# Patient Record
Sex: MALE | Race: OTHER | NOT HISPANIC OR LATINO | URBAN - METROPOLITAN AREA
[De-identification: names, ages, dates, MRNs, and addresses within clinical notes are randomized per-mention and may not be internally consistent; named-entity substitution may affect disease eponyms.]

---

## 2019-07-19 ENCOUNTER — INPATIENT (INPATIENT)
Facility: HOSPITAL | Age: 44
LOS: 0 days | Discharge: LEFT BEFORE DISCHARGE | DRG: 603 | End: 2019-07-19
Attending: UROLOGY | Admitting: UROLOGY
Payer: SELF-PAY

## 2019-07-19 ENCOUNTER — TRANSCRIPTION ENCOUNTER (OUTPATIENT)
Age: 44
End: 2019-07-19

## 2019-07-19 VITALS
HEART RATE: 62 BPM | SYSTOLIC BLOOD PRESSURE: 101 MMHG | OXYGEN SATURATION: 100 % | RESPIRATION RATE: 18 BRPM | TEMPERATURE: 97 F | DIASTOLIC BLOOD PRESSURE: 74 MMHG

## 2019-07-19 VITALS
DIASTOLIC BLOOD PRESSURE: 75 MMHG | TEMPERATURE: 98 F | WEIGHT: 139.99 LBS | RESPIRATION RATE: 20 BRPM | HEART RATE: 80 BPM | OXYGEN SATURATION: 96 % | HEIGHT: 65 IN | SYSTOLIC BLOOD PRESSURE: 119 MMHG

## 2019-07-19 DIAGNOSIS — L03.314 CELLULITIS OF GROIN: ICD-10-CM

## 2019-07-19 LAB
ALBUMIN SERPL ELPH-MCNC: 3.5 G/DL — SIGNIFICANT CHANGE UP (ref 3.5–5)
ALP SERPL-CCNC: 64 U/L — SIGNIFICANT CHANGE UP (ref 40–120)
ALT FLD-CCNC: 22 U/L DA — SIGNIFICANT CHANGE UP (ref 10–60)
ANION GAP SERPL CALC-SCNC: 6 MMOL/L — SIGNIFICANT CHANGE UP (ref 5–17)
APPEARANCE UR: CLEAR — SIGNIFICANT CHANGE UP
AST SERPL-CCNC: 18 U/L — SIGNIFICANT CHANGE UP (ref 10–40)
BASOPHILS # BLD AUTO: 0.04 K/UL — SIGNIFICANT CHANGE UP (ref 0–0.2)
BASOPHILS NFR BLD AUTO: 0.3 % — SIGNIFICANT CHANGE UP (ref 0–2)
BILIRUB SERPL-MCNC: 0.3 MG/DL — SIGNIFICANT CHANGE UP (ref 0.2–1.2)
BILIRUB UR-MCNC: NEGATIVE — SIGNIFICANT CHANGE UP
BUN SERPL-MCNC: 18 MG/DL — SIGNIFICANT CHANGE UP (ref 7–18)
C TRACH RRNA SPEC QL NAA+PROBE: SIGNIFICANT CHANGE UP
CALCIUM SERPL-MCNC: 7.9 MG/DL — LOW (ref 8.4–10.5)
CHLORIDE SERPL-SCNC: 111 MMOL/L — HIGH (ref 96–108)
CO2 SERPL-SCNC: 25 MMOL/L — SIGNIFICANT CHANGE UP (ref 22–31)
COLOR SPEC: YELLOW — SIGNIFICANT CHANGE UP
CREAT SERPL-MCNC: 0.99 MG/DL — SIGNIFICANT CHANGE UP (ref 0.5–1.3)
DIFF PNL FLD: NEGATIVE — SIGNIFICANT CHANGE UP
EOSINOPHIL # BLD AUTO: 0.6 K/UL — HIGH (ref 0–0.5)
EOSINOPHIL NFR BLD AUTO: 4.8 % — SIGNIFICANT CHANGE UP (ref 0–6)
GLUCOSE SERPL-MCNC: 82 MG/DL — SIGNIFICANT CHANGE UP (ref 70–99)
GLUCOSE UR QL: NEGATIVE — SIGNIFICANT CHANGE UP
HCT VFR BLD CALC: 44 % — SIGNIFICANT CHANGE UP (ref 39–50)
HGB BLD-MCNC: 14.6 G/DL — SIGNIFICANT CHANGE UP (ref 13–17)
HIV 1+2 AB+HIV1 P24 AG SERPL QL IA: SIGNIFICANT CHANGE UP
IMM GRANULOCYTES NFR BLD AUTO: 0.4 % — SIGNIFICANT CHANGE UP (ref 0–1.5)
KETONES UR-MCNC: NEGATIVE — SIGNIFICANT CHANGE UP
LEUKOCYTE ESTERASE UR-ACNC: NEGATIVE — SIGNIFICANT CHANGE UP
LYMPHOCYTES # BLD AUTO: 1.09 K/UL — SIGNIFICANT CHANGE UP (ref 1–3.3)
LYMPHOCYTES # BLD AUTO: 8.8 % — LOW (ref 13–44)
MCHC RBC-ENTMCNC: 31 PG — SIGNIFICANT CHANGE UP (ref 27–34)
MCHC RBC-ENTMCNC: 33.2 GM/DL — SIGNIFICANT CHANGE UP (ref 32–36)
MCV RBC AUTO: 93.4 FL — SIGNIFICANT CHANGE UP (ref 80–100)
MONOCYTES # BLD AUTO: 0.79 K/UL — SIGNIFICANT CHANGE UP (ref 0–0.9)
MONOCYTES NFR BLD AUTO: 6.4 % — SIGNIFICANT CHANGE UP (ref 2–14)
N GONORRHOEA RRNA SPEC QL NAA+PROBE: SIGNIFICANT CHANGE UP
NEUTROPHILS # BLD AUTO: 9.81 K/UL — HIGH (ref 1.8–7.4)
NEUTROPHILS NFR BLD AUTO: 79.3 % — HIGH (ref 43–77)
NITRITE UR-MCNC: NEGATIVE — SIGNIFICANT CHANGE UP
NRBC # BLD: 0 /100 WBCS — SIGNIFICANT CHANGE UP (ref 0–0)
PH UR: 6 — SIGNIFICANT CHANGE UP (ref 5–8)
PLATELET # BLD AUTO: 214 K/UL — SIGNIFICANT CHANGE UP (ref 150–400)
POTASSIUM SERPL-MCNC: 4.3 MMOL/L — SIGNIFICANT CHANGE UP (ref 3.5–5.3)
POTASSIUM SERPL-SCNC: 4.3 MMOL/L — SIGNIFICANT CHANGE UP (ref 3.5–5.3)
PROT SERPL-MCNC: 6.5 G/DL — SIGNIFICANT CHANGE UP (ref 6–8.3)
PROT UR-MCNC: NEGATIVE — SIGNIFICANT CHANGE UP
RBC # BLD: 4.71 M/UL — SIGNIFICANT CHANGE UP (ref 4.2–5.8)
RBC # FLD: 12.7 % — SIGNIFICANT CHANGE UP (ref 10.3–14.5)
SODIUM SERPL-SCNC: 142 MMOL/L — SIGNIFICANT CHANGE UP (ref 135–145)
SP GR SPEC: 1.02 — SIGNIFICANT CHANGE UP (ref 1.01–1.02)
SPECIMEN SOURCE: SIGNIFICANT CHANGE UP
T PALLIDUM AB TITR SER: NEGATIVE — SIGNIFICANT CHANGE UP
UROBILINOGEN FLD QL: NEGATIVE — SIGNIFICANT CHANGE UP
WBC # BLD: 12.38 K/UL — HIGH (ref 3.8–10.5)
WBC # FLD AUTO: 12.38 K/UL — HIGH (ref 3.8–10.5)

## 2019-07-19 PROCEDURE — 36415 COLL VENOUS BLD VENIPUNCTURE: CPT

## 2019-07-19 PROCEDURE — 87491 CHLMYD TRACH DNA AMP PROBE: CPT

## 2019-07-19 PROCEDURE — 76870 US EXAM SCROTUM: CPT | Mod: 26

## 2019-07-19 PROCEDURE — 76870 US EXAM SCROTUM: CPT

## 2019-07-19 PROCEDURE — 86780 TREPONEMA PALLIDUM: CPT

## 2019-07-19 PROCEDURE — 87591 N.GONORRHOEAE DNA AMP PROB: CPT

## 2019-07-19 PROCEDURE — 80053 COMPREHEN METABOLIC PANEL: CPT

## 2019-07-19 PROCEDURE — 81003 URINALYSIS AUTO W/O SCOPE: CPT

## 2019-07-19 PROCEDURE — 99285 EMERGENCY DEPT VISIT HI MDM: CPT | Mod: 25

## 2019-07-19 PROCEDURE — 99285 EMERGENCY DEPT VISIT HI MDM: CPT

## 2019-07-19 PROCEDURE — 85027 COMPLETE CBC AUTOMATED: CPT

## 2019-07-19 PROCEDURE — 87389 HIV-1 AG W/HIV-1&-2 AB AG IA: CPT

## 2019-07-19 RX ORDER — BUDESONIDE AND FORMOTEROL FUMARATE DIHYDRATE 160; 4.5 UG/1; UG/1
2 AEROSOL RESPIRATORY (INHALATION)
Refills: 0 | Status: DISCONTINUED | OUTPATIENT
Start: 2019-07-19 | End: 2019-07-19

## 2019-07-19 RX ORDER — SODIUM CHLORIDE 9 MG/ML
1000 INJECTION INTRAMUSCULAR; INTRAVENOUS; SUBCUTANEOUS
Refills: 0 | Status: DISCONTINUED | OUTPATIENT
Start: 2019-07-19 | End: 2019-07-19

## 2019-07-19 RX ORDER — TIOTROPIUM BROMIDE 18 UG/1
1 CAPSULE ORAL; RESPIRATORY (INHALATION) DAILY
Refills: 0 | Status: DISCONTINUED | OUTPATIENT
Start: 2019-07-19 | End: 2019-07-19

## 2019-07-19 RX ORDER — VANCOMYCIN HCL 1 G
1500 VIAL (EA) INTRAVENOUS ONCE
Refills: 0 | Status: COMPLETED | OUTPATIENT
Start: 2019-07-19 | End: 2019-07-19

## 2019-07-19 RX ORDER — ALBUTEROL 90 UG/1
2 AEROSOL, METERED ORAL EVERY 6 HOURS
Refills: 0 | Status: DISCONTINUED | OUTPATIENT
Start: 2019-07-19 | End: 2019-07-19

## 2019-07-19 RX ORDER — HEPARIN SODIUM 5000 [USP'U]/ML
5000 INJECTION INTRAVENOUS; SUBCUTANEOUS EVERY 8 HOURS
Refills: 0 | Status: DISCONTINUED | OUTPATIENT
Start: 2019-07-19 | End: 2019-07-19

## 2019-07-19 RX ORDER — FLUTICASONE PROPIONATE 50 MCG
1 SPRAY, SUSPENSION NASAL
Refills: 0 | Status: DISCONTINUED | OUTPATIENT
Start: 2019-07-19 | End: 2019-07-19

## 2019-07-19 RX ORDER — LORATADINE 10 MG/1
10 TABLET ORAL DAILY
Refills: 0 | Status: DISCONTINUED | OUTPATIENT
Start: 2019-07-19 | End: 2019-07-19

## 2019-07-19 RX ORDER — PIPERACILLIN AND TAZOBACTAM 4; .5 G/20ML; G/20ML
3.38 INJECTION, POWDER, LYOPHILIZED, FOR SOLUTION INTRAVENOUS ONCE
Refills: 0 | Status: COMPLETED | OUTPATIENT
Start: 2019-07-19 | End: 2019-07-19

## 2019-07-19 RX ORDER — DIPHENHYDRAMINE HCL 50 MG
25 CAPSULE ORAL ONCE
Refills: 0 | Status: DISCONTINUED | OUTPATIENT
Start: 2019-07-19 | End: 2019-07-19

## 2019-07-19 RX ORDER — MONTELUKAST 4 MG/1
10 TABLET, CHEWABLE ORAL DAILY
Refills: 0 | Status: DISCONTINUED | OUTPATIENT
Start: 2019-07-19 | End: 2019-07-19

## 2019-07-19 RX ADMIN — Medication 300 MILLIGRAM(S): at 10:21

## 2019-07-19 RX ADMIN — PIPERACILLIN AND TAZOBACTAM 200 GRAM(S): 4; .5 INJECTION, POWDER, LYOPHILIZED, FOR SOLUTION INTRAVENOUS at 12:10

## 2019-07-19 RX ADMIN — PIPERACILLIN AND TAZOBACTAM 3.38 GRAM(S): 4; .5 INJECTION, POWDER, LYOPHILIZED, FOR SOLUTION INTRAVENOUS at 12:40

## 2019-07-19 RX ADMIN — Medication 1500 MILLIGRAM(S): at 12:00

## 2019-07-19 NOTE — H&P ADULT - NSHPLABSRESULTS_GEN_ALL_CORE
14.6   12.38 )-----------( 214      ( 19 Jul 2019 09:28 )             44.0   07-19    142  |  111<H>  |  18  ----------------------------<  82  4.3   |  25  |  0.99    Ca    7.9<L>      19 Jul 2019 09:28    TPro  6.5  /  Alb  3.5  /  TBili  0.3  /  DBili  x   /  AST  18  /  ALT  22  /  AlkPhos  64  07-19  < from: US Testicles (07.19.19 @ 09:57) >    RIGHT:  Right testicle measures 4.1 x 1.9 x 3.3cm. No testicular cyst or mass   seen.  Right epididymis is within normal limits.  Normal Doppler flow to the right testis - no right testicular torsion   noted.  Mild right hydrocele is noted.  No right-sided varicocele.  There is right scrotal wall thickening (1.0cm) which may be due to   inflammatory process.    LEFT:   The left testis measures 3.7 x 1.9 x 3.6cm. No left testicular cyst or   mass.  The left epididymis is within normal limits.  There is normal Doppler flow to the left testis - no left testicular   torsion is noted.  Left-sided hydrocele is present.  No left-sided varicocele.  There is left scrotal wall thickening (9mm) -which may be due to   inflammatory process.    Miscellaneous:  The penis was scanned. There is generalized penile edema of uncertain   etiology - ?secondary to inflammatory process.    IMPRESSION:   1. No bilateral testicular cyst or mass.   2. No bilateral testicular torsion.  3. Mild bilateral hydroceles.  4. Scrotal wall thickening - right more than left - may be due to   inflammatory process.  5. Generalized penile edema of uncertain etiology - may be inflammatory.  6. Recommend urological consult to further assess. Short interval   followup sonogram (within three months) is recommended to reassess.   This report to ER via PACs system.    < end of copied text >

## 2019-07-19 NOTE — H&P ADULT - HISTORY OF PRESENT ILLNESS
44 y/o M with a significant PMHx of asthma presents to the ED with complaints of rash to penis and groin. Patient states he was in [place] 10 days ago, where he was exposed to poison ivy. Patient states swelling to penis and testicle have increased over the past day with associated redness. He reports applying an unknown ointment yesterday morning and the redness and swelling became worse. Denies difficulty urinating, history of STDs or syphilis, fever, chills or any other acute complaints.

## 2019-07-19 NOTE — ED ADULT NURSE REASSESSMENT NOTE - NS ED NURSE REASSESS COMMENT FT1
patient wanted to leave AMA, risks and benefits explained by DR Patel, still insist going, patient says I don't have any infection, iv lock removed, walked out of ed with out waiting for signing AMA form.

## 2019-07-19 NOTE — ED ADULT TRIAGE NOTE - CHIEF COMPLAINT QUOTE
Redness, swelling to penis and rash to both legs s/p exposure to Poison Ivy x 10 days. Redness, swelling to penis and rash to both legs s/p exposure to Poison Ivy x 10 days. P/s unable to urinate.

## 2019-07-19 NOTE — H&P ADULT - PROBLEM SELECTOR PLAN 1
1) admit to surgery Dr. Hui  2) IV antibiotics, zosyn  3) ID consult, Dr. Tyson  4) monitor scrotal and penile skin changes  5) benadryl  7) case and plan discussed with Dr. Hui and agrees

## 2019-07-19 NOTE — ED PROVIDER NOTE - CARE PLAN
Principal Discharge DX:	Cellulitis of groin  Secondary Diagnosis:	Rash  Secondary Diagnosis:	Penile swelling

## 2019-07-19 NOTE — ED PROVIDER NOTE - PROGRESS NOTE DETAILS
Pt adamant to leave, states he "has to collect paycheck", left prior to signing AMA. Risks of leaving stated to pt including worsening of infection/death.    Patient desires to leave emergency department against medical advice, prior to completion of my desired evaluation and treatment plan.  Patient's mental status is normal, patient is fully alert and oriented x person, place and time.  Patient demonstrates clear reasoning capabilities and capacity to make this decision.  Patient fully understands the explained risks involved with this decision including worsening of medical condition, missed and or delayed diagnosis, permanent disability and death.  All alternative options given to patient and still desires discharge against medical advice from ED and will follow up as an outpatient.  Patient given the option to return to ED at any time to have further evaluation and treatment.

## 2019-07-19 NOTE — H&P ADULT - NSHPPHYSICALEXAM_GEN_ALL_CORE
Gen: A&Ox2, NAD  Skin: no rashes, no jaundice  Abdomen: soft, NT, ND, no suprapubic tenderness  : normal external genitalia, penile erythema and edema with skin thickening, tenderness, no drainage, no crepitus, no abscess collection, scrotum with mild skin erythema, no palpable masses or areas of induration, no crepitus, no abscess collection

## 2019-07-19 NOTE — ED PROVIDER NOTE - OBJECTIVE STATEMENT
44 y/o M with a significant PMHx of asthma presents to the ED with complaints of redness/swelling to penis. Patient states he came into contact with poison ivy 10 days ago that started on his legs. Patient reports noticing another bump last night on R forearm. Patient then notes swelling and redness to penis that became very irritated last night. Patient reports being in [place] recently, where he believed he came into contact with poison ivy. Patient notes using lotion with no relief. Denies being sexually active. 44 y/o M with a significant PMHx of asthma presents to the ED with complaints of rash to penis and groin. Patient states he was in [place] 10 days ago, where he was exposed to poison ivy. Patient states swelling to penis and testicle have increased over the past day with associated redness. Denies difficulty urinating, history of STDs or syphilis, fever, chills or any other acute complaints.

## 2019-07-19 NOTE — ED ADULT NURSE NOTE - CHIEF COMPLAINT QUOTE
Redness, swelling to penis and rash to both legs s/p exposure to Poison Ivy x 10 days. P/s unable to urinate.

## 2019-07-19 NOTE — ED ADULT NURSE NOTE - OBJECTIVE STATEMENT
p[resented to ed c/o penile swelling and redness, patient said he got exposed to poison ivy 10 days ago

## 2019-07-19 NOTE — ED PROVIDER NOTE - CLINICAL SUMMARY MEDICAL DECISION MAKING FREE TEXT BOX
44 y/o M presents to the ED with rash and swelling to groin and history of ? poison ivy. Will obtain labs, US, urology consult and give 1 dose of antibiotics. 44 y/o M presents to the ED with rash and swelling to groin and history of ? poison ivy. Will obtain labs, US, urology consult and give 1 dose of antibiotics. no evidence of Kaylee's presently.

## 2019-10-21 ENCOUNTER — EMERGENCY (EMERGENCY)
Facility: HOSPITAL | Age: 44
LOS: 1 days | Discharge: HOME | End: 2019-10-21
Attending: EMERGENCY MEDICINE | Admitting: EMERGENCY MEDICINE
Payer: MEDICAID

## 2019-10-21 VITALS
RESPIRATION RATE: 18 BRPM | SYSTOLIC BLOOD PRESSURE: 141 MMHG | HEART RATE: 100 BPM | OXYGEN SATURATION: 99 % | DIASTOLIC BLOOD PRESSURE: 96 MMHG

## 2019-10-21 VITALS
TEMPERATURE: 98 F | HEART RATE: 59 BPM | DIASTOLIC BLOOD PRESSURE: 96 MMHG | OXYGEN SATURATION: 98 % | RESPIRATION RATE: 18 BRPM | SYSTOLIC BLOOD PRESSURE: 146 MMHG

## 2019-10-21 LAB
ALBUMIN SERPL ELPH-MCNC: 4.6 G/DL — SIGNIFICANT CHANGE UP (ref 3.3–5)
ALP SERPL-CCNC: 77 U/L — SIGNIFICANT CHANGE UP (ref 40–120)
ALT FLD-CCNC: 19 U/L — SIGNIFICANT CHANGE UP (ref 4–41)
AMPHET UR-MCNC: NEGATIVE — SIGNIFICANT CHANGE UP
ANION GAP SERPL CALC-SCNC: 15 MMO/L — HIGH (ref 7–14)
APAP SERPL-MCNC: < 15 UG/ML — LOW (ref 15–25)
APPEARANCE UR: CLEAR — SIGNIFICANT CHANGE UP
AST SERPL-CCNC: 28 U/L — SIGNIFICANT CHANGE UP (ref 4–40)
BACTERIA # UR AUTO: NEGATIVE — SIGNIFICANT CHANGE UP
BARBITURATES UR SCN-MCNC: NEGATIVE — SIGNIFICANT CHANGE UP
BASOPHILS # BLD AUTO: 0.07 K/UL — SIGNIFICANT CHANGE UP (ref 0–0.2)
BASOPHILS NFR BLD AUTO: 0.7 % — SIGNIFICANT CHANGE UP (ref 0–2)
BENZODIAZ UR-MCNC: NEGATIVE — SIGNIFICANT CHANGE UP
BILIRUB SERPL-MCNC: < 0.2 MG/DL — LOW (ref 0.2–1.2)
BILIRUB UR-MCNC: NEGATIVE — SIGNIFICANT CHANGE UP
BLOOD UR QL VISUAL: NEGATIVE — SIGNIFICANT CHANGE UP
BUN SERPL-MCNC: 25 MG/DL — HIGH (ref 7–23)
CALCIUM SERPL-MCNC: 9.4 MG/DL — SIGNIFICANT CHANGE UP (ref 8.4–10.5)
CANNABINOIDS UR-MCNC: POSITIVE — SIGNIFICANT CHANGE UP
CHLORIDE SERPL-SCNC: 102 MMOL/L — SIGNIFICANT CHANGE UP (ref 98–107)
CO2 SERPL-SCNC: 24 MMOL/L — SIGNIFICANT CHANGE UP (ref 22–31)
COCAINE METAB.OTHER UR-MCNC: NEGATIVE — SIGNIFICANT CHANGE UP
COLOR SPEC: YELLOW — SIGNIFICANT CHANGE UP
CREAT SERPL-MCNC: 1.05 MG/DL — SIGNIFICANT CHANGE UP (ref 0.5–1.3)
EOSINOPHIL # BLD AUTO: 0.11 K/UL — SIGNIFICANT CHANGE UP (ref 0–0.5)
EOSINOPHIL NFR BLD AUTO: 1.1 % — SIGNIFICANT CHANGE UP (ref 0–6)
ETHANOL BLD-MCNC: < 10 MG/DL — SIGNIFICANT CHANGE UP
GLUCOSE SERPL-MCNC: 85 MG/DL — SIGNIFICANT CHANGE UP (ref 70–99)
GLUCOSE UR-MCNC: NEGATIVE — SIGNIFICANT CHANGE UP
HCT VFR BLD CALC: 49.3 % — SIGNIFICANT CHANGE UP (ref 39–50)
HGB BLD-MCNC: 16.1 G/DL — SIGNIFICANT CHANGE UP (ref 13–17)
HYALINE CASTS # UR AUTO: NEGATIVE — SIGNIFICANT CHANGE UP
IMM GRANULOCYTES NFR BLD AUTO: 0.3 % — SIGNIFICANT CHANGE UP (ref 0–1.5)
KETONES UR-MCNC: NEGATIVE — SIGNIFICANT CHANGE UP
LEUKOCYTE ESTERASE UR-ACNC: NEGATIVE — SIGNIFICANT CHANGE UP
LYMPHOCYTES # BLD AUTO: 1.37 K/UL — SIGNIFICANT CHANGE UP (ref 1–3.3)
LYMPHOCYTES # BLD AUTO: 13.5 % — SIGNIFICANT CHANGE UP (ref 13–44)
MCHC RBC-ENTMCNC: 30.5 PG — SIGNIFICANT CHANGE UP (ref 27–34)
MCHC RBC-ENTMCNC: 32.7 % — SIGNIFICANT CHANGE UP (ref 32–36)
MCV RBC AUTO: 93.4 FL — SIGNIFICANT CHANGE UP (ref 80–100)
METHADONE UR-MCNC: NEGATIVE — SIGNIFICANT CHANGE UP
MONOCYTES # BLD AUTO: 1.12 K/UL — HIGH (ref 0–0.9)
MONOCYTES NFR BLD AUTO: 11 % — SIGNIFICANT CHANGE UP (ref 2–14)
NEUTROPHILS # BLD AUTO: 7.46 K/UL — HIGH (ref 1.8–7.4)
NEUTROPHILS NFR BLD AUTO: 73.4 % — SIGNIFICANT CHANGE UP (ref 43–77)
NITRITE UR-MCNC: NEGATIVE — SIGNIFICANT CHANGE UP
NRBC # FLD: 0 K/UL — SIGNIFICANT CHANGE UP (ref 0–0)
OPIATES UR-MCNC: NEGATIVE — SIGNIFICANT CHANGE UP
OXYCODONE UR-MCNC: NEGATIVE — SIGNIFICANT CHANGE UP
PCP UR-MCNC: NEGATIVE — SIGNIFICANT CHANGE UP
PH UR: 6 — SIGNIFICANT CHANGE UP (ref 5–8)
PLATELET # BLD AUTO: 267 K/UL — SIGNIFICANT CHANGE UP (ref 150–400)
PMV BLD: 10.7 FL — SIGNIFICANT CHANGE UP (ref 7–13)
POTASSIUM SERPL-MCNC: 5.1 MMOL/L — SIGNIFICANT CHANGE UP (ref 3.5–5.3)
POTASSIUM SERPL-SCNC: 5.1 MMOL/L — SIGNIFICANT CHANGE UP (ref 3.5–5.3)
PROT SERPL-MCNC: 7.5 G/DL — SIGNIFICANT CHANGE UP (ref 6–8.3)
PROT UR-MCNC: 20 — SIGNIFICANT CHANGE UP
RBC # BLD: 5.28 M/UL — SIGNIFICANT CHANGE UP (ref 4.2–5.8)
RBC # FLD: 13.1 % — SIGNIFICANT CHANGE UP (ref 10.3–14.5)
RBC CASTS # UR COMP ASSIST: SIGNIFICANT CHANGE UP (ref 0–?)
SALICYLATES SERPL-MCNC: < 5 MG/DL — LOW (ref 15–30)
SODIUM SERPL-SCNC: 141 MMOL/L — SIGNIFICANT CHANGE UP (ref 135–145)
SP GR SPEC: 1.03 — SIGNIFICANT CHANGE UP (ref 1–1.04)
SQUAMOUS # UR AUTO: SIGNIFICANT CHANGE UP
UROBILINOGEN FLD QL: NORMAL — SIGNIFICANT CHANGE UP
VALPROATE SERPL-MCNC: 15.2 UG/ML — LOW (ref 50–100)
WBC # BLD: 10.16 K/UL — SIGNIFICANT CHANGE UP (ref 3.8–10.5)
WBC # FLD AUTO: 10.16 K/UL — SIGNIFICANT CHANGE UP (ref 3.8–10.5)
WBC UR QL: SIGNIFICANT CHANGE UP (ref 0–?)

## 2019-10-21 PROCEDURE — 99283 EMERGENCY DEPT VISIT LOW MDM: CPT

## 2019-10-21 NOTE — ED PROVIDER NOTE - PROGRESS NOTE DETAILS
Jasmin NP- pt calm,  cooprative no behavioural distress, denies SI/ HI, AH, VH. Med clearance done pt will be discharge and follow up outpatient. Jasmin NP- collateral info from Mallroy Brown who is follow up SW at [facility], states pt was recently hospitalized and has a follow up appointment with them. Char: pt signed out to me by DR Hernández pending labs and discussion with family. Family corroborates story and have no concerns. PT has no SI/HI or hallucinations. pt also has outpatient f/u will d/c home.

## 2019-10-21 NOTE — ED PROVIDER NOTE - NSFOLLOWUPCLINICS_GEN_ALL_ED_FT
[facility]  Behavioral Health  [address] [place] [address]  Phone: [phone]  Fax:   Follow Up Time:
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
## 2019-10-21 NOTE — ED PROVIDER NOTE - PATIENT PORTAL LINK FT
You can access the FollowMyHealth Patient Portal offered by [facility] by registering at the following website: [email or web address]. By joining [facility]’s FollowMyHealth portal, you will also be able to view your health information using other applications (apps) compatible with our system.

## 2019-10-21 NOTE — ED BEHAVIORAL HEALTH NOTE - BEHAVIORAL HEALTH NOTE
Worker spoke with patient’s mother Yanely Titus ([phone]) for collateral information. All information is as per Ms. Titus:  Ms. Titus states that she was not aware of the patient presenting to the emergency room today. She states that the patient was recently admitted to [facility] from 10/9-10/18 because of his Bipolar diagnosis. She states that the patient has been doing well since his discharge from [facility] and he told her yesterday that he had an appointment today for his outpatient at [facility] and a job interview. She states that the patient has been compliant with his medications. She states that the patient has a history of using cocaine but is not sure if he is using now. She states that the patient does not have a good relationship with his father and she is not sure if he was with the father today because she has not spoken to him for 18 years. She states she is not aware of the patient having any SI/HI thoughts and today he ate breakfast and seemed as if he was doing well. She states that the patient was supposed to have an outpatient apt at 1pm. Case discussed with SINDY Castellanos. Worker spoke with patient’s mother Yanely Titus ([phone]) for collateral information. All information is as per Ms. Titus:  Ms. Titus states that she was not aware of the patient presenting to the emergency room today. She states that the patient was recently admitted to [facility] from 10/9-10/18 because of his Bipolar diagnosis. She states that the patient has been doing well since his discharge from [facility] and he told her yesterday that he had an appointment today for his outpatient at [facility] and a job interview. She states that the patient has been compliant with his medications. She states that the patient has a history of using cocaine but is not sure if he is using now. She states that the patient does not have a good relationship with his father and she is not sure if he was with the father today because she has not spoken to him for 18 years. She states she is not aware of the patient having any SI/HI thoughts and today he ate breakfast and seemed as if he was doing well. She states that the patient was supposed to have an outpatient apt at 1pm. Case discussed with SINDY Castellanos. Worker provided metro card for patient upon discharge.

## 2019-10-21 NOTE — ED PROVIDER NOTE - NS_ ATTENDINGSCRIBEDETAILS _ED_A_ED_FT
Pt signed out pt SINDY Castellanos to follow up psych recommendations. Initial evaluation and plan by me.
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
## 2019-10-21 NOTE — ED PROVIDER NOTE - CONSTITUTIONAL, MLM
normal... Very well dressed and groomed. Wearing a suit and tie. Well appearing, well nourished, awake, alert, oriented to person, place, time/situation and in no apparent distress.
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
## 2019-11-01 ENCOUNTER — OUTPATIENT (OUTPATIENT)
Dept: OUTPATIENT SERVICES | Facility: HOSPITAL | Age: 44
LOS: 1 days | End: 2019-11-01
Payer: MEDICAID

## 2019-11-01 PROCEDURE — G9001: CPT

## 2019-11-11 DIAGNOSIS — Z71.89 OTHER SPECIFIED COUNSELING: ICD-10-CM

## 2020-06-18 NOTE — ED ADULT NURSE NOTE - CAS DISCH ACCOMP BY
Implemented All Universal Safety Interventions:  [place] to call system. Call bell, personal items and telephone within reach. Instruct patient to call for assistance. Room bathroom lighting operational. Non-slip footwear when patient is off stretcher. Physically safe environment: no spills, clutter or unnecessary equipment. Stretcher in lowest position, wheels locked, appropriate side rails in place. Self

## 2021-02-21 ENCOUNTER — INPATIENT (INPATIENT)
Facility: HOSPITAL | Age: 46
LOS: 2 days | Discharge: HOME | DRG: 896 | End: 2021-02-24
Attending: GENERAL PRACTICE | Admitting: GENERAL PRACTICE
Payer: MEDICAID

## 2021-02-21 VITALS
HEIGHT: 65 IN | SYSTOLIC BLOOD PRESSURE: 170 MMHG | OXYGEN SATURATION: 100 % | WEIGHT: 149.91 LBS | DIASTOLIC BLOOD PRESSURE: 110 MMHG | RESPIRATION RATE: 30 BRPM | HEART RATE: 119 BPM

## 2021-02-21 DIAGNOSIS — J96.01 ACUTE RESPIRATORY FAILURE WITH HYPOXIA: ICD-10-CM

## 2021-02-21 LAB
ANION GAP SERPL CALC-SCNC: 5 MMOL/L — SIGNIFICANT CHANGE UP (ref 5–17)
BASE EXCESS BLDA CALC-SCNC: -4.8 MMOL/L — LOW (ref -2–2)
BASOPHILS # BLD AUTO: 0.07 K/UL — SIGNIFICANT CHANGE UP (ref 0–0.2)
BASOPHILS NFR BLD AUTO: 0.6 % — SIGNIFICANT CHANGE UP (ref 0–2)
BLOOD GAS COMMENTS ARTERIAL: SIGNIFICANT CHANGE UP
BUN SERPL-MCNC: 24 MG/DL — HIGH (ref 7–18)
CALCIUM SERPL-MCNC: 8.6 MG/DL — SIGNIFICANT CHANGE UP (ref 8.4–10.5)
CHLORIDE SERPL-SCNC: 104 MMOL/L — SIGNIFICANT CHANGE UP (ref 96–108)
CO2 SERPL-SCNC: 30 MMOL/L — SIGNIFICANT CHANGE UP (ref 22–31)
CREAT SERPL-MCNC: 1.14 MG/DL — SIGNIFICANT CHANGE UP (ref 0.5–1.3)
EOSINOPHIL # BLD AUTO: 1.48 K/UL — HIGH (ref 0–0.5)
EOSINOPHIL NFR BLD AUTO: 12.2 % — HIGH (ref 0–6)
GLUCOSE SERPL-MCNC: 170 MG/DL — HIGH (ref 70–99)
HCO3 BLDA-SCNC: 23 MMOL/L — SIGNIFICANT CHANGE UP (ref 23–27)
HCT VFR BLD CALC: 48.3 % — SIGNIFICANT CHANGE UP (ref 39–50)
HGB BLD-MCNC: 14.8 G/DL — SIGNIFICANT CHANGE UP (ref 13–17)
HOROWITZ INDEX BLDA+IHG-RTO: 100 — SIGNIFICANT CHANGE UP
IMM GRANULOCYTES NFR BLD AUTO: 0.2 % — SIGNIFICANT CHANGE UP (ref 0–1.5)
LYMPHOCYTES # BLD AUTO: 1.83 K/UL — SIGNIFICANT CHANGE UP (ref 1–3.3)
LYMPHOCYTES # BLD AUTO: 15 % — SIGNIFICANT CHANGE UP (ref 13–44)
MAGNESIUM SERPL-MCNC: 2.2 MG/DL — SIGNIFICANT CHANGE UP (ref 1.6–2.6)
MCHC RBC-ENTMCNC: 27.9 PG — SIGNIFICANT CHANGE UP (ref 27–34)
MCHC RBC-ENTMCNC: 30.6 GM/DL — LOW (ref 32–36)
MCV RBC AUTO: 91 FL — SIGNIFICANT CHANGE UP (ref 80–100)
MONOCYTES # BLD AUTO: 0.92 K/UL — HIGH (ref 0–0.9)
MONOCYTES NFR BLD AUTO: 7.6 % — SIGNIFICANT CHANGE UP (ref 2–14)
NEUTROPHILS # BLD AUTO: 7.83 K/UL — HIGH (ref 1.8–7.4)
NEUTROPHILS NFR BLD AUTO: 64.4 % — SIGNIFICANT CHANGE UP (ref 43–77)
NRBC # BLD: 0 /100 WBCS — SIGNIFICANT CHANGE UP (ref 0–0)
PCO2 BLDA: 54 MMHG — HIGH (ref 32–46)
PCP SPEC-MCNC: SIGNIFICANT CHANGE UP
PH BLDA: 7.24 — LOW (ref 7.35–7.45)
PLATELET # BLD AUTO: 375 K/UL — SIGNIFICANT CHANGE UP (ref 150–400)
PO2 BLDA: 361 MMHG — HIGH (ref 74–108)
POTASSIUM SERPL-MCNC: 4 MMOL/L — SIGNIFICANT CHANGE UP (ref 3.5–5.3)
POTASSIUM SERPL-SCNC: 4 MMOL/L — SIGNIFICANT CHANGE UP (ref 3.5–5.3)
RAPID RVP RESULT: SIGNIFICANT CHANGE UP
RBC # BLD: 5.31 M/UL — SIGNIFICANT CHANGE UP (ref 4.2–5.8)
RBC # FLD: 13.1 % — SIGNIFICANT CHANGE UP (ref 10.3–14.5)
SAO2 % BLDA: 99 % — HIGH (ref 92–96)
SARS-COV-2 IGG SERPL QL IA: POSITIVE
SARS-COV-2 IGM SERPL IA-ACNC: 34.6 INDEX — HIGH
SARS-COV-2 RNA SPEC QL NAA+PROBE: SIGNIFICANT CHANGE UP
SODIUM SERPL-SCNC: 139 MMOL/L — SIGNIFICANT CHANGE UP (ref 135–145)
WBC # BLD: 12.16 K/UL — HIGH (ref 3.8–10.5)
WBC # FLD AUTO: 12.16 K/UL — HIGH (ref 3.8–10.5)

## 2021-02-21 PROCEDURE — 71045 X-RAY EXAM CHEST 1 VIEW: CPT | Mod: 26

## 2021-02-21 PROCEDURE — 99291 CRITICAL CARE FIRST HOUR: CPT

## 2021-02-21 RX ORDER — BUDESONIDE AND FORMOTEROL FUMARATE DIHYDRATE 160; 4.5 UG/1; UG/1
2 AEROSOL RESPIRATORY (INHALATION)
Refills: 0 | Status: DISCONTINUED | OUTPATIENT
Start: 2021-02-21 | End: 2021-02-24

## 2021-02-21 RX ORDER — IPRATROPIUM/ALBUTEROL SULFATE 18-103MCG
3 AEROSOL WITH ADAPTER (GRAM) INHALATION EVERY 6 HOURS
Refills: 0 | Status: DISCONTINUED | OUTPATIENT
Start: 2021-02-21 | End: 2021-02-21

## 2021-02-21 RX ORDER — MAGNESIUM SULFATE 500 MG/ML
2 VIAL (ML) INJECTION ONCE
Refills: 0 | Status: COMPLETED | OUTPATIENT
Start: 2021-02-21 | End: 2021-02-21

## 2021-02-21 RX ORDER — ALBUTEROL 90 UG/1
2.5 AEROSOL, METERED ORAL
Refills: 0 | Status: DISCONTINUED | OUTPATIENT
Start: 2021-02-21 | End: 2021-02-21

## 2021-02-21 RX ORDER — ENOXAPARIN SODIUM 100 MG/ML
40 INJECTION SUBCUTANEOUS DAILY
Refills: 0 | Status: DISCONTINUED | OUTPATIENT
Start: 2021-02-21 | End: 2021-02-24

## 2021-02-21 RX ORDER — TIOTROPIUM BROMIDE 18 UG/1
1 CAPSULE ORAL; RESPIRATORY (INHALATION) DAILY
Refills: 0 | Status: DISCONTINUED | OUTPATIENT
Start: 2021-02-21 | End: 2021-02-24

## 2021-02-21 RX ORDER — PANTOPRAZOLE SODIUM 20 MG/1
40 TABLET, DELAYED RELEASE ORAL DAILY
Refills: 0 | Status: DISCONTINUED | OUTPATIENT
Start: 2021-02-21 | End: 2021-02-23

## 2021-02-21 RX ORDER — IPRATROPIUM/ALBUTEROL SULFATE 18-103MCG
3 AEROSOL WITH ADAPTER (GRAM) INHALATION ONCE
Refills: 0 | Status: COMPLETED | OUTPATIENT
Start: 2021-02-21 | End: 2021-02-21

## 2021-02-21 RX ORDER — LORATADINE 10 MG/1
10 TABLET ORAL DAILY
Refills: 0 | Status: DISCONTINUED | OUTPATIENT
Start: 2021-02-21 | End: 2021-02-24

## 2021-02-21 RX ORDER — MONTELUKAST 4 MG/1
10 TABLET, CHEWABLE ORAL DAILY
Refills: 0 | Status: DISCONTINUED | OUTPATIENT
Start: 2021-02-21 | End: 2021-02-24

## 2021-02-21 RX ORDER — ALBUTEROL 90 UG/1
1 AEROSOL, METERED ORAL EVERY 4 HOURS
Refills: 0 | Status: COMPLETED | OUTPATIENT
Start: 2021-02-21 | End: 2022-01-20

## 2021-02-21 RX ORDER — ALBUTEROL 90 UG/1
1 AEROSOL, METERED ORAL EVERY 4 HOURS
Refills: 0 | Status: DISCONTINUED | OUTPATIENT
Start: 2021-02-21 | End: 2021-02-24

## 2021-02-21 RX ADMIN — Medication 3 MILLILITER(S): at 10:50

## 2021-02-21 RX ADMIN — PANTOPRAZOLE SODIUM 40 MILLIGRAM(S): 20 TABLET, DELAYED RELEASE ORAL at 14:21

## 2021-02-21 RX ADMIN — Medication 125 MILLIGRAM(S): at 11:03

## 2021-02-21 RX ADMIN — ALBUTEROL 2.5 MILLIGRAM(S): 90 AEROSOL, METERED ORAL at 10:40

## 2021-02-21 RX ADMIN — ALBUTEROL 1 PUFF(S): 90 AEROSOL, METERED ORAL at 23:36

## 2021-02-21 RX ADMIN — Medication 3 MILLILITER(S): at 11:05

## 2021-02-21 RX ADMIN — Medication 1 MILLIGRAM(S): at 21:58

## 2021-02-21 RX ADMIN — Medication 40 MILLIGRAM(S): at 17:24

## 2021-02-21 RX ADMIN — Medication 200 GRAM(S): at 10:45

## 2021-02-21 RX ADMIN — Medication 0.25 MILLIGRAM(S): at 11:35

## 2021-02-21 RX ADMIN — ENOXAPARIN SODIUM 40 MILLIGRAM(S): 100 INJECTION SUBCUTANEOUS at 14:21

## 2021-02-21 RX ADMIN — Medication 40 MILLIGRAM(S): at 23:34

## 2021-02-21 NOTE — H&P ADULT - NSHPSOCIALHISTORY_GEN_ALL_CORE
former smoker and former alcohol intake   Denies any present alcohol abuse, denies smoking presently, Reports passive smoking, mother smokes   Lives at home with his mother, who smokes

## 2021-02-21 NOTE — H&P ADULT - HISTORY OF PRESENT ILLNESS
44 yo male with PMH of Asthma, never intubated or on BIPAP for asthma in past, presented with SOB and mild wheezing since yesterday. Pt reports following up with his pulmonologist and was prescribed steroids but reports that he was waiting for steroids to be delivered and did not take any dose yet.  No fever, cough, cp, ap, n/v/d. Last admitted for asthma more than 1 year ago. Denies any sick contact.  44 yo male with PMH of Asthma, never intubated or on BIPAP for asthma in past, presented with SOB and mild wheezing since yesterday. Pt reports following up with his pulmonologist and was prescribed steroids but reports that he was waiting for steroids to be delivered and did not take any dose yet.  No fever, cough, cp, ap, n/v/d. Last admitted for asthma more than 1 year ago. Denies any sick contact.  Pt reports feeling better after medication in ED and started on Bipap, and noted to be tachycardic on Tele monitor at bedside.

## 2021-02-21 NOTE — ED PROVIDER NOTE - CLINICAL SUMMARY MEDICAL DECISION MAKING FREE TEXT BOX
Patient with severe asthma exacerbation requiring bipap. Needs ICU admission for airway monitoring. Patient with severe asthma exacerbation requiring bipap. Needs ICU admission for airway monitoring.    Critical care time: 60 minutes. Additional history taking, interpretation of results, consultation with other physicians.

## 2021-02-21 NOTE — ED ADULT NURSE NOTE - OBJECTIVE STATEMENT
pr is here for difficulty breathing. pt stated that difficulty breathing since in the morning, denied chest pain headache, c/o restless and anxiety, diaphoretic, breathing with accessory muscle.

## 2021-02-21 NOTE — H&P ADULT - NSHPPHYSICALEXAM_GEN_ALL_CORE
ICU Vital Signs Last 24 Hrs  T(C): --  T(F): --  HR: 120 (21 Feb 2021 11:44) (118 - 120)  BP: 141/93 (21 Feb 2021 11:44) (141/93 - 170/110)  BP(mean): --  ABP: --  ABP(mean): --  RR: 28 (21 Feb 2021 11:44) (28 - 30)  SpO2: 100% (21 Feb 2021 11:44) (97% - 100%)      PHYSICAL EXAM:  GENERAL: male in bed, on bipap, in mild respiratory distress   HEENT: Normocephalic;  conjunctivae and sclerae clear; moist mucous membranes;   NECK : supple  CHEST/LUNG: bilateral breath sounds decreased, bilateral wheeze, no crackles   HEART: tachycardic   ABDOMEN: Soft, Nontender, Nondistended; Bowel sounds present  EXTREMITIES: no cyanosis; no edema; no calf tenderness  SKIN: warm and dry; no rash  NERVOUS SYSTEM:  Awake and alert; Oriented  to place, person and time ; no new deficits

## 2021-02-21 NOTE — ED PROVIDER NOTE - OBJECTIVE STATEMENT
Patient report he woke up with morning with severe shortness of breath. Had mild wheezing yesterday, saw his pulmonologist who prescribed steroids but patient did not start it yet. No fever, cough, cp, ap, n/v/d. Last admitted for asthma more than 1 year ago. Unsure if he has been in the ICU but has never been intubated.

## 2021-02-21 NOTE — ED PROVIDER NOTE - CARE PLAN
Principal Discharge DX:	Acute respiratory failure with hypoxia  Secondary Diagnosis:	Persistent asthma with acute exacerbation, unspecified asthma severity

## 2021-02-21 NOTE — H&P ADULT - ATTENDING COMMENTS
44 yo male with PMH of Asthma, never intubated or on BIPAP for asthma in past, presented with SOB and mild wheezing since yesterday. Pt reports following up with his pulmonologist and was prescribed steroids but reports that he was waiting for steroids to be delivered and didnot take any dose yet.  No fever, cough, cp, ap, n/v/d. Last admitted for asthma more than 1 year ago. Denies any sick contact.       Assessment:   1. Acute Hypoxic respiratory failure due to severe Asthma exacerbation   2. Leukocytosis   3. Tachycardia       Plan  -admit to icu   -continue BiPaP and will oral intubate as needed  -solumedrol   -albuterol  -continue antibx x 2 more day  -continue singulair   -hemodynamic support  -ivf  -hemodynamic monitoring   -dvt/GI prophy

## 2021-02-21 NOTE — H&P ADULT - NSICDXFAMILYHX_GEN_ALL_CORE_FT
FAMILY HISTORY:  Family history of asthma  No pertinent family history in first degree relatives

## 2021-02-21 NOTE — H&P ADULT - ASSESSMENT
ASSESSMENT AND PLAN:  46 yo male with PMH of Asthma, never intubated or on BIPAP for asthma in past, presented with SOB and mild wheezing since yesterday. Pt reports following up with his pulmonologist and was prescribed steroids but reports that he was waiting for steroids to be delivered and didnot take any dose yet.  No fever, cough, cp, ap, n/v/d. Last admitted for asthma more than 1 year ago. Denies any sick contact.       Assessment:   1. Acute Hypoxic respiratory failure due to severe Asthma exacerbation   2. Leukocytosis       =================== Neuro============================  Alert and oriented x 3, no active issues     ================= Cardiovascular==========================  No active issues     ================- Pulm=================================  Acute Hypoxic respiratory failure due to severe Asthma exacerbation.   -p/w SOB, wheeze,   -CXR: pending   - ED course:  Solumed, Duo Neb  -c/w Duo neb, symbicort   -C/w [id number]   - Levofloxacin for prophylactic PNA coverage   - follow daily Peak flows   - Presently on BIPAP, will try to taper to NC or HF       ==================ID===================================  Prophylactic coverage for PNA     ================= Nephro================================  No active issues   =================GI====================================  NPO for now as he is on Bipap, will try to taper to HF/NC and start diet     No active issues   ================ Heme==================================  Leukocytosis :   most likely reactive   monitor WBC count daily   =================Endocrine===============================  No active issues   Follow A1c, tSH   ================= Skin/Catheters============================  No rashes. Peripheral IV lines.     - =================Prophylaxis =============================  Lovenox for DVT proph  Protonix for  GI proph    ==================GOC==================================  full code   ==================Disposition===============================  Pt accepted to ICU for further care   ASSESSMENT AND PLAN:  44 yo male with PMH of Asthma, never intubated or on BIPAP for asthma in past, presented with SOB and mild wheezing since yesterday. Pt reports following up with his pulmonologist and was prescribed steroids but reports that he was waiting for steroids to be delivered and didnot take any dose yet.  No fever, cough, cp, ap, n/v/d. Last admitted for asthma more than 1 year ago. Denies any sick contact.       Assessment:   1. Acute Hypoxic respiratory failure due to severe Asthma exacerbation   2. Leukocytosis   3. Tachycardia       =================== Neuro============================  Alert and oriented x 3, no active issues     ================= Cardiovascular==========================  Tachycardia: Sinus tachy   most likely due to Duo neb and hypoxia   Tele monitoring     ================- Pulm=================================  Acute Hypoxic respiratory failure due to severe Asthma exacerbation.   -p/w SOB, wheeze,   -CXR: pending   - ED course:  Solumed, Duo Neb  -c/w Duo neb, symbicort   -C/w [id number]   - Levofloxacin for prophylactic PNA coverage   - follow daily Peak flows   - Presently on BIPAP, will try to taper to NC or HF       ==================ID===================================  Prophylactic coverage for PNA     ================= Nephro================================  No active issues   =================GI====================================  NPO for now as he is on Bipap, will try to taper to HF/NC and start diet     No active issues   ================ Heme==================================  Leukocytosis :   most likely reactive   monitor WBC count daily   =================Endocrine===============================  No active issues   Follow A1c, tSH   ================= Skin/Catheters============================  No rashes. Peripheral IV lines.     - =================Prophylaxis =============================  Lovenox for DVT proph  Protonix for  GI proph    ==================GOC==================================  full code   ==================Disposition===============================  Pt accepted to ICU for further care

## 2021-02-21 NOTE — ED PROVIDER NOTE - PROGRESS NOTE DETAILS
Shortly after my initial evaluation, patient became very distressed, tried to stand up, took off aerosol mask saying he couldn't breath. Multiple nurses and PCAs came into room with me, held mask on patient. Respiratory called, bipap started. Patient rapidly improved on bipap. Now sitting comfortably, still mildly tachypneic but able to speak in full sentences. I consulted the ICU, who accepted the patient.

## 2021-02-22 DIAGNOSIS — J45.901 UNSPECIFIED ASTHMA WITH (ACUTE) EXACERBATION: ICD-10-CM

## 2021-02-22 DIAGNOSIS — F11.23 OPIOID DEPENDENCE WITH WITHDRAWAL: ICD-10-CM

## 2021-02-22 DIAGNOSIS — F19.11 OTHER PSYCHOACTIVE SUBSTANCE ABUSE, IN REMISSION: ICD-10-CM

## 2021-02-22 LAB
24R-OH-CALCIDIOL SERPL-MCNC: 24.4 NG/ML — LOW (ref 30–80)
A1C WITH ESTIMATED AVERAGE GLUCOSE RESULT: 5.9 % — HIGH (ref 4–5.6)
ALBUMIN SERPL ELPH-MCNC: 3.8 G/DL — SIGNIFICANT CHANGE UP (ref 3.5–5)
ALP SERPL-CCNC: 72 U/L — SIGNIFICANT CHANGE UP (ref 40–120)
ALT FLD-CCNC: 28 U/L DA — SIGNIFICANT CHANGE UP (ref 10–60)
ANION GAP SERPL CALC-SCNC: 13 MMOL/L — SIGNIFICANT CHANGE UP (ref 5–17)
AST SERPL-CCNC: 20 U/L — SIGNIFICANT CHANGE UP (ref 10–40)
BASOPHILS # BLD AUTO: 0.01 K/UL — SIGNIFICANT CHANGE UP (ref 0–0.2)
BASOPHILS NFR BLD AUTO: 0.1 % — SIGNIFICANT CHANGE UP (ref 0–2)
BILIRUB SERPL-MCNC: 0.3 MG/DL — SIGNIFICANT CHANGE UP (ref 0.2–1.2)
BUN SERPL-MCNC: 27 MG/DL — HIGH (ref 7–18)
CALCIUM SERPL-MCNC: 9 MG/DL — SIGNIFICANT CHANGE UP (ref 8.4–10.5)
CHLORIDE SERPL-SCNC: 106 MMOL/L — SIGNIFICANT CHANGE UP (ref 96–108)
CHOLEST SERPL-MCNC: 175 MG/DL — SIGNIFICANT CHANGE UP
CO2 SERPL-SCNC: 21 MMOL/L — LOW (ref 22–31)
CREAT SERPL-MCNC: 1.06 MG/DL — SIGNIFICANT CHANGE UP (ref 0.5–1.3)
EOSINOPHIL # BLD AUTO: 0 K/UL — SIGNIFICANT CHANGE UP (ref 0–0.5)
EOSINOPHIL NFR BLD AUTO: 0 % — SIGNIFICANT CHANGE UP (ref 0–6)
ESTIMATED AVERAGE GLUCOSE: 123 MG/DL — HIGH (ref 68–114)
FOLATE SERPL-MCNC: 19 NG/ML — SIGNIFICANT CHANGE UP
GLUCOSE SERPL-MCNC: 124 MG/DL — HIGH (ref 70–99)
HCT VFR BLD CALC: 47.1 % — SIGNIFICANT CHANGE UP (ref 39–50)
HDLC SERPL-MCNC: 78 MG/DL — SIGNIFICANT CHANGE UP
HGB BLD-MCNC: 15 G/DL — SIGNIFICANT CHANGE UP (ref 13–17)
IMM GRANULOCYTES NFR BLD AUTO: 0.4 % — SIGNIFICANT CHANGE UP (ref 0–1.5)
LIPID PNL WITH DIRECT LDL SERPL: 87 MG/DL — SIGNIFICANT CHANGE UP
LYMPHOCYTES # BLD AUTO: 0.91 K/UL — LOW (ref 1–3.3)
LYMPHOCYTES # BLD AUTO: 6.6 % — LOW (ref 13–44)
MAGNESIUM SERPL-MCNC: 2.4 MG/DL — SIGNIFICANT CHANGE UP (ref 1.6–2.6)
MCHC RBC-ENTMCNC: 27.9 PG — SIGNIFICANT CHANGE UP (ref 27–34)
MCHC RBC-ENTMCNC: 31.8 GM/DL — LOW (ref 32–36)
MCV RBC AUTO: 87.7 FL — SIGNIFICANT CHANGE UP (ref 80–100)
MONOCYTES # BLD AUTO: 0.23 K/UL — SIGNIFICANT CHANGE UP (ref 0–0.9)
MONOCYTES NFR BLD AUTO: 1.7 % — LOW (ref 2–14)
NEUTROPHILS # BLD AUTO: 12.62 K/UL — HIGH (ref 1.8–7.4)
NEUTROPHILS NFR BLD AUTO: 91.2 % — HIGH (ref 43–77)
NON HDL CHOLESTEROL: 97 MG/DL — SIGNIFICANT CHANGE UP
NRBC # BLD: 0 /100 WBCS — SIGNIFICANT CHANGE UP (ref 0–0)
PHOSPHATE SERPL-MCNC: 1.8 MG/DL — LOW (ref 2.5–4.5)
PLATELET # BLD AUTO: 389 K/UL — SIGNIFICANT CHANGE UP (ref 150–400)
POTASSIUM SERPL-MCNC: 4.3 MMOL/L — SIGNIFICANT CHANGE UP (ref 3.5–5.3)
POTASSIUM SERPL-SCNC: 4.3 MMOL/L — SIGNIFICANT CHANGE UP (ref 3.5–5.3)
PROT SERPL-MCNC: 8 G/DL — SIGNIFICANT CHANGE UP (ref 6–8.3)
RBC # BLD: 5.37 M/UL — SIGNIFICANT CHANGE UP (ref 4.2–5.8)
RBC # FLD: 13.1 % — SIGNIFICANT CHANGE UP (ref 10.3–14.5)
SODIUM SERPL-SCNC: 140 MMOL/L — SIGNIFICANT CHANGE UP (ref 135–145)
TRIGL SERPL-MCNC: 48 MG/DL — SIGNIFICANT CHANGE UP
TSH SERPL-MCNC: 0.22 UU/ML — LOW (ref 0.34–4.82)
VIT B12 SERPL-MCNC: 1067 PG/ML — SIGNIFICANT CHANGE UP (ref 232–1245)
WBC # BLD: 13.83 K/UL — HIGH (ref 3.8–10.5)
WBC # FLD AUTO: 13.83 K/UL — HIGH (ref 3.8–10.5)

## 2021-02-22 PROCEDURE — 99222 1ST HOSP IP/OBS MODERATE 55: CPT

## 2021-02-22 RX ORDER — METHADONE HYDROCHLORIDE 40 MG/1
40 TABLET ORAL ONCE
Refills: 0 | Status: DISCONTINUED | OUTPATIENT
Start: 2021-02-22 | End: 2021-02-22

## 2021-02-22 RX ORDER — ONDANSETRON 8 MG/1
4 TABLET, FILM COATED ORAL EVERY 6 HOURS
Refills: 0 | Status: DISCONTINUED | OUTPATIENT
Start: 2021-02-22 | End: 2021-02-24

## 2021-02-22 RX ORDER — ONDANSETRON 8 MG/1
4 TABLET, FILM COATED ORAL ONCE
Refills: 0 | Status: COMPLETED | OUTPATIENT
Start: 2021-02-22 | End: 2021-02-22

## 2021-02-22 RX ORDER — MORPHINE SULFATE 50 MG/1
2 CAPSULE, EXTENDED RELEASE ORAL ONCE
Refills: 0 | Status: DISCONTINUED | OUTPATIENT
Start: 2021-02-22 | End: 2021-02-22

## 2021-02-22 RX ORDER — CLONAZEPAM 1 MG
0.5 TABLET ORAL EVERY 8 HOURS
Refills: 0 | Status: DISCONTINUED | OUTPATIENT
Start: 2021-02-22 | End: 2021-02-24

## 2021-02-22 RX ORDER — ACETAMINOPHEN 500 MG
650 TABLET ORAL EVERY 6 HOURS
Refills: 0 | Status: DISCONTINUED | OUTPATIENT
Start: 2021-02-22 | End: 2021-02-24

## 2021-02-22 RX ORDER — CYCLOBENZAPRINE HYDROCHLORIDE 10 MG/1
5 TABLET, FILM COATED ORAL THREE TIMES A DAY
Refills: 0 | Status: DISCONTINUED | OUTPATIENT
Start: 2021-02-22 | End: 2021-02-24

## 2021-02-22 RX ADMIN — ALBUTEROL 1 PUFF(S): 90 AEROSOL, METERED ORAL at 05:24

## 2021-02-22 RX ADMIN — Medication 40 MILLIGRAM(S): at 11:14

## 2021-02-22 RX ADMIN — LORATADINE 10 MILLIGRAM(S): 10 TABLET ORAL at 11:23

## 2021-02-22 RX ADMIN — ONDANSETRON 4 MILLIGRAM(S): 8 TABLET, FILM COATED ORAL at 11:29

## 2021-02-22 RX ADMIN — Medication 1 PATCH: at 21:24

## 2021-02-22 RX ADMIN — MONTELUKAST 10 MILLIGRAM(S): 4 TABLET, CHEWABLE ORAL at 11:12

## 2021-02-22 RX ADMIN — Medication 40 MILLIGRAM(S): at 05:24

## 2021-02-22 RX ADMIN — BUDESONIDE AND FORMOTEROL FUMARATE DIHYDRATE 2 PUFF(S): 160; 4.5 AEROSOL RESPIRATORY (INHALATION) at 20:41

## 2021-02-22 RX ADMIN — Medication 650 MILLIGRAM(S): at 18:58

## 2021-02-22 RX ADMIN — ONDANSETRON 4 MILLIGRAM(S): 8 TABLET, FILM COATED ORAL at 10:09

## 2021-02-22 RX ADMIN — TIOTROPIUM BROMIDE 1 CAPSULE(S): 18 CAPSULE ORAL; RESPIRATORY (INHALATION) at 11:19

## 2021-02-22 RX ADMIN — PANTOPRAZOLE SODIUM 40 MILLIGRAM(S): 20 TABLET, DELAYED RELEASE ORAL at 11:14

## 2021-02-22 RX ADMIN — ALBUTEROL 1 PUFF(S): 90 AEROSOL, METERED ORAL at 14:25

## 2021-02-22 RX ADMIN — Medication 0.5 MILLIGRAM(S): at 20:40

## 2021-02-22 RX ADMIN — ALBUTEROL 1 PUFF(S): 90 AEROSOL, METERED ORAL at 20:41

## 2021-02-22 RX ADMIN — ENOXAPARIN SODIUM 40 MILLIGRAM(S): 100 INJECTION SUBCUTANEOUS at 11:20

## 2021-02-22 RX ADMIN — Medication 1 PATCH: at 17:57

## 2021-02-22 RX ADMIN — CYCLOBENZAPRINE HYDROCHLORIDE 5 MILLIGRAM(S): 10 TABLET, FILM COATED ORAL at 20:41

## 2021-02-22 RX ADMIN — ALBUTEROL 1 PUFF(S): 90 AEROSOL, METERED ORAL at 17:55

## 2021-02-22 RX ADMIN — Medication 40 MILLIGRAM(S): at 17:58

## 2021-02-22 RX ADMIN — METHADONE HYDROCHLORIDE 40 MILLIGRAM(S): 40 TABLET ORAL at 11:12

## 2021-02-22 RX ADMIN — BUDESONIDE AND FORMOTEROL FUMARATE DIHYDRATE 2 PUFF(S): 160; 4.5 AEROSOL RESPIRATORY (INHALATION) at 11:23

## 2021-02-22 RX ADMIN — ALBUTEROL 1 PUFF(S): 90 AEROSOL, METERED ORAL at 11:18

## 2021-02-22 RX ADMIN — Medication 85 MILLIMOLE(S): at 09:28

## 2021-02-22 RX ADMIN — MORPHINE SULFATE 2 MILLIGRAM(S): 50 CAPSULE, EXTENDED RELEASE ORAL at 12:18

## 2021-02-22 NOTE — PHYSICAL THERAPY INITIAL EVALUATION ADULT - DIAGNOSIS, PT EVAL
No impairments or functional deficits detected, PT services not required at this time Pt. presents without gross impairment and independent with functional mobility. Skilled, therapeutic PT intervention not indicated at this time.

## 2021-02-22 NOTE — PHYSICAL THERAPY INITIAL EVALUATION ADULT - REFERRAL TO ANOTHER SERVICE NEEDED, PT EVAL
Asthma management, Pt reports he is unemployed/respiratory therapy/social work Asthma management; SW for substance misuse/respiratory therapy

## 2021-02-22 NOTE — PHYSICAL THERAPY INITIAL EVALUATION ADULT - PATIENT PROFILE REVIEW, REHAB EVAL
Consult received, chart reviewed. Patient received lying in chair, NAD, + Bipap, +SCDs (not connected to pump), +cardiac monitoring. Pt A&Ox4, RASS (0), CAM-ICU (-). Patient agreed to EVALUATION from Physical Therapist./yes yes

## 2021-02-22 NOTE — PHYSICAL THERAPY INITIAL EVALUATION ADULT - GENERAL OBSERVATIONS, REHAB EVAL
Pt appeared anxious while reclined in chair, divided attention in therapy session with zoom meeting on cell phone. Pt motivated to move during session. Consult received, chart reviewed. Patient received lying in chair, NAD, + Bipap, +SCDs (not connected to pump), +cardiac monitoring. RASS (0), CAM-ICU (-). Patient agreed to EVALUATION from Physical Therapist. Pt appeared anxious while reclined in chair, divided attention in therapy session with zoom meeting on cell phone (which he did not want to disconnect from).

## 2021-02-22 NOTE — PHYSICAL THERAPY INITIAL EVALUATION ADULT - CRITERIA FOR SKILLED THERAPEUTIC INTERVENTIONS
No skilled PT needs/anticipated discharge recommendation Pt. presents without gross impairment and independent with functional mobility. Skilled, therapeutic PT intervention not indicated at this time.

## 2021-02-22 NOTE — CONSULT NOTE ADULT - PROBLEM SELECTOR RECOMMENDATION 9
- pt with acute opioid withdrawal - heroin.  Last use yesterday - 1 bag. 2 days ago - 7-9 bags.   - Use COWS clinical scale - 6 .  Pt is in mild withdrawal - + for anxiety, tachycardia, joint pain.   - Methadone 40mg po q daily - given once.  Can give additional dose tomorrow  - Supportive mgt  - flexeril 5mg p q 8 hours   - recommend clonidine patch - 0.1mg daily - as pt was nauseous.   - zofran iv prn  - monitor vitals   - severe anxious - can give short acting benzo - klonopin.

## 2021-02-22 NOTE — PROGRESS NOTE ADULT - ATTENDING COMMENTS
44 yo male with PMH of Asthma, never intubated or on BIPAP for asthma in past, presented with SOB and mild wheezing since yesterday. Pt reports following up with his pulmonologist and was prescribed steroids but reports that he was waiting for steroids to be delivered and did not take any dose yet.  No fever, cough, cp, ap, n/v/d. Last admitted for asthma more than 1 year ago. Denies any sick contact.  Pt reports feeling better after medication in ED and started on Bipap, and noted to be tachycardic on Tele monitor at bedside. patient was started on asthma inhalers and solumedrol 40 mg IV q 6 ,  in the morning  2/22, he was agitated , on BIPAP , he vomited twice , BIPAP was removed and patient was put on NRB then NC , chest is clear on auscultation, patient was given zofran and a dose of morphine 2 mg and methadone  .Utox showed opoids and THC,  pain management bahman was consulted and recommended additional dose of methadone to be given  tomorrow,  Supportive mgt, flexeril 5mg p q 8 hours , clonidine patch - 0.1mg daily - as pt was nauseous,  zofran iv prn and for  severe anxiety - can give short acting benzo - klonopin      Plan   -advise to stop using marijuana and opiates  -social svc for out pat treatment   -continue solumedrol   -clonidine patch since pat is vomiting   -pat is showing signs of opiate withdrawl  -dvt/gi prophy

## 2021-02-22 NOTE — PHYSICAL THERAPY INITIAL EVALUATION ADULT - ADDITIONAL COMMENTS
Pt reports asthma medications have helped in symptom management, Pt has tried other treatment methods.

## 2021-02-22 NOTE — PHYSICAL THERAPY INITIAL EVALUATION ADULT - PERTINENT HX OF CURRENT PROBLEM, REHAB EVAL
Pt reports reason for admission was SOB and chest pain. Pt has history of asthma. Pt reports asthma medications have helped in symptom management, Pt has tried other treatment methods. SOB and chest pain. Has history of asthma.

## 2021-02-22 NOTE — PHYSICAL THERAPY INITIAL EVALUATION ADULT - MANUAL MUSCLE TESTING RESULTS, REHAB EVAL
BUE 5/5 except left wrist ext & flex 4-/5/no strength deficits were identified BUE 5/5 except left wrist ext & flex 4-/5 possibly due to IV./no strength deficits were identified

## 2021-02-22 NOTE — CONSULT NOTE ADULT - SUBJECTIVE AND OBJECTIVE BOX
Source of information: , Chart review, patient  Patient language: English  : n/a    CC: Patient is a 45y old  Male who presents with a chief complaint of SOB (22 Feb 2021 08:45)      HPI:  46 yo male with PMH of Asthma, never intubated or on BIPAP for asthma in past, presented with SOB and mild wheezing since yesterday. Pt reports following up with his pulmonologist and was prescribed steroids but reports that he was waiting for steroids to be delivered and did not take any dose yet.  No fever, cough, cp, ap, n/v/d. Last admitted for asthma more than 1 year ago. Denies any sick contact.  Pt reports feeling better after medication in ED and started on Bipap, and noted to be tachycardic on Tele monitor at bedside.  (21 Feb 2021 12:09)      Patient stated goal for pain control: to be able to take deep breaths, utilize incentive spirometer, get out of bed to chair and ambulate with tolerable pain control.     PAIN SCORE:       SCALE USED: (1-10 VNRS)    PAST MEDICAL & SURGICAL HISTORY:  Asthma    Ex-smoker  (cigarettes / marijuana)    No significant past surgical history        FAMILY HISTORY:  No pertinent family history in first degree relatives    Family history of asthma          SOCIAL HISTORY:  [ ] Denies Smoking, Alcohol, or Drug Use    Allergies    dust (Eye Irritation)  No Known Drug Allergies    Intolerances        MEDICATIONS:    MEDICATIONS  (STANDING):  ALBUTerol    90 MICROgram(s) HFA Inhaler 1 Puff(s) Inhalation every 4 hours  budesonide 160 MICROgram(s)/formoterol 4.5 MICROgram(s) Inhaler 2 Puff(s) Inhalation two times a day  cyclobenzaprine 5 milliGRAM(s) Oral three times a day  enoxaparin Injectable 40 milliGRAM(s) SubCutaneous daily  loratadine 10 milliGRAM(s) Oral daily  methylPREDNISolone sodium succinate Injectable 40 milliGRAM(s) IV Push every 6 hours  montelukast 10 milliGRAM(s) Oral daily  pantoprazole  Injectable 40 milliGRAM(s) IV Push daily  tiotropium 18 MICROgram(s) Capsule 1 Capsule(s) Inhalation daily    MEDICATIONS  (PRN):  ondansetron Injectable 4 milliGRAM(s) IV Push every 6 hours PRN Nausea and/or Vomiting      Vital Signs Last 24 Hrs  T(C): 36.7 (22 Feb 2021 12:00), Max: 36.8 (21 Feb 2021 20:00)  T(F): 98 (22 Feb 2021 12:00), Max: 98.2 (21 Feb 2021 20:00)  HR: 118 (22 Feb 2021 13:59) (98 - 124)  BP: 122/85 (22 Feb 2021 12:00) (110/68 - 155/93)  BP(mean): 95 (22 Feb 2021 12:00) (79 - 107)  RR: 31 (22 Feb 2021 12:00) (16 - 39)  SpO2: 96% (22 Feb 2021 13:59) (94% - 100%)    LABS:                          15.0   13.83 )-----------( 389      ( 22 Feb 2021 06:33 )             47.1     02-22    140  |  106  |  27<H>  ----------------------------<  124<H>  4.3   |  21<L>  |  1.06    Ca    9.0      22 Feb 2021 06:33  Phos  1.8     02-22  Mg     2.4     02-22    TPro  8.0  /  Alb  3.8  /  TBili  0.3  /  DBili  x   /  AST  20  /  ALT  28  /  AlkPhos  72  02-22      LIVER FUNCTIONS - ( 22 Feb 2021 06:33 )  Alb: 3.8 g/dL / Pro: 8.0 g/dL / ALK PHOS: 72 U/L / ALT: 28 U/L DA / AST: 20 U/L / GGT: x             CAPILLARY BLOOD GLUCOSE          REVIEW OF SYSTEMS:  CONSTITUTIONAL: No fever or fatigue  RESPIRATORY: No cough, wheezing, chills or hemoptysis; No shortness of breath  CARDIOVASCULAR: No chest pain, palpitations, dizziness, or leg swelling  GASTROINTESTINAL: No abdominal or epigastric pain. No nausea, vomiting; No diarrhea or constipation.   GENITOURINARY: No dysuria, frequency, hematuria, retention or incontinence  MUSCULOSKELETAL: No joint pain or swelling; No muscle, back, or extremity pain, no upper or lower motor strength weakness, no saddle anesthesia, bowel/bladder incontinence, no falls   NEURO: No weakness, no numbness   PSYCHIATRIC: No depression, anxiety, mood swings, or difficulty sleeping    PHYSICAL EXAM:  GENERAL:  Alert & Oriented X3, NAD, Good concentration  CHEST/LUNG: Clear to auscultation bilaterally; No rales, rhonchi, wheezing, or rubs  HEART: Regular rate and rhythm; No murmurs, rubs, or gallops  ABDOMEN: Soft, Nontender, Nondistended; Bowel sounds present  : no incontinence, no flank pain  EXTREMITIES:  2+ Peripheral Pulses, No cyanosis, or edema  MUSCULOSKELETAL: Motor Strength 5/5 B/L upper and lower extremities; moves all extremities equally against gravity; ROM intact; negative SRL  NEUROLOGICAL: awake and alert and oriented   SKIN: No rashes or lesions    Radiology:    Drug Screen:  enoxaparin Injectable 40 milliGRAM(s) SubCutaneous daily          ORT Score   Family Hx of substance abuse	Female	Male  Alcohol 	                                              1              3  Illegal drugs	                                      2              3  Rx drugs                                               4	      4    Personal Hx of substance abuse		  Alcohol 	                                               3	      3  Illegal drugs                                  	       4	      4  Rx drugs                                                5	      5  Age between 16- 45 years	               1             1  hx preadolescent sexual abuse	       3	      0  Psychological disease		  ADD, OCD, bipolar, schizophrenia	2	      2  Depression                                    	1	      1  Score totals 		  		  a score of 3 or lower indicates low risk for opioid abuse		  a score of 4-7 indicates moderate risk for opioid abuse		  a score of 8 or higher indicates high risk for opioid abuse	    Risk factors associated with adverse outcomes related to opioid treatment  [ ]  Concurrent benzodiazepine use  [ ]  History/ Active substance use or alcohol use disorder  [ ] Psychiatric co-morbidity  [ ] Sleep apnea  [ ] COPD  [ ] BMI> 35  [ ] Liver dysfunction  [ ] Renal dysfunction  [ ] CHF  [ ] Smoker  [ ]  Age > 60 years      [ ]  [place]  Reviewed and Copied to Chart. See below.           Source of information: , Chart review, patient  Patient language: English  : n/a    CC: Patient is a 45y old  Male who presents with a chief complaint of SOB (22 Feb 2021 08:45)      HPI:  46 yo male with PMH of Asthma, never intubated or on BIPAP for asthma in past, presented with SOB and mild wheezing since yesterday. Pt reports following up with his pulmonologist and was prescribed steroids but reports that he was waiting for steroids to be delivered and did not take any dose yet.  No fever, cough, cp, ap, n/v/d. Last admitted for asthma more than 1 year ago. Denies any sick contact.  Pt reports feeling better after medication in ED and started on Bipap, and noted to be tachycardic on Tele monitor at bedside.  (21 Feb 2021 12:09)    45 year old male with PMH of asthma.  Pt has never been intubated or on cpap.  Pt admitted for sob and wheezing.  Pt was complaining of chest tightness.  Pt was on bipap and now on nasal cannula.  Pt is agitated and restless.  Pt admitted to heroin use.  Pt has been on heroin for several years.  Pt did go to [facility] in [id number] for rehab and was clean for one year.   Pt used heroin 7-9 bags on 2/21.  Pt used one bag yesterday and came in for an asthma attack.  + agitation + restlessness.  No diarrhea.  No n/v. No rhinorhea, no tearing.  + muscle aches. Urine drug screen - + opioids and thc    PAIN SCORE:  0/10  USED: (1-10 VNRS)    PAST MEDICAL & SURGICAL HISTORY:  Asthma    Ex-smoker  (cigarettes / marijuana)    No significant past surgical history        FAMILY HISTORY:  No pertinent family history in first degree relatives    Family history of asthma          SOCIAL HISTORY:  + heroin use for 5 years    dust (Eye Irritation)  No Known Drug Allergies    Intolerances        MEDICATIONS:    MEDICATIONS  (STANDING):  ALBUTerol    90 MICROgram(s) HFA Inhaler 1 Puff(s) Inhalation every 4 hours  budesonide 160 MICROgram(s)/formoterol 4.5 MICROgram(s) Inhaler 2 Puff(s) Inhalation two times a day  cyclobenzaprine 5 milliGRAM(s) Oral three times a day  enoxaparin Injectable 40 milliGRAM(s) SubCutaneous daily  loratadine 10 milliGRAM(s) Oral daily  methylPREDNISolone sodium succinate Injectable 40 milliGRAM(s) IV Push every 6 hours  montelukast 10 milliGRAM(s) Oral daily  pantoprazole  Injectable 40 milliGRAM(s) IV Push daily  tiotropium 18 MICROgram(s) Capsule 1 Capsule(s) Inhalation daily    MEDICATIONS  (PRN):  ondansetron Injectable 4 milliGRAM(s) IV Push every 6 hours PRN Nausea and/or Vomiting      Vital Signs Last 24 Hrs  T(C): 36.7 (22 Feb 2021 12:00), Max: 36.8 (21 Feb 2021 20:00)  T(F): 98 (22 Feb 2021 12:00), Max: 98.2 (21 Feb 2021 20:00)  HR: 118 (22 Feb 2021 13:59) (98 - 124)  BP: 122/85 (22 Feb 2021 12:00) (110/68 - 155/93)  BP(mean): 95 (22 Feb 2021 12:00) (79 - 107)  RR: 31 (22 Feb 2021 12:00) (16 - 39)  SpO2: 96% (22 Feb 2021 13:59) (94% - 100%)    LABS:                          15.0   13.83 )-----------( 389      ( 22 Feb 2021 06:33 )             47.1     02-22    140  |  106  |  27<H>  ----------------------------<  124<H>  4.3   |  21<L>  |  1.06    Ca    9.0      22 Feb 2021 06:33  Phos  1.8     02-22  Mg     2.4     02-22    TPro  8.0  /  Alb  3.8  /  TBili  0.3  /  DBili  x   /  AST  20  /  ALT  28  /  AlkPhos  72  02-22      LIVER FUNCTIONS - ( 22 Feb 2021 06:33 )  Alb: 3.8 g/dL / Pro: 8.0 g/dL / ALK PHOS: 72 U/L / ALT: 28 U/L DA / AST: 20 U/L / GGT: x             CAPILLARY BLOOD GLUCOSE          REVIEW OF SYSTEMS:  CONSTITUTIONAL: No fever or fatigue  RESPIRATORY: No cough, wheezing, chills or hemoptysis; No shortness of breath  CARDIOVASCULAR: No chest pain, palpitations, dizziness, or leg swelling  GASTROINTESTINAL: No abdominal or epigastric pain. No nausea, vomiting; No diarrhea or constipation.   GENITOURINARY: No dysuria, frequency, hematuria, retention or incontinence  MUSCULOSKELETAL: + back pain + muscle aches  NEURO:  no weakness, no numbness   PSYCHIATRIC: No depression, anxiety, mood swings, or difficulty sleeping    PHYSICAL EXAM:  GENERAL:  Alert & Oriented X3, NAD, anxious   CHEST/LUNG: decreased breath sounds   HEART: Regular rate and rhythm; No murmurs, rubs, or gallops  ABDOMEN: Soft, Nontender, Nondistended; Bowel sounds present  : no incontinence, no flank pain  EXTREMITIES:  2+ Peripheral Pulses, No cyanosis, or edema  MUSCULOSKELETAL: + restlessness   NEUROLOGICAL: awake and alert and oriented   SKIN: No rashes or lesions    Radiology:  < from: Xray Chest 1 View- PORTABLE-Urgent (Xray Chest 1 View- PORTABLE-Urgent .) (02.21.21 @ 12:32) >    EXAM:  XR CHEST PORTABLE URGENT 1V                            PROCEDURE DATE:  02/21/2021          INTERPRETATION:  Clinical Information: Dyspnea    Technique: AP chest image.    Comparison: 2015    Findings/  Impression: The heart is unremarkable.The lungs are clear. Bones are unremarkable for age.    < end of copied text >      Drug Screen:  enoxaparin Injectable 40 milliGRAM(s) SubCutaneous daily          ORT Score   Family Hx of substance abuse	Female	Male  Alcohol 	                                              1              3  Illegal drugs	                                      2              3  Rx drugs                                               4	      4    Personal Hx of substance abuse		  Alcohol 	                                               3	      3  Illegal drugs                                  	       4	      4  Rx drugs                                                5	      5  Age between 16- 45 years	               1             1  hx preadolescent sexual abuse	       3	      0  Psychological disease		  ADD, OCD, bipolar, schizophrenia	2	      2  Depression                                    	1	      1  Score totals 		  		  a score of 3 or lower indicates low risk for opioid abuse		  a score of 4-7 indicates moderate risk for opioid abuse		  a score of 8 or higher indicates high risk for opioid abuse	    Risk factors associated with adverse outcomes related to opioid treatment  [ ]  Concurrent benzodiazepine use  [x ]  History/ Active substance use or alcohol use disorder  [ ] Psychiatric co-morbidity  [ ] Sleep apnea  [ ] COPD  [ ] BMI> 35  [ ] Liver dysfunction  [ ] Renal dysfunction  [ ] CHF  [ ] Smoker  [ ]  Age > 60 years      [ ]  [place]  Reviewed and Copied to Chart. See below.

## 2021-02-22 NOTE — CONSULT NOTE ADULT - ASSESSMENT
Patient Search   Multi-Patient Search   Reports   Drug Listing   Designation   My [facility]RI[facility] Numbers  Data Detail Level: Printer-Friendly View Extended View  Confidential Drug Utilization Report  Search Terms: pierce mix, 1975Search Date: 02/22/2021 15:00:37 PM  The Drug Utilization Report below displays all of the controlled substance prescriptions, if any, that your patient has filled in the last twelve months. The information displayed on this report is compiled from pharmacy submissions to the Department, and accurately reflects the information as submitted by the pharmacies.    Clinical Opiate Withdrawal Scale (COWS)  Flow-sheet for measuring symptoms for opiate withdrawals over a period of time.  For each item, write in the number that best describes the patient’s signs or symptom. Rate on  just the apparent relationship to opiate withdrawal. For example, if heart rate is increased  because the patient was jogging just prior to assessment, the increase pulse rate would not add to  the score.  Patient’s Name:___________________________ Date: ______________  Enter scores at time zero, 30min after first dose, 2 h after first dose, etc.  Times: ______ ______ ______ ______  Resting Pulse Rate: (record beats per minute)  Measured after patient is sitting or lying for one minute  0 pulse rate 80 or below  1 pulse rate   2 pulse rate 101-120  4 pulse rate greater than 120  Sweating: over past ½ hour not accounted for by room  temperature or patient activity.  0 no report of chills or flushing  1 subjective report of chills or flushing  2 flushed or observable moistness on face  3 beads of sweat on brow or face  4 sweat streaming off face  Restlessness Observation during assessment  0 able to sit still  1 reports difficulty sitting still, but is able to do so  3 frequent shifting or extraneous movements of legs/arms  5 Unable to sit still for more than a few seconds  Pupil size  0 pupils pinned or normal size for room light  1 pupils possibly larger than normal for room light  2 pupils moderately dilated  5 pupils so dilated that only the rim of the iris is visible  Bone or Joint aches If patient was having pain  previously, only the additional component attributed  to opiates withdrawal is scored  0 not present  1 mild diffuse discomfort  2 patient reports severe diffuse aching of joints/ muscles  4 patient is rubbing joints or muscles and is unable to sit  still because of discomfort  Runny nose or tearing Not accounted for by cold  symptoms or allergies  0 not present  1 nasal stuffiness or unusually moist eyes  2 nose running or tearing  4 nose constantly running or tears streaming down cheeks   COWS / Flow-sheet format for measuring symptoms over a period of time  GI Upset: over last ½ hour  0 no GI symptoms  1 stomach cramps  2 nausea or loose stool  3 vomiting or diarrhea  5 Multiple episodes of diarrhea or vomiting  Tremor observation of outstretched hands  0 No tremor  1 tremor can be felt, but not observed  2 slight tremor observable  4 gross tremor or muscle twitching  Yawning Observation during assessment  0 no yawning  1 yawning once or twice during assessment  2 yawning three or more times during assessment  4 yawning several times/minute  Anxiety or Irritability  0 none  1 patient reports increasing irritability or anxiousness  2 patient obviously irritable anxious  4 patient so irritable or anxious that participation in the  assessment is difficult  Gooseflesh skin  0 skin is smooth  3 piloerrection of skin can be felt or hairs standing up on  arms  5 prominent piloerrection  Total scores  with observer’s initials  Score:  5-12 = mild;  13-24 = moderate;  25-36 = moderately severe;  more than 36 = severe withdrawal

## 2021-02-22 NOTE — CHART NOTE - NSCHARTNOTEFT_GEN_A_CORE
46 yo male with PMH of Asthma, never intubated or on BIPAP for asthma in past, presented with SOB and mild wheezing since yesterday. Pt reports following up with his pulmonologist and was prescribed steroids but reports that he was waiting for steroids to be delivered and did not take any dose yet.  No fever, cough, cp, ap, n/v/d. Last admitted for asthma more than 1 year ago. Denies any sick contact.  Pt reports feeling better after medication in ED and started on Bipap, and noted to be tachycardic on Tele monitor at bedside. patient was started on asthma inhalers and solumedrol 40 mg IV q 6 ,  in the morning  2/22, he was agitated , on BIPAP , he vomited twice , BIPAP was removed and patient was put on NRB then NC , chest is clear on auscultation, patient was given zofran and a dose of morphine 2 mg and methadone  .Utox showed opoids and THC,  pain management bahman was consulted and recommended additional dose of methadone to be given  tomorrow,  Supportive mgt, flexeril 5mg p q 8 hours , clonidine patch - 0.1mg daily - as pt was nauseous,  zofran iv prn and for  severe anxiety - can give short acting benzo - klonopin.    Things to follow :  -Taper steroids and monitor respiratory status.   - f/u pain management recommendations for possible opoid withdrawal    signed out to Dr Chisholm 46 yo male with PMH of Asthma, never intubated or on BIPAP for asthma in past, presented with SOB and mild wheezing since yesterday. Pt reports following up with his pulmonologist and was prescribed steroids but reports that he was waiting for steroids to be delivered and did not take any dose yet.  No fever, cough, cp, ap, n/v/d. Last admitted for asthma more than 1 year ago. Denies any sick contact.  Pt reports feeling better after medication in ED and started on Bipap, and noted to be tachycardic on Tele monitor at bedside. patient was started on asthma inhalers and solumedrol 40 mg IV q 6 ,  in the morning  2/22, he was agitated , on BIPAP , he vomited twice , BIPAP was removed and patient was put on NRB then NC , chest is clear on auscultation, patient was given zofran and a dose of morphine 2 mg and methadone  .Utox showed opoids and THC,  pain management bahman was consulted and recommended additional dose of methadone to be given  tomorrow,  Supportive mgt, flexeril 5mg p q 8 hours , clonidine patch - 0.1mg daily - as pt was nauseous,  zofran iv prn and for  severe anxiety - can give short acting benzo - klonopin.    Things to follow :  -Taper steroids and monitor respiratory status.   - f/u pain management recommendations for possible opoid withdrawal    signed out to Aletha Polanco NP   Mother Ravi Yanely was updated about the current status and about the downgrade. 46 yo male with PMH of Asthma, never intubated or on BIPAP for asthma in past, presented with SOB and mild wheezing since yesterday. Pt reports following up with his pulmonologist and was prescribed steroids but reports that he was waiting for steroids to be delivered and did not take any dose yet.  No fever, cough, cp, ap, n/v/d. Last admitted for asthma more than 1 year ago. Denies any sick contact.  Pt reports feeling better after medication in ED and started on Bipap, and noted to be tachycardic on Tele monitor at bedside. patient was started on asthma inhalers and solumedrol 40 mg IV q 6 ,  in the morning  2/22, he was agitated , on BIPAP , he vomited twice , BIPAP was removed and patient was put on NRB then NC , chest is clear on auscultation, patient was given zofran and a dose of morphine 2 mg and methadone  .Utox showed opoids and THC,  pain management bahman was consulted and recommended additional dose of methadone to be given  tomorrow,  Supportive mgt, flexeril 5mg p q 8 hours , clonidine patch - 0.1mg daily - as pt was nauseous,  zofran iv prn and for  severe anxiety - can give short acting benzo - klonopin.    Things to follow :  -Taper steroids and monitor respiratory status.   - f/u pain management recommendations for possible opoid withdrawal  -TSH is low , f/u T3, T4    signed out to Aletha Polanco NP   Mother Yanely Rodrigues was updated about the current status and about the downgrade.

## 2021-02-22 NOTE — PHYSICAL THERAPY INITIAL EVALUATION ADULT - GAIT DISTANCE, PT EVAL
25 feet pt denies any exertion with this activity, no s/s of fatigue. Pt defered further amb as he wanted to lay down in bed./25 feet

## 2021-02-22 NOTE — PROGRESS NOTE ADULT - ASSESSMENT
ASSESSMENT AND PLAN:  46 yo male with PMH of Asthma, never intubated or on BIPAP for asthma in past, presented with SOB and mild wheezing since yesterday. Pt reports following up with his pulmonologist and was prescribed steroids but reports that he was waiting for steroids to be delivered and didnot take any dose yet.  No fever, cough, cp, ap, n/v/d. Last admitted for asthma more than 1 year ago. Denies any sick contact.       Assessment:   1. Acute Hypoxic respiratory failure due to severe Asthma exacerbation   2. Leukocytosis   3. Tachycardia       =================== Neuro============================  Alert and oriented x 3, no active issues     ================= Cardiovascular==========================  Tachycardia: Sinus tachy   most likely due to Duo neb and hypoxia   Tele monitoring     ================- Pulm=================================  Acute Hypoxic respiratory failure due to severe Asthma exacerbation.   -p/w SOB, wheeze,   -CXR: pending   - ED course:  Solumed, Duo Neb  -c/w Duo neb, symbicort   -C/w [id number]   - Levofloxacin for prophylactic PNA coverage   - follow daily Peak flows   - Presently on BIPAP, will try to taper to NC or HF       ==================ID===================================  Prophylactic coverage for PNA     ================= Nephro================================  No active issues   =================GI====================================  NPO for now as he is on Bipap, will try to taper to HF/NC and start diet     No active issues   ================ Heme==================================  Leukocytosis :   most likely reactive   monitor WBC count daily   =================Endocrine===============================  No active issues   Follow A1c, tSH   ================= Skin/Catheters============================  No rashes. Peripheral IV lines.     - =================Prophylaxis =============================  Lovenox for DVT proph  Protonix for  GI proph    ==================GOC==================================  full code   ==================Disposition===============================  Pt accepted to ICU for further care   ASSESSMENT AND PLAN:  44 yo male with PMH of Asthma, never intubated or on BIPAP for asthma in past, presented with SOB and mild wheezing since yesterday. Pt reports following up with his pulmonologist and was prescribed steroids but reports that he was waiting for steroids to be delivered and didnot take any dose yet.  No fever, cough, cp, ap, n/v/d. Last admitted for asthma more than 1 year ago. Denies any sick contact.       Assessment:   1. Acute Hypoxic respiratory failure due to severe Asthma exacerbation   2. Leukocytosis   3. Tachycardia       =================== Neuro============================  Alert and oriented x 3, agitated , utox pos for opiates, given methadone and morphine.     ================= Cardiovascular==========================  Tachycardia: Sinus tachy   most likely due to Duo neb and hypoxia   Tele monitoring   resolved    ================- Pulm=================================  Acute Hypoxic respiratory failure due to severe Asthma exacerbation.   -p/w SOB, wheeze,   -CXR: pending   - ED course:  Solumed, Duo Neb  -c/w Duo neb, symbicort   -C/w [id number]   - Levofloxacin dced  fllow daily Peak flows   - Presently on BIPAP, will try to taper to NC or HF       ==================ID===================================  Prophylactic coverage for PNA     ================= Nephro================================  No active issues   =================GI====================================  NPO for now as he is on Bipap, will try to taper to HF/NC and start diet     No active issues   ================ Heme==================================  Leukocytosis :   most likely reactive   monitor WBC count daily   =================Endocrine===============================  No active issues   Follow A1c, tSH   ================= Skin/Catheters============================  No rashes. Peripheral IV lines.     - =================Prophylaxis =============================  Lovenox for DVT proph  Protonix for  GI proph    ==================GOC==================================  full code   ==================Disposition===============================  Pt accepted to ICU for further care   ASSESSMENT AND PLAN:  46 yo male with PMH of Asthma, never intubated or on BIPAP for asthma in past, presented with SOB and mild wheezing since yesterday. Pt reports following up with his pulmonologist and was prescribed steroids but reports that he was waiting for steroids to be delivered and didnot take any dose yet.  No fever, cough, cp, ap, n/v/d. Last admitted for asthma more than 1 year ago. Denies any sick contact.       Assessment:   1. Acute Hypoxic respiratory failure due to severe Asthma exacerbation   2. Leukocytosis   3. Tachycardia       =================== Neuro============================  Alert and oriented x 3, agitated , utox pos for opiates, given methadone and morphine.     ================= Cardiovascular==========================  Tachycardia: Sinus tachy   most likely due to Duo neb and hypoxia   Tele monitoring   resolved    ================- Pulm=================================  Acute Hypoxic respiratory failure due to severe Asthma exacerbation.   -p/w SOB, wheeze,   -CXR: pending   - ED course:  Solumed, Duo Neb  -c/w Duo neb, symbicort   -C/w [id number]   - Levofloxacin dced  -follow daily Peak flows   -  NC      ==================ID===================================  levoflox dced     ================= Nephro================================  No active issues   =================GI====================================  strted on diet     No active issues   ================ Heme==================================  Leukocytosis :   most likely reactive   monitor WBC count daily   =================Endocrine===============================  No active issues   tsh low   f/u t3 , t4     ================= Skin/Catheters============================  No rashes. Peripheral IV lines.     - =================Prophylaxis =============================  Lovenox for DVT proph  Protonix for  GI proph    ==================GOC==================================  full code   ==================Disposition===============================  Pt accepted to ICU for further care

## 2021-02-22 NOTE — PROGRESS NOTE ADULT - SUBJECTIVE AND OBJECTIVE BOX
Patient is a 45y old  Male who presents with a chief complaint of SOB (21 Feb 2021 12:09)      INTERVAL HPI/OVERNIGHT EVENTS:  ICU Vital Signs Last 24 Hrs  T(C): 36.7 (22 Feb 2021 06:07), Max: 36.8 (21 Feb 2021 20:00)  T(F): 98.1 (22 Feb 2021 06:07), Max: 98.2 (21 Feb 2021 20:00)  HR: 110 (22 Feb 2021 08:00) (98 - 124)  BP: 121/103 (22 Feb 2021 08:00) (110/68 - 170/110)  BP(mean): 107 (22 Feb 2021 08:00) (80 - 107)  ABP: --  ABP(mean): --  RR: 30 (22 Feb 2021 08:00) (16 - 39)  SpO2: 99% (22 Feb 2021 08:00) (95% - 100%)    I&O's Summary    21 Feb 2021 07:01  -  22 Feb 2021 07:00  --------------------------------------------------------  IN: 420 mL / OUT: 750 mL / NET: -330 mL          LABS:                        15.0   13.83 )-----------( 389      ( 22 Feb 2021 06:33 )             47.1     02-22    140  |  106  |  27<H>  ----------------------------<  124<H>  4.3   |  21<L>  |  1.06    Ca    9.0      22 Feb 2021 06:33  Phos  1.8     02-22  Mg     2.4     02-22    TPro  8.0  /  Alb  3.8  /  TBili  0.3  /  DBili  x   /  AST  20  /  ALT  28  /  AlkPhos  72  02-22        CAPILLARY BLOOD GLUCOSE        ABG - ( 21 Feb 2021 11:20 )  pH, Arterial: 7.24  pH, Blood: x     /  pCO2: 54    /  pO2: 361   / HCO3: 23    / Base Excess: -4.8  /  SaO2: 99                  RADIOLOGY & ADDITIONAL TESTS:    Consultant(s) Notes Reviewed:  [x ] YES  [ ] NO    MEDICATIONS  (STANDING):  ALBUTerol    90 MICROgram(s) HFA Inhaler 1 Puff(s) Inhalation every 4 hours  budesonide 160 MICROgram(s)/formoterol 4.5 MICROgram(s) Inhaler 2 Puff(s) Inhalation two times a day  enoxaparin Injectable 40 milliGRAM(s) SubCutaneous daily  levoFLOXacin IVPB 500 milliGRAM(s) IV Intermittent every 24 hours  levoFLOXacin IVPB      loratadine 10 milliGRAM(s) Oral daily  methylPREDNISolone sodium succinate Injectable 40 milliGRAM(s) IV Push every 6 hours  montelukast 10 milliGRAM(s) Oral daily  pantoprazole  Injectable 40 milliGRAM(s) IV Push daily  sodium phosphate IVPB 30 milliMole(s) IV Intermittent once  tiotropium 18 MICROgram(s) Capsule 1 Capsule(s) Inhalation daily    MEDICATIONS  (PRN):      PHYSICAL EXAM:  GENERAL: well built, well nourished  HEAD:  Atraumatic, Normocephalic  EYES: EOMI, PERRLA, conjunctiva and sclera clear  ENT: No tonsillar erythema, exudates, or enlargement; Moist mucous membranes, Good dentition, No lesions  NECK: Supple, No JVD, Normal thyroid, no enlarged nodes  NERVOUS SYSTEM:  Alert & Oriented X3, Good concentration; Motor Strength 5/5 B/L upper and lower extremities; DTRs 2+ intact and symmetric, sensory intact  CHEST/LUNG: B/L good air entry; No rales, rhonchi, or wheezing  HEART: S1S2 normal, no S3, Regular rate and rhythm; No murmurs, rubs, or gallops  ABDOMEN: Soft, Nontender, Nondistended; Bowel sounds present  EXTREMITIES:  2+ Peripheral Pulses, No clubbing, cyanosis, or edema  LYMPH: No lymphadenopathy noted  SKIN: No rashes or lesions    Care Discussed with Consultants/Other Providers [ x] YES  [ ] NO Patient is a 45y old  Male who presents with a chief complaint of SOB (21 Feb 2021 12:09)      INTERVAL HPI/OVERNIGHT EVENTS:patient examine tiny benavides, he was agitated in the morning , on BIPAP , he vomited twice , BIPAP was removed and patient was put on NRB then NC , chest is clear on auscultation, patient was given zofran and a dose of morphine 2 mg and methadone  . pain management bahman was consulted. levofloxacin was dced.  ICU Vital Signs Last 24 Hrs  T(C): 36.7 (22 Feb 2021 06:07), Max: 36.8 (21 Feb 2021 20:00)  T(F): 98.1 (22 Feb 2021 06:07), Max: 98.2 (21 Feb 2021 20:00)  HR: 110 (22 Feb 2021 08:00) (98 - 124)  BP: 121/103 (22 Feb 2021 08:00) (110/68 - 170/110)  BP(mean): 107 (22 Feb 2021 08:00) (80 - 107)  ABP: --  ABP(mean): --  RR: 30 (22 Feb 2021 08:00) (16 - 39)  SpO2: 99% (22 Feb 2021 08:00) (95% - 100%)    I&O's Summary    21 Feb 2021 07:01  -  22 Feb 2021 07:00  --------------------------------------------------------  IN: 420 mL / OUT: 750 mL / NET: -330 mL          LABS:                        15.0   13.83 )-----------( 389      ( 22 Feb 2021 06:33 )             47.1     02-22    140  |  106  |  27<H>  ----------------------------<  124<H>  4.3   |  21<L>  |  1.06    Ca    9.0      22 Feb 2021 06:33  Phos  1.8     02-22  Mg     2.4     02-22    TPro  8.0  /  Alb  3.8  /  TBili  0.3  /  DBili  x   /  AST  20  /  ALT  28  /  AlkPhos  72  02-22        CAPILLARY BLOOD GLUCOSE        ABG - ( 21 Feb 2021 11:20 )  pH, Arterial: 7.24  pH, Blood: x     /  pCO2: 54    /  pO2: 361   / HCO3: 23    / Base Excess: -4.8  /  SaO2: 99                  RADIOLOGY & ADDITIONAL TESTS:    Consultant(s) Notes Reviewed:  [x ] YES  [ ] NO    MEDICATIONS  (STANDING):  ALBUTerol    90 MICROgram(s) HFA Inhaler 1 Puff(s) Inhalation every 4 hours  budesonide 160 MICROgram(s)/formoterol 4.5 MICROgram(s) Inhaler 2 Puff(s) Inhalation two times a day  enoxaparin Injectable 40 milliGRAM(s) SubCutaneous daily  levoFLOXacin IVPB 500 milliGRAM(s) IV Intermittent every 24 hours  levoFLOXacin IVPB      loratadine 10 milliGRAM(s) Oral daily  methylPREDNISolone sodium succinate Injectable 40 milliGRAM(s) IV Push every 6 hours  montelukast 10 milliGRAM(s) Oral daily  pantoprazole  Injectable 40 milliGRAM(s) IV Push daily  sodium phosphate IVPB 30 milliMole(s) IV Intermittent once  tiotropium 18 MICROgram(s) Capsule 1 Capsule(s) Inhalation daily    MEDICATIONS  (PRN):      PHYSICAL EXAM:  GENERAL: well built, 0n NC   HEAD:  Atraumatic, Normocephalic  EYES: EOMI, PERRLA, conjunctiva and sclera clear  ENT: No tonsillar erythema, exudates, or enlargement; Moist mucous membranes, Good dentition, No lesions  NECK: Supple, No JVD, Normal thyroid, no enlarged nodes  NERVOUS SYSTEM:  Alert & Oriented X3, agitated   CHEST/LUNG: B/L good air entry;   HEART: S1S2 normal, no S3, Regular rate and rhythm; No murmurs, rubs, or gallops  ABDOMEN: Soft, Nontender, Nondistended; Bowel sounds present  EXTREMITIES:  2+ Peripheral Pulses, No clubbing, cyanosis, or edema  LYMPH: No lymphadenopathy noted  SKIN: No rashes or lesions    Care Discussed with Consultants/Other Providers [ x] YES  [ ] NO

## 2021-02-23 ENCOUNTER — TRANSCRIPTION ENCOUNTER (OUTPATIENT)
Age: 46
End: 2021-02-23

## 2021-02-23 DIAGNOSIS — Z02.9 ENCOUNTER FOR ADMINISTRATIVE EXAMINATIONS, UNSPECIFIED: ICD-10-CM

## 2021-02-23 DIAGNOSIS — Z29.9 ENCOUNTER FOR PROPHYLACTIC MEASURES, UNSPECIFIED: ICD-10-CM

## 2021-02-23 DIAGNOSIS — R79.89 OTHER SPECIFIED ABNORMAL FINDINGS OF BLOOD CHEMISTRY: ICD-10-CM

## 2021-02-23 DIAGNOSIS — J96.01 ACUTE RESPIRATORY FAILURE WITH HYPOXIA: ICD-10-CM

## 2021-02-23 LAB
ALBUMIN SERPL ELPH-MCNC: 3.6 G/DL — SIGNIFICANT CHANGE UP (ref 3.5–5)
ALP SERPL-CCNC: 72 U/L — SIGNIFICANT CHANGE UP (ref 40–120)
ALT FLD-CCNC: 29 U/L DA — SIGNIFICANT CHANGE UP (ref 10–60)
ANION GAP SERPL CALC-SCNC: 10 MMOL/L — SIGNIFICANT CHANGE UP (ref 5–17)
AST SERPL-CCNC: 15 U/L — SIGNIFICANT CHANGE UP (ref 10–40)
BILIRUB SERPL-MCNC: 0.3 MG/DL — SIGNIFICANT CHANGE UP (ref 0.2–1.2)
BUN SERPL-MCNC: 26 MG/DL — HIGH (ref 7–18)
CALCIUM SERPL-MCNC: 9 MG/DL — SIGNIFICANT CHANGE UP (ref 8.4–10.5)
CHLORIDE SERPL-SCNC: 102 MMOL/L — SIGNIFICANT CHANGE UP (ref 96–108)
CO2 SERPL-SCNC: 25 MMOL/L — SIGNIFICANT CHANGE UP (ref 22–31)
CREAT SERPL-MCNC: 1.2 MG/DL — SIGNIFICANT CHANGE UP (ref 0.5–1.3)
GLUCOSE SERPL-MCNC: 218 MG/DL — HIGH (ref 70–99)
HCT VFR BLD CALC: 45.7 % — SIGNIFICANT CHANGE UP (ref 39–50)
HGB BLD-MCNC: 14.9 G/DL — SIGNIFICANT CHANGE UP (ref 13–17)
MAGNESIUM SERPL-MCNC: 2.4 MG/DL — SIGNIFICANT CHANGE UP (ref 1.6–2.6)
MCHC RBC-ENTMCNC: 28.4 PG — SIGNIFICANT CHANGE UP (ref 27–34)
MCHC RBC-ENTMCNC: 32.6 GM/DL — SIGNIFICANT CHANGE UP (ref 32–36)
MCV RBC AUTO: 87.2 FL — SIGNIFICANT CHANGE UP (ref 80–100)
NRBC # BLD: 0 /100 WBCS — SIGNIFICANT CHANGE UP (ref 0–0)
PHOSPHATE SERPL-MCNC: 2.9 MG/DL — SIGNIFICANT CHANGE UP (ref 2.5–4.5)
PLATELET # BLD AUTO: 355 K/UL — SIGNIFICANT CHANGE UP (ref 150–400)
POTASSIUM SERPL-MCNC: 4.3 MMOL/L — SIGNIFICANT CHANGE UP (ref 3.5–5.3)
POTASSIUM SERPL-SCNC: 4.3 MMOL/L — SIGNIFICANT CHANGE UP (ref 3.5–5.3)
PROT SERPL-MCNC: 7.1 G/DL — SIGNIFICANT CHANGE UP (ref 6–8.3)
RBC # BLD: 5.24 M/UL — SIGNIFICANT CHANGE UP (ref 4.2–5.8)
RBC # FLD: 13.4 % — SIGNIFICANT CHANGE UP (ref 10.3–14.5)
SODIUM SERPL-SCNC: 137 MMOL/L — SIGNIFICANT CHANGE UP (ref 135–145)
T3 SERPL-MCNC: 99 NG/DL — SIGNIFICANT CHANGE UP (ref 80–200)
T4 AB SER-ACNC: 10.2 UG/DL — SIGNIFICANT CHANGE UP (ref 4.6–12)
WBC # BLD: 27.09 K/UL — HIGH (ref 3.8–10.5)
WBC # FLD AUTO: 27.09 K/UL — HIGH (ref 3.8–10.5)

## 2021-02-23 PROCEDURE — 99232 SBSQ HOSP IP/OBS MODERATE 35: CPT

## 2021-02-23 RX ORDER — PANTOPRAZOLE SODIUM 20 MG/1
40 TABLET, DELAYED RELEASE ORAL
Refills: 0 | Status: DISCONTINUED | OUTPATIENT
Start: 2021-02-23 | End: 2021-02-24

## 2021-02-23 RX ADMIN — BUDESONIDE AND FORMOTEROL FUMARATE DIHYDRATE 2 PUFF(S): 160; 4.5 AEROSOL RESPIRATORY (INHALATION) at 02:00

## 2021-02-23 RX ADMIN — Medication 0.5 MILLIGRAM(S): at 16:23

## 2021-02-23 RX ADMIN — CYCLOBENZAPRINE HYDROCHLORIDE 5 MILLIGRAM(S): 10 TABLET, FILM COATED ORAL at 16:19

## 2021-02-23 RX ADMIN — BUDESONIDE AND FORMOTEROL FUMARATE DIHYDRATE 2 PUFF(S): 160; 4.5 AEROSOL RESPIRATORY (INHALATION) at 09:20

## 2021-02-23 RX ADMIN — ALBUTEROL 1 PUFF(S): 90 AEROSOL, METERED ORAL at 05:31

## 2021-02-23 RX ADMIN — PANTOPRAZOLE SODIUM 40 MILLIGRAM(S): 20 TABLET, DELAYED RELEASE ORAL at 18:30

## 2021-02-23 RX ADMIN — TIOTROPIUM BROMIDE 1 CAPSULE(S): 18 CAPSULE ORAL; RESPIRATORY (INHALATION) at 12:28

## 2021-02-23 RX ADMIN — ALBUTEROL 1 PUFF(S): 90 AEROSOL, METERED ORAL at 22:00

## 2021-02-23 RX ADMIN — ALBUTEROL 1 PUFF(S): 90 AEROSOL, METERED ORAL at 09:20

## 2021-02-23 RX ADMIN — Medication 650 MILLIGRAM(S): at 06:00

## 2021-02-23 RX ADMIN — LORATADINE 10 MILLIGRAM(S): 10 TABLET ORAL at 12:28

## 2021-02-23 RX ADMIN — ALBUTEROL 1 PUFF(S): 90 AEROSOL, METERED ORAL at 19:17

## 2021-02-23 RX ADMIN — ALBUTEROL 1 PUFF(S): 90 AEROSOL, METERED ORAL at 01:28

## 2021-02-23 RX ADMIN — MONTELUKAST 10 MILLIGRAM(S): 4 TABLET, CHEWABLE ORAL at 12:28

## 2021-02-23 RX ADMIN — Medication 40 MILLIGRAM(S): at 16:20

## 2021-02-23 RX ADMIN — Medication 40 MILLIGRAM(S): at 23:29

## 2021-02-23 RX ADMIN — Medication 40 MILLIGRAM(S): at 01:28

## 2021-02-23 RX ADMIN — ENOXAPARIN SODIUM 40 MILLIGRAM(S): 100 INJECTION SUBCUTANEOUS at 12:28

## 2021-02-23 RX ADMIN — Medication 40 MILLIGRAM(S): at 05:32

## 2021-02-23 RX ADMIN — CYCLOBENZAPRINE HYDROCHLORIDE 5 MILLIGRAM(S): 10 TABLET, FILM COATED ORAL at 23:29

## 2021-02-23 RX ADMIN — ONDANSETRON 4 MILLIGRAM(S): 8 TABLET, FILM COATED ORAL at 23:18

## 2021-02-23 RX ADMIN — ALBUTEROL 1 PUFF(S): 90 AEROSOL, METERED ORAL at 14:30

## 2021-02-23 RX ADMIN — Medication 1 PATCH: at 07:20

## 2021-02-23 RX ADMIN — CYCLOBENZAPRINE HYDROCHLORIDE 5 MILLIGRAM(S): 10 TABLET, FILM COATED ORAL at 05:32

## 2021-02-23 NOTE — PROGRESS NOTE ADULT - SUBJECTIVE AND OBJECTIVE BOX
NP Note discussed with  Primary Attending    Patient is a 45y old  Male who presents with a chief complaint of SOB (22 Feb 2021 14:20)    HPI - 45 year old male with PMH of asthma.  Pt has never been intubated or on cpap.  Pt admitted for sob and wheezing.  Pt was complaining of chest tightness.  Pt was placed BIPAP and monitored in ICU now on PRN nasal cannula .  Pt downgraded to medicine for opioids withdrawal.   Pt was agitated and restless, no issue overnight. Pt has been on heroin for several years.  Pt did go to [place] in [id number] for rehab and was clean for one year.   Pt used heroin 7-9 bags on 2/21.  Pt used one bag 2/22 and came in for an asthma attack. No diarrhea.  No n/v. No rhinorrhea no tearing.      2/23 seen and examined pt at bedside AOX 3.no restless or gitation, asked to take a shower, explained that he should be off oxygen to go to shower safely understood, O2 saturation was 93% on RA last night, now on 2l/min via N-C and saturating 98%     INTERVAL HPI/OVERNIGHT EVENTS: no new complaints    MEDICATIONS  (STANDING):  ALBUTerol    90 MICROgram(s) HFA Inhaler 1 Puff(s) Inhalation every 4 hours  budesonide 160 MICROgram(s)/formoterol 4.5 MICROgram(s) Inhaler 2 Puff(s) Inhalation two times a day  cyclobenzaprine 5 milliGRAM(s) Oral three times a day  enoxaparin Injectable 40 milliGRAM(s) SubCutaneous daily  loratadine 10 milliGRAM(s) Oral daily  methylPREDNISolone sodium succinate Injectable 40 milliGRAM(s) IV Push every 8 hours  montelukast 10 milliGRAM(s) Oral daily  pantoprazole    Tablet 40 milliGRAM(s) Oral before breakfast  tiotropium 18 MICROgram(s) Capsule 1 Capsule(s) Inhalation daily    MEDICATIONS  (PRN):  acetaminophen   Tablet .. 650 milliGRAM(s) Oral every 6 hours PRN Temp greater or equal to 38C (100.4F), Mild Pain (1 - 3)  clonazePAM  Tablet 0.5 milliGRAM(s) Oral every 8 hours PRN agitation  ondansetron Injectable 4 milliGRAM(s) IV Push every 6 hours PRN Nausea and/or Vomiting      __________________________________________________  REVIEW OF SYSTEMS:    CONSTITUTIONAL: No fever,   EYES: no acute visual disturbances  NECK: No pain or stiffness  RESPIRATORY: No cough; No shortness of breath  CARDIOVASCULAR: No chest pain, no palpitations  GASTROINTESTINAL: No pain. No nausea or vomiting; No diarrhea   NEUROLOGICAL: No headache or numbness, no tremors  MUSCULOSKELETAL: No joint pain, no muscle pain  GENITOURINARY: no dysuria, no frequency, no hesitancy  PSYCHIATRY: no depression , no anxiety  ALL OTHER  ROS negative        Vital Signs Last 24 Hrs  T(C): 37.1 (23 Feb 2021 04:40), Max: 37.3 (22 Feb 2021 21:00)  T(F): 98.7 (23 Feb 2021 04:40), Max: 99.1 (22 Feb 2021 21:00)  HR: 107 (23 Feb 2021 04:40) (91 - 118)  BP: 131/84 (23 Feb 2021 04:40) (116/67 - 135/83)  BP(mean): 96 (22 Feb 2021 15:00) (79 - 100)  RR: 22 (23 Feb 2021 04:40) (20 - 36)  SpO2: 93% (23 Feb 2021 04:40) (93% - 100%)    ________________________________________________  PHYSICAL EXAM:  GENERAL: NAD  HEENT: Normocephalic;  conjunctivae and sclerae clear; moist mucous membranes;   NECK : supple  CHEST/LUNG: Clear to auscultation bilaterally with good air entry   HEART: S1 S2  regular; no murmurs, gallops or rubs  ABDOMEN: Soft, Nontender, Nondistended; Bowel sounds present  EXTREMITIES: no cyanosis; no edema; no calf tenderness  SKIN: warm and dry; no rash  NERVOUS SYSTEM:  Awake and alert; Oriented  to place, person and time ; no new deficits    _________________________________________________  LABS:                        14.9   x     )-----------( 355      ( 23 Feb 2021 08:34 )             45.7     02-23    137  |  102  |  26<H>  ----------------------------<  218<H>  4.3   |  25  |  1.20    Ca    9.0      23 Feb 2021 08:34  Phos  2.9     02-23  Mg     2.4     02-23    TPro  7.1  /  Alb  3.6  /  TBili  0.3  /  DBili  x   /  AST  15  /  ALT  29  /  AlkPhos  72  02-23        CAPILLARY BLOOD GLUCOSE            RADIOLOGY & ADDITIONAL TESTS:  < from: Xray Chest 1 View- PORTABLE-Urgent (Xray Chest 1 View- PORTABLE-Urgent .) (02.21.21 @ 12:32) >    EXAM:  XR CHEST PORTABLE URGENT 1V                            PROCEDURE DATE:  02/21/2021          INTERPRETATION:  Clinical Information: Dyspnea    Technique: AP chest image.    Comparison: 2015    Findings/  Impression: The heart is unremarkable.The lungs are clear. Bones are unremarkable for age.            JB REGAN MD; Attending Interventional Radiologist  This document has been electronically signed. Feb 21 2021  1:06PM    < end of copied text >    Imaging Personally Reviewed:  YES    Consultant(s) Notes Reviewed:   YES    Care Discussed with Consultants : pain     Plan of care was discussed with patient and /or primary care giver; all questions and concerns were addressed and care was aligned with patient's wishes.

## 2021-02-23 NOTE — DISCHARGE NOTE PROVIDER - HOSPITAL COURSE
45 year old male with PMH of asthma  admitted  to ICU due to acute respiratory failure 2/2  asthma attack required BIPAP caused by opioids withdrawal given heroin use prior to admission.    Wean off BIPAP, titrated down to RA and saturating well. IV solumedrol started for Asthma attack and tapered, can complete P.O prednisone at home.  Pain was consulted for pain  team due to opioids withdrawal, started regimen as recommended,  GI discomfort subsided will be  d/c home on -xxxx    incomplete           45 year old male with PMH of asthma  admitted  to ICU due to acute respiratory failure 2/2 asthma attack required BIPAP caused by opioids withdrawal given heroin use prior to admission.    Wean off BIPAP, titrated down to RA and saturating well. IV solumedrol started for Asthma attack and tapered, can complete P.O prednisone at home. Pain was consulted for pain team due to opioids withdrawal, started regimen as recommended.  GI discomfort subsided and tolerating PO intake and LFTs WNL with a benign abdominal exam.  Pt reported feeling much better and requesting to be d/c.  Will d/c home with short course of Zofran PRN for two days.            45 year old male with PMH of asthma  admitted  to ICU due to acute respiratory failure 2/2 asthma attack required BIPAP caused by opioids withdrawal given heroin use prior to admission.    Wean off BIPAP, titrated down to RA and saturating well. IV solumedrol started for Asthma attack and tapered, can complete P.O prednisone at home. Pain was consulted for pain team due to opioids withdrawal, started regimen as recommended.  GI discomfort subsided and tolerating PO intake and LFTs WNL with a benign abdominal exam.  Pt reported feeling much better and requesting to be d/c.  Will d/c home with short course of Zofran PRN for two days.  Of note, pt has resource information for outpatient treatment and he was also evaluated her by  for assistance with substance abuse.

## 2021-02-23 NOTE — PROGRESS NOTE ADULT - SUBJECTIVE AND OBJECTIVE BOX
Source of information: , Chart review  Patient language: English  : n/a    CC:      This is a 45yMale patient who presents to the hospital for Patient is a 45y old  Male who presents with a chief complaint of SOB (23 Feb 2021 09:50)  .       Patient stated goal for pain control: to be able to take deep breaths, get out of bed to chair and ambulate with tolerable pain control.     PAIN SCORE:         SCALE USED: (1-10 NRS)      PAST MEDICAL & SURGICAL HISTORY:  Asthma    Ex-smoker  (cigarettes / marijuana)    No significant past surgical history        FAMILY HISTORY:  No pertinent family history in first degree relatives    Family history of asthma          SOCIAL HISTORY:  [ ] Denies Smoking, Alcohol, or Drug Use    Allergies    dust (Eye Irritation)  No Known Drug Allergies    Intolerances        MEDICATIONS:    MEDICATIONS  (STANDING):  ALBUTerol    90 MICROgram(s) HFA Inhaler 1 Puff(s) Inhalation every 4 hours  budesonide 160 MICROgram(s)/formoterol 4.5 MICROgram(s) Inhaler 2 Puff(s) Inhalation two times a day  cyclobenzaprine 5 milliGRAM(s) Oral three times a day  enoxaparin Injectable 40 milliGRAM(s) SubCutaneous daily  loratadine 10 milliGRAM(s) Oral daily  methylPREDNISolone sodium succinate Injectable 40 milliGRAM(s) IV Push every 8 hours  montelukast 10 milliGRAM(s) Oral daily  pantoprazole    Tablet 40 milliGRAM(s) Oral before breakfast  tiotropium 18 MICROgram(s) Capsule 1 Capsule(s) Inhalation daily    MEDICATIONS  (PRN):  acetaminophen   Tablet .. 650 milliGRAM(s) Oral every 6 hours PRN Temp greater or equal to 38C (100.4F), Mild Pain (1 - 3)  clonazePAM  Tablet 0.5 milliGRAM(s) Oral every 8 hours PRN agitation  ondansetron Injectable 4 milliGRAM(s) IV Push every 6 hours PRN Nausea and/or Vomiting      Vital Signs Last 24 Hrs  T(C): 37.1 (23 Feb 2021 04:40), Max: 37.3 (22 Feb 2021 21:00)  T(F): 98.7 (23 Feb 2021 04:40), Max: 99.1 (22 Feb 2021 21:00)  HR: 107 (23 Feb 2021 04:40) (91 - 118)  BP: 131/84 (23 Feb 2021 04:40) (122/85 - 135/83)  BP(mean): 96 (22 Feb 2021 15:00) (93 - 96)  RR: 22 (23 Feb 2021 10:30) (20 - 31)  SpO2: 97% (23 Feb 2021 10:30) (93% - 100%)    LABS:                          14.9   27.09 )-----------( 355      ( 23 Feb 2021 08:34 )             45.7     02-23    137  |  102  |  26<H>  ----------------------------<  218<H>  4.3   |  25  |  1.20    Ca    9.0      23 Feb 2021 08:34  Phos  2.9     02-23  Mg     2.4     02-23    TPro  7.1  /  Alb  3.6  /  TBili  0.3  /  DBili  x   /  AST  15  /  ALT  29  /  AlkPhos  72  02-23      LIVER FUNCTIONS - ( 23 Feb 2021 08:34 )  Alb: 3.6 g/dL / Pro: 7.1 g/dL / ALK PHOS: 72 U/L / ALT: 29 U/L DA / AST: 15 U/L / GGT: x             CAPILLARY BLOOD GLUCOSE          Radiology:    Drug Screen:        REVIEW OF SYSTEMS:  CONSTITUTIONAL: No fever or fatigue  RESPIRATORY: No cough, wheezing, chills or hemoptysis; No shortness of breath  CARDIOVASCULAR: No chest pain, palpitations, dizziness, or leg swelling  GASTROINTESTINAL: No abdominal or epigastric pain. No nausea, vomiting; No diarrhea or constipation.   GENITOURINARY: No dysuria, frequency, hematuria, retention or incontinence  MUSCULOSKELETAL: No joint pain or swelling; No muscle, back, or extremity pain, no upper or lower motor strength weakness, no saddle anesthesia, bowel/bladder incontinence, no falls   NEURO: No headaches, No numbness/tingling b/l LE, No weakness  PSYCHIATRIC: No depression, anxiety, mood swings, or difficulty sleeping    PHYSICAL EXAM:  GENERAL:  Alert & Oriented X3, NAD, Good concentration  CHEST/LUNG: Clear to auscultation bilaterally; No rales, rhonchi, wheezing, or rubs  HEART: Regular rate and rhythm; No murmurs, rubs, or gallops  ABDOMEN: Soft, Nontender, Nondistended; Bowel sounds present  EXTREMITIES:  2+ Peripheral Pulses, No cyanosis, or edema  MUSCULOSKELETAL: + decreased rom Motor Strength 5/5 B/L upper and lower extremities; moves all extremities equally against gravity; ROM intact; negative SLR  SKIN: No rashes or lesions    Risk factors associated with adverse outcomes related to opioid treatment  [ ]  Concurrent benzodiazepine use  [ ]  History/ Active substance use or alcohol use disorder  [ ] Psychiatric co-morbidity  [ ] Sleep apnea  [ ] COPD  [ ] BMI> 35  [ ] Liver dysfunction  [ ] Renal dysfunction  [ ] CHF  [ ] Smoker  [ ]  Age > 60 years    [ ]  [place]  Reviewed and Copied to Chart. See below.    Plan of care and goal oriented pain management treatment options were discussed with patient and /or primary care giver; all questions and concerns were addressed and care was aligned with patient's wishes.    Educated patient on goal oriented pain management treatment options     02-23-21 @ 11:35 Source of information: , Chart review  Patient language: English  : n/a    CC: sob    This is a 45yMale patient who presents to the hospital for Patient is a 45y old  Male who presents with a chief complaint of SOB (23 Feb 2021 09:50)    Pt is oob and ambulating without any difficulties.  + asthma exacerbation.  Pt now on iv steroids and being tapered down.  + heroin use.  Pt is restless and has muscle aches but denies all other symptoms.  No rhinorhea, tearing, diarrhea, or yawning.     PAIN SCORE:    3/10     SCALE USED: (1-10 NRS)      PAST MEDICAL & SURGICAL HISTORY:  Asthma    Ex-smoker  (cigarettes / marijuana)    No significant past surgical history        FAMILY HISTORY:  No pertinent family history in first degree relatives    Family history of asthma          SOCIAL HISTORY:  + heroin use   + smoking history    Allergies    dust (Eye Irritation)  No Known Drug Allergies    Intolerances        MEDICATIONS:    MEDICATIONS  (STANDING):  ALBUTerol    90 MICROgram(s) HFA Inhaler 1 Puff(s) Inhalation every 4 hours  budesonide 160 MICROgram(s)/formoterol 4.5 MICROgram(s) Inhaler 2 Puff(s) Inhalation two times a day  cyclobenzaprine 5 milliGRAM(s) Oral three times a day  enoxaparin Injectable 40 milliGRAM(s) SubCutaneous daily  loratadine 10 milliGRAM(s) Oral daily  methylPREDNISolone sodium succinate Injectable 40 milliGRAM(s) IV Push every 8 hours  montelukast 10 milliGRAM(s) Oral daily  pantoprazole    Tablet 40 milliGRAM(s) Oral before breakfast  tiotropium 18 MICROgram(s) Capsule 1 Capsule(s) Inhalation daily    MEDICATIONS  (PRN):  acetaminophen   Tablet .. 650 milliGRAM(s) Oral every 6 hours PRN Temp greater or equal to 38C (100.4F), Mild Pain (1 - 3)  clonazePAM  Tablet 0.5 milliGRAM(s) Oral every 8 hours PRN agitation  ondansetron Injectable 4 milliGRAM(s) IV Push every 6 hours PRN Nausea and/or Vomiting      Vital Signs Last 24 Hrs  T(C): 37.1 (23 Feb 2021 04:40), Max: 37.3 (22 Feb 2021 21:00)  T(F): 98.7 (23 Feb 2021 04:40), Max: 99.1 (22 Feb 2021 21:00)  HR: 107 (23 Feb 2021 04:40) (91 - 118)  BP: 131/84 (23 Feb 2021 04:40) (122/85 - 135/83)  BP(mean): 96 (22 Feb 2021 15:00) (93 - 96)  RR: 22 (23 Feb 2021 10:30) (20 - 31)  SpO2: 97% (23 Feb 2021 10:30) (93% - 100%)    LABS:                          14.9   27.09 )-----------( 355      ( 23 Feb 2021 08:34 )             45.7     02-23    137  |  102  |  26<H>  ----------------------------<  218<H>  4.3   |  25  |  1.20    Ca    9.0      23 Feb 2021 08:34  Phos  2.9     02-23  Mg     2.4     02-23    TPro  7.1  /  Alb  3.6  /  TBili  0.3  /  DBili  x   /  AST  15  /  ALT  29  /  AlkPhos  72  02-23      LIVER FUNCTIONS - ( 23 Feb 2021 08:34 )  Alb: 3.6 g/dL / Pro: 7.1 g/dL / ALK PHOS: 72 U/L / ALT: 29 U/L DA / AST: 15 U/L / GGT: x             CAPILLARY BLOOD GLUCOSE          Radiology:      Drug Screen:        REVIEW OF SYSTEMS:  CONSTITUTIONAL: No fever or fatigue  RESPIRATORY: no sob, + asthma  CARDIOVASCULAR: No chest pain, palpitations, dizziness, or leg swelling  GASTROINTESTINAL: No abdominal or epigastric pain. No nausea, vomiting; No diarrhea or constipation.   GENITOURINARY: No dysuria, frequency, hematuria, retention or incontinence  MUSCULOSKELETAL: + muscles aches   NEURO: No headaches, No numbness/tingling b/l LE, No weakness  PSYCHIATRIC: No depression, anxiety, mood swings, or difficulty sleeping    PHYSICAL EXAM:  GENERAL:  Alert & Oriented X3, NAD, Good concentration, restless   CHEST/LUNG: decreased breath sounds  HEART: Regular rate and rhythm; No murmurs, rubs, or gallops  ABDOMEN: Soft, Nontender, Nondistended; Bowel sounds present  EXTREMITIES:  2+ Peripheral Pulses, No cyanosis, or edema  MUSCULOSKELETAL: + decreased rom + restless, + muscle and joint pain  SKIN: No rashes or lesions    Risk factors associated with adverse outcomes related to opioid treatment  [ ]  Concurrent benzodiazepine use  [ x]  History/ Active substance use or alcohol use disorder  [ ] Psychiatric co-morbidity  [ ] Sleep apnea  [ ] COPD  [ ] BMI> 35  [ ] Liver dysfunction  [ ] Renal dysfunction  [ ] CHF  [ ] Smoker  [ ]  Age > 60 years    [x ]  [place]  Reviewed and Copied to Chart. See below.    Plan of care and goal oriented pain management treatment options were discussed with patient and /or primary care giver; all questions and concerns were addressed and care was aligned with patient's wishes.    Educated patient on goal oriented pain management treatment options     02-23-21 @ 11:35

## 2021-02-23 NOTE — DISCHARGE NOTE PROVIDER - NSDCCPCAREPLAN_GEN_ALL_CORE_FT
PRINCIPAL DISCHARGE DIAGNOSIS  Diagnosis: Acute respiratory failure with hypoxia  Assessment and Plan of Treatment: due to asthma attack which caused by opioid withdrawal  you were admitted to intensive care unit as you needed BIPAP initially, now you are off oxygen and able to maintain good oxygen saturation on room air  complete the steroid regimen as prescribed and use inhaler as directed  follow up with PMD in 1 week      SECONDARY DISCHARGE DIAGNOSES  Diagnosis: Low TSH level  Assessment and Plan of Treatment: Low TSH level    Diagnosis: Acute opioid withdrawal  Assessment and Plan of Treatment: Acute opioid withdrawal    Diagnosis: Persistent asthma with acute exacerbation, unspecified asthma severity  Assessment and Plan of Treatment: HOME CARE INSTRUCTIONS  Take medicines as directed by your caregiver.  Control your home environment in the following ways to help prevent asthma attacks:  Change your heating and air conditioning filter at least once a month.  Do not smoke. Do not stay in places where others are smoking.  Get rid of pests (such as roaches and mice) and their droppings.  If you see mold on a plant, throw it away.  Clean your floors and dust every week. Use unscented cleaning products. Use a vacuum  with a HEPA filter if possible. If vacuuming or cleaning triggers your asthma, try to find someone else to do these chores..  Use allergy-proof pillows, mattress covers, and box spring covers.  Wash bedsheets and blankets every week in hot water and dry in a dryer.  Use a blanket that is made of polyester or cotton with a tight nap.  Clean bathrooms and alexus with bleach and repaint with mold-resistant paint.   Wash hands frequently.  Talk to your caregiver about an action plan for managing asthma attacks. This includes the use of a peak flow meter which measures the severity of the attack and medicines that can help stop the attack. An action plan can help minimize or stop the attack without having to seek medical care.  SEEK MEDICAL CARE IF:  You have wheezing, shortness of breath, or a cough even if taking medicine to prevent attacks.   You have thickening of sputum.   Your sputum changes from clear or white to yellow, green, gray, or bloody.   You have any problems that may be related to the medicines you are taking (such as a rash, itching, swelling, or trouble breathing).  You are using a reliever medicine more than 2–3 times per week.  Your peak flow is still at 50–79% of personal best after following your action plan for 1 hour.  SEEK IMMEDIATE MEDICAL CARE IF:  You are short of breath even at rest.  You get short of breath when doing very little physical activity.  You have difficulty eating, drinking, or talking due to asthma symptoms.  You have chest pain or you feel that your heart is beati     PRINCIPAL DISCHARGE DIAGNOSIS  Diagnosis: Acute respiratory failure with hypoxia  Assessment and Plan of Treatment: due to asthma attack which caused by opioid withdrawal  you were admitted to intensive care unit as you needed BIPAP initially, now you are off oxygen and able to maintain good oxygen saturation on room air  complete the steroid regimen as prescribed and use inhaler as directed  follow up with PMD in 1 week      SECONDARY DISCHARGE DIAGNOSES  Diagnosis: Acute opioid withdrawal  Assessment and Plan of Treatment: Pt avoid opioid used as to help minimize increased complication such as cardiac and other organ damage such as liver, hepatitis, risk of HIV.  Make sure to follow up with outpt treatment for opiod/ heroin treatment.  Report any worsening of your symptoms to your doctor such as diaphoresis, nausea or vomiting, inability tolerating food.    Diagnosis: Low TSH level  Assessment and Plan of Treatment: Low TSH level- follow up with your doctor as outpatient for low thyroid function test result.  Take copies of your lab result with your to discuss with your doctor.    Diagnosis: Persistent asthma with acute exacerbation, unspecified asthma severity  Assessment and Plan of Treatment: HOME CARE INSTRUCTIONS  Take medicines as directed by your caregiver.  Control your home environment in the following ways to help prevent asthma attacks:  Change your heating and air conditioning filter at least once a month.  Do not smoke. Do not stay in places where others are smoking.  Get rid of pests (such as roaches and mice) and their droppings.  If you see mold on a plant, throw it away.  Clean your floors and dust every week. Use unscented cleaning products. Use a vacuum  with a HEPA filter if possible. If vacuuming or cleaning triggers your asthma, try to find someone else to do these chores..  Use allergy-proof pillows, mattress covers, and box spring covers.  Wash bedsheets and blankets every week in hot water and dry in a dryer.  Use a blanket that is made of polyester or cotton with a tight nap.  Clean bathrooms and alexus with bleach and repaint with mold-resistant paint.   Wash hands frequently.  Talk to your caregiver about an action plan for managing asthma attacks. This includes the use of a peak flow meter which measures the severity of the attack and medicines that can help stop the attack. An action plan can help minimize or stop the attack without having to seek medical care.  SEEK MEDICAL CARE IF:  You have wheezing, shortness of breath, or a cough even if taking medicine to prevent attacks.   You have thickening of sputum.   Your sputum changes from clear or white to yellow, green, gray, or bloody.   You have any problems that may be related to the medicines you are taking (such as a rash, itching, swelling, or trouble breathing).  You are using a reliever medicine more than 2–3 times per week.  Your peak flow is still at 50–79% of personal best after following your action plan for 1 hour.  SEEK IMMEDIATE MEDICAL CARE IF:  You are short of breath even at rest.  You get short of breath when doing very little physical activity.  You have difficulty eating, drinking, or talking due to asthma symptoms.  You have chest pain or you feel that your heart is beati

## 2021-02-23 NOTE — DISCHARGE NOTE PROVIDER - NSDCMRMEDTOKEN_GEN_ALL_CORE_FT
Advair HFA:  inhaled 2 times a day  cetirizine 10 mg oral tablet: 1 tab(s) orally once a day  Flonase 50 mcg/inh nasal spray: 1 spray(s) nasal 2 times a day  Symbicort 160 mcg-4.5 mcg/inh inhalation aerosol: 2 puff(s) inhaled 2 times a day  Ventolin HFA 90 mcg/inh inhalation aerosol: 2 puff(s) inhaled every 4 hours, As Needed - for shortness of breath and/or wheezing   Advair HFA:  inhaled 2 times a day  cetirizine 10 mg oral tablet: 1 tab(s) orally once a day  Flonase 50 mcg/inh nasal spray: 1 spray(s) nasal 2 times a day  montelukast 10 mg oral tablet: 1 tab(s) orally once a day MDD:1 tablet  ondansetron 4 mg oral tablet: 1 tab(s) orally 3 times a day, As Needed MDD:4 mg   predniSONE 20 mg oral tablet: 2 tab(s) orally once a day x first 2 days-   Prednisone taper- 20 mg daily x two days MDD:40mg tablet  Symbicort 160 mcg-4.5 mcg/inh inhalation aerosol: 2 puff(s) inhaled 2 times a day  Ventolin HFA 90 mcg/inh inhalation aerosol: 2 puff(s) inhaled every 4 hours, As Needed - for shortness of breath and/or wheezing

## 2021-02-23 NOTE — DISCHARGE NOTE PROVIDER - CONDITIONS AT DISCHARGE
Case has been discussed w/ Attending Dr. Chisholm directing pt's care and medically cleared for discharge.

## 2021-02-23 NOTE — PROGRESS NOTE ADULT - ASSESSMENT
_________________________________________________________________________________________  ========>>  M E D I C A L   A T T E N D I N G    F O L L O W  U P  N O T E  <<=========  -----------------------------------------------------------------------------------------------------    - Patient seen and examined by me earlier today. pt transferred from ICU last M  - In summary,  JESSICA WILSON is a 45y year old man admitted with SOB / asthma   - Patient today overall doing ok, comfortable, eating OK. overall feels better     ==================>> REVIEW OF SYSTEM <<=================    GEN: no fever, no chills, no pain  RESP: no SOB, occasional cough with some sputum  CVS: no chest pain, no palpitations, no edema  GI: no abdominal pain, no nausea, no constipation, no diarrhea  : no dysuria, no frequency, no hematuria  Neuro: no headache, no dizziness  Derm : no itching, no rash    ==================>> PHYSICAL EXAM <<=================    GEN: A&O X 3 , NAD , comfortable  HEENT: NCAT, PERRL, MMM, hearing intact  Neck: supple , no JVD appreciated  CVS: S1S2 , regular , No M/R/G appreciated  PULM: CTA B/L,  no wheezing   ABD.: soft. non tender, non distended,  bowel sounds present  Extrem: intact pulses , no edema   PSYCH : normal mood,  not anxious                                         ( Note Written:  02-23-21 )    ==================>> MEDICATIONS <<====================    MEDICATIONS  (STANDING):  ALBUTerol    90 MICROgram(s) HFA Inhaler 1 Puff(s) Inhalation every 4 hours  budesonide 160 MICROgram(s)/formoterol 4.5 MICROgram(s) Inhaler 2 Puff(s) Inhalation two times a day  cyclobenzaprine 5 milliGRAM(s) Oral three times a day  enoxaparin Injectable 40 milliGRAM(s) SubCutaneous daily  loratadine 10 milliGRAM(s) Oral daily  methylPREDNISolone sodium succinate Injectable 40 milliGRAM(s) IV Push once  montelukast 10 milliGRAM(s) Oral daily  pantoprazole    Tablet 40 milliGRAM(s) Oral before breakfast  tiotropium 18 MICROgram(s) Capsule 1 Capsule(s) Inhalation daily    MEDICATIONS  (PRN):  acetaminophen   Tablet .. 650 milliGRAM(s) Oral every 6 hours PRN Temp greater or equal to 38C (100.4F), Mild Pain (1 - 3)  clonazePAM  Tablet 0.5 milliGRAM(s) Oral every 8 hours PRN agitation  ondansetron Injectable 4 milliGRAM(s) IV Push every 6 hours PRN Nausea and/or Vomiting    ___________  Active diet:  Diet, Regular  ___________________    ==================>> VITAL SIGNS <<==================    T(C): 36.9 (02-23-21 @ 20:34), Max: 37.1 (02-23-21 @ 04:40)  HR: 98 (02-23-21 @ 20:34) (98 - 107)  BP: 132/89 (02-23-21 @ 20:34) (131/84 - 144/85)  RR: 18 (02-23-21 @ 20:34) (18 - 22)  SpO2: 97% (02-23-21 @ 20:34) (93% - 99%)     I&O's Summary    22 Feb 2021 07:01  -  23 Feb 2021 07:00  --------------------------------------------------------  IN: 739.8 mL / OUT: 400 mL / NET: 339.8 mL       ==================>> LAB AND IMAGING <<==================                        14.9   27.09 )-----------( 355      ( 23 Feb 2021 08:34 )             45.7     WBC count:   27.09 <<== ,  13.83 <<== ,  12.16 <<==        02-23    137  |  102  |  26<H>  ----------------------------<  218<H>  4.3   |  25  |  1.20    Ca    9.0      23 Feb 2021 08:34  Phos  2.9     02-23  Mg     2.4     02-23    TPro  7.1  /  Alb  3.6  /  TBili  0.3  /  DBili  x   /  AST  15  /  ALT  29  /  AlkPhos  72  02-23      TSH:      0.22   (02-22-21)           Lipid profile:  (02-22-21)     Total: 175     LDL  : (p)     HDL  :78     TG   :48     HgA1C:   (02-22-21)          (02-22-21)      5.9    ___________________________________________________________________________________  ===============>>  A S S E S S M E N T   A N D   P L A N <<===============  ------------------------------------------------------------------------------------------    5yMale patient who presents to the hospital for Patient is a 45y old  Male who presents with a chief complaint of SOB    Problem/Plan - 1:  ·  Problem: Acute opioid withdrawal.  Plan: Acute opioid withdrawal. Recommendation: - pt with acute opioid withdrawal, in the ICU - heroin.    - on Methadone   - Supportive mgt  - flexeril 5mg p q 8 hours   pain mgmt service following and appreciated   - zofran iv prn  - monitor vitals     Problem/Plan - 2:  ·  Problem: Persistent asthma with acute exacerbation  overall improved  tapering steroids ( likely cause of leukocytosis)     Problem/Plan - 3:  ·  Problem: H/O: substance abuse.     -GI/DVT Prophylaxis per protocol.    --------------------------------------------  Case discussed with pt...  Education given on findings and plan of care  ___________________________  H. SHANTAL Chisholm.  Pager: [phone]

## 2021-02-23 NOTE — DISCHARGE NOTE PROVIDER - NSDCCAREPROVSEEN_GEN_ALL_CORE_FT
Emanuel Chisholm Cheyenne Kessler  Unknown Doctor  Team [facility] - NP Service  Team [facility]

## 2021-02-23 NOTE — DISCHARGE NOTE PROVIDER - PROVIDER TOKENS
FREE:[LAST:[Primary care doctor],PHONE:[(   )    -],FAX:[(   )    -],ADDRESS:[Please call your doctor to schedule an appoinment.  If you do not have a doctor, call our medical office to schedule an appointment.]]

## 2021-02-23 NOTE — DISCHARGE NOTE PROVIDER - NSDCFUADDINST_GEN_ALL_CORE_FT
You can also call [email or web address] at  for assistance with your substance abuse.  Make sure to  your medication sent to your pharmacy as requested.

## 2021-02-23 NOTE — DISCHARGE NOTE PROVIDER - CARE PROVIDER_API CALL
Primary care doctor,   Please call your doctor to schedule an appoinment.  If you do not have a doctor, call our medical office to schedule an appointment.  Phone: (   )    -  Fax: (   )    -  Follow Up Time:

## 2021-02-24 ENCOUNTER — TRANSCRIPTION ENCOUNTER (OUTPATIENT)
Age: 46
End: 2021-02-24

## 2021-02-24 VITALS
TEMPERATURE: 98 F | OXYGEN SATURATION: 98 % | SYSTOLIC BLOOD PRESSURE: 116 MMHG | HEART RATE: 98 BPM | DIASTOLIC BLOOD PRESSURE: 95 MMHG | RESPIRATION RATE: 16 BRPM

## 2021-02-24 LAB
ALBUMIN SERPL ELPH-MCNC: 3.8 G/DL — SIGNIFICANT CHANGE UP (ref 3.5–5)
ALP SERPL-CCNC: 71 U/L — SIGNIFICANT CHANGE UP (ref 40–120)
ALT FLD-CCNC: 29 U/L DA — SIGNIFICANT CHANGE UP (ref 10–60)
ANION GAP SERPL CALC-SCNC: 9 MMOL/L — SIGNIFICANT CHANGE UP (ref 5–17)
AST SERPL-CCNC: 15 U/L — SIGNIFICANT CHANGE UP (ref 10–40)
BILIRUB SERPL-MCNC: 0.6 MG/DL — SIGNIFICANT CHANGE UP (ref 0.2–1.2)
BUN SERPL-MCNC: 28 MG/DL — HIGH (ref 7–18)
CALCIUM SERPL-MCNC: 9.5 MG/DL — SIGNIFICANT CHANGE UP (ref 8.4–10.5)
CHLORIDE SERPL-SCNC: 106 MMOL/L — SIGNIFICANT CHANGE UP (ref 96–108)
CO2 SERPL-SCNC: 24 MMOL/L — SIGNIFICANT CHANGE UP (ref 22–31)
CREAT SERPL-MCNC: 1.13 MG/DL — SIGNIFICANT CHANGE UP (ref 0.5–1.3)
GLUCOSE SERPL-MCNC: 113 MG/DL — HIGH (ref 70–99)
HCT VFR BLD CALC: 48.6 % — SIGNIFICANT CHANGE UP (ref 39–50)
HGB BLD-MCNC: 15.8 G/DL — SIGNIFICANT CHANGE UP (ref 13–17)
MCHC RBC-ENTMCNC: 28.5 PG — SIGNIFICANT CHANGE UP (ref 27–34)
MCHC RBC-ENTMCNC: 32.5 GM/DL — SIGNIFICANT CHANGE UP (ref 32–36)
MCV RBC AUTO: 87.6 FL — SIGNIFICANT CHANGE UP (ref 80–100)
NRBC # BLD: 0 /100 WBCS — SIGNIFICANT CHANGE UP (ref 0–0)
PLATELET # BLD AUTO: 360 K/UL — SIGNIFICANT CHANGE UP (ref 150–400)
POTASSIUM SERPL-MCNC: 4.2 MMOL/L — SIGNIFICANT CHANGE UP (ref 3.5–5.3)
POTASSIUM SERPL-SCNC: 4.2 MMOL/L — SIGNIFICANT CHANGE UP (ref 3.5–5.3)
PROT SERPL-MCNC: 7.5 G/DL — SIGNIFICANT CHANGE UP (ref 6–8.3)
RBC # BLD: 5.55 M/UL — SIGNIFICANT CHANGE UP (ref 4.2–5.8)
RBC # FLD: 13.8 % — SIGNIFICANT CHANGE UP (ref 10.3–14.5)
SODIUM SERPL-SCNC: 139 MMOL/L — SIGNIFICANT CHANGE UP (ref 135–145)
WBC # BLD: 22.17 K/UL — HIGH (ref 3.8–10.5)
WBC # FLD AUTO: 22.17 K/UL — HIGH (ref 3.8–10.5)

## 2021-02-24 PROCEDURE — 83036 HEMOGLOBIN GLYCOSYLATED A1C: CPT

## 2021-02-24 PROCEDURE — 83735 ASSAY OF MAGNESIUM: CPT

## 2021-02-24 PROCEDURE — 94660 CPAP INITIATION&MGMT: CPT

## 2021-02-24 PROCEDURE — 85027 COMPLETE CBC AUTOMATED: CPT

## 2021-02-24 PROCEDURE — 84480 ASSAY TRIIODOTHYRONINE (T3): CPT

## 2021-02-24 PROCEDURE — 99232 SBSQ HOSP IP/OBS MODERATE 35: CPT

## 2021-02-24 PROCEDURE — 97161 PT EVAL LOW COMPLEX 20 MIN: CPT

## 2021-02-24 PROCEDURE — 93005 ELECTROCARDIOGRAM TRACING: CPT

## 2021-02-24 PROCEDURE — 82746 ASSAY OF FOLIC ACID SERUM: CPT

## 2021-02-24 PROCEDURE — 82607 VITAMIN B-12: CPT

## 2021-02-24 PROCEDURE — 96372 THER/PROPH/DIAG INJ SC/IM: CPT

## 2021-02-24 PROCEDURE — 0225U NFCT DS DNA&RNA 21 SARSCOV2: CPT

## 2021-02-24 PROCEDURE — 99291 CRITICAL CARE FIRST HOUR: CPT

## 2021-02-24 PROCEDURE — 94640 AIRWAY INHALATION TREATMENT: CPT

## 2021-02-24 PROCEDURE — 82306 VITAMIN D 25 HYDROXY: CPT

## 2021-02-24 PROCEDURE — 80053 COMPREHEN METABOLIC PANEL: CPT

## 2021-02-24 PROCEDURE — 82803 BLOOD GASES ANY COMBINATION: CPT

## 2021-02-24 PROCEDURE — 96375 TX/PRO/DX INJ NEW DRUG ADDON: CPT

## 2021-02-24 PROCEDURE — 84436 ASSAY OF TOTAL THYROXINE: CPT

## 2021-02-24 PROCEDURE — 84100 ASSAY OF PHOSPHORUS: CPT

## 2021-02-24 PROCEDURE — 36415 COLL VENOUS BLD VENIPUNCTURE: CPT

## 2021-02-24 PROCEDURE — 71045 X-RAY EXAM CHEST 1 VIEW: CPT

## 2021-02-24 PROCEDURE — 80048 BASIC METABOLIC PNL TOTAL CA: CPT

## 2021-02-24 PROCEDURE — 80307 DRUG TEST PRSMV CHEM ANLYZR: CPT

## 2021-02-24 PROCEDURE — 85025 COMPLETE CBC W/AUTO DIFF WBC: CPT

## 2021-02-24 PROCEDURE — 86769 SARS-COV-2 COVID-19 ANTIBODY: CPT

## 2021-02-24 PROCEDURE — 84443 ASSAY THYROID STIM HORMONE: CPT

## 2021-02-24 PROCEDURE — 80061 LIPID PANEL: CPT

## 2021-02-24 PROCEDURE — 96374 THER/PROPH/DIAG INJ IV PUSH: CPT

## 2021-02-24 RX ORDER — MONTELUKAST 4 MG/1
1 TABLET, CHEWABLE ORAL
Qty: 14 | Refills: 0
Start: 2021-02-24 | End: 2021-03-09

## 2021-02-24 RX ORDER — METOCLOPRAMIDE HCL 10 MG
10 TABLET ORAL ONCE
Refills: 0 | Status: DISCONTINUED | OUTPATIENT
Start: 2021-02-24 | End: 2021-02-24

## 2021-02-24 RX ORDER — METHADONE HYDROCHLORIDE 40 MG/1
40 TABLET ORAL ONCE
Refills: 0 | Status: DISCONTINUED | OUTPATIENT
Start: 2021-02-24 | End: 2021-02-24

## 2021-02-24 RX ORDER — ONDANSETRON 8 MG/1
1 TABLET, FILM COATED ORAL
Qty: 6 | Refills: 0
Start: 2021-02-24 | End: 2021-02-25

## 2021-02-24 RX ADMIN — Medication 0.5 MILLIGRAM(S): at 10:21

## 2021-02-24 RX ADMIN — TIOTROPIUM BROMIDE 1 CAPSULE(S): 18 CAPSULE ORAL; RESPIRATORY (INHALATION) at 11:17

## 2021-02-24 RX ADMIN — ONDANSETRON 4 MILLIGRAM(S): 8 TABLET, FILM COATED ORAL at 08:51

## 2021-02-24 RX ADMIN — Medication 40 MILLIGRAM(S): at 17:05

## 2021-02-24 RX ADMIN — CYCLOBENZAPRINE HYDROCHLORIDE 5 MILLIGRAM(S): 10 TABLET, FILM COATED ORAL at 13:18

## 2021-02-24 RX ADMIN — METHADONE HYDROCHLORIDE 40 MILLIGRAM(S): 40 TABLET ORAL at 11:40

## 2021-02-24 RX ADMIN — LORATADINE 10 MILLIGRAM(S): 10 TABLET ORAL at 11:17

## 2021-02-24 RX ADMIN — CYCLOBENZAPRINE HYDROCHLORIDE 5 MILLIGRAM(S): 10 TABLET, FILM COATED ORAL at 05:48

## 2021-02-24 RX ADMIN — ALBUTEROL 1 PUFF(S): 90 AEROSOL, METERED ORAL at 13:18

## 2021-02-24 RX ADMIN — BUDESONIDE AND FORMOTEROL FUMARATE DIHYDRATE 2 PUFF(S): 160; 4.5 AEROSOL RESPIRATORY (INHALATION) at 10:21

## 2021-02-24 RX ADMIN — MONTELUKAST 10 MILLIGRAM(S): 4 TABLET, CHEWABLE ORAL at 11:17

## 2021-02-24 RX ADMIN — Medication 40 MILLIGRAM(S): at 06:29

## 2021-02-24 RX ADMIN — ALBUTEROL 1 PUFF(S): 90 AEROSOL, METERED ORAL at 17:04

## 2021-02-24 RX ADMIN — ALBUTEROL 1 PUFF(S): 90 AEROSOL, METERED ORAL at 10:21

## 2021-02-24 RX ADMIN — PANTOPRAZOLE SODIUM 40 MILLIGRAM(S): 20 TABLET, DELAYED RELEASE ORAL at 05:49

## 2021-02-24 NOTE — PROGRESS NOTE ADULT - PROBLEM SELECTOR PLAN 1
heroin abuse  pain NP is following  COWS scale 6 - mild withdrawal   cont flexiril 5mg q 8hrs  clonidine 0.1mg patch QD for nausea   zofran IVP PRN   klonopin 0.5mg q 8hours for anxiety   no agitation or restless noted on exam
Acute opioid withdrawal. Recommendation: - pt with acute opioid withdrawal - heroin.  Last use yesterday - 1 bag. 2 days ago - 7-9 bags.   - Use COWS clinical scale - today 8.  Pt is in mild withdrawal - + for anxiety, tachycardia, joint pain, n/v, restless   - Methadone 40mg po once  - Supportive mgt  - flexeril 5mg p q 8 hours   - recommend clonidine patch - 0.1mg daily - as pt was nauseous.   - zofran iv prn  - monitor vitals   - severe anxious - klonopin 0.5mg po q 8 hours
Acute opioid withdrawal. Recommendation: - pt with acute opioid withdrawal - heroin.  Last use yesterday - 1 bag. 2 days ago - 7-9 bags.   - Use COWS clinical scale - today 5 .  Pt is in mild withdrawal - + for anxiety, tachycardia, joint pain.   - Methadone 40mg po given yesterday. will hold today  - Supportive mgt  - flexeril 5mg p q 8 hours   - recommend clonidine patch - 0.1mg daily - as pt was nauseous.   - zofran iv prn  - monitor vitals   - severe anxious - klonopin 0.5mg po q 8 hours

## 2021-02-24 NOTE — PROGRESS NOTE ADULT - SUBJECTIVE AND OBJECTIVE BOX
Source of information: , Chart review  Patient language: English  : n/a    CC:  joint pain    This is a 45yMale patient who presents to the hospital for Patient is a 45y old  Male who presents with a chief complaint of SOB (23 Feb 2021 19:47)    Pt admitted with asthma exacerbation, who needed iv steroids, lying in bed, restless.  + heroin use.  Last use - 3 days ago.  Pt with diarrhea, vomiting, restless, joint discomfort.  Pt is for discharge home today. Pt states that he is willing to seek help via rehab. Pt is very familiar with [facility].      PAIN SCORE:   5/10      SCALE USED: (1-10 NRS)      PAST MEDICAL & SURGICAL HISTORY:  Asthma    Ex-smoker  (cigarettes / marijuana)    No significant past surgical history        FAMILY HISTORY:  No pertinent family history in first degree relatives    Family history of asthma    SOCIAL HISTORY:  + heroin use    Allergies    dust (Eye Irritation)  No Known Drug Allergies    Intolerances        MEDICATIONS:    MEDICATIONS  (STANDING):  ALBUTerol    90 MICROgram(s) HFA Inhaler 1 Puff(s) Inhalation every 4 hours  budesonide 160 MICROgram(s)/formoterol 4.5 MICROgram(s) Inhaler 2 Puff(s) Inhalation two times a day  cyclobenzaprine 5 milliGRAM(s) Oral three times a day  enoxaparin Injectable 40 milliGRAM(s) SubCutaneous daily  loratadine 10 milliGRAM(s) Oral daily  methylPREDNISolone sodium succinate Injectable 40 milliGRAM(s) IV Push every 12 hours  metoclopramide Injectable 10 milliGRAM(s) IV Push once  montelukast 10 milliGRAM(s) Oral daily  pantoprazole    Tablet 40 milliGRAM(s) Oral before breakfast  tiotropium 18 MICROgram(s) Capsule 1 Capsule(s) Inhalation daily    MEDICATIONS  (PRN):  acetaminophen   Tablet .. 650 milliGRAM(s) Oral every 6 hours PRN Temp greater or equal to 38C (100.4F), Mild Pain (1 - 3)  clonazePAM  Tablet 0.5 milliGRAM(s) Oral every 8 hours PRN agitation  ondansetron Injectable 4 milliGRAM(s) IV Push every 6 hours PRN Nausea and/or Vomiting      Vital Signs Last 24 Hrs  T(C): 36.3 (24 Feb 2021 13:42), Max: 36.9 (23 Feb 2021 20:34)  T(F): 97.3 (24 Feb 2021 13:42), Max: 98.4 (23 Feb 2021 20:34)  HR: 83 (24 Feb 2021 13:43) (72 - 98)  BP: 139/98 (24 Feb 2021 13:43) (132/89 - 146/95)  BP(mean): --  RR: 16 (24 Feb 2021 13:43) (16 - 18)  SpO2: 98% (24 Feb 2021 13:43) (95% - 98%)    LABS:                          15.8   22.17 )-----------( 360      ( 24 Feb 2021 07:57 )             48.6     02-24    139  |  106  |  28<H>  ----------------------------<  113<H>  4.2   |  24  |  1.13    Ca    9.5      24 Feb 2021 07:57  Phos  2.9     02-23  Mg     2.4     02-23    TPro  7.5  /  Alb  3.8  /  TBili  0.6  /  DBili  x   /  AST  15  /  ALT  29  /  AlkPhos  71  02-24      LIVER FUNCTIONS - ( 24 Feb 2021 07:57 )  Alb: 3.8 g/dL / Pro: 7.5 g/dL / ALK PHOS: 71 U/L / ALT: 29 U/L DA / AST: 15 U/L / GGT: x             CAPILLARY BLOOD GLUCOSE          Radiology:    Drug Screen:        REVIEW OF SYSTEMS:  CONSTITUTIONAL: No fever or fatigue  RESPIRATORY: No cough, wheezing, chills or hemoptysis; No shortness of breath  CARDIOVASCULAR: No chest pain, palpitations, dizziness, or leg swelling  GASTROINTESTINAL: +n/v, + gi upset,+ diarrhea   GENITOURINARY: No dysuria, frequency, hematuria, retention or incontinence  MUSCULOSKELETAL: + joint pain  NEURO: No headaches, No numbness/tingling b/l LE, No weakness  PSYCHIATRIC: + anxious, +difficulty sleeping    PHYSICAL EXAM:  GENERAL:  Alert & Oriented X3, NAD, Good concentration  CHEST/LUNG: Clear to auscultation bilaterally; No rales, rhonchi, wheezing, or rubs  HEART: Regular rate and rhythm; No murmurs, rubs, or gallops  ABDOMEN: Soft, Nontender, Nondistended; Bowel sounds present  EXTREMITIES:  2+ Peripheral Pulses, No cyanosis, or edema  MUSCULOSKELETAL: + decreased rom Motor Strength 5/5 B/L upper and lower extremities; moves all extremities equally against gravity; ROM intact; negative SLR  SKIN: No rashes or lesions    Risk factors associated with adverse outcomes related to opioid treatment  [ ]  Concurrent benzodiazepine use  [x ]  History/ Active substance use or alcohol use disorder  [ ] Psychiatric co-morbidity  [ ] Sleep apnea  [ ] COPD  [ ] BMI> 35  [ ] Liver dysfunction  [ ] Renal dysfunction  [ ] CHF  [ ] Smoker  [ ]  Age > 60 years    [ x]  [place]  Reviewed and Copied to Chart. See below.    Plan of care and goal oriented pain management treatment options were discussed with patient and /or primary care giver; all questions and concerns were addressed and care was aligned with patient's wishes.    Educated patient on goal oriented pain management treatment options     02-24-21 @ 14:29

## 2021-02-24 NOTE — PROGRESS NOTE ADULT - PROBLEM SELECTOR PROBLEM 3
H/O: substance abuse
H/O: substance abuse
Persistent asthma with acute exacerbation, unspecified asthma severity

## 2021-02-24 NOTE — PROGRESS NOTE ADULT - ASSESSMENT
M E D I C A L   A T T E N D I N G    F O L L O W    U P   N O T E  (02-24-21 )                                     ------------------------------------------------------------------------------------------------    patient evaluated by me, case discussed with team, chart, medications, and physical exam reviewed, labs / tests  and vitals reviewed by me, as bellow.   Patient is stable for discharge today.  pt overall doing well. ambulating, eating ( vomited once, but no issues afterwards) , breathing well, off O2   Patient to follow up with  PMD, Pulm..   See discharge document for full note.                                           ( note written   02-24-21 )    ==================>> MEDICATIONS <<====================    ALBUTerol    90 MICROgram(s) HFA Inhaler 1 Puff(s) Inhalation every 4 hours  budesonide 160 MICROgram(s)/formoterol 4.5 MICROgram(s) Inhaler 2 Puff(s) Inhalation two times a day  cyclobenzaprine 5 milliGRAM(s) Oral three times a day  enoxaparin Injectable 40 milliGRAM(s) SubCutaneous daily  loratadine 10 milliGRAM(s) Oral daily  methylPREDNISolone sodium succinate Injectable 40 milliGRAM(s) IV Push every 12 hours  metoclopramide Injectable 10 milliGRAM(s) IV Push once  montelukast 10 milliGRAM(s) Oral daily  pantoprazole    Tablet 40 milliGRAM(s) Oral before breakfast  tiotropium 18 MICROgram(s) Capsule 1 Capsule(s) Inhalation daily    MEDICATIONS  (PRN):  acetaminophen   Tablet .. 650 milliGRAM(s) Oral every 6 hours PRN Temp greater or equal to 38C (100.4F), Mild Pain (1 - 3)  clonazePAM  Tablet 0.5 milliGRAM(s) Oral every 8 hours PRN agitation  ondansetron Injectable 4 milliGRAM(s) IV Push every 6 hours PRN Nausea and/or Vomiting    ___________  Active diet:  Diet, Regular  ___________________    ==================>> VITAL SIGNS <<==================    T(C): 36.3 (02-24-21 @ 13:42), Max: 36.9 (02-23-21 @ 20:34)  HR: 83 (02-24-21 @ 13:43) (72 - 98)  BP: 139/98 (02-24-21 @ 13:43) (132/89 - 146/95)  RR: 16 (02-24-21 @ 13:43) (16 - 18)  SpO2: 98% (02-24-21 @ 13:43) (95% - 98%)        ==================>> LAB AND IMAGING <<==================                        15.8   22.17 )-----------( 360      ( 24 Feb 2021 07:57 )             48.6        02-24    139  |  106  |  28<H>  ----------------------------<  113<H>  4.2   |  24  |  1.13    Ca    9.5      24 Feb 2021 07:57  Phos  2.9     02-23  Mg     2.4     02-23    TPro  7.5  /  Alb  3.8  /  TBili  0.6  /  DBili  x   /  AST  15  /  ALT  29  /  AlkPhos  71  02-24                 TSH:      0.22   (02-22-21)           Lipid profile:  (02-22-21)     Total: 175     LDL  : (p)     HDL  :78     TG   :48     HgA1C:   (02-22-21)          (02-22-21)      5.9    WBC count:   22.17 <<== ,  27.09 <<== ,  13.83 <<== ,  12.16 <<==   Hemoglobin:   15.8 <<==,  14.9 <<==,  15.0 <<==,  14.8 <<==  platelets:  360 <==, 355 <==, 389 <==, 375 <==    Creatinine:  1.13  <<==, 1.20  <<==, 1.06  <<==, 1.14  <<==  Sodium:   139  <==, 137  <==, 140  <==, 139  <==       AST:          15 <== , 15 <== , 20 <==      ALT:        29  <== , 29  <== , 28  <==      AP:        71  <=, 72  <=, 72  <=     Bili:        0.6  <=, 0.3  <=, 0.3  <=

## 2021-02-24 NOTE — PROGRESS NOTE ADULT - PROBLEM SELECTOR PROBLEM 2
Persistent asthma with acute exacerbation, unspecified asthma severity
Acute respiratory failure with hypoxia
Persistent asthma with acute exacerbation, unspecified asthma severity

## 2021-02-24 NOTE — PROGRESS NOTE ADULT - PROBLEM SELECTOR PLAN 2
hx of asthma , admitted for acute res failure with hypoxia which required BIPAP due to Asthma attack after heroin use   off BIPAP, on NC 2l/min now, continue PRN o2   no wheezing  solumedrol 40mg IVP q 6hours tapered to q 8hours today   monitor O2 saturation and respiratory distress
Discharge plan   [facility] - [phone]    - do not dc pt on klonopin or clonidine.  - flexeril 5mg po q 8 hours  - social work consult prior to dc  - pt encouraged to enter rehab - called the above # or go to [facility]

## 2021-02-24 NOTE — DISCHARGE NOTE NURSING/CASE MANAGEMENT/SOCIAL WORK - PATIENT PORTAL LINK FT
You can access the FollowMyHealth Patient Portal offered by [facility] by registering at the following website: [email or web address]. By joining [facility]’s FollowMyHealth portal, you will also be able to view your health information using other applications (apps) compatible with our system.

## 2021-05-17 ENCOUNTER — APPOINTMENT (OUTPATIENT)
Dept: ENDOCRINOLOGY | Facility: CLINIC | Age: 46
End: 2021-05-17

## 2023-08-21 ENCOUNTER — NON-APPOINTMENT (OUTPATIENT)
Age: 48
End: 2023-08-21

## 2023-08-21 ENCOUNTER — APPOINTMENT (OUTPATIENT)
Dept: FAMILY MEDICINE | Facility: CLINIC | Age: 48
End: 2023-08-21
Payer: MEDICAID

## 2023-08-21 ENCOUNTER — LABORATORY RESULT (OUTPATIENT)
Age: 48
End: 2023-08-21

## 2023-08-21 VITALS
HEIGHT: 65 IN | DIASTOLIC BLOOD PRESSURE: 75 MMHG | SYSTOLIC BLOOD PRESSURE: 113 MMHG | TEMPERATURE: 98 F | HEART RATE: 100 BPM | BODY MASS INDEX: 23.82 KG/M2 | OXYGEN SATURATION: 98 % | WEIGHT: 143 LBS

## 2023-08-21 DIAGNOSIS — Z11.3 ENCOUNTER FOR SCREENING FOR INFECTIONS WITH A PREDOMINANTLY SEXUAL MODE OF TRANSMISSION: ICD-10-CM

## 2023-08-21 DIAGNOSIS — Z13.29 ENCOUNTER FOR SCREENING FOR OTHER SUSPECTED ENDOCRINE DISORDER: ICD-10-CM

## 2023-08-21 DIAGNOSIS — Z13.1 ENCOUNTER FOR SCREENING FOR DIABETES MELLITUS: ICD-10-CM

## 2023-08-21 DIAGNOSIS — R94.31 ABNORMAL ELECTROCARDIOGRAM [ECG] [EKG]: ICD-10-CM

## 2023-08-21 DIAGNOSIS — R19.5 OTHER FECAL ABNORMALITIES: ICD-10-CM

## 2023-08-21 DIAGNOSIS — Z13.6 ENCOUNTER FOR SCREENING FOR CARDIOVASCULAR DISORDERS: ICD-10-CM

## 2023-08-21 DIAGNOSIS — Z82.5 FAMILY HISTORY OF ASTHMA AND OTHER CHRONIC LOWER RESPIRATORY DISEASES: ICD-10-CM

## 2023-08-21 DIAGNOSIS — Z87.09 PERSONAL HISTORY OF OTHER DISEASES OF THE RESPIRATORY SYSTEM: ICD-10-CM

## 2023-08-21 DIAGNOSIS — Z12.11 ENCOUNTER FOR SCREENING FOR MALIGNANT NEOPLASM OF COLON: ICD-10-CM

## 2023-08-21 PROCEDURE — 99215 OFFICE O/P EST HI 40 MIN: CPT | Mod: 25

## 2023-08-21 PROCEDURE — 93000 ELECTROCARDIOGRAM COMPLETE: CPT

## 2023-08-21 RX ORDER — OLANZAPINE 10 MG/1
10 TABLET ORAL
Refills: 0 | Status: ACTIVE | COMMUNITY

## 2023-08-21 RX ORDER — DIVALPROEX SODIUM 500 MG/1
500 TABLET, DELAYED RELEASE ORAL
Refills: 0 | Status: ACTIVE | COMMUNITY

## 2023-08-21 NOTE — PLAN
[FreeTextEntry1] : CPE -Routine labs ordered (CBC, CMP, Lipid, TSH, A1c) + additional assessment -EKG: abnormal. ST elevation. Referred to Cardioology  -Vaccines:     -unsure hx at this time.   -Referrals:     -Colon: FOBT ordered  -Emphasized importance of health meals and daily exercise.  *Loose stool -soft, brown in color  -Bismuth & Probiotics -Denies blood in stool, recent travel, new food  -Emphasized rehydration   f/u in 2 days for results

## 2023-08-21 NOTE — HEALTH RISK ASSESSMENT
[Yes] : Yes [0] : 2) Feeling down, depressed, or hopeless: Not at all (0) [PHQ-2 Negative - No further assessment needed] : PHQ-2 Negative - No further assessment needed [None] : None [With Family] : lives with family [Unemployed] : unemployed [Single] : single [Feels Safe at Home] : Feels safe at home [Fully functional (bathing, dressing, toileting, transferring, walking, feeding)] : Fully functional (bathing, dressing, toileting, transferring, walking, feeding) [Fully functional (using the telephone, shopping, preparing meals, housekeeping, doing laundry, using] : Fully functional and needs no help or supervision to perform IADLs (using the telephone, shopping, preparing meals, housekeeping, doing laundry, using transportation, managing medications and managing finances) [Former] : Former [0-4] : 0-4 [< 15 Years] : < 15 Years [Good] : ~his/her~  mood as  good [2 - 4 times a month (2 pts)] : 2-4 times a month (2 points) [1 or 2 (0 pts)] : 1 or 2 (0 points) [Less than monthly (1 pt)] : Less than monthly (1 point) [Patient declined colonoscopy] : Patient declined colonoscopy [HIV Test offered] : HIV Test offered [Hepatitis C test offered] : Hepatitis C test offered [de-identified] : psychiatrist  [de-identified] : little to none  [de-identified] : "mix of everything" high carbs, small meals  [[id number]] : 0 [Change in mental status noted] : No change in mental status noted [Sexually Active] : not sexually active [High Risk Behavior] : no high risk behavior [Reports changes in hearing] : Reports no changes in hearing [Reports changes in vision] : Reports no changes in vision [de-identified] : pending. Working w/ program to find a job  [de-identified] : Quit 2yrs ago. [place]

## 2023-08-21 NOTE — HISTORY OF PRESENT ILLNESS
[FreeTextEntry1] : New- soft loose stool x3  CPE  [de-identified] : 47yr old male w/ PMHx of Asthma- cat allergy, states now controlled since removal, Substance Abuse- ETOH (was taken to [facility] within the year for relapse).   FHx: Mom-COPD. Unsure about father.  Psychiatrist (Dr. Ruiz [facility]) -medication dose change to lowest dose about 6 months ago. Has monthly virtual visits.   Patient denies dizziness, headache, chest pain, shortness of breath, and abdominal pain at this time.   Vaccination: -unsure of vaccination hx  -Covid: x2  -Flu: unsure  -Pneumo: unsure -Tdap: unsure   Prevention care:  -Eye: due -Dental: cleaning done this year. further work-up advised but insurance issuse  -Colon: FOBI

## 2023-08-22 ENCOUNTER — NON-APPOINTMENT (OUTPATIENT)
Age: 48
End: 2023-08-22

## 2023-08-22 ENCOUNTER — INPATIENT (INPATIENT)
Facility: HOSPITAL | Age: 48
LOS: 2 days | Discharge: HOME | DRG: 375 | End: 2023-08-25
Attending: INTERNAL MEDICINE | Admitting: INTERNAL MEDICINE
Payer: MEDICAID

## 2023-08-22 VITALS
HEIGHT: 65 IN | OXYGEN SATURATION: 98 % | RESPIRATION RATE: 18 BRPM | WEIGHT: 143.08 LBS | DIASTOLIC BLOOD PRESSURE: 76 MMHG | SYSTOLIC BLOOD PRESSURE: 115 MMHG | HEART RATE: 97 BPM | TEMPERATURE: 98 F

## 2023-08-22 DIAGNOSIS — F31.9 BIPOLAR DISORDER, UNSPECIFIED: ICD-10-CM

## 2023-08-22 DIAGNOSIS — J45.909 UNSPECIFIED ASTHMA, UNCOMPLICATED: ICD-10-CM

## 2023-08-22 DIAGNOSIS — D64.9 ANEMIA, UNSPECIFIED: ICD-10-CM

## 2023-08-22 DIAGNOSIS — Z29.9 ENCOUNTER FOR PROPHYLACTIC MEASURES, UNSPECIFIED: ICD-10-CM

## 2023-08-22 LAB
25(OH)D3 SERPL-MCNC: 18.2 NG/ML
ALBUMIN SERPL ELPH-MCNC: 3.2 G/DL — LOW (ref 3.5–5)
ALBUMIN SERPL ELPH-MCNC: 4.2 G/DL
ALP BLD-CCNC: 52 U/L
ALP SERPL-CCNC: 48 U/L — SIGNIFICANT CHANGE UP (ref 40–120)
ALT FLD-CCNC: 13 U/L DA — SIGNIFICANT CHANGE UP (ref 10–60)
ALT SERPL-CCNC: 6 U/L
ANION GAP SERPL CALC-SCNC: 12 MMOL/L
ANION GAP SERPL CALC-SCNC: 5 MMOL/L — SIGNIFICANT CHANGE UP (ref 5–17)
APPEARANCE: CLEAR
APTT BLD: 30 SEC — SIGNIFICANT CHANGE UP (ref 24.5–35.6)
AST SERPL-CCNC: 12 U/L
AST SERPL-CCNC: 7 U/L — LOW (ref 10–40)
BACTERIA: NEGATIVE /HPF
BASOPHILS # BLD AUTO: 0.03 K/UL — SIGNIFICANT CHANGE UP (ref 0–0.2)
BASOPHILS NFR BLD AUTO: 1 % — SIGNIFICANT CHANGE UP (ref 0–2)
BILIRUB SERPL-MCNC: 0.2 MG/DL
BILIRUB SERPL-MCNC: 0.2 MG/DL — SIGNIFICANT CHANGE UP (ref 0.2–1.2)
BILIRUBIN URINE: NEGATIVE
BLOOD URINE: NEGATIVE
BUN SERPL-MCNC: 18 MG/DL
BUN SERPL-MCNC: 20 MG/DL — HIGH (ref 7–18)
C TRACH RRNA SPEC QL NAA+PROBE: NOT DETECTED
CALCIUM SERPL-MCNC: 8.5 MG/DL — SIGNIFICANT CHANGE UP (ref 8.4–10.5)
CALCIUM SERPL-MCNC: 9.1 MG/DL
CAST: 1 /LPF
CHLORIDE SERPL-SCNC: 105 MMOL/L
CHLORIDE SERPL-SCNC: 113 MMOL/L — HIGH (ref 96–108)
CHOLEST SERPL-MCNC: 110 MG/DL
CO2 SERPL-SCNC: 23 MMOL/L
CO2 SERPL-SCNC: 25 MMOL/L — SIGNIFICANT CHANGE UP (ref 22–31)
COLOR: YELLOW
CREAT SERPL-MCNC: 0.79 MG/DL — SIGNIFICANT CHANGE UP (ref 0.5–1.3)
CREAT SERPL-MCNC: 0.88 MG/DL
EGFR: 108 ML/MIN/1.73M2
EGFR: 112 ML/MIN/1.73M2 — SIGNIFICANT CHANGE UP
EOSINOPHIL # BLD AUTO: 0.05 K/UL — SIGNIFICANT CHANGE UP (ref 0–0.5)
EOSINOPHIL NFR BLD AUTO: 2 % — SIGNIFICANT CHANGE UP (ref 0–6)
EPITHELIAL CELLS: 0 /HPF
ESTIMATED AVERAGE GLUCOSE: 97 MG/DL
FERRITIN SERPL-MCNC: 1 NG/ML — LOW (ref 30–400)
FOLATE SERPL-MCNC: >20 NG/ML — SIGNIFICANT CHANGE UP
GGT SERPL-CCNC: 14 U/L
GLUCOSE QUALITATIVE U: NEGATIVE MG/DL
GLUCOSE SERPL-MCNC: 83 MG/DL — SIGNIFICANT CHANGE UP (ref 70–99)
GLUCOSE SERPL-MCNC: 88 MG/DL
HAPTOGLOB SERPL-MCNC: 164 MG/DL — SIGNIFICANT CHANGE UP (ref 34–200)
HBA1C MFR BLD HPLC: 5 %
HBV CORE IGG+IGM SER QL: NONREACTIVE
HCT VFR BLD CALC: 18.7 % — CRITICAL LOW (ref 39–50)
HCT VFR BLD CALC: 22.3 % — LOW (ref 39–50)
HCV AB SER QL: NONREACTIVE
HCV S/CO RATIO: 0.14 S/CO
HDLC SERPL-MCNC: 48 MG/DL
HGB BLD-MCNC: 4.2 G/DL — CRITICAL LOW (ref 13–17)
HGB BLD-MCNC: 6 G/DL — CRITICAL LOW (ref 13–17)
HIV1+2 AB SPEC QL IA.RAPID: NONREACTIVE
INR BLD: 1.03 RATIO — SIGNIFICANT CHANGE UP (ref 0.85–1.18)
IRON SATN MFR SERPL: 2 % — LOW (ref 20–55)
IRON SATN MFR SERPL: 9 UG/DL — LOW (ref 65–170)
KETONES URINE: ABNORMAL MG/DL
LDH SERPL L TO P-CCNC: 152 U/L — SIGNIFICANT CHANGE UP (ref 120–225)
LDLC SERPL CALC-MCNC: 49 MG/DL
LEUKOCYTE ESTERASE URINE: NEGATIVE
LYMPHOCYTES # BLD AUTO: 1.09 K/UL — SIGNIFICANT CHANGE UP (ref 1–3.3)
LYMPHOCYTES # BLD AUTO: 40 % — SIGNIFICANT CHANGE UP (ref 13–44)
MAGNESIUM SERPL-MCNC: 2.1 MG/DL — SIGNIFICANT CHANGE UP (ref 1.6–2.6)
MCHC RBC-ENTMCNC: 14 PG — LOW (ref 27–34)
MCHC RBC-ENTMCNC: 18.1 PG — LOW (ref 27–34)
MCHC RBC-ENTMCNC: 22.5 GM/DL — LOW (ref 32–36)
MCHC RBC-ENTMCNC: 26.9 GM/DL — LOW (ref 32–36)
MCV RBC AUTO: 62.5 FL — LOW (ref 80–100)
MCV RBC AUTO: 67.2 FL — LOW (ref 80–100)
MICROSCOPIC-UA: NORMAL
MONOCYTES # BLD AUTO: 0.35 K/UL — SIGNIFICANT CHANGE UP (ref 0–0.9)
MONOCYTES NFR BLD AUTO: 13 % — SIGNIFICANT CHANGE UP (ref 2–14)
N GONORRHOEA RRNA SPEC QL NAA+PROBE: NOT DETECTED
NEUTROPHILS # BLD AUTO: 1.2 K/UL — LOW (ref 1.8–7.4)
NEUTROPHILS NFR BLD AUTO: 44 % — SIGNIFICANT CHANGE UP (ref 43–77)
NITRITE URINE: NEGATIVE
NONHDLC SERPL-MCNC: 62 MG/DL
NRBC # BLD: 0 /100 WBCS — SIGNIFICANT CHANGE UP (ref 0–0)
PH URINE: 6.5
PLATELET # BLD AUTO: 508 K/UL — HIGH (ref 150–400)
PLATELET # BLD AUTO: 592 K/UL — HIGH (ref 150–400)
POTASSIUM SERPL-MCNC: 4.5 MMOL/L — SIGNIFICANT CHANGE UP (ref 3.5–5.3)
POTASSIUM SERPL-SCNC: 4.5 MMOL/L — SIGNIFICANT CHANGE UP (ref 3.5–5.3)
POTASSIUM SERPL-SCNC: 5.2 MMOL/L
PROT SERPL-MCNC: 7.1 G/DL
PROT SERPL-MCNC: 7.2 G/DL — SIGNIFICANT CHANGE UP (ref 6–8.3)
PROTEIN URINE: NORMAL MG/DL
PROTHROM AB SERPL-ACNC: 11.7 SEC — SIGNIFICANT CHANGE UP (ref 9.5–13)
RBC # BLD: 2.99 M/UL — LOW (ref 4.2–5.8)
RBC # BLD: 3.14 M/UL — LOW (ref 4.2–5.8)
RBC # BLD: 3.32 M/UL — LOW (ref 4.2–5.8)
RBC # FLD: 20.1 % — HIGH (ref 10.3–14.5)
RBC # FLD: 23.1 % — HIGH (ref 10.3–14.5)
RED BLOOD CELLS URINE: 0 /HPF
RETICS #: 33.3 K/UL — SIGNIFICANT CHANGE UP (ref 25–125)
RETICS/RBC NFR: 1.1 % — SIGNIFICANT CHANGE UP (ref 0.5–2.5)
SODIUM SERPL-SCNC: 140 MMOL/L
SODIUM SERPL-SCNC: 143 MMOL/L — SIGNIFICANT CHANGE UP (ref 135–145)
SOURCE AMPLIFICATION: NORMAL
SPECIFIC GRAVITY URINE: 1.02
T PALLIDUM AB SER QL IA: NEGATIVE
TIBC SERPL-MCNC: 414 UG/DL — SIGNIFICANT CHANGE UP (ref 250–450)
TRIGL SERPL-MCNC: 54 MG/DL
TSH SERPL-ACNC: 1.09 UIU/ML
UIBC SERPL-MCNC: 405 UG/DL — HIGH (ref 110–370)
URATE SERPL-MCNC: 4.6 MG/DL
UROBILINOGEN URINE: 0.2 MG/DL
VIT B12 SERPL-MCNC: 791 PG/ML — SIGNIFICANT CHANGE UP (ref 232–1245)
VIT B12 SERPL-MCNC: 811 PG/ML
WBC # BLD: 2.72 K/UL — LOW (ref 3.8–10.5)
WBC # BLD: 4.57 K/UL — SIGNIFICANT CHANGE UP (ref 3.8–10.5)
WBC # FLD AUTO: 2.72 K/UL — LOW (ref 3.8–10.5)
WBC # FLD AUTO: 4.57 K/UL — SIGNIFICANT CHANGE UP (ref 3.8–10.5)
WHITE BLOOD CELLS URINE: 1 /HPF

## 2023-08-22 PROCEDURE — 74177 CT ABD & PELVIS W/CONTRAST: CPT | Mod: 26

## 2023-08-22 PROCEDURE — 99285 EMERGENCY DEPT VISIT HI MDM: CPT

## 2023-08-22 RX ORDER — ACETAMINOPHEN 500 MG
650 TABLET ORAL EVERY 6 HOURS
Refills: 0 | Status: DISCONTINUED | OUTPATIENT
Start: 2023-08-22 | End: 2023-08-25

## 2023-08-22 RX ORDER — LANOLIN ALCOHOL/MO/W.PET/CERES
3 CREAM (GRAM) TOPICAL AT BEDTIME
Refills: 0 | Status: DISCONTINUED | OUTPATIENT
Start: 2023-08-22 | End: 2023-08-25

## 2023-08-22 RX ORDER — OLANZAPINE 15 MG/1
10 TABLET, FILM COATED ORAL DAILY
Refills: 0 | Status: DISCONTINUED | OUTPATIENT
Start: 2023-08-22 | End: 2023-08-25

## 2023-08-22 RX ORDER — DIVALPROEX SODIUM 500 MG/1
500 TABLET, DELAYED RELEASE ORAL DAILY
Refills: 0 | Status: DISCONTINUED | OUTPATIENT
Start: 2023-08-22 | End: 2023-08-25

## 2023-08-22 RX ORDER — ONDANSETRON 8 MG/1
4 TABLET, FILM COATED ORAL EVERY 8 HOURS
Refills: 0 | Status: DISCONTINUED | OUTPATIENT
Start: 2023-08-22 | End: 2023-08-25

## 2023-08-22 RX ORDER — IRON SUCROSE 20 MG/ML
200 INJECTION, SOLUTION INTRAVENOUS EVERY 24 HOURS
Refills: 0 | Status: COMPLETED | OUTPATIENT
Start: 2023-08-23 | End: 2023-08-25

## 2023-08-22 RX ORDER — CETIRIZINE HYDROCHLORIDE 10 MG/1
1 TABLET ORAL
Qty: 0 | Refills: 0 | DISCHARGE

## 2023-08-22 RX ADMIN — OLANZAPINE 10 MILLIGRAM(S): 15 TABLET, FILM COATED ORAL at 20:02

## 2023-08-22 RX ADMIN — Medication 0.5 MILLIGRAM(S): at 22:55

## 2023-08-22 NOTE — H&P ADULT - NSHPREVIEWOFSYSTEMS_GEN_ALL_CORE
CONSTITUTIONAL: (+) fatigue (+) dizziness No fever, weight loss, or fatigue  RESPIRATORY: (+) WEN when walking uphill. No cough, wheezing, chills or hemoptysis  CARDIOVASCULAR: (+) palpitations. No chest pain, dizziness, or leg swelling  GASTROINTESTINAL: No abdominal pain. No nausea, vomiting, or hematemesis; No diarrhea or constipation. No melena or hematochezia.  GENITOURINARY: No dysuria or hematuria, urinary frequency  NEUROLOGICAL:  No headaches, memory loss, loss of strength, numbness  ENDOCRINE: No polyuria, polydipsia, or heat/cold intolerance  MUSKULOSKELETAL: No muscle aches, joint pains  HEME: no easy bruisability, no tender or enlarged lymph nodes  SKIN: No itching, burning, rashes, or lesions .

## 2023-08-22 NOTE — ED ADULT NURSE NOTE - NSFALLUNIVINTERV_ED_ALL_ED
Bed/Stretcher in lowest position, wheels locked, appropriate side rails in place/Call bell, personal items and telephone in reach/Instruct patient to call for assistance before getting out of bed/chair/stretcher/Non-slip footwear applied when patient is off stretcher/[place] to call system/Physically safe environment - no spills, clutter or unnecessary equipment/Purposeful proactive rounding/Room/bathroom lighting operational, light cord in reach

## 2023-08-22 NOTE — ED PROVIDER NOTE - OBJECTIVE STATEMENT
47-year-old male history of bipolar disorder presents to ED for anemia.  Patient had routine lab work done on Monday he was called today saying his hemoglobin was 2 and he needed a blood transfusion.  Patient denies transfusion in the past.  Patient does endorse weakness and worsening dyspnea on exertion but denies chest pain no fever no vomiting no diarrhea.  Patient says stool is normal brown color.  Patient denies any bleeding that he has noticed.

## 2023-08-22 NOTE — H&P ADULT - PROBLEM SELECTOR PLAN 2
h/o bipolar disorder  takes depakote 500 qd and zyprexa at home (unknown dosage)  will hold zyprexa for now to r/o cause of anemia  will continue with depakote 500 qd  consult psych Dr. Calvert h/o bipolar disorder  takes depakote 500 qd and zyprexa 10mg qd at home  will hold zyprexa for now to r/o cause of anemia  will continue with depakote 500 qd  consult psych Dr. Calvert h/o bipolar disorder  takes depakote 500 qd and zyprexa 10mg qd at home  will continue with depakote 500 qd and zyprexa 10 qd   consult psych Dr. Calvert

## 2023-08-22 NOTE — ED PROVIDER NOTE - CLINICAL SUMMARY MEDICAL DECISION MAKING FREE TEXT BOX
47-year-old male history of bipolar presents to ED with hemoglobin that is low.  Patient does endorse worsening dyspnea on exertion.  Patient denies any blood in stool or vomiting blood.  Patient denies abdominal pain.  Patient pale appearing on exam.  Concern for symptomatic anemia.  Will check labs transfuse as needed anticipate admission

## 2023-08-22 NOTE — H&P ADULT - ASSESSMENT
47M PMH asthma and bipolar disorder presenting to the ED for fatigue, WEN and dizziness admitted for symptomatic anemia

## 2023-08-22 NOTE — H&P ADULT - HISTORY OF PRESENT ILLNESS
47M PMH asthma and bipolar disorder presenting to the ED for fatigue, WEN and dizziness. Pt had his annual PCP visit yesterday and had routine blood work done. He was called by the PCP's office today stating his Hb was low and that he should go to the ED. Pt endorses that he has had WEN when walking uphill and palpitations for the past 6 months. He also endorses that he gets dizzy and lightheaded when he stands up too quickly. He lives at home with his mother who is now a hospice patient. He also reports that he was started on zyprexa 1.5 years ago, which he noted was the only change he's had since his symptoms have started. Pt denies any headache, blurry vision, chest pain, N/V/D, abdominal pain, dysuria, melena, BRBPR, numbness/tingling/weakness in extremities.

## 2023-08-22 NOTE — ED ADULT NURSE NOTE - OBJECTIVE STATEMENT
48 y/o male from home , was referred by PMD for low RBC count   Pt appears very pale and states he has decreased energy x few weeks , denies any active bleeding , denies N/V/D   Only known condition is Bipolar D/O and takes 2 different meds for it

## 2023-08-22 NOTE — H&P ADULT - NSHPPHYSICALEXAM_GEN_ALL_CORE
Spoke with pts  to confirm appt on 3/31 for 9 am. He confirmed she will be here.   
General - NAD, sitting up in bed, well groomed  Eyes - PERRLA, EOM intact  ENT - (+) pale mucous membranes. Nonicteric sclerae, PERRLA, EOMI. Oropharynx clear. Conjunctivae appear well perfused.   Neck - No noticeable or palpable swelling, redness or rash around throat or on face  Lymph Nodes - No lymphadenopathy  Cardiovascular - RRR no m/r/g, no JVD, no carotid bruits  Lungs - Clear to auscultation, no use of accessory muscles, no crackles or wheezes.  Skin - No rashes, skin warm and dry, no erythematous areas  Abdomen - Normal bowel sounds, abdomen soft and nontender  Rectal – Rectal exam not performed since no symptoms indicated blood loss.  Extremities - No edema, cyanosis or clubbing  Musculoskeletal - 5/5 strength, normal range of motion, no swollen or erythematous joints.  Neuro– Alert and oriented x 3, CN 2-12 grossly intact. (+) mild tongue fasiculations and tremors noted b/l UEs.

## 2023-08-22 NOTE — CONSULT NOTE ADULT - ASSESSMENT
complete note to follow    #VTE Prophylaxis  SCD boots 47M PMH asthma and bipolar disorder presenting to the ED for fatigue, WEN and dizziness. Pt had his annual PCP visit yesterday and had routine blood work done. He was called by the PCP's office today stating his Hb was low and that he should go to the ED. Pt endorses that he has had WEN when walking uphill and palpitations for the past 6 months. He also endorses that he gets dizzy and lightheaded when he stands up too quickly. He lives at home with his mother who is now a hospice patient. He also reports that he was started on zyprexa 1.5 years ago, which he noted was the only change he's had since his symptoms have started. Pt denies any headache, blurry vision, chest pain, N/V/D, abdominal pain, dysuria, melena, BRBPR, numbness/tingling/weakness in extremities.     #Microcytic Anemia  abnormal lab as outpt, c/o WEN and diarrhea  on admit Hgb=4.2, MCV=62  CBC 02/2021 H/H was nl  pt does not follow with PMD  currently on zyprexa and depakote  smear shows hypochromia, tear drop cells, elliptocytes and only slight schistocytes  retic and LDH are nl  Iron panel shows trans sat 2% and nl TIBC  Rec's:  -Addt'l anemia panel, incl hepatitis  -Recommend Venofer 200mg QD x 3 days if no infection  -Transfuse PRBC if Hgb <7.0 or if symptomatic  -monitor H/H  -GI consult  -CT C/A/P r/o pathology  further recommendations pending above    #Leukopenia  ANC=1,200  afebrile  likely drug induced, pt on zyprexa, depakote  -daily CBC, monitor ANC    #VTE Prophylaxis  SCD boots    Thank you for the referral. Will continue to monitor the patient.  Please call with any questions [phone]  Above reviewed with Attending Dr. Arturo BEAR/[facility]  [id number] [facility], [address], [place], [place]  [phone]  *Note not finalized until signed by Attending Physician

## 2023-08-22 NOTE — CONSULT NOTE ADULT - ASSESSMENT
Assessment:    Plan:  Neuro:    Cardiovascular:    Pulmonary:     Infectious Diseases:    Gastrointestinal:    Renal:    Heme/onc:     Endo:     Skin/ catheter:     Prophylaxis:     Goals of Care:     Dispo:   Assessment: This is a 47y old  Male, coming from home, with medical history of Asthma and Bipolar who presents with a chief complaint of shortness of breath on exertion. ICU was consulted for severe symptomatic anemia.      Plan:  #Aplastic anemia vs agranulocytosis.   #Bipolar disorder  #Asthma     Neuro:  Aox3   Currently at baseline.     Cardiovascular:  #No active issues.     Pulmonary:   #Asthma   History of Asthma   Currently well controlled.   Not on any medications.   Albuterol q6 prn.     Infectious Diseases:  #No Active issues.     Gastrointestinal:  #No Active issues.   Would recommend getting a colonoscopy to r/o gi bleed vs malignancy being the cause of the anemia.     Renal:  #No Active Issues.     Heme/onc:   #Aplastic Anemia vs agranulocytotics  Suspecting in the setting of Zyprexa usage.   f/u iron panel   f/u hemolysis panel   transfuse with goal >7   Recommend consulting heme onc   would hold pts zyprexa untill it can be r/o as a cause for the anemia.     Endo:   #No Active Issues.     Skin/ catheter: Peripheral IV    Prophylaxis: SCD - Will hold off chemical prophylaxis in the setting of severe anemia.     Goals of Care: Full Code    Dispo: No indication for ICU. Please reconsult if needed.    Assessment: This is a 47y old  Male, coming from home, with medical history of Asthma and Bipolar who presents with a chief complaint of shortness of breath on exertion. ICU was consulted for severe symptomatic anemia.      Plan:  #Microcytic anemia - possible aplastic vs agranulocytosis.   #Leukopenia  #Thrombocytosis  #Bipolar disorder  #Asthma     Neuro:  Aox3   Currently at baseline.     Cardiovascular:  #No active issues.     Pulmonary:   #Asthma   History of Asthma   Currently well controlled.   Not on any medications.   Albuterol q6 prn.     Infectious Diseases:  #No Active issues.     Gastrointestinal:  #No Active issues.   Would recommend getting a colonoscopy to r/o gi bleed vs malignancy being the cause of the anemia.     Renal:  #No Active Issues.     Heme/onc:   #Aplastic Anemia vs agranulocytotics  Suspecting in the setting of Zyprexa usage.   f/u iron panel   f/u hemolysis panel   transfuse with goal >7   Recommend consulting heme onc   would hold pts zyprexa untill it can be r/o as a cause for the anemia.     Endo:   #No Active Issues.     Skin/ catheter: Peripheral IV    Prophylaxis: SCD - Will hold off chemical prophylaxis in the setting of severe anemia.     Goals of Care: Full Code    Dispo: No indication for ICU. Please reconsult if needed.

## 2023-08-22 NOTE — H&P ADULT - PROBLEM SELECTOR PLAN 1
presenting with WEN, fatigue, palpitations, and dizziness x 6  months  H/H on admission 4.2/18.7, WBC 2.72, platelet 592  VS: Temp 98.1, HR 97, P 115/76, RR 18, 98% on RA  No active signs of bleeding on examination  s/p 2U PRBC in ED  maintain active type and screen  CBC q8  f/u CBC post transfusion  QMA consulted, GI consulted  f/u haptoglobin, LDH, peripheral smear, retic count and tbili/dbili, ferritin, transferrin, TIBC, b12, folic acid  SPEP, UPEP  CTAP with contrast after transfusions

## 2023-08-22 NOTE — H&P ADULT - VTE RISK ASSESSMENT
----- Message from Hernan Purvis MD sent at 3/24/2021 11:07 PM CDT -----  Need to give 2000 U of vitamin D3 per day for 12 weeks and then recheck vitamin D, he is vitamin D deficient  TSH is normal  
<<--- Click to launch

## 2023-08-22 NOTE — ED PROVIDER NOTE - PROGRESS NOTE DETAILS
h/h  noted.  pt consented for transfusion.  pt seen by ICU and cleared for floor.  likely medicaiton induced anemia.  PRBC's ordered .

## 2023-08-22 NOTE — CONSULT NOTE ADULT - ATTENDING COMMENTS
Critical care consult called for anemia. Patient sent in for blood transfusion by PCP for low hgb. Endorsing feeling short of breath with exertion for a few months now. Endorses history of bipolar disorder and has been on depakote and zyprexa for a few years. Denies any previous blood work, Denies any history of bleeding. No ecchymosis on examination. Hemodynamically stable. States feels close to his baseline status.    Suspect chronic anemia. No obvious signs of bleeding noted on exam or per history. Would recommend to work up anemia, including checking hemolysis labs and for nutritional deficiencies. Monitor for signs of bleeding. Transfusion ordered by ED. Consider hematology consult for further work up. Possible medication induced anemia due to zyprexa as noted with leukopenia as well. Would recommend to hold Zyprexa for now.    At this time can consider admission to medical service for further work up of anemia and for blood transfusions.  Discussed with Dr. Ortez. Critical care consult called for anemia. Patient sent in for blood transfusion by PCP for low hgb. Endorsing feeling short of breath with exertion for a few months now. Endorses history of bipolar disorder and has been on depakote and zyprexa for a few years. Denies any previous blood work, Denies any history of bleeding. No ecchymosis on examination. Hemodynamically stable. States feels close to his baseline status.    Suspect chronic anemia. No obvious signs of bleeding noted on exam or per history. Would recommend to work up anemia, including checking hemolysis labs and for nutritional deficiencies. Monitor for signs of bleeding. Transfusion ordered by ED. Consider hematology and gastroenterology consult for further work up. Possible medication induced anemia due to zyprexa as noted with leukopenia as well. Would recommend to hold Zyprexa for now.    At this time can consider admission to medical service for further work up of anemia and for blood transfusions.  Discussed with Dr. Ortez.

## 2023-08-22 NOTE — H&P ADULT - ATTENDING COMMENTS
47M PMH asthma and bipolar disorder presenting to the ED for fatigue, WEN and dizziness. Pt had his annual PCP visit yesterday and had routine blood work done. He was called by the PCP's office today stating his Hb was low and that he should go to the ED. Pt endorses that he has had WEN when walking uphill and palpitations for the past 6 months. He also endorses that he gets dizzy and lightheaded when he stands up too quickly. He lives at home with his mother who is now a hospice patient. He also reports that he was started on zyprexa 1.5 years ago, which he noted was the only change he's had since his symptoms have started. Pt denies any headache, blurry vision, chest pain, N/V/D, abdominal pain, dysuria, melena, BRBPR, numbness/tingling/weakness in extremities.     # SYMPTOMATIC ANEMIA  - PPI BID, CLEAR LIQUID DIET  - TREND HGB  - PLANNED FOR PRBC TRANSFUSION  - F/U ANEMIA PANEL  - F/U LDH, HAPTOGLOBIN  - F/U SPEP, UPEP  - F/U CT A/P  - HEME/ONC CONSULT  - GI CONSULT  - S/P CRITICAL CARE CONSULT    # ASTHMA    # BIPOLAR DISORDER    # GI AND DVT PPX 47M PMH asthma and bipolar disorder presenting to the ED for fatigue, WEN and dizziness. Pt had his annual PCP visit yesterday and had routine blood work done. He was called by the PCP's office today stating his Hb was low and that he should go to the ED. Pt endorses that he has had WEN when walking uphill and palpitations for the past 6 months. He also endorses that he gets dizzy and lightheaded when he stands up too quickly. He lives at home with his mother who is now a hospice patient. He also reports that he was started on zyprexa 1.5 years ago, which he noted was the only change he's had since his symptoms have started. Pt denies any headache, blurry vision, chest pain, N/V/D, abdominal pain, dysuria, melena, BRBPR, numbness/tingling/weakness in extremities.     # SYMPTOMATIC ANEMIA  # LEUKOPENIA    - PPI BID, CLEAR LIQUID DIET  - TRANSFUSION THRESHOLD HGB < 7  - TREND HGB  - PLANNED FOR PRBC TRANSFUSION  - F/U ANEMIA PANEL, PERIPHERAL SMEAR  - F/U LDH, HAPTOGLOBIN  - F/U RETIC COUNT  - F/U CT A/P  - HEME/ONC CONSULT  - GI CONSULT  - S/P CRITICAL CARE CONSULT    # ? BIPOLAR DISORDER  - PATIENT REPORTS HE DOES NOT FOLLOW A PSYCHIATRIST  - ON DEPAKOTE AND ZYPREXA  - PSYCHIATRY CONSULT    # HX OF POLYSUBSTANCE ABUSE  - F/U UDS  - COUNSELLED PATIENT AT LENGTH ON CESSATION    # ASTHMA    # GI AND DVT PPX

## 2023-08-22 NOTE — CONSULT NOTE ADULT - SUBJECTIVE AND OBJECTIVE BOX
This is a 47y old  Male, coming from home, with medical history of Asthma and Bipolar who presents with a chief complaint of shortness of breath on exertion. Pt states he has been having these symptoms for over 6 months. He went to his primary care doctor recently for blood work and was notified today that he should come to ED as his hemoglobin was noted to be 2.99.     HPI:      Allergies    No Known Drug Allergies  dust (Eye Irritation)    Intolerances        MEDICATIONS  (STANDING):    MEDICATIONS  (PRN):      Daily Height in cm: 165.1 (22 Aug 2023 11:27)    Daily     Drug Dosing Weight  Height (cm): 165.1 (22 Aug 2023 11:27)  Weight (kg): 64.9 (22 Aug 2023 11:27)  BMI (kg/m2): 23.8 (22 Aug 2023 11:27)  BSA (m2): 1.72 (22 Aug 2023 11:27)    PAST MEDICAL & SURGICAL HISTORY:  Ex-smoker  (cigarettes / marijuana)      Asthma      No significant past surgical history          FAMILY HISTORY:  Family history of asthma    No pertinent family history in first degree relatives        SOCIAL HISTORY:    ADVANCE DIRECTIVES:    REVIEW OF SYSTEMS:    CONSTITUTIONAL: No fever, weight loss, or fatigue  EYES: No eye pain, visual disturbances, or discharge  ENMT:  No difficulty hearing, tinnitus, vertigo; No sinus or throat pain  NECK: No pain or stiffness  BREASTS: No pain, masses, or nipple discharge  RESPIRATORY: No cough, wheezing, chills or hemoptysis; No shortness of breath  CARDIOVASCULAR: No chest pain, palpitations, dizziness, or leg swelling  GASTROINTESTINAL: No abdominal or epigastric pain. No nausea, vomiting, or hematemesis; No diarrhea or constipation. No melena or hematochezia.  GENITOURINARY: No dysuria, frequency, hematuria, or incontinence  NEUROLOGICAL: No headaches, memory loss, loss of strength, numbness, or tremors  SKIN: No itching, burning, rashes, or lesions   LYMPH NODES: No enlarged glands  ENDOCRINE: No heat or cold intolerance; No hair loss  MUSCULOSKELETAL: No joint pain or swelling; No muscle, back, or extremity pain  PSYCHIATRIC: No depression, anxiety, mood swings, or difficulty sleeping  HEME/LYMPH: No easy bruising, or bleeding gums  ALLERGY AND IMMUNOLOGIC: No hives or eczema          ICU Vital Signs Last 24 Hrs  T(C): 36.7 (22 Aug 2023 11:27), Max: 36.7 (22 Aug 2023 11:27)  T(F): 98.1 (22 Aug 2023 11:27), Max: 98.1 (22 Aug 2023 11:27)  HR: 97 (22 Aug 2023 11:27) (97 - 97)  BP: 115/76 (22 Aug 2023 11:27) (115/76 - 115/76)  BP(mean): --  ABP: --  ABP(mean): --  RR: 18 (22 Aug 2023 11:27) (18 - 18)  SpO2: 98% (22 Aug 2023 11:27) (98% - 98%)    O2 Parameters below as of 22 Aug 2023 11:27  Patient On (Oxygen Delivery Method): room air                I&O's Detail      PHYSICAL EXAM:    GENERAL: NAD, well-groomed, well-developed  HEAD:  Atraumatic, Normocephalic  EYES: EOMI, PERRLA, conjunctiva and sclera clear  ENMT: No tonsillar erythema, exudates, or enlargement; Moist mucous membranes, Good dentition, No lesions  NECK: Supple, No JVD, Normal thyroid  NERVOUS SYSTEM:  Alert & Oriented X3, Good concentration; Motor Strength 5/5 B/L upper and lower extremities; DTRs 2+ intact and symmetric  CHEST/LUNG: Clear to percussion bilaterally; No rales, rhonchi, wheezing, or rubs  HEART: Regular rate and rhythm; No murmurs, rubs, or gallops  ABDOMEN: Soft, Nontender, Nondistended; Bowel sounds present  EXTREMITIES:  2+ Peripheral Pulses, No clubbing, cyanosis, or edema  LYMPH: No lymphadenopathy noted  SKIN: No rashes or lesions    LABS:  CBC Full  -  ( 22 Aug 2023 12:00 )  WBC Count : 2.72 K/uL  RBC Count : 2.99 M/uL  Hemoglobin : 4.2 g/dL  Hematocrit : 18.7 %  Platelet Count - Automated : 592 K/uL  Mean Cell Volume : 62.5 fl  Mean Cell Hemoglobin : 14.0 pg  Mean Cell Hemoglobin Concentration : 22.5 gm/dL  Auto Neutrophil # : x  Auto Lymphocyte # : x  Auto Monocyte # : x  Auto Eosinophil # : x  Auto Basophil # : x  Auto Neutrophil % : x  Auto Lymphocyte % : x  Auto Monocyte % : x  Auto Eosinophil % : x  Auto Basophil % : x    08-22    143  |  113<H>  |  20<H>  ----------------------------<  83  4.5   |  25  |  0.79    Ca    8.5      22 Aug 2023 12:00  Mg     2.1     08-22    TPro  7.2  /  Alb  3.2<L>  /  TBili  0.2  /  DBili  x   /  AST  7<L>  /  ALT  13  /  AlkPhos  48  08-22    CAPILLARY BLOOD GLUCOSE        PT/INR - ( 22 Aug 2023 12:00 )   PT: 11.7 sec;   INR: 1.03 ratio         PTT - ( 22 Aug 2023 12:00 )  PTT:30.0 sec  Urinalysis Basic - ( 22 Aug 2023 12:00 )    Color: x / Appearance: x / SG: x / pH: x  Gluc: 83 mg/dL / Ketone: x  / Bili: x / Urobili: x   Blood: x / Protein: x / Nitrite: x   Leuk Esterase: x / RBC: x / WBC x   Sq Epi: x / Non Sq Epi: x / Bacteria: x              EKG:    ECHO, US:    RADIOLOGY:    CRITICAL CARE TIME SPENT:   HPI:  This is a 47y old  Male, coming from home, with medical history of Asthma and Bipolar who presents with a chief complaint of shortness of breath on exertion. Pt states he has been having these symptoms for over 6 months. He went to his primary care doctor recently for blood work and was notified today that he should come to ED as his hemoglobin was noted to be 2.99. Pt was sent to the ED. In the ED pt had repeat blood work done which showed hemoglobin of 4.9. Pt is on Zyprexa and Depakote for bipolar. Suspecting aplastic anemia secondary to zyprexa use as pt does not follow up with any doctor for routine blood work. Pt has not had any routine screening like colonscopies. He denies having any night sweats or weight changes. He denies any headaches, visual disturbances, N/V/D,  falls, chest pain, palpitations, lower extremity swelling, fevers, skin rash, recent travel, or sick contacts        Allergies -     No Known Drug Allergies  dust (Eye Irritation)    Intolerances        MEDICATIONS  (STANDING):    MEDICATIONS  (PRN):      Daily Height in cm: 165.1 (22 Aug 2023 11:27)    Daily     Drug Dosing Weight  Height (cm): 165.1 (22 Aug 2023 11:27)  Weight (kg): 64.9 (22 Aug 2023 11:27)  BMI (kg/m2): 23.8 (22 Aug 2023 11:27)  BSA (m2): 1.72 (22 Aug 2023 11:27)    PAST MEDICAL & SURGICAL HISTORY:  Ex-smoker  (cigarettes / marijuana)      Asthma      No significant past surgical history          FAMILY HISTORY:  Family history of asthma    No pertinent family history in first degree relatives        SOCIAL HISTORY:    ADVANCE DIRECTIVES:    REVIEW OF SYSTEMS:    CONSTITUTIONAL: No fever, weight loss, or fatigue  EYES: No eye pain, visual disturbances, or discharge  ENMT:  No difficulty hearing, No sinus or throat pain  NECK: No pain or stiffness  BREASTS: No pain, masses, or nipple discharge  RESPIRATORY: No cough, wheezing, chills or hemoptysis; shortness of breath on exertion.   CARDIOVASCULAR: No chest pain, palpitations, dizziness, or leg swelling  GASTROINTESTINAL: No abdominal or epigastric pain. No nausea, vomiting, or hematemesis; No diarrhea or constipation. No melena or hematochezia.  GENITOURINARY: No dysuria, frequency, hematuria, or incontinence  NEUROLOGICAL: No headaches, memory loss, loss of strength, numbness, or tremors  SKIN: No itching, burning, rashes, or lesions   MUSCULOSKELETAL: No joint pain or swelling; No muscle, back, or extremity pain  ALLERGY AND IMMUNOLOGIC: No hives or eczema          ICU Vital Signs Last 24 Hrs  T(C): 36.7 (22 Aug 2023 11:27), Max: 36.7 (22 Aug 2023 11:27)  T(F): 98.1 (22 Aug 2023 11:27), Max: 98.1 (22 Aug 2023 11:27)  HR: 97 (22 Aug 2023 11:27) (97 - 97)  BP: 115/76 (22 Aug 2023 11:27) (115/76 - 115/76)  BP(mean): --  ABP: --  ABP(mean): --  RR: 18 (22 Aug 2023 11:27) (18 - 18)  SpO2: 98% (22 Aug 2023 11:27) (98% - 98%)    O2 Parameters below as of 22 Aug 2023 11:27  Patient On (Oxygen Delivery Method): room air                I&O's Detail      PHYSICAL EXAM:    GENERAL: NAD, well-groomed, well-developed, Pale  HEAD:  Atraumatic, Normocephalic  EYES: EOMI, PERRLA, conjunctiva and sclera clear  ENMT: No tonsillar erythema, exudates, or enlargement; Moist mucous membranes, Good dentition, No lesions  NECK: Supple, No JVD, Normal thyroid  NERVOUS SYSTEM:  Alert & Oriented X3, Good concentration;   CHEST/LUNG: Clear to percussion bilaterally; No rales, rhonchi, wheezing, or rubs  HEART: Regular rate and rhythm; No murmurs, rubs, or gallops  ABDOMEN: Soft, Nontender, Nondistended; Bowel sounds present  EXTREMITIES:  2+ Peripheral Pulses, No clubbing, cyanosis, or edema  LYMPH: No lymphadenopathy noted  SKIN: No rashes or lesions    LABS:  CBC Full  -  ( 22 Aug 2023 12:00 )  WBC Count : 2.72 K/uL  RBC Count : 2.99 M/uL  Hemoglobin : 4.2 g/dL  Hematocrit : 18.7 %  Platelet Count - Automated : 592 K/uL  Mean Cell Volume : 62.5 fl  Mean Cell Hemoglobin : 14.0 pg  Mean Cell Hemoglobin Concentration : 22.5 gm/dL  Auto Neutrophil # : x  Auto Lymphocyte # : x  Auto Monocyte # : x  Auto Eosinophil # : x  Auto Basophil # : x  Auto Neutrophil % : x  Auto Lymphocyte % : x  Auto Monocyte % : x  Auto Eosinophil % : x  Auto Basophil % : x    08-22    143  |  113<H>  |  20<H>  ----------------------------<  83  4.5   |  25  |  0.79    Ca    8.5      22 Aug 2023 12:00  Mg     2.1     08-22    TPro  7.2  /  Alb  3.2<L>  /  TBili  0.2  /  DBili  x   /  AST  7<L>  /  ALT  13  /  AlkPhos  48  08-22    CAPILLARY BLOOD GLUCOSE        PT/INR - ( 22 Aug 2023 12:00 )   PT: 11.7 sec;   INR: 1.03 ratio         PTT - ( 22 Aug 2023 12:00 )  PTT:30.0 sec  Urinalysis Basic - ( 22 Aug 2023 12:00 )    Color: x / Appearance: x / SG: x / pH: x  Gluc: 83 mg/dL / Ketone: x  / Bili: x / Urobili: x   Blood: x / Protein: x / Nitrite: x   Leuk Esterase: x / RBC: x / WBC x   Sq Epi: x / Non Sq Epi: x / Bacteria: x              EKG:    ECHO, US:    RADIOLOGY:    CRITICAL CARE TIME SPENT:   HPI:  This is a 47y old  Male, coming from home, with medical history of Asthma and Bipolar who presents with a chief complaint of shortness of breath on exertion. Pt states he has been having these symptoms for over 6 months. He went to his primary care doctor recently for blood work and was notified today that he should come to ED as his hemoglobin was noted to be 2.99. Pt was sent to the ED. In the ED pt had repeat blood work done which showed hemoglobin of 4.9. Pt is on Zyprexa and Depakote for bipolar. Suspecting aplastic anemia secondary to zyprexa use as pt does not follow up with any doctor for routine blood work. Pt has not had any routine screening like colonscopies. He denies having any night sweats or weight changes. He denies any headaches, visual disturbances, N/V/D,  falls, chest pain, palpitations, lower extremity swelling, fevers, skin rash, recent travel, or sick contacts        Allergies -     No Known Drug Allergies  dust (Eye Irritation)    Intolerances        MEDICATIONS  (STANDING):    MEDICATIONS  (PRN):      Daily Height in cm: 165.1 (22 Aug 2023 11:27)    Daily     Drug Dosing Weight  Height (cm): 165.1 (22 Aug 2023 11:27)  Weight (kg): 64.9 (22 Aug 2023 11:27)  BMI (kg/m2): 23.8 (22 Aug 2023 11:27)  BSA (m2): 1.72 (22 Aug 2023 11:27)    PAST MEDICAL & SURGICAL HISTORY:  Ex-smoker  (cigarettes / marijuana)      Asthma      No significant past surgical history          FAMILY HISTORY:  Family history of asthma    No pertinent family history in first degree relatives, denies bleeding disorders in family, no reported malignancies        SOCIAL HISTORY: Former smoker, + cocaine use     ADVANCE DIRECTIVES:    REVIEW OF SYSTEMS:    CONSTITUTIONAL: No fever, weight loss, or fatigue  EYES: No eye pain, visual disturbances, or discharge  ENMT:  No difficulty hearing, No sinus or throat pain  NECK: No pain or stiffness  BREASTS: No pain, masses, or nipple discharge  RESPIRATORY: No cough, wheezing, chills or hemoptysis; shortness of breath on exertion.   CARDIOVASCULAR: No chest pain, palpitations, dizziness, or leg swelling  GASTROINTESTINAL: No abdominal or epigastric pain. No nausea, vomiting, or hematemesis; No diarrhea or constipation. No melena or hematochezia.  GENITOURINARY: No dysuria, frequency, hematuria, or incontinence  NEUROLOGICAL: No headaches, memory loss, loss of strength, numbness, or tremors  SKIN: No itching, burning, rashes, or lesions   MUSCULOSKELETAL: No joint pain or swelling; No muscle, back, or extremity pain  ALLERGY AND IMMUNOLOGIC: No hives or eczema          ICU Vital Signs Last 24 Hrs  T(C): 36.7 (22 Aug 2023 11:27), Max: 36.7 (22 Aug 2023 11:27)  T(F): 98.1 (22 Aug 2023 11:27), Max: 98.1 (22 Aug 2023 11:27)  HR: 97 (22 Aug 2023 11:27) (97 - 97)  BP: 115/76 (22 Aug 2023 11:27) (115/76 - 115/76)  BP(mean): --  ABP: --  ABP(mean): --  RR: 18 (22 Aug 2023 11:27) (18 - 18)  SpO2: 98% (22 Aug 2023 11:27) (98% - 98%)    O2 Parameters below as of 22 Aug 2023 11:27  Patient On (Oxygen Delivery Method): room air                I&O's Detail      PHYSICAL EXAM:    GENERAL: NAD, well-groomed, well-developed, Pale  HEAD:  Atraumatic, Normocephalic  EYES: EOMI, PERRLA, conjunctiva and sclera clear  ENMT: No tonsillar erythema, exudates, or enlargement; Moist mucous membranes, Good dentition, No lesions  NECK: Supple, No JVD, Normal thyroid  NERVOUS SYSTEM:  Alert & Oriented X3, Good concentration;   CHEST/LUNG: Clear to percussion bilaterally; No rales, rhonchi, wheezing, or rubs  HEART: Regular rate and rhythm; No murmurs, rubs, or gallops  ABDOMEN: Soft, Nontender, Nondistended; Bowel sounds present  EXTREMITIES:  2+ Peripheral Pulses, No clubbing, cyanosis, or edema  LYMPH: No lymphadenopathy noted  SKIN: No rashes or lesions, cool to touch, no ecchymosis on exam    LABS:  CBC Full  -  ( 22 Aug 2023 12:00 )  WBC Count : 2.72 K/uL  RBC Count : 2.99 M/uL  Hemoglobin : 4.2 g/dL  Hematocrit : 18.7 %  Platelet Count - Automated : 592 K/uL  Mean Cell Volume : 62.5 fl  Mean Cell Hemoglobin : 14.0 pg  Mean Cell Hemoglobin Concentration : 22.5 gm/dL  Auto Neutrophil # : x  Auto Lymphocyte # : x  Auto Monocyte # : x  Auto Eosinophil # : x  Auto Basophil # : x  Auto Neutrophil % : x  Auto Lymphocyte % : x  Auto Monocyte % : x  Auto Eosinophil % : x  Auto Basophil % : x    08-22    143  |  113<H>  |  20<H>  ----------------------------<  83  4.5   |  25  |  0.79    Ca    8.5      22 Aug 2023 12:00  Mg     2.1     08-22    TPro  7.2  /  Alb  3.2<L>  /  TBili  0.2  /  DBili  x   /  AST  7<L>  /  ALT  13  /  AlkPhos  48  08-22    CAPILLARY BLOOD GLUCOSE        PT/INR - ( 22 Aug 2023 12:00 )   PT: 11.7 sec;   INR: 1.03 ratio         PTT - ( 22 Aug 2023 12:00 )  PTT:30.0 sec  Urinalysis Basic - ( 22 Aug 2023 12:00 )    Color: x / Appearance: x / SG: x / pH: x  Gluc: 83 mg/dL / Ketone: x  / Bili: x / Urobili: x   Blood: x / Protein: x / Nitrite: x   Leuk Esterase: x / RBC: x / WBC x   Sq Epi: x / Non Sq Epi: x / Bacteria: x              EKG:    ECHO, US:    RADIOLOGY:    CRITICAL CARE TIME SPENT:

## 2023-08-22 NOTE — CONSULT NOTE ADULT - SUBJECTIVE AND OBJECTIVE BOX
MEDICATIONS  (STANDING):  divalproex  milliGRAM(s) Oral daily    MEDICATIONS  (PRN):  acetaminophen     Tablet .. 650 milliGRAM(s) Oral every 6 hours PRN Temp greater or equal to 38C (100.4F), Mild Pain (1 - 3)  aluminum hydroxide/magnesium hydroxide/simethicone Suspension 30 milliLiter(s) Oral every 4 hours PRN Dyspepsia  melatonin 3 milliGRAM(s) Oral at bedtime PRN Insomnia  ondansetron Injectable 4 milliGRAM(s) IV Push every 8 hours PRN Nausea and/or Vomiting    CAPILLARY BLOOD GLUCOSE        I&O's Summary      PHYSICAL EXAM:  Vital Signs Last 24 Hrs  T(C): 36.8 (22 Aug 2023 15:58), Max: 36.8 (22 Aug 2023 15:58)  T(F): 98.3 (22 Aug 2023 15:58), Max: 98.3 (22 Aug 2023 15:58)  HR: 100 (22 Aug 2023 15:58) (97 - 100)  BP: 106/72 (22 Aug 2023 15:58) (106/72 - 115/76)  BP(mean): --  RR: 18 (22 Aug 2023 15:58) (18 - 18)  SpO2: 100% (22 Aug 2023 15:58) (98% - 100%)    Parameters below as of 22 Aug 2023 15:58  Patient On (Oxygen Delivery Method): room air      LABS:                        4.2    2.72  )-----------( 592      ( 22 Aug 2023 12:00 )             18.7     08-22    143  |  113<H>  |  20<H>  ----------------------------<  83  4.5   |  25  |  0.79    Ca    8.5      22 Aug 2023 12:00  Mg     2.1     08-22    TPro  7.2  /  Alb  3.2<L>  /  TBili  0.2  /  DBili  x   /  AST  7<L>  /  ALT  13  /  AlkPhos  48  08-22    PT/INR - ( 22 Aug 2023 12:00 )   PT: 11.7 sec;   INR: 1.03 ratio         PTT - ( 22 Aug 2023 12:00 )  PTT:30.0 sec      Urinalysis Basic - ( 22 Aug 2023 12:00 )    Color: x / Appearance: x / SG: x / pH: x  Gluc: 83 mg/dL / Ketone: x  / Bili: x / Urobili: x   Blood: x / Protein: x / Nitrite: x   Leuk Esterase: x / RBC: x / WBC x   Sq Epi: x / Non Sq Epi: x / Bacteria: x            RADIOLOGY & ADDITIONAL TESTS:     Reason for Admission: symptomatic anemia  History of Present Illness:   47M PMH asthma and bipolar disorder presenting to the ED for fatigue, WEN and dizziness. Pt had his annual PCP visit yesterday and had routine blood work done. He was called by the PCP's office today stating his Hb was low and that he should go to the ED. Pt endorses that he has had WEN when walking uphill and palpitations for the past 6 months. He also endorses that he gets dizzy and lightheaded when he stands up too quickly. He lives at home with his mother who is now a hospice patient. He also reports that he was started on zyprexa 1.5 years ago, which he noted was the only change he's had since his symptoms have started. Pt denies any headache, blurry vision, chest pain, N/V/D, abdominal pain, dysuria, melena, BRBPR, numbness/tingling/weakness in extremities.         Review of Systems:   CONSTITUTIONAL: (+) fatigue (+) dizziness No fever, weight loss, or fatigue  RESPIRATORY: (+) WEN when walking uphill. No cough, wheezing, chills or hemoptysis  CARDIOVASCULAR: (+) palpitations. No chest pain, dizziness, or leg swelling  GASTROINTESTINAL: No abdominal pain. No nausea, vomiting, or hematemesis; + diarrhea, no constipation. No melena or hematochezia.  GENITOURINARY: No dysuria or hematuria, urinary frequency  NEUROLOGICAL:  No headaches, memory loss, loss of strength, numbness  ENDOCRINE: No polyuria, polydipsia, or heat/cold intolerance  MUSKULOSKELETAL: No muscle aches, joint pains  HEME: no easy bruisability, no tender or enlarged lymph nodes  SKIN: No itching, burning, rashes, or lesions .      Allergies and Intolerances:        Allergies:  	No Known Drug Allergies:   	dust: Miscellaneous, Eye Irritation, seasonal allergies    Home Medications:   * Patient Currently Takes Medications as of 24-Feb-2021 16:57 documented in Structured Notes  · 	montelukast 10 mg oral tablet: 1 tab(s) orally once a day MDD:1 tablet  · 	ondansetron 4 mg oral tablet: 1 tab(s) orally 3 times a day, As Needed MDD:4 mg   · 	predniSONE 20 mg oral tablet: 2 tab(s) orally once a day x first 2 days-   	Prednisone taper- 20 mg daily x two days MDD:40mg tablet  · 	Ventolin HFA 90 mcg/inh inhalation aerosol: 2 puff(s) inhaled every 4 hours, As Needed - for shortness of breath and/or wheezing  · 	Symbicort 160 mcg-4.5 mcg/inh inhalation aerosol: 2 puff(s) inhaled 2 times a day  · 	Flonase 50 mcg/inh nasal spray: 1 spray(s) nasal 2 times a day  · 	Advair HFA:  inhaled 2 times a day  · 	cetirizine 10 mg oral tablet: 1 tab(s) orally once a day    .    Patient History:    Social History:  · Substance use	Yes  · Alcohol use	drinks a few beers socially, last drink on Saturday  · Substance use	cocaine (last use on Saturday)     Tobacco Screening:  · Core Measure Site	Yes  · Has the patient used tobacco in the past 30 days?	No    Risk Assessment:    Present on Admission:  Deep Venous Thrombosis	no  Pulmonary Embolus	no    MEDICATIONS  (STANDING):  divalproex  milliGRAM(s) Oral daily    MEDICATIONS  (PRN):  acetaminophen     Tablet .. 650 milliGRAM(s) Oral every 6 hours PRN Temp greater or equal to 38C (100.4F), Mild Pain (1 - 3)  aluminum hydroxide/magnesium hydroxide/simethicone Suspension 30 milliLiter(s) Oral every 4 hours PRN Dyspepsia  melatonin 3 milliGRAM(s) Oral at bedtime PRN Insomnia  ondansetron Injectable 4 milliGRAM(s) IV Push every 8 hours PRN Nausea and/or Vomiting    CAPILLARY BLOOD GLUCOSE        I&O's Summary      PHYSICAL EXAM:  Vital Signs Last 24 Hrs  T(C): 36.8 (22 Aug 2023 15:58), Max: 36.8 (22 Aug 2023 15:58)  T(F): 98.3 (22 Aug 2023 15:58), Max: 98.3 (22 Aug 2023 15:58)  HR: 100 (22 Aug 2023 15:58) (97 - 100)  BP: 106/72 (22 Aug 2023 15:58) (106/72 - 115/76)  BP(mean): --  RR: 18 (22 Aug 2023 15:58) (18 - 18)  SpO2: 100% (22 Aug 2023 15:58) (98% - 100%)    Parameters below as of 22 Aug 2023 15:58  Patient On (Oxygen Delivery Method): room air    GEN: NAD; A and O x 3, pallor  LUNGS: CTA B/L  HEART: S1 S2  ABDOMEN: soft, non-tender, non-distended, + BS  EXTREMITIES: no edema      LABS:                        4.2    2.72  )-----------( 592      ( 22 Aug 2023 12:00 )             18.7     08-22    143  |  113<H>  |  20<H>  ----------------------------<  83  4.5   |  25  |  0.79    Ca    8.5      22 Aug 2023 12:00  Mg     2.1     08-22    TPro  7.2  /  Alb  3.2<L>  /  TBili  0.2  /  DBili  x   /  AST  7<L>  /  ALT  13  /  AlkPhos  48  08-22    PT/INR - ( 22 Aug 2023 12:00 )   PT: 11.7 sec;   INR: 1.03 ratio         PTT - ( 22 Aug 2023 12:00 )  PTT:30.0 sec      Urinalysis Basic - ( 22 Aug 2023 12:00 )    Color: x / Appearance: x / SG: x / pH: x  Gluc: 83 mg/dL / Ketone: x  / Bili: x / Urobili: x   Blood: x / Protein: x / Nitrite: x   Leuk Esterase: x / RBC: x / WBC x   Sq Epi: x / Non Sq Epi: x / Bacteria: x

## 2023-08-23 ENCOUNTER — APPOINTMENT (OUTPATIENT)
Dept: FAMILY MEDICINE | Facility: CLINIC | Age: 48
End: 2023-08-23
Payer: MEDICAID

## 2023-08-23 DIAGNOSIS — D64.9 ANEMIA, UNSPECIFIED: ICD-10-CM

## 2023-08-23 DIAGNOSIS — D50.0 IRON DEFICIENCY ANEMIA SECONDARY TO BLOOD LOSS (CHRONIC): ICD-10-CM

## 2023-08-23 DIAGNOSIS — Z02.9 ENCOUNTER FOR ADMINISTRATIVE EXAMINATIONS, UNSPECIFIED: ICD-10-CM

## 2023-08-23 LAB
ANION GAP SERPL CALC-SCNC: 7 MMOL/L — SIGNIFICANT CHANGE UP (ref 5–17)
ANISOCYTOSIS BLD QL: SIGNIFICANT CHANGE UP
BASOPHILS # BLD AUTO: 0.05 K/UL — SIGNIFICANT CHANGE UP (ref 0–0.2)
BASOPHILS NFR BLD AUTO: 1.2 % — SIGNIFICANT CHANGE UP (ref 0–2)
BILIRUB DIRECT SERPL-MCNC: 0.1 MG/DL — SIGNIFICANT CHANGE UP (ref 0–0.3)
BUN SERPL-MCNC: 15 MG/DL — SIGNIFICANT CHANGE UP (ref 7–18)
BURR CELLS BLD QL SMEAR: PRESENT — SIGNIFICANT CHANGE UP
CALCIUM SERPL-MCNC: 7.8 MG/DL — LOW (ref 8.4–10.5)
CHLORIDE SERPL-SCNC: 113 MMOL/L — HIGH (ref 96–108)
CO2 SERPL-SCNC: 22 MMOL/L — SIGNIFICANT CHANGE UP (ref 22–31)
CREAT SERPL-MCNC: 0.7 MG/DL — SIGNIFICANT CHANGE UP (ref 0.5–1.3)
EGFR: 116 ML/MIN/1.73M2 — SIGNIFICANT CHANGE UP
ELLIPTOCYTES BLD QL SMEAR: SLIGHT — SIGNIFICANT CHANGE UP
EOSINOPHIL # BLD AUTO: 0.25 K/UL — SIGNIFICANT CHANGE UP (ref 0–0.5)
EOSINOPHIL NFR BLD AUTO: 6.2 % — HIGH (ref 0–6)
GIANT PLATELETS BLD QL SMEAR: PRESENT — SIGNIFICANT CHANGE UP
GLUCOSE SERPL-MCNC: 102 MG/DL — HIGH (ref 70–99)
HCT VFR BLD CALC: 26.1 % — LOW (ref 39–50)
HGB BLD-MCNC: 7.3 G/DL — LOW (ref 13–17)
HYPOCHROMIA BLD QL: SIGNIFICANT CHANGE UP
IMM GRANULOCYTES NFR BLD AUTO: 0.2 % — SIGNIFICANT CHANGE UP (ref 0–0.9)
LYMPHOCYTES # BLD AUTO: 1.27 K/UL — SIGNIFICANT CHANGE UP (ref 1–3.3)
LYMPHOCYTES # BLD AUTO: 31.7 % — SIGNIFICANT CHANGE UP (ref 13–44)
MAGNESIUM SERPL-MCNC: 2 MG/DL — SIGNIFICANT CHANGE UP (ref 1.6–2.6)
MANUAL SMEAR VERIFICATION: SIGNIFICANT CHANGE UP
MCHC RBC-ENTMCNC: 19 PG — LOW (ref 27–34)
MCHC RBC-ENTMCNC: 28 GM/DL — LOW (ref 32–36)
MCV RBC AUTO: 68 FL — LOW (ref 80–100)
MICROCYTES BLD QL: SIGNIFICANT CHANGE UP
MONOCYTES # BLD AUTO: 0.79 K/UL — SIGNIFICANT CHANGE UP (ref 0–0.9)
MONOCYTES NFR BLD AUTO: 19.7 % — HIGH (ref 2–14)
NEUTROPHILS # BLD AUTO: 1.64 K/UL — LOW (ref 1.8–7.4)
NEUTROPHILS NFR BLD AUTO: 41 % — LOW (ref 43–77)
NRBC # BLD: 0 /100 WBCS — SIGNIFICANT CHANGE UP (ref 0–0)
OVALOCYTES BLD QL SMEAR: SLIGHT — SIGNIFICANT CHANGE UP
PHOSPHATE SERPL-MCNC: 3.5 MG/DL — SIGNIFICANT CHANGE UP (ref 2.5–4.5)
PLAT MORPH BLD: NORMAL — SIGNIFICANT CHANGE UP
PLATELET # BLD AUTO: 499 K/UL — HIGH (ref 150–400)
POIKILOCYTOSIS BLD QL AUTO: SIGNIFICANT CHANGE UP
POTASSIUM SERPL-MCNC: 3.8 MMOL/L — SIGNIFICANT CHANGE UP (ref 3.5–5.3)
POTASSIUM SERPL-SCNC: 3.8 MMOL/L — SIGNIFICANT CHANGE UP (ref 3.5–5.3)
PROT SERPL-MCNC: 6.3 G/DL — SIGNIFICANT CHANGE UP (ref 6–8.3)
PROT SERPL-MCNC: 6.3 G/DL — SIGNIFICANT CHANGE UP (ref 6–8.3)
RBC # BLD: 3.84 M/UL — LOW (ref 4.2–5.8)
RBC # FLD: 22.9 % — HIGH (ref 10.3–14.5)
RBC BLD AUTO: ABNORMAL
SCHISTOCYTES BLD QL AUTO: SLIGHT — SIGNIFICANT CHANGE UP
SODIUM SERPL-SCNC: 142 MMOL/L — SIGNIFICANT CHANGE UP (ref 135–145)
TSH SERPL-MCNC: 2.41 UU/ML — SIGNIFICANT CHANGE UP (ref 0.34–4.82)
VALPROATE SERPL-MCNC: 17 UG/ML — LOW (ref 50–100)
WBC # BLD: 4.01 K/UL — SIGNIFICANT CHANGE UP (ref 3.8–10.5)
WBC # FLD AUTO: 4.01 K/UL — SIGNIFICANT CHANGE UP (ref 3.8–10.5)

## 2023-08-23 PROCEDURE — 99441: CPT

## 2023-08-23 PROCEDURE — 90792 PSYCH DIAG EVAL W/MED SRVCS: CPT

## 2023-08-23 PROCEDURE — 99222 1ST HOSP IP/OBS MODERATE 55: CPT

## 2023-08-23 RX ORDER — SOD SULF/SODIUM/NAHCO3/KCL/PEG
4000 SOLUTION, RECONSTITUTED, ORAL ORAL ONCE
Refills: 0 | Status: COMPLETED | OUTPATIENT
Start: 2023-08-23 | End: 2023-08-23

## 2023-08-23 RX ORDER — SODIUM CHLORIDE 9 MG/ML
1000 INJECTION, SOLUTION INTRAVENOUS
Refills: 0 | Status: DISCONTINUED | OUTPATIENT
Start: 2023-08-23 | End: 2023-08-24

## 2023-08-23 RX ADMIN — IRON SUCROSE 110 MILLIGRAM(S): 20 INJECTION, SOLUTION INTRAVENOUS at 06:36

## 2023-08-23 RX ADMIN — Medication 4000 MILLILITER(S): at 17:16

## 2023-08-23 RX ADMIN — Medication 0.5 MILLIGRAM(S): at 11:42

## 2023-08-23 RX ADMIN — DIVALPROEX SODIUM 500 MILLIGRAM(S): 500 TABLET, DELAYED RELEASE ORAL at 11:41

## 2023-08-23 RX ADMIN — OLANZAPINE 10 MILLIGRAM(S): 15 TABLET, FILM COATED ORAL at 11:41

## 2023-08-23 NOTE — CONSULT NOTE ADULT - NS ATTEND AMEND GEN_ALL_CORE FT
EGD/colon tomorrow for anemia w/u as above.     Total time spent to complete patient's bedside assessment, physical examination, review medical chart including labs & imaging, discuss medical plan of care with housestaff was more than 50 minutes.
# Anemia: MCHC; Severe iron def   No prior EGD / Colonoscopy   Recs:   -FOBT   -GI eval   -PRBC tx to keep Hb > 7   -IV Venofer   -Consider CT A/P   -CEA   -Hemolytic panel / further anemia w/u as above   #DVT ppx   Thank you for the consult   Case d/w primary team. Dr. Felix starting service from AM

## 2023-08-23 NOTE — BH CONSULTATION LIAISON ASSESSMENT NOTE - HPI (INCLUDE ILLNESS QUALITY, SEVERITY, DURATION, TIMING, CONTEXT, MODIFYING FACTORS, ASSOCIATED SIGNS AND SYMPTOMS)
48 y/o M with a hx of Bipolar disorder as per patient, and asthma, who is admitted due to symptomatic anemia. He is consulted due to concerns regarding the need for his current medication regimen and the possibility of it contributing to anemia. Patient reports that he was started on both Depakote and Zyprexa 2 years ago, when he was intoxicated with alcohol and ended up admitted on the inpatient psychiatric unit at [facility]. Says that he was told that he was Bipolar and has been on those medications ever since. Says that he talks on the phone with a psychiatrist every month who renews the medication, and has been trying to lower his Depakote. The patient questions the diagnosis and medications, and says, "I was very drunk at the time, but did stay in the hospital for 10 days". He denies any SI, HI or AVH.

## 2023-08-23 NOTE — BH CONSULTATION LIAISON ASSESSMENT NOTE - NSSUICPROTFACT_PSY_ALL_CORE
Supportive social network of family or friends/Cultural, spiritual and/or moral attitudes against suicide/Positive therapeutic relationships/Ability to cope with stress/Mormon beliefs

## 2023-08-23 NOTE — BH CONSULTATION LIAISON ASSESSMENT NOTE - NSBHCHARTREVIEWLAB_PSY_A_CORE FT
CBC Full  -  ( 23 Aug 2023 07:36 )  WBC Count : 4.01 K/uL  RBC Count : 3.84 M/uL  Hemoglobin : 7.3 g/dL  Hematocrit : 26.1 %  Platelet Count - Automated : 499 K/uL  Mean Cell Volume : 68.0 fl  Mean Cell Hemoglobin : 19.0 pg  Mean Cell Hemoglobin Concentration : 28.0 gm/dL  Auto Neutrophil # : 1.64 K/uL  Auto Lymphocyte # : 1.27 K/uL  Auto Monocyte # : 0.79 K/uL  Auto Eosinophil # : 0.25 K/uL  Auto Basophil # : 0.05 K/uL  Auto Neutrophil % : 41.0 %  Auto Lymphocyte % : 31.7 %  Auto Monocyte % : 19.7 %  Auto Eosinophil % : 6.2 %  Auto Basophil % : 1.2 %  08-23    142  |  113<H>  |  15  ----------------------------<  102<H>  3.8   |  22  |  0.70    Ca    7.8<L>      23 Aug 2023 07:36  Phos  3.5     08-23  Mg     2.0     08-23    TPro  x   /  Alb  x   /  TBili  x   /  DBili  0.1  /  AST  x   /  ALT  x   /  AlkPhos  x   08-23

## 2023-08-23 NOTE — PATIENT PROFILE ADULT - NSPROIMPLANTSMEDDEV_GEN_A_NUR
[facility] HOSP - [facility]    Brief Cardiac Cath Note  Elaina Buenrostro Patient Status:  Outpatient in a Bed    1938 MRN [id number]   Location [facility] Attending Kevin Méndez MD   Hosp Day # 0 PCP Harriet Farr
None

## 2023-08-23 NOTE — CONSULT NOTE ADULT - ASSESSMENT
Patient is a 47M with a PMHx of asthma and bipolar disorder (on zyprexa & depakote) who presented to the ED with WEN and low Hgb on outpatient labwork. GI was consulted for anemia.    Patient reports dizziness, shortness of breath, and fatigue for the past 6 months. He went to his PCP for an annual checkup yesterday, 8/22, at which time his Hgb was noted to be 2.99 and was instructed to go to the ED for blood transfusion. He denies prior hx of anemia and has not required blood transfusions in the past. H denies hematochezia or melena, last BM was yesterday, soft-formed and brown in color. He denies unintentional weight loss, decreased appetite, chest pain, abdominal pain/tenderness, hematemesis, n/v/d, constipation, changes in stool caliber, early satiety, dysphagia, and odynophagia. Denies family hx of liver disease or colorectal cancers. No autoimmune illnesses. Does not take herbal supplements or anticoagulants. Denies excessive use of NSAIDs, medication changes, and prior EGD/colonoscopy. He denies smoking. Reports drinking ETOH socially, beers/whiskey, no hx of withdrawals or seizures. Reports to intermittent use of inhaling cocaine, last use was Saturday prior to admission (8/19), $20 worth of substance.     In the ED, labs notable for WBC 2.72, Hgb 4.2, MCV 62.5, plt 592, BUN 20, normal Cr 0.79 and INR 1.03. LFTs wnl. CTAP notable for mildly distended stomach with debris, mild colonic stool burden, rectal wall thickening, sigmoid diverticulosis without diverticulitis, no ascites. ICU was consulted for severe symptomatic anemia, ? aplastic anemia vs agranulocytosis 2/2 zyprexa use. Iron panel deranged, normal TIBC 414, low %sat 2 and iron 9, elevated UIBC 405. Heme/onc was consulted for leukopenia & anemia, patient reported diarrhea per note however denied during my interview, rec additional anemia panel and IV Venofer x 3 days. He received 3u PRBC with repeat labs showing Hgb 6.0, MCV 67.2, and plt 508. Normal , haptoglobin 154, Vit B12 - 791, and folate >20,     #Microcytic anemia  #CUHCK  #Proctitis  #Thrombocytosis  Patient presented with dizziness, shortness of breath, and fatigue x 6 months, Hgb 2.99 per outpatient labs 8/22, Hgb in the ED noted to be 4.2, transfused 3u PRBC with repeat Hgb 6.0 -> 7.3 this morning. Heme/onc was consulted, recommended IV Venofer x 3 days. CTAP showing possibly proctitis and mild constipation, however patient denies, at bedside he reports soft-formed brown stools, denies hematochezia, melena, diarrhea, or changes in stool caliber. No prior EGD/colonoscopy, no constitutional symptoms or family hx to suggest malignancy. Discussed the possibility of a slow occult bleed from the GI tract with patient, recommended inpatient EGD/colonoscopy, reviewed procedure indications, techniques, risks, and benefits, he is agreeable. Patient is otherwise HD stable at this time.     	- Tentative EGD/colonoscopy 8/24, Thursday  	- CLD now, NPO after midnight, administer 4L golytely at 6PM tonight  	- AM labs: CBC, CMP, PT/INR  	- s/p 3u PRBC since admission  	- Heme/onc following, appreciate recs  	- Monitor for s/sx of bleeding  	- Maintain active T&S, 2 large bore peripheral IVs, transfuse for goal Hgb >7 or if symptomatic  	- Trend H/H  	- Remainder of care per primary team    This note and its recommendations herein are preliminary until such time as cosigned by an attending.    GI will continue to follow.  Thank you for this consult!

## 2023-08-23 NOTE — PROGRESS NOTE ADULT - PROBLEM SELECTOR PLAN 6
pending ct A/P w/ contrast   colonoscopy 8/24/23 w/ Dr Margie ceballos f/u colonoscopy 8/24/23 w/ Dr Hanson  psych f/u

## 2023-08-23 NOTE — PATIENT PROFILE ADULT - FUNCTIONAL ASSESSMENT - DAILY ACTIVITY SCORE.
[de-identified] : The patient was advised of the diagnosis.  The natural history of the pathology was explained in full to the patient in layman's terms. All questions were answered.  The risks and benefits of surgical and non-surgical treatment alternatives were explained in full to the patient.\par 
24

## 2023-08-23 NOTE — BH CONSULTATION LIAISON ASSESSMENT NOTE - NSICDXBHSECONDARYDX_PSY_ALL_CORE
Symptomatic anemia   D64.9  Asthma   J45.909  Bipolar disorder   F31.9  Prophylactic measure   Z29.9  Iron deficiency anemia due to chronic blood loss   D50.0  Discharge planning issues   Z02.9

## 2023-08-23 NOTE — HISTORY OF PRESENT ILLNESS
[Other Location: e.g. School (Enter Location, City,State)___] : at [unfilled], at the time of the visit. [Medical Office: ([facility])___] : at the medical office located in  [Verbal consent obtained from patient] : the patient, [unfilled] [FreeTextEntry1] : f/u Anemia  [de-identified] : 47yr old male w/ PMHx of asthma, bipolar, and substance abuse. Patient denies dizziness, headache, chest pain, shortness of breath, and abdominal pain at this time.   Currently in hospital for critical CBC discovered during annual check up. Pt reports receiving 3-4units of PRBC so far. Pending colonoscopy tomorrow d/t result of Abdominal CT done during visit.   Pt made aware to call if needed but more importantly, f/u once d/c'd.

## 2023-08-23 NOTE — PROGRESS NOTE ADULT - SUBJECTIVE AND OBJECTIVE BOX
Patient is a 47y old  Male who presents with a chief complaint of symptomatic anemia (23 Aug 2023 10:18)      INTERVAL HPI/OVERNIGHT EVENTS: no overnight events    I&O's Summary    Vital Signs Last 24 Hrs  T(C): 36.8 (23 Aug 2023 05:12), Max: 37.1 (22 Aug 2023 18:44)  T(F): 98.2 (23 Aug 2023 05:12), Max: 98.7 (22 Aug 2023 18:44)  HR: 86 (23 Aug 2023 05:12) (86 - 100)  BP: 103/62 (23 Aug 2023 05:12) (103/62 - 117/77)  BP(mean): --  RR: 18 (23 Aug 2023 05:12) (16 - 18)  SpO2: 98% (23 Aug 2023 05:12) (98% - 100%)    Parameters below as of 23 Aug 2023 05:12  Patient On (Oxygen Delivery Method): room air      PAST MEDICAL & SURGICAL HISTORY:  Ex-smoker  (cigarettes / marijuana)      Asthma      No significant past surgical history          SOCIAL HISTORY  Alcohol:  Tobacco:  Illicit substance use:      FAMILY HISTORY:      LABS:                        7.3    4.01  )-----------( 499      ( 23 Aug 2023 07:36 )             26.1     08-23    142  |  113<H>  |  15  ----------------------------<  102<H>  3.8   |  22  |  0.70    Ca    7.8<L>      23 Aug 2023 07:36  Phos  3.5     08-23  Mg     2.0     08-23    TPro  7.2  /  Alb  3.2<L>  /  TBili  0.2  /  DBili  x   /  AST  7<L>  /  ALT  13  /  AlkPhos  48  08-22    PT/INR - ( 22 Aug 2023 12:00 )   PT: 11.7 sec;   INR: 1.03 ratio         PTT - ( 22 Aug 2023 12:00 )  PTT:30.0 sec  Urinalysis Basic - ( 23 Aug 2023 07:36 )    Color: x / Appearance: x / SG: x / pH: x  Gluc: 102 mg/dL / Ketone: x  / Bili: x / Urobili: x   Blood: x / Protein: x / Nitrite: x   Leuk Esterase: x / RBC: x / WBC x   Sq Epi: x / Non Sq Epi: x / Bacteria: x      CAPILLARY BLOOD GLUCOSE        Urinalysis Basic - ( 23 Aug 2023 07:36 )    Color: x / Appearance: x / SG: x / pH: x  Gluc: 102 mg/dL / Ketone: x  / Bili: x / Urobili: x   Blood: x / Protein: x / Nitrite: x   Leuk Esterase: x / RBC: x / WBC x   Sq Epi: x / Non Sq Epi: x / Bacteria: x        MEDICATIONS  (STANDING):  divalproex  milliGRAM(s) Oral daily  iron sucrose IVPB 200 milliGRAM(s) IV Intermittent every 24 hours  LORazepam     Tablet 0.5 milliGRAM(s) Oral once  OLANZapine 10 milliGRAM(s) Oral daily  polyethylene glycol/electrolyte Solution. 4000 milliLiter(s) Oral once    MEDICATIONS  (PRN):  acetaminophen     Tablet .. 650 milliGRAM(s) Oral every 6 hours PRN Temp greater or equal to 38C (100.4F), Mild Pain (1 - 3)  aluminum hydroxide/magnesium hydroxide/simethicone Suspension 30 milliLiter(s) Oral every 4 hours PRN Dyspepsia  melatonin 3 milliGRAM(s) Oral at bedtime PRN Insomnia  ondansetron Injectable 4 milliGRAM(s) IV Push every 8 hours PRN Nausea and/or Vomiting      REVIEW OF SYSTEMS:  CONSTITUTIONAL: No fever  EYES: No eye pain  ENMT:  No sinus or throat pain  NECK: No pain   RESPIRATORY: No cough, No shortness of breath  CARDIOVASCULAR: No chest pain, palpitations  GASTROINTESTINAL: No abdominal pain. No nausea, vomiting; No diarrhea or constipation.  GENITOURINARY: No dysuria, frequency  NEUROLOGICAL: No headaches  SKIN: No rashes  MUSCULOSKELETAL: No joint pain     RADIOLOGY & ADDITIONAL TESTS:  < from: CT Abdomen and Pelvis w/ IV Cont (08.22.23 @ 18:12) >  ACC: [id number] EXAM:  CT ABDOMEN AND PELVIS IC   ORDERED BY: ARNIE DUKE     PROCEDURE DATE:  08/22/2023          INTERPRETATION:  CLINICAL INFORMATION: 47 years  Male with r/o   hemorrhage. Anemia    COMPARISON: None.    CONTRAST/COMPLICATIONS:  IV Contrast: Omnipaque 350  90 cc administered   10 cc discarded  Oral Contrast: NONE  Complications: None reported at time of study completion    PROCEDURE:  CT of the Abdomen and Pelvis was performed.  Sagittal and coronal reformats were performed.    FINDINGS:  LOWER CHEST: Within normal limits.    LIVER: Within normal limits.  BILE DUCTS: Normal caliber.  GALLBLADDER: Within normal limits.  SPLEEN: Within normal limits.  PANCREAS: Within normal limits.  ADRENALS: Within normal limits.  KIDNEYS/URETERS: Within normal limits.    BLADDER: Within normal limits.  REPRODUCTIVE ORGANS: Prostate within normal limits.    BOWEL: No bowel obstruction. Appendix is normal.. Mildly distended   stomach with debris. Mild colonic stool burden. Rectal wall thickening.   Sigmoid diverticulosis without diverticulitis.  PERITONEUM: No ascites.  VESSELS: Within normal limits.  RETROPERITONEUM/LYMPH NODES: No lymphadenopathy.  ABDOMINAL WALL: Small bilateral fat-containing inguinal hernias.  BONES: Within normal limits.    IMPRESSION:    Rectal wall thickening, possibly proctitis. Recommend colonoscopy.    Mildly distended stomach with debris. Mild constipation.    Colonic diverticulosis without diverticulitis.    --- End of Report ---      MARIO JACOBO MD; Attending Radiologist  This document has been electronically signed. Aug 22 2023  6:24PM    < end of copied text >    Imaging Personally Reviewed:  [x ] YES  [ ] NO    Consultant(s) Notes Reviewed:  [x ] YES  [ ] NO    PHYSICAL EXAM:  GENERAL: NAD  HEAD:  Atraumatic, Normocephalic  EYES: conjunctiva and sclera clear  ENMT: Moist mucous membranes  NECK: Supple  NERVOUS SYSTEM:  Alert & Oriented X3, Good concentration  CHEST/LUNG: CTA bilaterally; No rales, rhonchi, wheezing, or rubs  HEART: Regular rate and rhythm  ABDOMEN: Soft, Nontender, Nondistended; Bowel sounds present  EXTREMITIES:  No clubbing, cyanosis, or edema  SKIN: No rashes     Care Collaborated Discussed with Consultants/Other Providers [ x] YES  [ ] NO

## 2023-08-23 NOTE — BH CONSULTATION LIAISON ASSESSMENT NOTE - NSBHCHARTREVIEWVS_PSY_A_CORE FT
Vital Signs Last 24 Hrs  T(C): 36.6 (23 Aug 2023 13:11), Max: 37.1 (22 Aug 2023 18:44)  T(F): 97.8 (23 Aug 2023 13:11), Max: 98.7 (22 Aug 2023 18:44)  HR: 73 (23 Aug 2023 13:11) (73 - 91)  BP: 127/74 (23 Aug 2023 13:11) (103/62 - 127/74)  BP(mean): --  RR: 18 (23 Aug 2023 13:11) (16 - 18)  SpO2: 100% (23 Aug 2023 13:11) (98% - 100%)    Parameters below as of 23 Aug 2023 13:11  Patient On (Oxygen Delivery Method): room air

## 2023-08-23 NOTE — PROGRESS NOTE ADULT - SUBJECTIVE AND OBJECTIVE BOX
Patient is a 47y old  Male who presents with a chief complaint of symptomatic anemia (23 Aug 2023 08:25)    PATIENT IS SEEN AND EXAMINED IN MEDICAL FLOOR.  MOHAMUDT [    ]    JAVIER [   ]      GT [   ]    ALLERGIES:  No Known Drug Allergies  dust (Eye Irritation)      Daily Height in cm: 165.1 (22 Aug 2023 11:27)    Daily     VITALS:    Vital Signs Last 24 Hrs  T(C): 36.8 (23 Aug 2023 05:12), Max: 37.1 (22 Aug 2023 18:44)  T(F): 98.2 (23 Aug 2023 05:12), Max: 98.7 (22 Aug 2023 18:44)  HR: 86 (23 Aug 2023 05:12) (86 - 100)  BP: 103/62 (23 Aug 2023 05:12) (103/62 - 117/77)  BP(mean): --  RR: 18 (23 Aug 2023 05:12) (16 - 18)  SpO2: 98% (23 Aug 2023 05:12) (98% - 100%)    Parameters below as of 23 Aug 2023 05:12  Patient On (Oxygen Delivery Method): room air        LABS:    CBC Full  -  ( 23 Aug 2023 07:36 )  WBC Count : 4.01 K/uL  RBC Count : 3.84 M/uL  Hemoglobin : 7.3 g/dL  Hematocrit : 26.1 %  Platelet Count - Automated : 499 K/uL  Mean Cell Volume : 68.0 fl  Mean Cell Hemoglobin : 19.0 pg  Mean Cell Hemoglobin Concentration : 28.0 gm/dL  Auto Neutrophil # : x  Auto Lymphocyte # : x  Auto Monocyte # : x  Auto Eosinophil # : x  Auto Basophil # : x  Auto Neutrophil % : x  Auto Lymphocyte % : x  Auto Monocyte % : x  Auto Eosinophil % : x  Auto Basophil % : x    PT/INR - ( 22 Aug 2023 12:00 )   PT: 11.7 sec;   INR: 1.03 ratio         PTT - ( 22 Aug 2023 12:00 )  PTT:30.0 sec  08-23    142  |  113<H>  |  15  ----------------------------<  102<H>  3.8   |  22  |  0.70    Ca    7.8<L>      23 Aug 2023 07:36  Phos  3.5     08-23  Mg     2.0     08-23    TPro  7.2  /  Alb  3.2<L>  /  TBili  0.2  /  DBili  x   /  AST  7<L>  /  ALT  13  /  AlkPhos  48  08-22    CAPILLARY BLOOD GLUCOSE            LIVER FUNCTIONS - ( 22 Aug 2023 12:00 )  Alb: 3.2 g/dL / Pro: 7.2 g/dL / ALK PHOS: 48 U/L / ALT: 13 U/L DA / AST: 7 U/L / GGT: x           Creatinine Trend: 0.70<--, 0.79<--  I&O's Summary              MEDICATIONS:    MEDICATIONS  (STANDING):  divalproex  milliGRAM(s) Oral daily  iron sucrose IVPB 200 milliGRAM(s) IV Intermittent every 24 hours  OLANZapine 10 milliGRAM(s) Oral daily  polyethylene glycol/electrolyte Solution. 4000 milliLiter(s) Oral once      MEDICATIONS  (PRN):  acetaminophen     Tablet .. 650 milliGRAM(s) Oral every 6 hours PRN Temp greater or equal to 38C (100.4F), Mild Pain (1 - 3)  aluminum hydroxide/magnesium hydroxide/simethicone Suspension 30 milliLiter(s) Oral every 4 hours PRN Dyspepsia  melatonin 3 milliGRAM(s) Oral at bedtime PRN Insomnia  ondansetron Injectable 4 milliGRAM(s) IV Push every 8 hours PRN Nausea and/or Vomiting      REVIEW OF SYSTEMS:                           ALL ROS DONE [ X   ]    CONSTITUTIONAL:  LETHARGIC [   ], FEVER [   ], UNRESPONSIVE [   ]  CVS:  CP  [   ], SOB, [   ], PALPITATIONS [   ], DIZZYNESS [   ]  RS: COUGH [   ], SPUTUM [   ]  GI: ABDOMINAL PAIN [   ], NAUSEA [   ], VOMITINGS [   ], DIARRHEA [   ], CONSTIPATION [   ]  :  DYSURIA [   ], NOCTURIA [   ], INCREASED FREQUENCY [   ], DRIBLING [   ],  SKELETAL: PAINFUL JOINTS [   ], SWOLLEN JOINTS [   ], NECK ACHE [   ], LOW BACK ACHE [   ],  SKIN : ULCERS [   ], RASH [   ], ITCHING [   ]  CNS: HEAD ACHE [   ], DOUBLE VISION [   ], BLURRED VISION [   ], AMS / CONFUSION [   ], SEIZURES [   ], WEAKNESS [   ],TINGLING / NUMBNESS [   ]    PHYSICAL EXAMINATION:  GENERAL APPEARANCE: NO DISTRESS  HEENT:  NO PALLOR, NO  JVD,  NO   NODES, NECK SUPPLE  CVS: S1 +, S2 +,   RS: AEEB,  OCCASIONAL  RALES +,   NO RONCHI  ABD: SOFT, NT, NO, BS +  EXT: NO PE  SKIN: WARM,   SKELETAL:  ROM ACCEPTABLE  CNS:  AAO X    ,   DEFICITS    RADIOLOGY :      ASSESSMENT :     Anemia    Asthma    Ex-smoker    Asthma      PLAN:  HPI:  47M PMH asthma and bipolar disorder presenting to the ED for fatigue, WEN and dizziness. Pt had his annual PCP visit yesterday and had routine blood work done. He was called by the PCP's office today stating his Hb was low and that he should go to the ED. Pt endorses that he has had WEN when walking uphill and palpitations for the past 6 months. He also endorses that he gets dizzy and lightheaded when he stands up too quickly. He lives at home with his mother who is now a hospice patient. He also reports that he was started on zyprexa 1.5 years ago, which he noted was the only change he's had since his symptoms have started. Pt denies any headache, blurry vision, chest pain, N/V/D, abdominal pain, dysuria, melena, BRBPR, numbness/tingling/weakness in extremities.  (22 Aug 2023 14:45)    # SYMPTOMATIC ANEMIA  # LEUKOPENIA    - PPI BID, CLEAR LIQUID DIET  - TRANSFUSION THRESHOLD HGB < 7  - TREND HGB  - PLANNED FOR PRBC TRANSFUSION  - F/U ANEMIA PANEL, PERIPHERAL SMEAR  - F/U LDH, HAPTOGLOBIN  - F/U RETIC COUNT  - F/U CT A/P  - HEME/ONC CONSULT  - GI CONSULT  - S/P CRITICAL CARE CONSULT    # ? BIPOLAR DISORDER  - PATIENT REPORTS HE DOES NOT FOLLOW A PSYCHIATRIST  - ON DEPAKOTE AND ZYPREXA  - PSYCHIATRY CONSULT    # HX OF POLYSUBSTANCE ABUSE  - F/U UDS  - COUNSELLED PATIENT AT LENGTH ON CESSATION    # ASTHMA    # GI AND DVT PPX.        Patient is a 47y old  Male who presents with a chief complaint of symptomatic anemia (23 Aug 2023 08:25)    PATIENT IS SEEN AND EXAMINED IN MEDICAL FLOOR.      ALLERGIES:  No Known Drug Allergies  dust (Eye Irritation)      Daily Height in cm: 165.1 (22 Aug 2023 11:27)    Daily     VITALS:    Vital Signs Last 24 Hrs  T(C): 36.8 (23 Aug 2023 05:12), Max: 37.1 (22 Aug 2023 18:44)  T(F): 98.2 (23 Aug 2023 05:12), Max: 98.7 (22 Aug 2023 18:44)  HR: 86 (23 Aug 2023 05:12) (86 - 100)  BP: 103/62 (23 Aug 2023 05:12) (103/62 - 117/77)  BP(mean): --  RR: 18 (23 Aug 2023 05:12) (16 - 18)  SpO2: 98% (23 Aug 2023 05:12) (98% - 100%)    Parameters below as of 23 Aug 2023 05:12  Patient On (Oxygen Delivery Method): room air        LABS:    CBC Full  -  ( 23 Aug 2023 07:36 )  WBC Count : 4.01 K/uL  RBC Count : 3.84 M/uL  Hemoglobin : 7.3 g/dL  Hematocrit : 26.1 %  Platelet Count - Automated : 499 K/uL  Mean Cell Volume : 68.0 fl  Mean Cell Hemoglobin : 19.0 pg  Mean Cell Hemoglobin Concentration : 28.0 gm/dL  Auto Neutrophil # : x  Auto Lymphocyte # : x  Auto Monocyte # : x  Auto Eosinophil # : x  Auto Basophil # : x  Auto Neutrophil % : x  Auto Lymphocyte % : x  Auto Monocyte % : x  Auto Eosinophil % : x  Auto Basophil % : x    PT/INR - ( 22 Aug 2023 12:00 )   PT: 11.7 sec;   INR: 1.03 ratio         PTT - ( 22 Aug 2023 12:00 )  PTT:30.0 sec  08-23    142  |  113<H>  |  15  ----------------------------<  102<H>  3.8   |  22  |  0.70    Ca    7.8<L>      23 Aug 2023 07:36  Phos  3.5     08-23  Mg     2.0     08-23    TPro  7.2  /  Alb  3.2<L>  /  TBili  0.2  /  DBili  x   /  AST  7<L>  /  ALT  13  /  AlkPhos  48  08-22    CAPILLARY BLOOD GLUCOSE            LIVER FUNCTIONS - ( 22 Aug 2023 12:00 )  Alb: 3.2 g/dL / Pro: 7.2 g/dL / ALK PHOS: 48 U/L / ALT: 13 U/L DA / AST: 7 U/L / GGT: x           Creatinine Trend: 0.70<--, 0.79<--  I&O's Summary              MEDICATIONS:    MEDICATIONS  (STANDING):  divalproex  milliGRAM(s) Oral daily  iron sucrose IVPB 200 milliGRAM(s) IV Intermittent every 24 hours  OLANZapine 10 milliGRAM(s) Oral daily  polyethylene glycol/electrolyte Solution. 4000 milliLiter(s) Oral once      MEDICATIONS  (PRN):  acetaminophen     Tablet .. 650 milliGRAM(s) Oral every 6 hours PRN Temp greater or equal to 38C (100.4F), Mild Pain (1 - 3)  aluminum hydroxide/magnesium hydroxide/simethicone Suspension 30 milliLiter(s) Oral every 4 hours PRN Dyspepsia  melatonin 3 milliGRAM(s) Oral at bedtime PRN Insomnia  ondansetron Injectable 4 milliGRAM(s) IV Push every 8 hours PRN Nausea and/or Vomiting      REVIEW OF SYSTEMS:                           ALL ROS DONE [ X   ]    CONSTITUTIONAL:  LETHARGIC [   ], FEVER [   ], UNRESPONSIVE [   ]  CVS:  CP  [   ], SOB, [   ], PALPITATIONS [   ], DIZZYNESS [   ]  RS: COUGH [   ], SPUTUM [   ]  GI: ABDOMINAL PAIN [   ], NAUSEA [   ], VOMITINGS [   ], DIARRHEA [   ], CONSTIPATION [   ]  :  DYSURIA [   ], NOCTURIA [   ], INCREASED FREQUENCY [   ], DRIBLING [   ],  SKELETAL: PAINFUL JOINTS [   ], SWOLLEN JOINTS [   ], NECK ACHE [   ], LOW BACK ACHE [   ],  SKIN : ULCERS [   ], RASH [   ], ITCHING [   ]  CNS: HEAD ACHE [   ], DOUBLE VISION [   ], BLURRED VISION [   ], AMS / CONFUSION [   ], SEIZURES [   ], WEAKNESS [   ],TINGLING / NUMBNESS [   ]    PHYSICAL EXAMINATION:  GENERAL APPEARANCE: NO DISTRESS  HEENT:  NO PALLOR, NO  JVD,  NO   NODES, NECK SUPPLE  CVS: S1 +, S2 +,   RS: AEEB,  OCCASIONAL  RALES +,   NO RONCHI  ABD: SOFT, NT, NO, BS +  EXT: NO PE  SKIN: WARM,   SKELETAL:  ROM ACCEPTABLE  CNS:  AAO X 3    RADIOLOGY :    RADIOLOGY AND READINGS REVIEWED    ASSESSMENT :     Anemia    Asthma    Ex-smoker    Asthma      PLAN:  HPI:  47M PMH asthma and bipolar disorder presenting to the ED for fatigue, WEN and dizziness. Pt had his annual PCP visit yesterday and had routine blood work done. He was called by the PCP's office today stating his Hb was low and that he should go to the ED. Pt endorses that he has had WEN when walking uphill and palpitations for the past 6 months. He also endorses that he gets dizzy and lightheaded when he stands up too quickly. He lives at home with his mother who is now a hospice patient. He also reports that he was started on zyprexa 1.5 years ago, which he noted was the only change he's had since his symptoms have started. Pt denies any headache, blurry vision, chest pain, N/V/D, abdominal pain, dysuria, melena, BRBPR, numbness/tingling/weakness in extremities.  (22 Aug 2023 14:45)    # SYMPTOMATIC ANEMIA  # MICROCYTIC, IRON DEFICIENCY  # LEUKOPENIA    - PPI BID, CLEAR LIQUID DIET  - TRANSFUSION THRESHOLD HGB < 7  - TREND HGB  - PLANNED FOR PRBC TRANSFUSION  - F/U ANEMIA PANEL, PERIPHERAL SMEAR  - F/U LDH, HAPTOGLOBIN  - F/U RETIC COUNT  - NOTED CT A/P  - HEME/ONC CONSULT  - GI CONSULT  - S/P CRITICAL CARE CONSULT    - PLANNED FOR EGD/COLONOSCOPY 8/24    # BIPOLAR DISORDER  - PATIENT CLARIFIES THAT HE SPEAKS WITH A PSYCHIATRIST VIA TELEVISITS  - ON DEPAKOTE AND ZYPREXA  - PSYCHIATRY CONSULT    # HX OF POLYSUBSTANCE ABUSE  - COUNSELLED PATIENT AT LENGTH ON CESSATION    # ASTHMA    # GI AND DVT PPX.        Patient is a 47y old  Male who presents with a chief complaint of symptomatic anemia (23 Aug 2023 08:25)    PATIENT IS SEEN AND EXAMINED IN MEDICAL FLOOR.      ALLERGIES:  No Known Drug Allergies  dust (Eye Irritation)      Daily Height in cm: 165.1 (22 Aug 2023 11:27)    Daily     VITALS:    Vital Signs Last 24 Hrs  T(C): 36.8 (23 Aug 2023 05:12), Max: 37.1 (22 Aug 2023 18:44)  T(F): 98.2 (23 Aug 2023 05:12), Max: 98.7 (22 Aug 2023 18:44)  HR: 86 (23 Aug 2023 05:12) (86 - 100)  BP: 103/62 (23 Aug 2023 05:12) (103/62 - 117/77)  BP(mean): --  RR: 18 (23 Aug 2023 05:12) (16 - 18)  SpO2: 98% (23 Aug 2023 05:12) (98% - 100%)    Parameters below as of 23 Aug 2023 05:12  Patient On (Oxygen Delivery Method): room air        LABS:    CBC Full  -  ( 23 Aug 2023 07:36 )  WBC Count : 4.01 K/uL  RBC Count : 3.84 M/uL  Hemoglobin : 7.3 g/dL  Hematocrit : 26.1 %  Platelet Count - Automated : 499 K/uL  Mean Cell Volume : 68.0 fl  Mean Cell Hemoglobin : 19.0 pg  Mean Cell Hemoglobin Concentration : 28.0 gm/dL  Auto Neutrophil # : x  Auto Lymphocyte # : x  Auto Monocyte # : x  Auto Eosinophil # : x  Auto Basophil # : x  Auto Neutrophil % : x  Auto Lymphocyte % : x  Auto Monocyte % : x  Auto Eosinophil % : x  Auto Basophil % : x    PT/INR - ( 22 Aug 2023 12:00 )   PT: 11.7 sec;   INR: 1.03 ratio         PTT - ( 22 Aug 2023 12:00 )  PTT:30.0 sec  08-23    142  |  113<H>  |  15  ----------------------------<  102<H>  3.8   |  22  |  0.70    Ca    7.8<L>      23 Aug 2023 07:36  Phos  3.5     08-23  Mg     2.0     08-23    TPro  7.2  /  Alb  3.2<L>  /  TBili  0.2  /  DBili  x   /  AST  7<L>  /  ALT  13  /  AlkPhos  48  08-22    CAPILLARY BLOOD GLUCOSE            LIVER FUNCTIONS - ( 22 Aug 2023 12:00 )  Alb: 3.2 g/dL / Pro: 7.2 g/dL / ALK PHOS: 48 U/L / ALT: 13 U/L DA / AST: 7 U/L / GGT: x           Creatinine Trend: 0.70<--, 0.79<--  I&O's Summary              MEDICATIONS:    MEDICATIONS  (STANDING):  divalproex  milliGRAM(s) Oral daily  iron sucrose IVPB 200 milliGRAM(s) IV Intermittent every 24 hours  OLANZapine 10 milliGRAM(s) Oral daily  polyethylene glycol/electrolyte Solution. 4000 milliLiter(s) Oral once      MEDICATIONS  (PRN):  acetaminophen     Tablet .. 650 milliGRAM(s) Oral every 6 hours PRN Temp greater or equal to 38C (100.4F), Mild Pain (1 - 3)  aluminum hydroxide/magnesium hydroxide/simethicone Suspension 30 milliLiter(s) Oral every 4 hours PRN Dyspepsia  melatonin 3 milliGRAM(s) Oral at bedtime PRN Insomnia  ondansetron Injectable 4 milliGRAM(s) IV Push every 8 hours PRN Nausea and/or Vomiting      REVIEW OF SYSTEMS:                           ALL ROS DONE [ X   ]    CONSTITUTIONAL:  LETHARGIC [   ], FEVER [   ], UNRESPONSIVE [   ]  CVS:  CP  [   ], SOB, [   ], PALPITATIONS [   ], DIZZYNESS [   ]  RS: COUGH [   ], SPUTUM [   ]  GI: ABDOMINAL PAIN [   ], NAUSEA [   ], VOMITINGS [   ], DIARRHEA [   ], CONSTIPATION [   ]  :  DYSURIA [   ], NOCTURIA [   ], INCREASED FREQUENCY [   ], DRIBLING [   ],  SKELETAL: PAINFUL JOINTS [   ], SWOLLEN JOINTS [   ], NECK ACHE [   ], LOW BACK ACHE [   ],  SKIN : ULCERS [   ], RASH [   ], ITCHING [   ]  CNS: HEAD ACHE [   ], DOUBLE VISION [   ], BLURRED VISION [   ], AMS / CONFUSION [   ], SEIZURES [   ], WEAKNESS [   ],TINGLING / NUMBNESS [   ]    PHYSICAL EXAMINATION:  GENERAL APPEARANCE: NO DISTRESS  HEENT:  NO PALLOR, NO  JVD,  NO   NODES, NECK SUPPLE  CVS: S1 +, S2 +,   RS: AEEB,  OCCASIONAL  RALES +,   NO RONCHI  ABD: SOFT, NT, NO, BS +  EXT: NO PE  SKIN: WARM,   SKELETAL:  ROM ACCEPTABLE  CNS:  AAO X 3    RADIOLOGY :    RADIOLOGY AND READINGS REVIEWED    ASSESSMENT :     Anemia    Asthma    Ex-smoker    Asthma      PLAN:  HPI:  47M PMH asthma and bipolar disorder presenting to the ED for fatigue, WEN and dizziness. Pt had his annual PCP visit yesterday and had routine blood work done. He was called by the PCP's office today stating his Hb was low and that he should go to the ED. Pt endorses that he has had WEN when walking uphill and palpitations for the past 6 months. He also endorses that he gets dizzy and lightheaded when he stands up too quickly. He lives at home with his mother who is now a hospice patient. He also reports that he was started on zyprexa 1.5 years ago, which he noted was the only change he's had since his symptoms have started. Pt denies any headache, blurry vision, chest pain, N/V/D, abdominal pain, dysuria, melena, BRBPR, numbness/tingling/weakness in extremities.  (22 Aug 2023 14:45)    # SYMPTOMATIC ANEMIA  # MICROCYTIC, IRON DEFICIENCY  # LEUKOPENIA    - PPI BID, CLEAR LIQUID DIET  - TRANSFUSION THRESHOLD HGB < 7  - TREND HGB  - PLANNED FOR PRBC TRANSFUSION  - F/U ANEMIA PANEL, PERIPHERAL SMEAR  - F/U LDH, HAPTOGLOBIN  - F/U RETIC COUNT  - NOTED CT A/P  - HEME/ONC CONSULT  - GI CONSULT  - S/P CRITICAL CARE CONSULT    - STARTED ON VENOFER    - PLANNED FOR EGD/COLONOSCOPY 8/24    # BIPOLAR DISORDER  - PATIENT CLARIFIES THAT HE SPEAKS WITH A PSYCHIATRIST VIA TELEVISITS  - ON DEPAKOTE AND ZYPREXA  - PSYCHIATRY CONSULT    # HX OF POLYSUBSTANCE ABUSE  - COUNSELLED PATIENT AT LENGTH ON CESSATION    # ASTHMA    # GI AND DVT PPX.

## 2023-08-23 NOTE — PATIENT PROFILE ADULT - NSPRESCRUSEDDRG_GEN_A_NUR
[facility]   [address] [facility], [address], [facility]  Phone: [phone]   [phone]       Nephrology Progress Note  Christoph Butterfield     [birth year]     [id number]  Date of Admission : 10/25/2019  01/05/20    CC: Follow up for JUAN J, CKD, Hyponatremia      Assessment and Plan   JUAN J on CKD:  - resolving and stable  - Cr stable, voiding well w/o bumex  - cont present care    Pseudomonal UTI and bacteremia:  - from cultures on 12/29  - abx per ID    Orthostatic hypotension w/ syncope:  - holding loops  - on midodrine    Hyponatremia :  - Na stable at 130  - daily Na levels for now    Gross hematuria, BPH w/ retention:  - off CBI pre VAD. cysto pre op showed catheter related Cystitis @ postr wall   -CBI stopped and Lizama removed 12/26  -Continue with Flomax and Proscar    Myoclonus and Encephalopathy   Possible CVA, 3 rd nerve palsy   - On Keppra    Acute on Chronic HFrEF   - EF 16-20%, NYHA Class IV , hx of VF arrest - s/p AICD  - Impella insertion 10/29- removed 11/18   - s/p LVAD placement on 11/18  - s/p right thoracentesis. CT in place    L arm clot s/p thrombectomy on 11/25    JOSE on CPAP      PAF s/p DCCV 6/19     Anemia:  - from blood loss s/p multiple blood products + IV iron  - cont PAVEL     Interval History:    Seen and examined. Na and Cr stable. Still orthostatic. Voiding well.   No cp or sob reported    Review of Systems: as per HPI    Current Medications:   Current Facility-Administered Medications   Medication Dose Route Frequency    warfarin (COUMADIN) tablet 4 mg  4 mg Oral ONCE    midodrine (PROAMITINE) tablet 5 mg  5 mg Oral BID WITH MEALS    midodrine (PROAMITINE) tablet 2.5 mg  2.5 mg Oral ONCE    sildenafil (pulm.hypertension) (REVATIO) tablet 20 mg  20 mg Oral TID    thiamine HCL (B-1) tablet 100 mg  100 mg Oral DAILY    venlafaxine-SR (EFFEXOR-XR) capsule 75 mg  75 mg Oral DAILY WITH BREAKFAST    levoFLOXacin (LEVAQUIN) 750 mg in D5W IVPB  750 mg IntraVENous Q24H    cefepime (MAXIPIME) 2 g in 0.9% sodium chloride (MBP/ADV) 100 mL  2 g IntraVENous Q8H    oxybutynin (DITROPAN) tablet 5 mg  5 mg Oral Q6H PRN    magnesium oxide (MAG-OX) tablet 800 mg  800 mg Oral BID    [Held by provider] milrinone (PRIMACOR) 20 MG/100 ML D5W infusion  0.125 mcg/kg/min IntraVENous CONTINUOUS    albumin human 5% (BUMINATE) solution 12.5 g  12.5 g IntraVENous DIALYSIS PRN    lidocaine (URO-JET/ GLYDO) 2 % jelly   Urethral PRN    senna-docusate (PERICOLACE) 8.6-50 mg per tablet 1 Tab  1 Tab Oral DAILY    epoetin yvonne-epbx (RETACRIT) injection 20,000 Units  20,000 Units SubCUTAneous Q MON, WED & FRI    levETIRAcetam (KEPPRA) tablet 125 mg  125 mg Oral BID    polyethylene glycol (MIRALAX) packet 17 g  17 g Oral DAILY    albuterol-ipratropium (DUO-NEB) 2.5 MG-0.5 MG/3 ML  3 mL Nebulization Q6H PRN    therapeutic multivitamin (THERAGRAN) tablet 1 Tab  1 Tab Oral DAILY    potassium chloride SR (KLOR-CON 10) tablet 40 mEq  40 mEq Oral DAILY    alteplase (CATHFLO) 1 mg in sterile water (preservative free) 1 mL injection  1 mg InterCATHeter PRN    finasteride (PROSCAR) tablet 5 mg  5 mg Oral DAILY    spironolactone (ALDACTONE) tablet 25 mg  25 mg Oral DAILY    clonazePAM (KlonoPIN) tablet 0.5 mg  0.5 mg Oral TID PRN    diphenhydrAMINE (BENADRYL) capsule 25 mg  25 mg Oral QHS PRN    octreotide (SANDOSTATIN) injection 25 mcg  25 mcg SubCUTAneous TID    benzocaine-menthol (CEPACOL) lozenge 1 Lozenge  1 Lozenge Mucous Membrane PRN    digoxin (LANOXIN) tablet 0.0625 mg  62.5 mcg Oral Q MON, WED & FRI    pantoprazole (PROTONIX) tablet 40 mg  40 mg Oral ACB    allopurinol (ZYLOPRIM) tablet 100 mg  100 mg Oral DAILY    arformoterol (BROVANA) neb solution 15 mcg  15 mcg Nebulization BID RT    And    budesonide (PULMICORT) 500 mcg/2 ml nebulizer suspension  500 mcg Nebulization BID RT    artificial saliva (MOUTH KOTE) 1 Spray  1 Spray Oral PRN    lactobac ac& pc-s.therm-b.anim (TIAN Q/RISAQUAD)  1 Cap Oral DAILY    oxyCODONE IR (ROXICODONE) tablet 5 mg  5 mg Oral Q6H PRN    tamsulosin (FLOMAX) capsule 0.4 mg  0.4 mg Oral DAILY    insulin lispro (HUMALOG) injection   SubCUTAneous AC&HS    Warfarin MD/NP dosing   Other PRN    sodium chloride (NS) flush 5-40 mL  5-40 mL IntraVENous Q8H    sodium chloride (NS) flush 5-40 mL  5-40 mL IntraVENous PRN    acetaminophen (TYLENOL) tablet 650 mg  650 mg Oral Q6H PRN    naloxone (NARCAN) injection 0.4 mg  0.4 mg IntraVENous PRN    ondansetron (ZOFRAN) injection 4 mg  4 mg IntraVENous Q4H PRN    bisacodyl (DULCOLAX) tablet 5 mg  5 mg Oral DAILY PRN    sucralfate (CARAFATE) tablet 1 g  1 g Oral AC&HS    influenza vaccine 2019-20 (6 mos+)(PF) (FLUARIX/FLULAVAL/FLUZONE QUAD) injection 0.5 mL  0.5 mL IntraMUSCular PRIOR TO DISCHARGE    hydrALAZINE (APRESOLINE) 20 mg/mL injection 20 mg  20 mg IntraVENous Q6H PRN    dextrose 10 % infusion 125-250 mL  125-250 mL IntraVENous PRN      No Known Allergies    Objective:  Vitals:    Vitals:    01/05/20 0254 01/05/20 0445 01/05/20 0739 01/05/20 1112   BP:       Pulse: 83 79 81 87   Resp: 17 18 16 18   Temp: 98.1 °F (36.7 °C) 98.4 °F (36.9 °C) 97.4 °F (36.3 °C) 97.7 °F (36.5 °C)   SpO2: 100% 100% 100% 100%   Weight:  76.2 kg (167 lb 15.9 oz)     Height:         Intake and Output:  01/05 0701 - 01/05 1900  In: 3738 [P.O.:720; I.V.:300]  Out: 1300 [Urine:1300]  01/03 1901 - 01/05 0700  In: 2028 [P.O.:1077; I.V.:951]  Out: 4250 [Urine:4250]    Physical Examination:    General: Elderly man in no acute distress  Neck:  No lines   Resp:  TA  CV:  VAD sounds;   No LE edema  GI:  Soft and non-tender; no distension  Neurologic:  Alert and appropriate; normal speech  :  No Lizama    [x]    High complexity decision making was performed  [x]    Patient is at high-risk of decompensation with multiple organ involvement    Lab Data Personally Reviewed: I have reviewed all the pertinent labs, microbiology data and radiology studies during assessment. Recent Labs     01/05/20  0429 01/04/20  0617 01/03/20  0351   * 132* 131*   K 4.6 4.8 4.6   CL 99 100 99   CO2 26 25 26   * 105* 97   BUN 21* 20 19   CREA 1.09 1.03 1.02   CA 9.0 8.3* 8.6   MG 1.7 1.8 1.9   ALB 2.4* 2.4* 2.5*   SGOT 19 21 18   ALT 16 15 14   INR 1.3* 1.4* 1.4*     Recent Labs     01/05/20  0429 01/04/20  0617 01/03/20  0351   WBC 6.2 6.1 5.6   HGB 8.9* 8.8* 8.5*   HCT 29.3* 28.5* 28.0*   * 131* 124*     No results found for: SDES  Lab Results   Component Value Date/Time    Culture result: LIGHT NORMAL RESPIRATORY TIAN 12/31/2019 04:18 PM    Culture result: (A) 12/31/2019 03:24 PM     PSEUDOMONAS AERUGINOSA ISOLATED FROM 2 OF 4 BOTTLES DRAWN, EACH FROM A DIFFERENT SITE. .. RFA AND LEFT WRIST    Culture result: (A) 12/31/2019 03:24 PM     PRELIMINARY REPORT OF GRAM NEGATIVE RODS GROWING IN 1 OF 4 BOTTLES DRAWN CALLED TO AND READ BACK BY Kassidy Pierre RN AT 3978 ON 1.1.20 RB    Culture result: (A) 12/31/2019 03:24 PM     PRELIMINARY REPORT OF GRAM NEGATIVE RODS GROWING IN 2ND OF 4 BOTTLES DRAWN (DIFFERENT SITE=L WRIST) CALLED TO AND READ BACK BY Kassidy Pierre RN AT 15:51 ON 1/1/20 BY [facility].     Culture result: REMAINING BOTTLE(S) HAS/HAVE NO GROWTH SO FAR 12/31/2019 03:24 PM     Recent Results (from the past 24 hour(s))   GLUCOSE, POC    Collection Time: 01/04/20 12:02 PM   Result Value Ref Range    Glucose (POC) 117 (H) 65 - 100 mg/dL    Performed by [address] [facility], POC    Collection Time: 01/04/20  4:35 PM   Result Value Ref Range    Glucose (POC) 114 (H) 65 - 100 mg/dL    Performed by [facility]    GLUCOSE, POC    Collection Time: 01/04/20  9:46 PM   Result Value Ref Range    Glucose (POC) 131 (H) 65 - 100 mg/dL    Performed by [address], COMPREHENSIVE    Collection Time: 01/05/20  4:29 AM   Result Value Ref Range    Sodium 130 (L) 136 - 145 mmol/L    Potassium 4.6 3.5 - 5.1 mmol/L    Chloride 99 97 - 108 mmol/L    CO2 26 21 - 32 mmol/L    Anion gap 5 5 - 15 mmol/L    Glucose 102 (H) 65 - 100 mg/dL    BUN 21 (H) 6 - 20 MG/DL    Creatinine 1.09 0.70 - 1.30 MG/DL    BUN/Creatinine ratio 19 12 - 20      GFR est AA >60 >60 ml/min/1.73m2    GFR est non-AA >60 >60 ml/min/1.73m2    Calcium 9.0 8.5 - 10.1 MG/DL    Bilirubin, total 0.7 0.2 - 1.0 MG/DL    ALT (SGPT) 16 12 - 78 U/L    AST (SGOT) 19 15 - 37 U/L    Alk.  phosphatase 115 45 - 117 U/L    Protein, total 6.6 6.4 - 8.2 g/dL    Albumin 2.4 (L) 3.5 - 5.0 g/dL    Globulin 4.2 (H) 2.0 - 4.0 g/dL    A-G Ratio 0.6 (L) 1.1 - 2.2     MAGNESIUM    Collection Time: 01/05/20  4:29 AM   Result Value Ref Range    Magnesium 1.7 1.6 - 2.4 mg/dL   CBC W/O DIFF    Collection Time: 01/05/20  4:29 AM   Result Value Ref Range    WBC 6.2 4.1 - 11.1 K/uL    RBC 3.10 (L) 4.10 - 5.70 M/uL    HGB 8.9 (L) 12.1 - 17.0 g/dL    HCT 29.3 (L) 36.6 - 50.3 %    MCV 94.5 80.0 - 99.0 FL    MCH 28.7 26.0 - 34.0 PG    MCHC 30.4 30.0 - 36.5 g/dL    RDW 18.1 (H) 11.5 - 14.5 %    PLATELET 723 (L) 507 - 400 K/uL    MPV 9.9 8.9 - 12.9 FL    NRBC 0.0 0  WBC    ABSOLUTE NRBC 0.00 0.00 - 0.01 K/uL   PROTHROMBIN TIME + INR    Collection Time: 01/05/20  4:29 AM   Result Value Ref Range    INR 1.3 (H) 0.9 - 1.1      Prothrombin time 13.1 (H) 9.0 - 11.1 sec   PTT    Collection Time: 01/05/20  4:29 AM   Result Value Ref Range    aPTT 30.7 22.1 - 32.0 sec    aPTT, therapeutic range     58.0 - 77.0 SECS   DIGOXIN    Collection Time: 01/05/20  4:29 AM   Result Value Ref Range    Digoxin level 0.6 (L) 0.90 - 2.00 NG/ML   LACTIC ACID    Collection Time: 01/05/20  4:29 AM   Result Value Ref Range    Lactic acid 0.7 0.4 - 2.0 MMOL/L   PROCALCITONIN    Collection Time: 01/05/20  4:29 AM   Result Value Ref Range    Procalcitonin 0.53 ng/mL   LD    Collection Time: 01/05/20  4:29 AM   Result Value Ref Range     85 - 241 U/L   NT-PRO BNP    Collection Time: 01/05/20  4:29 AM Result Value Ref Range    NT pro-BNP 4,364 (H) <125 PG/ML   GLUCOSE, POC    Collection Time: 01/05/20  6:59 AM   Result Value Ref Range    Glucose (POC) 98 65 - 100 mg/dL    Performed by Angel Rasmussen    GLUCOSE, POC    Collection Time: 01/05/20 11:11 AM   Result Value Ref Range    Glucose (POC) 152 (H) 65 - 100 mg/dL    Performed by Harsha Sterling MD No

## 2023-08-23 NOTE — PROGRESS NOTE ADULT - SUBJECTIVE AND OBJECTIVE BOX
Reason for Admission: symptomatic anemia  History of Present Illness:   47M PMH asthma and bipolar disorder presenting to the ED for fatigue, WEN and dizziness. Pt had his annual PCP visit yesterday and had routine blood work done. He was called by the PCP's office today stating his Hb was low and that he should go to the ED. Pt endorses that he has had WEN when walking uphill and palpitations for the past 6 months. He also endorses that he gets dizzy and lightheaded when he stands up too quickly. He lives at home with his mother who is now a hospice patient. He also reports that he was started on zyprexa 1.5 years ago, which he noted was the only change he's had since his symptoms have started. Pt denies any headache, blurry vision, chest pain, N/V/D, abdominal pain, dysuria, melena, BRBPR, numbness/tingling/weakness in extremities.     Today_  pt  is on liquid diet, seen by GI , sched for  EGD/colonoscopy  8/24/23      Review of Systems:   CONSTITUTIONAL: (+) fatigue (+) dizziness No fever, weight loss, or fatigue  RESPIRATORY: (+) WEN when walking uphill. No cough, wheezing, chills or hemoptysis  CARDIOVASCULAR: (+) palpitations. No chest pain, dizziness, or leg swelling  GASTROINTESTINAL: No abdominal pain. No nausea, vomiting, or hematemesis; + diarrhea, no constipation. No melena or hematochezia.  GENITOURINARY: No dysuria or hematuria, urinary frequency  NEUROLOGICAL:  No headaches, memory loss, loss of strength, numbness  ENDOCRINE: No polyuria, polydipsia, or heat/cold intolerance  MUSKULOSKELETAL: No muscle aches, joint pains  HEME: no easy bruisability, no tender or enlarged lymph nodes  SKIN: No itching, burning, rashes, or lesions .      Allergies and Intolerances:        Allergies:  	No Known Drug Allergies:   	dust: Miscellaneous, Eye Irritation, seasonal allergies    Home Medications:   * Patient Currently Takes Medications as of 24-Feb-2021 16:57 documented in Structured Notes  · 	montelukast 10 mg oral tablet: 1 tab(s) orally once a day MDD:1 tablet  · 	ondansetron 4 mg oral tablet: 1 tab(s) orally 3 times a day, As Needed MDD:4 mg   · 	predniSONE 20 mg oral tablet: 2 tab(s) orally once a day x first 2 days-   	Prednisone taper- 20 mg daily x two days MDD:40mg tablet  · 	Ventolin HFA 90 mcg/inh inhalation aerosol: 2 puff(s) inhaled every 4 hours, As Needed - for shortness of breath and/or wheezing  · 	Symbicort 160 mcg-4.5 mcg/inh inhalation aerosol: 2 puff(s) inhaled 2 times a day  · 	Flonase 50 mcg/inh nasal spray: 1 spray(s) nasal 2 times a day  · 	Advair HFA:  inhaled 2 times a day  · 	cetirizine 10 mg oral tablet: 1 tab(s) orally once a day    .    Patient History:    Social History:  · Substance use	Yes  · Alcohol use	drinks a few beers socially, last drink on Saturday  · Substance use	cocaine (last use on Saturday)     Tobacco Screening:  · Core Measure Site	Yes  · Has the patient used tobacco in the past 30 days?	No    Risk Assessment:    Present on Admission:  Deep Venous Thrombosis	no  Pulmonary Embolus	no    MEDICATIONS  (STANDING):  divalproex  milliGRAM(s) Oral daily    MEDICATIONS  (PRN):  acetaminophen     Tablet .. 650 milliGRAM(s) Oral every 6 hours PRN Temp greater or equal to 38C (100.4F), Mild Pain (1 - 3)  aluminum hydroxide/magnesium hydroxide/simethicone Suspension 30 milliLiter(s) Oral every 4 hours PRN Dyspepsia  melatonin 3 milliGRAM(s) Oral at bedtime PRN Insomnia  ondansetron Injectable 4 milliGRAM(s) IV Push every 8 hours PRN Nausea and/or Vomiting    CAPILLARY BLOOD GLUCOSE        I&O's Summary      PHYSICAL EXAM:    Vital Signs Last 24 Hrs  T(C): 36.6 (23 Aug 2023 13:11), Max: 37.1 (22 Aug 2023 18:44)  T(F): 97.8 (23 Aug 2023 13:11), Max: 98.7 (22 Aug 2023 18:44)  HR: 73 (23 Aug 2023 13:11) (73 - 91)  BP: 127/74 (23 Aug 2023 13:11) (103/62 - 127/74)  BP(mean): --  RR: 18 (23 Aug 2023 13:11) (16 - 18)  SpO2: 100% (23 Aug 2023 13:11) (98% - 100%)    Parameters below as of 23 Aug 2023 13:11  Patient On (Oxygen Delivery Method): room air          GEN: NAD; A and O x 3, pallor  LUNGS: CTA B/L  HEART: S1 S2  ABDOMEN: soft, non-tender, non-distended, + BS  EXTREMITIES: no edema      LABS:                                                  7.3    4.01  )-----------( 499      ( 23 Aug 2023 07:36 )             26.1       08-23    142  |  113<H>  |  15  ----------------------------<  102<H>  3.8   |  22  |  0.70    Ca    7.8<L>      23 Aug 2023 07:36  Phos  3.5     08-23  Mg     2.0     08-23    TPro  x   /  Alb  x   /  TBili  x   /  DBili  0.1  /  AST  x   /  ALT  x   /  AlkPhos  x   08-23      Folate, Serum: >20.0 ng/mL  Vitamin B12, Serum: 791 pg/mL  Reticulocyte Count (08.22.23 @ 13:44)   RBC Count: 3.14 M/uL  Reticulocyte Percent: 1.1 %  Absolute Reticulocytes: 33.3 K/    Haptoglobin, Serum: 164 mg/dL  Iron with Total Binding Capacity (08.22.23 @ 13:44)   Iron - Total Binding Capacity.: 414 ug/dL  % Saturation, Iron: 2 %  Iron Total: 9 ug/dL  Unsaturated Iron Binding Capacity: 405 ug/dL  Ferritin: 1 ng/mL  PT/INR - ( 22 Aug 2023 12:00 )   PT: 11.7 sec;   INR: 1.03 ratio        Lactate Dehydrogenase, Serum (08.22.23 @ 13:44)   Lactate Dehydrogenase, Serum: 152 U/LPTT - ( 22 Aug 2023 12:00 )  PTT:30.0 sec      Urinalysis Basic - ( 22 Aug 2023 12:00 )    Color: x / Appearance: x / SG: x / pH: x  Gluc: 83 mg/dL / Ketone: x  / Bili: x / Urobili: x   Blood: x / Protein: x / Nitrite: x   Leuk Esterase: x / RBC: x / WBC x   Sq Epi: x / Non Sq Epi: x / Bacteria: x        < from: CT Abdomen and Pelvis w/ IV Cont (08.22.23 @ 18:12) >    ACC: [id number] EXAM:  CT ABDOMEN AND PELVIS IC   ORDERED BY: ARNIE DUKE     PROCEDURE DATE:  08/22/2023          INTERPRETATION:  CLINICAL INFORMATION: 47 years  Male with r/o   hemorrhage. Anemia    COMPARISON: None.    CONTRAST/COMPLICATIONS:  IV Contrast: Omnipaque 350  90 cc administered   10 cc discarded  Oral Contrast: NONE  Complications: None reported at time of study completion    PROCEDURE:  CT of the Abdomen and Pelvis was performed.  Sagittal and coronal reformats were performed.    FINDINGS:  LOWER CHEST: Within normal limits.    LIVER: Within normal limits.  BILE DUCTS: Normal caliber.  GALLBLADDER: Within normal limits.  SPLEEN: Within normal limits.  PANCREAS: Within normal limits.  ADRENALS: Within normal limits.  KIDNEYS/URETERS: Within normal limits.    BLADDER: Within normal limits.  REPRODUCTIVE ORGANS: Prostate within normal limits.    BOWEL: No bowel obstruction. Appendix is normal.. Mildly distended   stomach with debris. Mild colonic stool burden. Rectal wall thickening.   Sigmoid diverticulosis without diverticulitis.  PERITONEUM: No ascites.  VESSELS: Within normal limits.  RETROPERITONEUM/LYMPH NODES: No lymphadenopathy.  ABDOMINAL WALL: Small bilateral fat-containing inguinal hernias.  BONES: Within normal limits.    IMPRESSION:    Rectal wall thickening, possibly proctitis. Recommend colonoscopy.    Mildly distended stomach with debris. Mild constipation.    Colonic diverticulosis without diverticulitis.        --- End of Report ---            MARIO JACOBO MD; Attending Radiologist  This document has been electronically signed. Aug 22 2023  6:24PM    < end of copied text >

## 2023-08-23 NOTE — BH CONSULTATION LIAISON ASSESSMENT NOTE - CURRENT MEDICATION
MEDICATIONS  (STANDING):  divalproex  milliGRAM(s) Oral daily  iron sucrose IVPB 200 milliGRAM(s) IV Intermittent every 24 hours  OLANZapine 10 milliGRAM(s) Oral daily  polyethylene glycol/electrolyte Solution. 4000 milliLiter(s) Oral once    MEDICATIONS  (PRN):  acetaminophen     Tablet .. 650 milliGRAM(s) Oral every 6 hours PRN Temp greater or equal to 38C (100.4F), Mild Pain (1 - 3)  aluminum hydroxide/magnesium hydroxide/simethicone Suspension 30 milliLiter(s) Oral every 4 hours PRN Dyspepsia  melatonin 3 milliGRAM(s) Oral at bedtime PRN Insomnia  ondansetron Injectable 4 milliGRAM(s) IV Push every 8 hours PRN Nausea and/or Vomiting

## 2023-08-23 NOTE — BH CONSULTATION LIAISON ASSESSMENT NOTE - OTHER PAST PSYCHIATRIC HISTORY (INCLUDE DETAILS REGARDING ONSET, COURSE OF ILLNESS, INPATIENT/OUTPATIENT TREATMENT)
Hx of Bipolar Disorder. Besides the hospitalization two years ago, he also reports a psychiatric hospitalization when he was in his 20's due to depression. Denies past SA's.

## 2023-08-23 NOTE — BH CONSULTATION LIAISON ASSESSMENT NOTE - RISK ASSESSMENT
Given past hospitalizations and intermittent substance use, he is at an elevated, but still low risk

## 2023-08-23 NOTE — BH CONSULTATION LIAISON ASSESSMENT NOTE - SUMMARY
48 y/o M with a hx of Bipolar disorder as per patient, and asthma, who is admitted due to symptomatic anemia. He is consulted due to concerns regarding the need for his current medication regimen and the possibility of it contributing to anemia. The patient is followed by a [facility] outpatient psychiatrist who has continued to prescribe him both Depakote and Zyprexa for the suspected underlying Bipolar Disorder. Regarding the possibility of either Depakote or Zyprexa causing anemia, although possible, would be a very rare adverse effect. Would rule out other causes before changing his medications. He is not suicidal, homicidal or psychotic.    -Would continue Depakote and Zyprexa as prescribed  -Should f/u with his [facility] psychiatrist  -Hema/Onc f/u  -No need for an inpatient psychiatric admission or 1:1  -Case discussed with the primary team  -Will follow up as needed

## 2023-08-23 NOTE — CONSULT NOTE ADULT - SUBJECTIVE AND OBJECTIVE BOX
[facility]    Patient is a 47y old  Male who presents with a chief complaint of symptomatic anemia (22 Aug 2023 16:04)    HPI:  47M PMH asthma and bipolar disorder presenting to the ED for fatigue, WEN and dizziness. Pt had his annual PCP visit yesterday and had routine blood work done. He was called by the PCP's office today stating his Hb was low and that he should go to the ED. Pt endorses that he has had WEN when walking uphill and palpitations for the past 6 months. He also endorses that he gets dizzy and lightheaded when he stands up too quickly. He lives at home with his mother who is now a hospice patient. He also reports that he was started on zyprexa 1.5 years ago, which he noted was the only change he's had since his symptoms have started. Pt denies any headache, blurry vision, chest pain, N/V/D, abdominal pain, dysuria, melena, BRBPR, numbness/tingling/weakness in extremities.  (22 Aug 2023 14:45)        PMH/PSH:  PAST MEDICAL & SURGICAL HISTORY:  Ex-smoker  (cigarettes / marijuana)      Asthma      No significant past surgical history            FH:  FAMILY HISTORY:  Family history of asthma    No pertinent family history in first degree relatives          MEDS:  MEDICATIONS  (STANDING):  divalproex  milliGRAM(s) Oral daily  iron sucrose IVPB 200 milliGRAM(s) IV Intermittent every 24 hours  OLANZapine 10 milliGRAM(s) Oral daily    MEDICATIONS  (PRN):  acetaminophen     Tablet .. 650 milliGRAM(s) Oral every 6 hours PRN Temp greater or equal to 38C (100.4F), Mild Pain (1 - 3)  aluminum hydroxide/magnesium hydroxide/simethicone Suspension 30 milliLiter(s) Oral every 4 hours PRN Dyspepsia  melatonin 3 milliGRAM(s) Oral at bedtime PRN Insomnia  ondansetron Injectable 4 milliGRAM(s) IV Push every 8 hours PRN Nausea and/or Vomiting    Allergies    No Known Drug Allergies  dust (Eye Irritation)    Intolerances          ROS: A detailed set of ROS were asked and negative except those outlined in GI HPI.  ______________________________________________________________________  PHYSICAL EXAM:  T(C): 36.8 (08-23-23 @ 05:12), Max: 37.1 (08-22-23 @ 18:44)  HR: 86 (08-23-23 @ 05:12)  BP: 103/62 (08-23-23 @ 05:12)  RR: 18 (08-23-23 @ 05:12)  SpO2: 98% (08-23-23 @ 05:12)  Wt(kg): --      GEN: NAD  HEENT: EOMI, conjunctivae anicteric, neck supple, moist mucous membranes  PULM: LSCTAB, no wheezing, rales, or rhonchi  CV: RRR, no m/r/b  GI: Soft, NT, ND; +BS in all four quadrants, no ascites, no Lira's sign  MSK: JERONIMO, no edema  NEURO: A&O x 3, no gross deficits  ______________________________________________________________________  LABS:                        6.0    4.57  )-----------( 508      ( 22 Aug 2023 23:11 )             22.3     08-23    142  |  113<H>  |  15  ----------------------------<  102<H>  3.8   |  22  |  0.70    Ca    7.8<L>      23 Aug 2023 07:36  Phos  3.5     08-23  Mg     2.0     08-23    TPro  7.2  /  Alb  3.2<L>  /  TBili  0.2  /  DBili  x   /  AST  7<L>  /  ALT  13  /  AlkPhos  48  08-22    LIVER FUNCTIONS - ( 22 Aug 2023 12:00 )  Alb: 3.2 g/dL / Pro: 7.2 g/dL / ALK PHOS: 48 U/L / ALT: 13 U/L DA / AST: 7 U/L / GGT: x           PT/INR - ( 22 Aug 2023 12:00 )   PT: 11.7 sec;   INR: 1.03 ratio         PTT - ( 22 Aug 2023 12:00 )  PTT:30.0 sec  ____________________________________________    IMAGING:    < from: CT Abdomen and Pelvis w/ IV Cont (08.22.23 @ 18:12) >  CT ABDOMEN AND PELVIS IC   ORDERED BY: ARNIE DUKE     PROCEDURE DATE:  08/22/2023          INTERPRETATION:  CLINICAL INFORMATION: 47 years  Male with r/o   hemorrhage. Anemia    COMPARISON: None.    CONTRAST/COMPLICATIONS:  IV Contrast: Omnipaque 350  90 cc administered   10 cc discarded  Oral Contrast: NONE  Complications: None reported at time of study completion    PROCEDURE:  CT of the Abdomen and Pelvis was performed.  Sagittal and coronal reformats were performed.    FINDINGS:  LOWER CHEST: Within normal limits.    LIVER: Within normal limits.  BILE DUCTS: Normal caliber.  GALLBLADDER: Within normal limits.  SPLEEN: Within normal limits.  PANCREAS: Within normal limits.  ADRENALS: Within normal limits.  KIDNEYS/URETERS: Within normal limits.    BLADDER: Within normal limits.  REPRODUCTIVE ORGANS: Prostate within normal limits.    BOWEL: No bowel obstruction. Appendix is normal.. Mildly distended   stomach with debris. Mild colonic stool burden. Rectal wall thickening.   Sigmoid diverticulosis without diverticulitis.  PERITONEUM: No ascites.  VESSELS: Within normal limits.  RETROPERITONEUM/LYMPH NODES: No lymphadenopathy.  ABDOMINAL WALL: Small bilateral fat-containing inguinal hernias.  BONES: Within normal limits.    IMPRESSION:    Rectal wall thickening, possibly proctitis. Recommend colonoscopy.    Mildly distended stomach with debris. Mild constipation.    Colonic diverticulosis without diverticulitis.      < end of copied text >

## 2023-08-23 NOTE — PATIENT PROFILE ADULT - FALL HARM RISK - UNIVERSAL INTERVENTIONS
Bed in lowest position, wheels locked, appropriate side rails in place/Call bell, personal items and telephone in reach/Instruct patient to call for assistance before getting out of bed or chair/Non-slip footwear when patient is out of bed/[place] to call system/Physically safe environment - no spills, clutter or unnecessary equipment/Purposeful Proactive Rounding/Room/bathroom lighting operational, light cord in reach

## 2023-08-23 NOTE — BH CONSULTATION LIAISON ASSESSMENT NOTE - GENERAL APPEARANCE
No deformities present Inflammation Suggestive Of Cancer Camouflage Histology Text: There was a dense lymphocytic infiltrate which prevented adequate histologic evaluation of adjacent structures.

## 2023-08-24 ENCOUNTER — TRANSCRIPTION ENCOUNTER (OUTPATIENT)
Age: 48
End: 2023-08-24

## 2023-08-24 ENCOUNTER — RESULT REVIEW (OUTPATIENT)
Age: 48
End: 2023-08-24

## 2023-08-24 DIAGNOSIS — K63.89 OTHER SPECIFIED DISEASES OF INTESTINE: ICD-10-CM

## 2023-08-24 LAB
% ALBUMIN: 55.8 % — SIGNIFICANT CHANGE UP
% ALPHA 1: 5.3 % — SIGNIFICANT CHANGE UP
% ALPHA 2: 9.3 % — SIGNIFICANT CHANGE UP
% BETA: 13.3 % — SIGNIFICANT CHANGE UP
% GAMMA: 16.3 % — SIGNIFICANT CHANGE UP
ALBUMIN SERPL ELPH-MCNC: 2.8 G/DL — LOW (ref 3.5–5)
ALBUMIN SERPL ELPH-MCNC: 3.5 G/DL — LOW (ref 3.6–5.5)
ALBUMIN/GLOB SERPL ELPH: 1.2 RATIO — SIGNIFICANT CHANGE UP
ALP SERPL-CCNC: 50 U/L — SIGNIFICANT CHANGE UP (ref 40–120)
ALPHA1 GLOB SERPL ELPH-MCNC: 0.3 G/DL — SIGNIFICANT CHANGE UP (ref 0.1–0.4)
ALPHA2 GLOB SERPL ELPH-MCNC: 0.6 G/DL — SIGNIFICANT CHANGE UP (ref 0.5–1)
ALT FLD-CCNC: 11 U/L DA — SIGNIFICANT CHANGE UP (ref 10–60)
ANION GAP SERPL CALC-SCNC: 8 MMOL/L — SIGNIFICANT CHANGE UP (ref 5–17)
AST SERPL-CCNC: 9 U/L — LOW (ref 10–40)
B-GLOBULIN SERPL ELPH-MCNC: 0.8 G/DL — SIGNIFICANT CHANGE UP (ref 0.5–1)
BASOPHILS # BLD AUTO: 0.05 K/UL — SIGNIFICANT CHANGE UP (ref 0–0.2)
BASOPHILS NFR BLD AUTO: 1.1 % — SIGNIFICANT CHANGE UP (ref 0–2)
BILIRUB SERPL-MCNC: 0.3 MG/DL — SIGNIFICANT CHANGE UP (ref 0.2–1.2)
BUN SERPL-MCNC: 6 MG/DL — LOW (ref 7–18)
CALCIUM SERPL-MCNC: 8.3 MG/DL — LOW (ref 8.4–10.5)
CEA SERPL-MCNC: 11.2 NG/ML — HIGH (ref 0–3.8)
CHLORIDE SERPL-SCNC: 110 MMOL/L — HIGH (ref 96–108)
CO2 SERPL-SCNC: 24 MMOL/L — SIGNIFICANT CHANGE UP (ref 22–31)
CREAT SERPL-MCNC: 0.74 MG/DL — SIGNIFICANT CHANGE UP (ref 0.5–1.3)
EGFR: 114 ML/MIN/1.73M2 — SIGNIFICANT CHANGE UP
EOSINOPHIL # BLD AUTO: 0.28 K/UL — SIGNIFICANT CHANGE UP (ref 0–0.5)
EOSINOPHIL NFR BLD AUTO: 5.9 % — SIGNIFICANT CHANGE UP (ref 0–6)
GAMMA GLOBULIN: 1 G/DL — SIGNIFICANT CHANGE UP (ref 0.6–1.6)
GLUCOSE SERPL-MCNC: 89 MG/DL — SIGNIFICANT CHANGE UP (ref 70–99)
HAV IGM SER-ACNC: SIGNIFICANT CHANGE UP
HBV CORE IGM SER-ACNC: SIGNIFICANT CHANGE UP
HBV SURFACE AG SER-ACNC: SIGNIFICANT CHANGE UP
HCT VFR BLD CALC: 27.7 % — LOW (ref 39–50)
HCV AB S/CO SERPL IA: 0.14 S/CO — SIGNIFICANT CHANGE UP (ref 0–0.99)
HCV AB SERPL-IMP: SIGNIFICANT CHANGE UP
HEMOCCULT STL QL IA: POSITIVE
HGB BLD-MCNC: 7.7 G/DL — LOW (ref 13–17)
IMM GRANULOCYTES NFR BLD AUTO: 0.4 % — SIGNIFICANT CHANGE UP (ref 0–0.9)
INR BLD: 1.05 RATIO — SIGNIFICANT CHANGE UP (ref 0.85–1.18)
LYMPHOCYTES # BLD AUTO: 1.3 K/UL — SIGNIFICANT CHANGE UP (ref 1–3.3)
LYMPHOCYTES # BLD AUTO: 27.5 % — SIGNIFICANT CHANGE UP (ref 13–44)
MCHC RBC-ENTMCNC: 18.9 PG — LOW (ref 27–34)
MCHC RBC-ENTMCNC: 27.8 GM/DL — LOW (ref 32–36)
MCV RBC AUTO: 68.1 FL — LOW (ref 80–100)
MONOCYTES # BLD AUTO: 0.76 K/UL — SIGNIFICANT CHANGE UP (ref 0–0.9)
MONOCYTES NFR BLD AUTO: 16.1 % — HIGH (ref 2–14)
NEUTROPHILS # BLD AUTO: 2.31 K/UL — SIGNIFICANT CHANGE UP (ref 1.8–7.4)
NEUTROPHILS NFR BLD AUTO: 49 % — SIGNIFICANT CHANGE UP (ref 43–77)
NRBC # BLD: 1 /100 WBCS — HIGH (ref 0–0)
PLATELET # BLD AUTO: 489 K/UL — HIGH (ref 150–400)
POTASSIUM SERPL-MCNC: 3.9 MMOL/L — SIGNIFICANT CHANGE UP (ref 3.5–5.3)
POTASSIUM SERPL-SCNC: 3.9 MMOL/L — SIGNIFICANT CHANGE UP (ref 3.5–5.3)
PROT PATTERN SERPL ELPH-IMP: SIGNIFICANT CHANGE UP
PROT SERPL-MCNC: 6.1 G/DL — SIGNIFICANT CHANGE UP (ref 6–8.3)
PROTHROM AB SERPL-ACNC: 11.9 SEC — SIGNIFICANT CHANGE UP (ref 9.5–13)
RBC # BLD: 4.07 M/UL — LOW (ref 4.2–5.8)
RBC # FLD: 23.9 % — HIGH (ref 10.3–14.5)
SODIUM SERPL-SCNC: 142 MMOL/L — SIGNIFICANT CHANGE UP (ref 135–145)
WBC # BLD: 4.72 K/UL — SIGNIFICANT CHANGE UP (ref 3.8–10.5)
WBC # FLD AUTO: 4.72 K/UL — SIGNIFICANT CHANGE UP (ref 3.8–10.5)

## 2023-08-24 PROCEDURE — 45381 COLONOSCOPY SUBMUCOUS NJX: CPT

## 2023-08-24 PROCEDURE — 88305 TISSUE EXAM BY PATHOLOGIST: CPT | Mod: 26

## 2023-08-24 PROCEDURE — 45385 COLONOSCOPY W/LESION REMOVAL: CPT

## 2023-08-24 PROCEDURE — 88312 SPECIAL STAINS GROUP 1: CPT | Mod: 26

## 2023-08-24 PROCEDURE — 43239 EGD BIOPSY SINGLE/MULTIPLE: CPT

## 2023-08-24 PROCEDURE — 99221 1ST HOSP IP/OBS SF/LOW 40: CPT

## 2023-08-24 PROCEDURE — 45380 COLONOSCOPY AND BIOPSY: CPT | Mod: 59

## 2023-08-24 RX ORDER — SODIUM CHLORIDE 9 MG/ML
1000 INJECTION, SOLUTION INTRAVENOUS
Refills: 0 | Status: DISCONTINUED | OUTPATIENT
Start: 2023-08-24 | End: 2023-08-25

## 2023-08-24 RX ORDER — ALPRAZOLAM 0.25 MG
0.5 TABLET ORAL ONCE
Refills: 0 | Status: DISCONTINUED | OUTPATIENT
Start: 2023-08-24 | End: 2023-08-24

## 2023-08-24 RX ADMIN — Medication 0.5 MILLIGRAM(S): at 17:00

## 2023-08-24 RX ADMIN — IRON SUCROSE 110 MILLIGRAM(S): 20 INJECTION, SOLUTION INTRAVENOUS at 05:45

## 2023-08-24 RX ADMIN — SODIUM CHLORIDE 75 MILLILITER(S): 9 INJECTION, SOLUTION INTRAVENOUS at 00:33

## 2023-08-24 RX ADMIN — SODIUM CHLORIDE 75 MILLILITER(S): 9 INJECTION, SOLUTION INTRAVENOUS at 17:08

## 2023-08-24 NOTE — SBIRT NOTE ADULT - NSSBIRTALCPOSREINDET_GEN_A_CORE
Ewa discussed substance use resources and educated pt on the harms of alcohol consumption has on his overall health.

## 2023-08-24 NOTE — PROGRESS NOTE ADULT - SUBJECTIVE AND OBJECTIVE BOX
Patient is a 47y old  Male who presents with a chief complaint of symptomatic anemia (24 Aug 2023 13:56)    OVERNIGHT EVENTS: no acute changes.     Pt is awake, comfortable appearing, eating well, ambulating independently.    REVIEW OF SYSTEMS:  CONSTITUTIONAL: No fever, chills  ENMT:  No difficulty hearing, no change in vision  NECK: No pain or stiffness  RESPIRATORY: No cough, SOB  CARDIOVASCULAR: No chest pain, palpitations  GASTROINTESTINAL: No abdominal pain. No nausea, vomiting, or diarrhea  GENITOURINARY: No dysuria  NEUROLOGICAL: No HA  SKIN: No itching, burning, rashes, or lesions   LYMPH NODES: No enlarged glands  ENDOCRINE: No heat or cold intolerance; No hair loss  MUSCULOSKELETAL: No joint pain or swelling; No muscle, back, or extremity pain  PSYCHIATRIC: No depression, anxiety  HEME/LYMPH: No easy bruising, or bleeding gums    T(C): 36.8 (08-24-23 @ 12:00), Max: 36.8 (08-24-23 @ 12:00)  HR: 71 (08-24-23 @ 13:30) (68 - 88)  BP: 119/85 (08-24-23 @ 13:30) (92/60 - 126/83)  RR: 17 (08-24-23 @ 13:30) (10 - 20)  SpO2: 100% (08-24-23 @ 13:30) (97% - 100%)  Wt(kg): --Vital Signs Last 24 Hrs  T(C): 36.8 (24 Aug 2023 12:00), Max: 36.8 (24 Aug 2023 12:00)  T(F): 98.2 (24 Aug 2023 12:00), Max: 98.2 (24 Aug 2023 12:00)  HR: 71 (24 Aug 2023 13:30) (68 - 88)  BP: 119/85 (24 Aug 2023 13:30) (92/60 - 126/83)  BP(mean): --  RR: 17 (24 Aug 2023 13:30) (10 - 20)  SpO2: 100% (24 Aug 2023 13:30) (97% - 100%)    Parameters below as of 24 Aug 2023 13:30  Patient On (Oxygen Delivery Method): room air        MEDICATIONS  (STANDING):  dextrose 5% + sodium chloride 0.9%. 1000 milliLiter(s) (75 mL/Hr) IV Continuous <Continuous>  divalproex  milliGRAM(s) Oral daily  iron sucrose IVPB 200 milliGRAM(s) IV Intermittent every 24 hours  lactated ringers. 1000 milliLiter(s) (75 mL/Hr) IV Continuous <Continuous>  OLANZapine 10 milliGRAM(s) Oral daily    MEDICATIONS  (PRN):  acetaminophen     Tablet .. 650 milliGRAM(s) Oral every 6 hours PRN Temp greater or equal to 38C (100.4F), Mild Pain (1 - 3)  aluminum hydroxide/magnesium hydroxide/simethicone Suspension 30 milliLiter(s) Oral every 4 hours PRN Dyspepsia  melatonin 3 milliGRAM(s) Oral at bedtime PRN Insomnia  ondansetron Injectable 4 milliGRAM(s) IV Push every 8 hours PRN Nausea and/or Vomiting      PHYSICAL EXAM:  GENERAL: NAD  EYES: clear conjunctiva  ENMT: Moist mucous membranes  NECK: Supple, No JVD, Normal thyroid  CHEST/LUNG: Clear to auscultation bilaterally; No rales, rhonchi, wheezing, or rubs  HEART: S1, S2, Regular rate and rhythm  ABDOMEN: Soft, Nontender, Nondistended; Bowel sounds present  NEURO: Alert & Oriented X3  EXTREMITIES: No LE edema, no calf tenderness  LYMPH: No lymphadenopathy noted  SKIN: +pallor. No rashes or lesions    Consultant(s) Notes Reviewed:  [x ] YES  [ ] NO  Care Discussed with Consultants/Other Providers [ x] YES  [ ] NO    LABS:                        7.7    4.72  )-----------( 489      ( 24 Aug 2023 08:58 )             27.7     08-24    142  |  110<H>  |  6<L>  ----------------------------<  89  3.9   |  24  |  0.74    Ca    8.3<L>      24 Aug 2023 08:58  Phos  3.5     08-23  Mg     2.0     08-23    TPro  6.1  /  Alb  2.8<L>  /  TBili  0.3  /  DBili  x   /  AST  9<L>  /  ALT  11  /  AlkPhos  50  08-24    PT/INR - ( 24 Aug 2023 08:58 )   PT: 11.9 sec;   INR: 1.05 ratio           CAPILLARY BLOOD GLUCOSE            Urinalysis Basic - ( 24 Aug 2023 08:58 )    Color: x / Appearance: x / SG: x / pH: x  Gluc: 89 mg/dL / Ketone: x  / Bili: x / Urobili: x   Blood: x / Protein: x / Nitrite: x   Leuk Esterase: x / RBC: x / WBC x   Sq Epi: x / Non Sq Epi: x / Bacteria: x        RADIOLOGY & ADDITIONAL TESTS:  < from: EGD-Colonoscopy (08.24.23 @ 12:30) >    EGD Findings:        Esophagus Mucosa Irregular Z-line. Biopsies taken with a cold forceps for    histology.        Stomach Mucosa Mild edema/congestion and erythema of the mucosa inthe gastric    body. Biopsies taken with a cold forceps for histology.        Duodenum Mucosa Normal examined duodenum. Biopsies taken with a cold forceps for    histology.        EGD Impressions:        Irregular Z-line. (Biopsy).        Mild gastropathy. (Biopsy).        Normal examined duodenum. (Biopsy).    < end of copied text >  < from: EGD-Colonoscopy (08.24.23 @ 12:30) >  Colonoscopy Impressions:        Large malignant-appearing mass in the rectosigmoid colon at 20 cm. (Biopsy,    Tattoo).        Additional large malignant-appearing mass in the distal rectum at 5 cm.    (Biopsy, Tattoo).        Polyp (10 mm) in the rectosigmoid junction. (Polypectomy).        Polyp (5 mm) in the descending colon. (Polypectomy).        Internal and external hemorrhoids.        < end of copied text >    Imaging Personally Reviewed:  [ ] YES  [ ] NO

## 2023-08-24 NOTE — PROGRESS NOTE ADULT - PROBLEM SELECTOR PLAN 1
presenting with WEN, fatigue, palpitations, and dizziness x 6  months  H/H on admission 4.2/18.7-->7.3/26.1  No active signs of bleeding   CT A/P shows Rectal wall thickening, possibly proctitis.  s/p 3U PRBCs since admission, improved to 7.7  s/p EGD/Colonoscopy found to have 2 large malignant masses in the colon  maintain active type and screen  monitor CBC daily;  goal hgb >7  QMA and GI following

## 2023-08-24 NOTE — PROGRESS NOTE ADULT - SUBJECTIVE AND OBJECTIVE BOX
Reason for Admission: symptomatic anemia  History of Present Illness:   47M PMH asthma and bipolar disorder presenting to the ED for fatigue, WEN and dizziness. Pt had his annual PCP visit yesterday and had routine blood work done. He was called by the PCP's office today stating his Hb was low and that he should go to the ED. Pt endorses that he has had WEN when walking uphill and palpitations for the past 6 months. He also endorses that he gets dizzy and lightheaded when he stands up too quickly. He lives at home with his mother who is now a hospice patient. He also reports that he was started on zyprexa 1.5 years ago, which he noted was the only change he's had since his symptoms have started. Pt denies any headache, blurry vision, chest pain, N/V/D, abdominal pain, dysuria, melena, BRBPR, numbness/tingling/weakness in extremities.     Today_  pt  is anxous about  findings on   EGD/colonoscopy  8/24/23  patient reports changing in  bowel habits  >6 months ( pencil like stool)      Review of Systems:   CONSTITUTIONAL: (+) fatigue (+) dizziness No fever, weight loss, or fatigue  RESPIRATORY: (+) WEN when walking uphill. No cough, wheezing, chills or hemoptysis  CARDIOVASCULAR: (+) palpitations. No chest pain, dizziness, or leg swelling  GASTROINTESTINAL: No abdominal pain. No nausea, vomiting, or hematemesis; + diarrhea, no constipation. No melena or hematochezia.  GENITOURINARY: No dysuria or hematuria, urinary frequency  NEUROLOGICAL:  No headaches, memory loss, loss of strength, numbness  ENDOCRINE: No polyuria, polydipsia, or heat/cold intolerance  MUSKULOSKELETAL: No muscle aches, joint pains  HEME: no easy bruisability, no tender or enlarged lymph nodes  SKIN: No itching, burning, rashes, or lesions .      Allergies and Intolerances:        Allergies:  	No Known Drug Allergies:   	dust: Miscellaneous, Eye Irritation, seasonal allergies    Home Medications:   * Patient Currently Takes Medications as of 24-Feb-2021 16:57 documented in Structured Notes  · 	montelukast 10 mg oral tablet: 1 tab(s) orally once a day MDD:1 tablet  · 	ondansetron 4 mg oral tablet: 1 tab(s) orally 3 times a day, As Needed MDD:4 mg   · 	predniSONE 20 mg oral tablet: 2 tab(s) orally once a day x first 2 days-   	Prednisone taper- 20 mg daily x two days MDD:40mg tablet  · 	Ventolin HFA 90 mcg/inh inhalation aerosol: 2 puff(s) inhaled every 4 hours, As Needed - for shortness of breath and/or wheezing  · 	Symbicort 160 mcg-4.5 mcg/inh inhalation aerosol: 2 puff(s) inhaled 2 times a day  · 	Flonase 50 mcg/inh nasal spray: 1 spray(s) nasal 2 times a day  · 	Advair HFA:  inhaled 2 times a day  · 	cetirizine 10 mg oral tablet: 1 tab(s) orally once a day    .    Patient History:    Social History:  · Substance use	Yes  · Alcohol use	drinks a few beers socially, last drink on Saturday  · Substance use	cocaine (last use on Saturday)     Tobacco Screening:  · Core Measure Site	Yes  · Has the patient used tobacco in the past 30 days?	No    Risk Assessment:    Present on Admission:  Deep Venous Thrombosis	no  Pulmonary Embolus	no    MEDICATIONS  (STANDING):  divalproex  milliGRAM(s) Oral daily    MEDICATIONS  (PRN):  acetaminophen     Tablet .. 650 milliGRAM(s) Oral every 6 hours PRN Temp greater or equal to 38C (100.4F), Mild Pain (1 - 3)  aluminum hydroxide/magnesium hydroxide/simethicone Suspension 30 milliLiter(s) Oral every 4 hours PRN Dyspepsia  melatonin 3 milliGRAM(s) Oral at bedtime PRN Insomnia  ondansetron Injectable 4 milliGRAM(s) IV Push every 8 hours PRN Nausea and/or Vomiting    CAPILLARY BLOOD GLUCOSE        I&O's Summary      PHYSICAL EXAM:      Vital Signs Last 24 Hrs  T(C): 36.8 (24 Aug 2023 12:00), Max: 36.8 (24 Aug 2023 12:00)  T(F): 98.2 (24 Aug 2023 12:00), Max: 98.2 (24 Aug 2023 12:00)  HR: 71 (24 Aug 2023 13:30) (68 - 88)  BP: 119/85 (24 Aug 2023 13:30) (92/60 - 126/83)  BP(mean): --  RR: 17 (24 Aug 2023 13:30) (10 - 20)  SpO2: 100% (24 Aug 2023 13:30) (97% - 100%)    Parameters below as of 24 Aug 2023 13:30  Patient On (Oxygen Delivery Method): room air            GEN: NAD; A and O x 3, pallor+ anxious   LUNGS: CTA B/L  HEART: S1 S2  ABDOMEN: soft, non-tender, non-distended, + BS  EXTREMITIES: no edema      LABS:                                                    7.7    4.72  )-----------( 489      ( 24 Aug 2023 08:58 )             27.7       08-24    142  |  110<H>  |  6<L>  ----------------------------<  89  3.9   |  24  |  0.74    Ca    8.3<L>      24 Aug 2023 08:58  Phos  3.5     08-23  Mg     2.0     08-23    TPro  6.1  /  Alb  2.8<L>  /  TBili  0.3  /  DBili  x   /  AST  9<L>  /  ALT  11  /  AlkPhos  50  08-24    Folate, Serum: >20.0 ng/mL  Vitamin B12, Serum: 791 pg/mL  Reticulocyte Count (08.22.23 @ 13:44)   RBC Count: 3.14 M/uL  Reticulocyte Percent: 1.1 %  Absolute Reticulocytes: 33.3 K/    Haptoglobin, Serum: 164 mg/dL  Iron with Total Binding Capacity (08.22.23 @ 13:44)   Iron - Total Binding Capacity.: 414 ug/dL  % Saturation, Iron: 2 %  Iron Total: 9 ug/dL  Unsaturated Iron Binding Capacity: 405 ug/dL  Ferritin: 1 ng/mL  PT/INR - ( 22 Aug 2023 12:00 )   PT: 11.7 sec;   INR: 1.03 ratio        Lactate Dehydrogenase, Serum (08.22.23 @ 13:44)   Lactate Dehydrogenase, Serum: 152 U/LPTT - ( 22 Aug 2023 12:00 )  PTT:30.0 sec      Urinalysis Basic - ( 22 Aug 2023 12:00 )    Color: x / Appearance: x / SG: x / pH: x  Gluc: 83 mg/dL / Ketone: x  / Bili: x / Urobili: x   Blood: x / Protein: x / Nitrite: x   Leuk Esterase: x / RBC: x / WBC x   Sq Epi: x / Non Sq Epi: x / Bacteria: x        < from: CT Abdomen and Pelvis w/ IV Cont (08.22.23 @ 18:12) >    ACC: [id number] EXAM:  CT ABDOMEN AND PELVIS IC   ORDERED BY: ARNIE DUKE     PROCEDURE DATE:  08/22/2023          INTERPRETATION:  CLINICAL INFORMATION: 47 years  Male with r/o   hemorrhage. Anemia    COMPARISON: None.    CONTRAST/COMPLICATIONS:  IV Contrast: Omnipaque 350  90 cc administered   10 cc discarded  Oral Contrast: NONE  Complications: None reported at time of study completion    PROCEDURE:  CT of the Abdomen and Pelvis was performed.  Sagittal and coronal reformats were performed.    FINDINGS:  LOWER CHEST: Within normal limits.    LIVER: Within normal limits.  BILE DUCTS: Normal caliber.  GALLBLADDER: Within normal limits.  SPLEEN: Within normal limits.  PANCREAS: Within normal limits.  ADRENALS: Within normal limits.  KIDNEYS/URETERS: Within normal limits.    BLADDER: Within normal limits.  REPRODUCTIVE ORGANS: Prostate within normal limits.    BOWEL: No bowel obstruction. Appendix is normal.. Mildly distended   stomach with debris. Mild colonic stool burden. Rectal wall thickening.   Sigmoid diverticulosis without diverticulitis.  PERITONEUM: No ascites.  VESSELS: Within normal limits.  RETROPERITONEUM/LYMPH NODES: No lymphadenopathy.  ABDOMINAL WALL: Small bilateral fat-containing inguinal hernias.  BONES: Within normal limits.    IMPRESSION:    Rectal wall thickening, possibly proctitis. Recommend colonoscopy.    Mildly distended stomach with debris. Mild constipation.    Colonic diverticulosis without diverticulitis.        --- End of Report ---            MARIO JACOBO MD; Attending Radiologist  This document has been electronically signed. Aug 22 2023  6:24PM    < end of copied text >

## 2023-08-24 NOTE — CONSULT NOTE ADULT - ASSESSMENT
JESSICA WILSON is a 47y Male admitted for symptomatic anemia, found to have rectosigmoid and distal rectal mass on colonoscopy   CEA 11.2, LFTs wnl     PLAN   - Plan for bedside sigmoidoscopy tomorrow with Dr. Robertson   - Please give 1 enema tonight and 1 enema tomorrow   - OK to cont regular diet as tolerated   - Please obtain CT chest for staging   - Please obtain    - Will need MRI of pelvis for further staging / surgical planning as outpatient   - Discussed with Dr. Robertson

## 2023-08-24 NOTE — PROGRESS NOTE ADULT - SUBJECTIVE AND OBJECTIVE BOX
Patient is a 47y old  Male who presents with a chief complaint of symptomatic anemia (23 Aug 2023 16:34)    PATIENT IS SEEN AND EXAMINED IN MEDICAL FLOOR.  VALERIA [    ]    JAVIER [   ]      GT [   ]    ALLERGIES:  No Known Drug Allergies  dust (Eye Irritation)      Daily     Daily     VITALS:    Vital Signs Last 24 Hrs  T(C): 36.4 (24 Aug 2023 05:31), Max: 36.6 (23 Aug 2023 13:11)  T(F): 97.5 (24 Aug 2023 05:31), Max: 97.8 (23 Aug 2023 13:11)  HR: 82 (24 Aug 2023 05:31) (73 - 88)  BP: 105/77 (24 Aug 2023 05:31) (105/77 - 127/74)  BP(mean): --  RR: 18 (24 Aug 2023 05:31) (18 - 20)  SpO2: 97% (24 Aug 2023 05:31) (97% - 100%)    Parameters below as of 24 Aug 2023 05:31  Patient On (Oxygen Delivery Method): room air        LABS:    CBC Full  -  ( 24 Aug 2023 08:58 )  WBC Count : 4.72 K/uL  RBC Count : 4.07 M/uL  Hemoglobin : 7.7 g/dL  Hematocrit : 27.7 %  Platelet Count - Automated : 489 K/uL  Mean Cell Volume : 68.1 fl  Mean Cell Hemoglobin : 18.9 pg  Mean Cell Hemoglobin Concentration : 27.8 gm/dL  Auto Neutrophil # : 2.31 K/uL  Auto Lymphocyte # : 1.30 K/uL  Auto Monocyte # : 0.76 K/uL  Auto Eosinophil # : 0.28 K/uL  Auto Basophil # : 0.05 K/uL  Auto Neutrophil % : 49.0 %  Auto Lymphocyte % : 27.5 %  Auto Monocyte % : 16.1 %  Auto Eosinophil % : 5.9 %  Auto Basophil % : 1.1 %    PT/INR - ( 24 Aug 2023 08:58 )   PT: 11.9 sec;   INR: 1.05 ratio         PTT - ( 22 Aug 2023 12:00 )  PTT:30.0 sec  08-24    142  |  110<H>  |  6<L>  ----------------------------<  89  3.9   |  24  |  0.74    Ca    8.3<L>      24 Aug 2023 08:58  Phos  3.5     08-23  Mg     2.0     08-23    TPro  6.1  /  Alb  2.8<L>  /  TBili  0.3  /  DBili  x   /  AST  9<L>  /  ALT  11  /  AlkPhos  50  08-24    CAPILLARY BLOOD GLUCOSE            LIVER FUNCTIONS - ( 24 Aug 2023 08:58 )  Alb: 2.8 g/dL / Pro: 6.1 g/dL / ALK PHOS: 50 U/L / ALT: 11 U/L DA / AST: 9 U/L / GGT: x           Creatinine Trend: 0.74<--, 0.70<--, 0.79<--  I&O's Summary              MEDICATIONS:    MEDICATIONS  (STANDING):  dextrose 5% + sodium chloride 0.9%. 1000 milliLiter(s) (75 mL/Hr) IV Continuous <Continuous>  divalproex  milliGRAM(s) Oral daily  iron sucrose IVPB 200 milliGRAM(s) IV Intermittent every 24 hours  OLANZapine 10 milliGRAM(s) Oral daily      MEDICATIONS  (PRN):  acetaminophen     Tablet .. 650 milliGRAM(s) Oral every 6 hours PRN Temp greater or equal to 38C (100.4F), Mild Pain (1 - 3)  aluminum hydroxide/magnesium hydroxide/simethicone Suspension 30 milliLiter(s) Oral every 4 hours PRN Dyspepsia  melatonin 3 milliGRAM(s) Oral at bedtime PRN Insomnia  ondansetron Injectable 4 milliGRAM(s) IV Push every 8 hours PRN Nausea and/or Vomiting      REVIEW OF SYSTEMS:                           ALL ROS DONE [ X   ]    CONSTITUTIONAL:  LETHARGIC [   ], FEVER [   ], UNRESPONSIVE [   ]  CVS:  CP  [   ], SOB, [   ], PALPITATIONS [   ], DIZZYNESS [   ]  RS: COUGH [   ], SPUTUM [   ]  GI: ABDOMINAL PAIN [   ], NAUSEA [   ], VOMITINGS [   ], DIARRHEA [   ], CONSTIPATION [   ]  :  DYSURIA [   ], NOCTURIA [   ], INCREASED FREQUENCY [   ], DRIBLING [   ],  SKELETAL: PAINFUL JOINTS [   ], SWOLLEN JOINTS [   ], NECK ACHE [   ], LOW BACK ACHE [   ],  SKIN : ULCERS [   ], RASH [   ], ITCHING [   ]  CNS: HEAD ACHE [   ], DOUBLE VISION [   ], BLURRED VISION [   ], AMS / CONFUSION [   ], SEIZURES [   ], WEAKNESS [   ],TINGLING / NUMBNESS [   ]    PHYSICAL EXAMINATION:  GENERAL APPEARANCE: NO DISTRESS  HEENT:  NO PALLOR, NO  JVD,  NO   NODES, NECK SUPPLE  CVS: S1 +, S2 +,   RS: AEEB,  OCCASIONAL  RALES +,   NO RONCHI  ABD: SOFT, NT, NO, BS +  EXT: NO PE  SKIN: WARM,   SKELETAL:  ROM ACCEPTABLE  CNS:  AAO X 3    RADIOLOGY :    RADIOLOGY AND READINGS REVIEWED    ASSESSMENT :     Anemia    Asthma    Ex-smoker    Asthma      PLAN:  HPI:  47M PMH asthma and bipolar disorder presenting to the ED for fatigue, WEN and dizziness. Pt had his annual PCP visit yesterday and had routine blood work done. He was called by the PCP's office today stating his Hb was low and that he should go to the ED. Pt endorses that he has had WEN when walking uphill and palpitations for the past 6 months. He also endorses that he gets dizzy and lightheaded when he stands up too quickly. He lives at home with his mother who is now a hospice patient. He also reports that he was started on zyprexa 1.5 years ago, which he noted was the only change he's had since his symptoms have started. Pt denies any headache, blurry vision, chest pain, N/V/D, abdominal pain, dysuria, melena, BRBPR, numbness/tingling/weakness in extremities.  (22 Aug 2023 14:45)    # SYMPTOMATIC ANEMIA  # MICROCYTIC, IRON DEFICIENCY  # LEUKOPENIA    - PPI BID, CLEAR LIQUID DIET  - TRANSFUSION THRESHOLD HGB < 7  - TREND HGB  - PLANNED FOR PRBC TRANSFUSION  - F/U ANEMIA PANEL, PERIPHERAL SMEAR  - F/U LDH, HAPTOGLOBIN  - F/U RETIC COUNT  - NOTED CT A/P  - HEME/ONC CONSULT  - GI CONSULT  - S/P CRITICAL CARE CONSULT    - STARTED ON VENOFER    - PLANNED FOR EGD/COLONOSCOPY 8/24    # BIPOLAR DISORDER  - PATIENT CLARIFIES THAT HE SPEAKS WITH A PSYCHIATRIST VIA TELEVISITS  - ON DEPAKOTE AND ZYPREXA  - PSYCHIATRY CONSULT    # HX OF POLYSUBSTANCE ABUSE  - COUNSELLED PATIENT AT LENGTH ON CESSATION    # ASTHMA    # GI AND DVT PPX.    Patient is a 47y old  Male who presents with a chief complaint of symptomatic anemia (23 Aug 2023 16:34)    PATIENT IS SEEN AND EXAMINED IN MEDICAL FLOOR.    ALLERGIES:  No Known Drug Allergies  dust (Eye Irritation)      VITALS:    Vital Signs Last 24 Hrs  T(C): 36.4 (24 Aug 2023 05:31), Max: 36.6 (23 Aug 2023 13:11)  T(F): 97.5 (24 Aug 2023 05:31), Max: 97.8 (23 Aug 2023 13:11)  HR: 82 (24 Aug 2023 05:31) (73 - 88)  BP: 105/77 (24 Aug 2023 05:31) (105/77 - 127/74)  BP(mean): --  RR: 18 (24 Aug 2023 05:31) (18 - 20)  SpO2: 97% (24 Aug 2023 05:31) (97% - 100%)    Parameters below as of 24 Aug 2023 05:31  Patient On (Oxygen Delivery Method): room air        LABS:    CBC Full  -  ( 24 Aug 2023 08:58 )  WBC Count : 4.72 K/uL  RBC Count : 4.07 M/uL  Hemoglobin : 7.7 g/dL  Hematocrit : 27.7 %  Platelet Count - Automated : 489 K/uL  Mean Cell Volume : 68.1 fl  Mean Cell Hemoglobin : 18.9 pg  Mean Cell Hemoglobin Concentration : 27.8 gm/dL  Auto Neutrophil # : 2.31 K/uL  Auto Lymphocyte # : 1.30 K/uL  Auto Monocyte # : 0.76 K/uL  Auto Eosinophil # : 0.28 K/uL  Auto Basophil # : 0.05 K/uL  Auto Neutrophil % : 49.0 %  Auto Lymphocyte % : 27.5 %  Auto Monocyte % : 16.1 %  Auto Eosinophil % : 5.9 %  Auto Basophil % : 1.1 %    PT/INR - ( 24 Aug 2023 08:58 )   PT: 11.9 sec;   INR: 1.05 ratio         PTT - ( 22 Aug 2023 12:00 )  PTT:30.0 sec  08-24    142  |  110<H>  |  6<L>  ----------------------------<  89  3.9   |  24  |  0.74    Ca    8.3<L>      24 Aug 2023 08:58  Phos  3.5     08-23  Mg     2.0     08-23    TPro  6.1  /  Alb  2.8<L>  /  TBili  0.3  /  DBili  x   /  AST  9<L>  /  ALT  11  /  AlkPhos  50  08-24    CAPILLARY BLOOD GLUCOSE            LIVER FUNCTIONS - ( 24 Aug 2023 08:58 )  Alb: 2.8 g/dL / Pro: 6.1 g/dL / ALK PHOS: 50 U/L / ALT: 11 U/L DA / AST: 9 U/L / GGT: x           Creatinine Trend: 0.74<--, 0.70<--, 0.79<--  I&O's Summary              MEDICATIONS:    MEDICATIONS  (STANDING):  dextrose 5% + sodium chloride 0.9%. 1000 milliLiter(s) (75 mL/Hr) IV Continuous <Continuous>  divalproex  milliGRAM(s) Oral daily  iron sucrose IVPB 200 milliGRAM(s) IV Intermittent every 24 hours  OLANZapine 10 milliGRAM(s) Oral daily      MEDICATIONS  (PRN):  acetaminophen     Tablet .. 650 milliGRAM(s) Oral every 6 hours PRN Temp greater or equal to 38C (100.4F), Mild Pain (1 - 3)  aluminum hydroxide/magnesium hydroxide/simethicone Suspension 30 milliLiter(s) Oral every 4 hours PRN Dyspepsia  melatonin 3 milliGRAM(s) Oral at bedtime PRN Insomnia  ondansetron Injectable 4 milliGRAM(s) IV Push every 8 hours PRN Nausea and/or Vomiting      REVIEW OF SYSTEMS:                           ALL ROS DONE [ X   ]    CONSTITUTIONAL:  LETHARGIC [   ], FEVER [   ], UNRESPONSIVE [   ]  CVS:  CP  [   ], SOB, [   ], PALPITATIONS [   ], DIZZYNESS [   ]  RS: COUGH [   ], SPUTUM [   ]  GI: ABDOMINAL PAIN [   ], NAUSEA [   ], VOMITINGS [   ], DIARRHEA [   ], CONSTIPATION [   ]  :  DYSURIA [   ], NOCTURIA [   ], INCREASED FREQUENCY [   ], DRIBLING [   ],  SKELETAL: PAINFUL JOINTS [   ], SWOLLEN JOINTS [   ], NECK ACHE [   ], LOW BACK ACHE [   ],  SKIN : ULCERS [   ], RASH [   ], ITCHING [   ]  CNS: HEAD ACHE [   ], DOUBLE VISION [   ], BLURRED VISION [   ], AMS / CONFUSION [   ], SEIZURES [   ], WEAKNESS [   ],TINGLING / NUMBNESS [   ]    PHYSICAL EXAMINATION:  GENERAL APPEARANCE: NO DISTRESS  HEENT:  NO PALLOR, NO  JVD,  NO   NODES, NECK SUPPLE  CVS: S1 +, S2 +,   RS: AEEB,  OCCASIONAL  RALES +,   NO RONCHI  ABD: SOFT, NT, NO, BS +  EXT: NO PE  SKIN: WARM,   SKELETAL:  ROM ACCEPTABLE  CNS:  AAO X 3    RADIOLOGY :    RADIOLOGY AND READINGS REVIEWED    ASSESSMENT :     Anemia    Asthma    Ex-smoker    Asthma      PLAN:  HPI:  47M PMH asthma and bipolar disorder presenting to the ED for fatigue, WEN and dizziness. Pt had his annual PCP visit yesterday and had routine blood work done. He was called by the PCP's office today stating his Hb was low and that he should go to the ED. Pt endorses that he has had WEN when walking uphill and palpitations for the past 6 months. He also endorses that he gets dizzy and lightheaded when he stands up too quickly. He lives at home with his mother who is now a hospice patient. He also reports that he was started on zyprexa 1.5 years ago, which he noted was the only change he's had since his symptoms have started. Pt denies any headache, blurry vision, chest pain, N/V/D, abdominal pain, dysuria, melena, BRBPR, numbness/tingling/weakness in extremities.  (22 Aug 2023 14:45)    # SYMPTOMATIC ANEMIA  # MICROCYTIC, IRON DEFICIENCY  # RECTOSIGMOID MASS, RECTAL MASS, COLONIC POLYPS  # LEUKOPENIA    - PPI BID, CLEAR LIQUID DIET  - TRANSFUSION THRESHOLD HGB < 7  - TREND HGB  - PLANNED FOR PRBC TRANSFUSION  - NOTED ANEMIA PANEL, PERIPHERAL SMEAR, LDH, HAPTOGLOBIN, RETIC COUNT  - NOTED CT A/P  - HEME/ONC CONSULT  - GI CONSULT  - S/P CRITICAL CARE CONSULT    - STARTED ON VENOFER    - PLANNED FOR EGD/COLONOSCOPY 8/24  - ESOPHAGUS IRREGULAR Z LINE, MILD GASTROPATHY.  LARGE MALIGNANT APPEARING MASS IN THE RECTOSIGMOID COLON AT 20 CM, ADDITIONAL LARGE MALIGNANT APPEARING MASS IN THE DISTAL RECTUM AT 5CM. POLYP IN RECTOSIGMOID JUNCTION. POLYP IN DESCENDING COLON. INTERNAL AND EXTERNAL HEMORRHOIDS.    - F/U PATH    - NOTED ELEVATED CEA, F/U CA 19-9  - F/U CT CHEST  - RECOMMENDED FOR MRI PELVIS  - PLANNED FOR SIGMOIDOSCOPY 8/26  - COLORECTAL SURGERY CONSULT    # BIPOLAR DISORDER  - PATIENT CLARIFIES THAT HE SPEAKS WITH A PSYCHIATRIST VIA TELEVISITS  - ON DEPAKOTE AND ZYPREXA  - PSYCHIATRY CONSULT    # HX OF POLYSUBSTANCE ABUSE  - COUNSELLED PATIENT AT LENGTH ON CESSATION    # ASTHMA    # GI AND DVT PPX.

## 2023-08-24 NOTE — PROGRESS NOTE ADULT - PROBLEM SELECTOR PLAN 4
h/o asthma  no exacerbation
h/o bipolar disorder  takes depakote 500 qd and zyprexa 10mg qd at home  Valproic acid level 17  will continue with depakote 500 mg qd and zyprexa 10 mg qd  Psych Dr. Calvert followed, no changes to meds

## 2023-08-24 NOTE — PROGRESS NOTE ADULT - PROBLEM SELECTOR PLAN 3
heme/onc recc: venofer QD x 3 days until 8/25  plan as above
h/o bipolar disorder  takes depakote 500 qd and zyprexa 10mg qd at home  Valproic acid level 17  will continue with depakote 500 qd and zyprexa 10 qd   consulted psych Dr. Calvert, f/u reccs

## 2023-08-24 NOTE — CONSULT NOTE ADULT - SUBJECTIVE AND OBJECTIVE BOX
HPI: 47M PMH bipolar disorder who presented to the ED for symptomatic anemia after seeing PCP. Patient reported that over the last few months he had noticed dyspnea upon exertion (when walking uphill) and dizziness/lightheadedness when standing up. At time of presentation, H/H was 4.2/18.7 and patient was admitted to medicine for further management. Received 3 units of PRBCs with response to 7.7/27.7. CT at time of presentation was notable for rectal wall thickening and recommended further evaluation with colonoscopy. Patient had EGD and colonoscopy 8/24/23 which revealed large malignant appearing mass of the rectosigmoid colon and of distal rectum 5cm from the anal verge. Colorectal surgery was consulted for the above findings. At time of exam, patient denies abdominal pain, nausea, emesis, constipation, fevers, chills, hematochezia, BRBPR, or weight loss. He states that his lightheadedness is improved following the blood transfusion. He denies a family history of cancer.       PAST MEDICAL & SURGICAL HISTORY:  Ex-smoker  (cigarettes / marijuana)      Asthma      No significant past surgical history          MEDICATIONS  (STANDING):  dextrose 5% + sodium chloride 0.9%. 1000 milliLiter(s) (75 mL/Hr) IV Continuous <Continuous>  divalproex  milliGRAM(s) Oral daily  iron sucrose IVPB 200 milliGRAM(s) IV Intermittent every 24 hours  lactated ringers. 1000 milliLiter(s) (75 mL/Hr) IV Continuous <Continuous>  OLANZapine 10 milliGRAM(s) Oral daily    MEDICATIONS  (PRN):  acetaminophen     Tablet .. 650 milliGRAM(s) Oral every 6 hours PRN Temp greater or equal to 38C (100.4F), Mild Pain (1 - 3)  aluminum hydroxide/magnesium hydroxide/simethicone Suspension 30 milliLiter(s) Oral every 4 hours PRN Dyspepsia  melatonin 3 milliGRAM(s) Oral at bedtime PRN Insomnia  ondansetron Injectable 4 milliGRAM(s) IV Push every 8 hours PRN Nausea and/or Vomiting      Allergies    No Known Drug Allergies  dust (Eye Irritation)    Intolerances        ROS: Negative except per HPI.     SOCIAL HISTORY:  Smoking history   ETOH history    OBJECTIVE:    Vital Signs Last 24 Hrs  T(C): 36.8 (24 Aug 2023 12:00), Max: 36.8 (24 Aug 2023 12:00)  T(F): 98.2 (24 Aug 2023 12:00), Max: 98.2 (24 Aug 2023 12:00)  HR: 71 (24 Aug 2023 13:30) (68 - 88)  BP: 119/85 (24 Aug 2023 13:30) (92/60 - 126/83)  BP(mean): --  RR: 17 (24 Aug 2023 13:30) (10 - 20)  SpO2: 100% (24 Aug 2023 13:30) (97% - 100%)    Parameters below as of 24 Aug 2023 13:30  Patient On (Oxygen Delivery Method): room air        I&O's Summary      LABS:                        7.7    4.72  )-----------( 489      ( 24 Aug 2023 08:58 )             27.7     08-24    142  |  110<H>  |  6<L>  ----------------------------<  89  3.9   |  24  |  0.74    Ca    8.3<L>      24 Aug 2023 08:58  Phos  3.5     08-23  Mg     2.0     08-23    TPro  6.1  /  Alb  2.8<L>  /  TBili  0.3  /  DBili  x   /  AST  9<L>  /  ALT  11  /  AlkPhos  50  08-24    PT/INR - ( 24 Aug 2023 08:58 )   PT: 11.9 sec;   INR: 1.05 ratio           Urinalysis Basic - ( 24 Aug 2023 08:58 )    Color: x / Appearance: x / SG: x / pH: x  Gluc: 89 mg/dL / Ketone: x  / Bili: x / Urobili: x   Blood: x / Protein: x / Nitrite: x   Leuk Esterase: x / RBC: x / WBC x   Sq Epi: x / Non Sq Epi: x / Bacteria: x      CAPILLARY BLOOD GLUCOSE        LIVER FUNCTIONS - ( 24 Aug 2023 08:58 )  Alb: 2.8 g/dL / Pro: 6.1 g/dL / ALK PHOS: 50 U/L / ALT: 11 U/L DA / AST: 9 U/L / GGT: x             Cultures:      PHYSICAL EXAM:   General: AOx3, NAD.   Cardio: RR  Pulm: Normal chest rise and expansion. Non-labored breathing.  GI: abdomen soft, nontender, nondistended. No palpable masses. CHAGO performed with good sphincter tone, small external hemorrhoids, no active bleeding, no fluctuance or irregular masses appreciated, nontender to palpation,   : Normal external genitalia.   Extremities: Warm, dry with 3 sec cap refill. No edema or swelling noted b/l.  No overlying skin changes noted. No calf tenderness b/l.      RADIOLOGY & ADDITIONAL STUDIES:  < from: CT Abdomen and Pelvis w/ IV Cont (08.22.23 @ 18:12) >  ACC: [id number] EXAM:  CT ABDOMEN AND PELVIS IC   ORDERED BY: ARNIE DUKE     PROCEDURE DATE:  08/22/2023          INTERPRETATION:  CLINICAL INFORMATION: 47 years  Male with r/o   hemorrhage. Anemia    COMPARISON: None.    CONTRAST/COMPLICATIONS:  IV Contrast: Omnipaque 350  90 cc administered   10 cc discarded  Oral Contrast: NONE  Complications: None reported at time of study completion    PROCEDURE:  CT of the Abdomen and Pelvis was performed.  Sagittal and coronal reformats were performed.    FINDINGS:  LOWER CHEST: Within normal limits.    LIVER: Within normal limits.  BILE DUCTS: Normal caliber.  GALLBLADDER: Within normal limits.  SPLEEN: Within normal limits.  PANCREAS: Within normal limits.  ADRENALS: Within normal limits.  KIDNEYS/URETERS: Within normal limits.    BLADDER: Within normal limits.  REPRODUCTIVE ORGANS: Prostate within normal limits.    BOWEL: No bowel obstruction. Appendix is normal.. Mildly distended   stomach with debris. Mild colonic stool burden. Rectal wall thickening.   Sigmoid diverticulosis without diverticulitis.  PERITONEUM: No ascites.  VESSELS: Within normal limits.  RETROPERITONEUM/LYMPH NODES: No lymphadenopathy.  ABDOMINAL WALL: Small bilateral fat-containing inguinal hernias.  BONES: Within normal limits.    IMPRESSION:    Rectal wall thickening, possibly proctitis. Recommend colonoscopy.    Mildly distended stomach with debris. Mild constipation.    Colonic diverticulosis without diverticulitis.      < end of copied text >    < from: EGD-Colonoscopy (08.24.23 @ 12:30) >    Colonoscopy Impressions:        Large malignant-appearing mass in the rectosigmoid colon at 20 cm. (Biopsy,    Tattoo).        Additional large malignant-appearing mass in the distal rectum at 5 cm.    (Biopsy, Tattoo).        Polyp (10 mm) in the rectosigmoid junction. (Polypectomy).        Polyp (5 mm) in the descending colon. (Polypectomy).        Internal and external hemorrhoids.    < end of copied text >

## 2023-08-24 NOTE — PROGRESS NOTE ADULT - PROBLEM SELECTOR PLAN 2
heme/onc recc: venofer QD x 3 days  plan as above
EGD/Colonoscopy found to have 2 large malignant masses in the colon  f/u GI 19-9  f/u pathology results of the colonic masses  pending Sigmoidoscopy on 8/25  NPO after midnight  Surgery Dr. Robertson following

## 2023-08-25 ENCOUNTER — TRANSCRIPTION ENCOUNTER (OUTPATIENT)
Age: 48
End: 2023-08-25

## 2023-08-25 VITALS
HEART RATE: 80 BPM | SYSTOLIC BLOOD PRESSURE: 121 MMHG | DIASTOLIC BLOOD PRESSURE: 77 MMHG | RESPIRATION RATE: 17 BRPM | OXYGEN SATURATION: 100 % | TEMPERATURE: 97 F

## 2023-08-25 LAB
ALBUMIN SERPL ELPH-MCNC: 2.8 G/DL — LOW (ref 3.5–5)
ALP SERPL-CCNC: 60 U/L — SIGNIFICANT CHANGE UP (ref 40–120)
ALT FLD-CCNC: 12 U/L DA — SIGNIFICANT CHANGE UP (ref 10–60)
ANION GAP SERPL CALC-SCNC: 6 MMOL/L — SIGNIFICANT CHANGE UP (ref 5–17)
AST SERPL-CCNC: 10 U/L — SIGNIFICANT CHANGE UP (ref 10–40)
BILIRUB SERPL-MCNC: 0.2 MG/DL — SIGNIFICANT CHANGE UP (ref 0.2–1.2)
BUN SERPL-MCNC: 11 MG/DL — SIGNIFICANT CHANGE UP (ref 7–18)
CALCIUM SERPL-MCNC: 8.2 MG/DL — LOW (ref 8.4–10.5)
CANCER AG19-9 SERPL-ACNC: 9 U/ML — SIGNIFICANT CHANGE UP
CHLORIDE SERPL-SCNC: 110 MMOL/L — HIGH (ref 96–108)
CO2 SERPL-SCNC: 25 MMOL/L — SIGNIFICANT CHANGE UP (ref 22–31)
CREAT SERPL-MCNC: 0.85 MG/DL — SIGNIFICANT CHANGE UP (ref 0.5–1.3)
CREATININE, URINE RESULT: 35 MG/DL — SIGNIFICANT CHANGE UP
EGFR: 109 ML/MIN/1.73M2 — SIGNIFICANT CHANGE UP
GLUCOSE BLDC GLUCOMTR-MCNC: 95 MG/DL — SIGNIFICANT CHANGE UP (ref 70–99)
GLUCOSE BLDC GLUCOMTR-MCNC: 98 MG/DL — SIGNIFICANT CHANGE UP (ref 70–99)
GLUCOSE SERPL-MCNC: 95 MG/DL — SIGNIFICANT CHANGE UP (ref 70–99)
HCT VFR BLD CALC: 29.5 % — LOW (ref 39–50)
HGB BLD-MCNC: 8 G/DL — LOW (ref 13–17)
MAGNESIUM SERPL-MCNC: 2.1 MG/DL — SIGNIFICANT CHANGE UP (ref 1.6–2.6)
MCHC RBC-ENTMCNC: 19.2 PG — LOW (ref 27–34)
MCHC RBC-ENTMCNC: 27.1 GM/DL — LOW (ref 32–36)
MCV RBC AUTO: 70.7 FL — LOW (ref 80–100)
NRBC # BLD: 2 /100 WBCS — HIGH (ref 0–0)
PHOSPHATE SERPL-MCNC: 3.7 MG/DL — SIGNIFICANT CHANGE UP (ref 2.5–4.5)
PLATELET # BLD AUTO: 468 K/UL — HIGH (ref 150–400)
POTASSIUM SERPL-MCNC: 4 MMOL/L — SIGNIFICANT CHANGE UP (ref 3.5–5.3)
POTASSIUM SERPL-SCNC: 4 MMOL/L — SIGNIFICANT CHANGE UP (ref 3.5–5.3)
PROT ?TM UR-MCNC: 9 MG/DL — SIGNIFICANT CHANGE UP (ref 0–12)
PROT ?TM UR-MCNC: 9 MG/DL — SIGNIFICANT CHANGE UP (ref 0–12)
PROT SERPL-MCNC: 6.3 G/DL — SIGNIFICANT CHANGE UP (ref 6–8.3)
RBC # BLD: 4.17 M/UL — LOW (ref 4.2–5.8)
RBC # FLD: 25.6 % — HIGH (ref 10.3–14.5)
SODIUM SERPL-SCNC: 141 MMOL/L — SIGNIFICANT CHANGE UP (ref 135–145)
WBC # BLD: 6.51 K/UL — SIGNIFICANT CHANGE UP (ref 3.8–10.5)
WBC # FLD AUTO: 6.51 K/UL — SIGNIFICANT CHANGE UP (ref 3.8–10.5)

## 2023-08-25 PROCEDURE — 82962 GLUCOSE BLOOD TEST: CPT

## 2023-08-25 PROCEDURE — 84155 ASSAY OF PROTEIN SERUM: CPT

## 2023-08-25 PROCEDURE — 85730 THROMBOPLASTIN TIME PARTIAL: CPT

## 2023-08-25 PROCEDURE — 82607 VITAMIN B-12: CPT

## 2023-08-25 PROCEDURE — 71260 CT THORAX DX C+: CPT

## 2023-08-25 PROCEDURE — 80074 ACUTE HEPATITIS PANEL: CPT

## 2023-08-25 PROCEDURE — 84443 ASSAY THYROID STIM HORMONE: CPT

## 2023-08-25 PROCEDURE — 85610 PROTHROMBIN TIME: CPT

## 2023-08-25 PROCEDURE — P9040: CPT

## 2023-08-25 PROCEDURE — 85025 COMPLETE CBC W/AUTO DIFF WBC: CPT

## 2023-08-25 PROCEDURE — 88305 TISSUE EXAM BY PATHOLOGIST: CPT

## 2023-08-25 PROCEDURE — 99232 SBSQ HOSP IP/OBS MODERATE 35: CPT

## 2023-08-25 PROCEDURE — 82248 BILIRUBIN DIRECT: CPT

## 2023-08-25 PROCEDURE — 82746 ASSAY OF FOLIC ACID SERUM: CPT

## 2023-08-25 PROCEDURE — 36415 COLL VENOUS BLD VENIPUNCTURE: CPT

## 2023-08-25 PROCEDURE — 80164 ASSAY DIPROPYLACETIC ACD TOT: CPT

## 2023-08-25 PROCEDURE — 84100 ASSAY OF PHOSPHORUS: CPT

## 2023-08-25 PROCEDURE — 86301 IMMUNOASSAY TUMOR CA 19-9: CPT

## 2023-08-25 PROCEDURE — 84166 PROTEIN E-PHORESIS/URINE/CSF: CPT

## 2023-08-25 PROCEDURE — 83540 ASSAY OF IRON: CPT

## 2023-08-25 PROCEDURE — 45300 PROCTOSIGMOIDOSCOPY DX: CPT

## 2023-08-25 PROCEDURE — 86901 BLOOD TYPING SEROLOGIC RH(D): CPT

## 2023-08-25 PROCEDURE — 83550 IRON BINDING TEST: CPT

## 2023-08-25 PROCEDURE — 85045 AUTOMATED RETICULOCYTE COUNT: CPT

## 2023-08-25 PROCEDURE — 80048 BASIC METABOLIC PNL TOTAL CA: CPT

## 2023-08-25 PROCEDURE — 83010 ASSAY OF HAPTOGLOBIN QUANT: CPT

## 2023-08-25 PROCEDURE — 83615 LACTATE (LD) (LDH) ENZYME: CPT

## 2023-08-25 PROCEDURE — 82728 ASSAY OF FERRITIN: CPT

## 2023-08-25 PROCEDURE — 36430 TRANSFUSION BLD/BLD COMPNT: CPT

## 2023-08-25 PROCEDURE — 84165 PROTEIN E-PHORESIS SERUM: CPT

## 2023-08-25 PROCEDURE — 83735 ASSAY OF MAGNESIUM: CPT

## 2023-08-25 PROCEDURE — 86923 COMPATIBILITY TEST ELECTRIC: CPT

## 2023-08-25 PROCEDURE — 74177 CT ABD & PELVIS W/CONTRAST: CPT

## 2023-08-25 PROCEDURE — 99285 EMERGENCY DEPT VISIT HI MDM: CPT

## 2023-08-25 PROCEDURE — 82378 CARCINOEMBRYONIC ANTIGEN: CPT

## 2023-08-25 PROCEDURE — 85027 COMPLETE CBC AUTOMATED: CPT

## 2023-08-25 PROCEDURE — 71260 CT THORAX DX C+: CPT | Mod: 26

## 2023-08-25 PROCEDURE — 86900 BLOOD TYPING SEROLOGIC ABO: CPT

## 2023-08-25 PROCEDURE — 80053 COMPREHEN METABOLIC PANEL: CPT

## 2023-08-25 PROCEDURE — 86850 RBC ANTIBODY SCREEN: CPT

## 2023-08-25 PROCEDURE — 88312 SPECIAL STAINS GROUP 1: CPT

## 2023-08-25 RX ORDER — HYDROXYZINE HCL 10 MG
1 TABLET ORAL
Qty: 120 | Refills: 0
Start: 2023-08-25 | End: 2023-09-23

## 2023-08-25 RX ORDER — ALPRAZOLAM 0.25 MG
0.5 TABLET ORAL ONCE
Refills: 0 | Status: DISCONTINUED | OUTPATIENT
Start: 2023-08-25 | End: 2023-08-25

## 2023-08-25 RX ORDER — FERROUS SULFATE 325(65) MG
2 TABLET ORAL
Qty: 60 | Refills: 0
Start: 2023-08-25 | End: 2023-09-23

## 2023-08-25 RX ORDER — SENNA PLUS 8.6 MG/1
2 TABLET ORAL
Qty: 58 | Refills: 0
Start: 2023-08-25 | End: 2023-09-22

## 2023-08-25 RX ORDER — ASCORBIC ACID 60 MG
1 TABLET,CHEWABLE ORAL
Qty: 30 | Refills: 0
Start: 2023-08-25 | End: 2023-09-23

## 2023-08-25 RX ORDER — ACETAMINOPHEN 500 MG
2 TABLET ORAL
Qty: 0 | Refills: 0 | DISCHARGE
Start: 2023-08-25

## 2023-08-25 RX ADMIN — Medication 0.5 MILLIGRAM(S): at 02:28

## 2023-08-25 RX ADMIN — IRON SUCROSE 110 MILLIGRAM(S): 20 INJECTION, SOLUTION INTRAVENOUS at 05:57

## 2023-08-25 RX ADMIN — Medication 3 MILLIGRAM(S): at 02:28

## 2023-08-25 RX ADMIN — DIVALPROEX SODIUM 500 MILLIGRAM(S): 500 TABLET, DELAYED RELEASE ORAL at 11:54

## 2023-08-25 RX ADMIN — OLANZAPINE 10 MILLIGRAM(S): 15 TABLET, FILM COATED ORAL at 11:54

## 2023-08-25 RX ADMIN — Medication 1 ENEMA: at 08:37

## 2023-08-25 RX ADMIN — SODIUM CHLORIDE 75 MILLILITER(S): 9 INJECTION, SOLUTION INTRAVENOUS at 05:57

## 2023-08-25 NOTE — DISCHARGE NOTE PROVIDER - NSDCCPCAREPLAN_GEN_ALL_CORE_FT
PRINCIPAL DISCHARGE DIAGNOSIS  Diagnosis: Colonic mass  Assessment and Plan of Treatment: During your hospitalization you had a endoscopy and colonoscopy procedure on 8/24 which found that you have 2 large colonic massess.   you had biopsies taking from the masses.   you are to follow up with Oncologist Dr. Felix in 1-2 weeks for pathology results. Additionally please discuss the need for a outpatient MRI of your PELVIS   you are to also follow up with Colorectal Surgeon Dr. Robertson in 1-2 weeks.   if you have mild pain, you may take tylenol 650 mg every 6 hours as needed      SECONDARY DISCHARGE DIAGNOSES  Diagnosis: Symptomatic anemia  Assessment and Plan of Treatment: during your hospitalization you were found to have severely low blood count  this could be caused by the recent 2 masses that were found in the colon  you were given 3 units of packed red blood cells and your hemoglobin improved to 8.0  you are to continue to monitor your blood count  please follow up with your Primary Care Dr. Abbasi in 1-2 weeks.    Diagnosis: Iron deficiency anemia  Assessment and Plan of Treatment: you were found to have iron deficiency anemia  you were given 3 doses of iron transfusion in the hospital.   you are to continue taking ferrous sulfate 325 mg twice a day and vitamin c 500 mg daily   follow up outpatient with Hematologist & Oncologist Dr. Felix in 1-2 weeks, call to make an appointment    Diagnosis: Bipolar disorder  Assessment and Plan of Treatment: continue taking zyprexa and depakote   follow up with your outpatient psychiatrist in [facility], make an appointment     PRINCIPAL DISCHARGE DIAGNOSIS  Diagnosis: Colonic mass  Assessment and Plan of Treatment: During your hospitalization you had a endoscopy and colonoscopy procedure on 8/24 which found that you have 2 large colonic massess.   you had biopsies taking from the masses.   you are to follow up with Oncologist Dr. Felix in 1-2 weeks for pathology results. Additionally please discuss the need for a outpatient MRI of your PELVIS   you are to also follow up with Colorectal Surgeon Dr. Robertson in 1-2 weeks.   if you have mild pain, you may take tylenol 650 mg every 6 hours as needed      SECONDARY DISCHARGE DIAGNOSES  Diagnosis: Symptomatic anemia  Assessment and Plan of Treatment: during your hospitalization you were found to have severely low blood count  this could be caused by the recent 2 masses that were found in the colon  you were given 3 units of packed red blood cells and your hemoglobin improved to 8.0  you are to continue to monitor your blood count  please follow up with your Primary Care Dr. Abbasi in 1-2 weeks.    Diagnosis: Iron deficiency anemia  Assessment and Plan of Treatment: you were found to have iron deficiency anemia  you were given 3 doses of iron transfusion in the hospital.   you are to continue taking ferrous sulfate 325 mg twice a day and vitamin c 500 mg daily   iron pills can cause constipation, therefore you can take senna 2 tabs daily at bedtime  follow up outpatient with Hematologist & Oncologist Dr. Felix in 1-2 weeks, call to make an appointment    Diagnosis: Bipolar disorder  Assessment and Plan of Treatment: continue taking zyprexa and depakote   follow up with your outpatient psychiatrist in [facility], make an appointment     PRINCIPAL DISCHARGE DIAGNOSIS  Diagnosis: Colonic mass  Assessment and Plan of Treatment: During your hospitalization you had a endoscopy and colonoscopy procedure on 8/24 which found that you have 2 large colonic massess.   you had biopsies taking from the masses.   you are to follow up with Oncologist Dr. eFlix in 1-2 weeks for pathology results. Additionally please discuss the need for a outpatient MRI of your PELVIS   you are to also follow up with Colorectal Surgeon Dr. Robertson in 1-2 weeks.   if you have mild pain, you may take tylenol 650 mg every 6 hours as needed      SECONDARY DISCHARGE DIAGNOSES  Diagnosis: Symptomatic anemia  Assessment and Plan of Treatment: during your hospitalization you were found to have severely low blood count  this could be caused by the recent 2 masses that were found in the colon  you were given 3 units of packed red blood cells and your hemoglobin improved to 8.0  you are to continue to monitor your blood count  please follow up with your Primary Care Dr. Abbasi in 1-2 weeks.    Diagnosis: Iron deficiency anemia  Assessment and Plan of Treatment: you were found to have iron deficiency anemia  you were given 3 doses of iron transfusion in the hospital.   you are to continue taking ferrous sulfate 325 mg twice a day and vitamin c 500 mg daily   iron pills can cause constipation, therefore you can take senna 2 tabs daily at bedtime  follow up outpatient with Hematologist & Oncologist Dr. Felix in 1-2 weeks, call to make an appointment    Diagnosis: Bipolar disorder  Assessment and Plan of Treatment: continue taking zyprexa and depakote   It was discussed with our inpatient psychiatrist Dr. Calvert, since you are having breakthrough anxiety, you can take hydroxyzine 25 mg every 6 hours as needed for anxiety  hydroxyzine can cause drowsiness - becareful to not drive or operate heavy machinery  follow up with your outpatient psychiatrist in [facility], make an appointment

## 2023-08-25 NOTE — DISCHARGE NOTE PROVIDER - PROVIDER TOKENS
PROVIDER:[TOKEN:[[id number]],FOLLOWUP:[1 week]],PROVIDER:[TOKEN:[[id number]],FOLLOWUP:[1 week]],PROVIDER:[TOKEN:[[id number]],FOLLOWUP:[Routine]] PROVIDER:[TOKEN:[[id number]],FOLLOWUP:[1 week]],PROVIDER:[TOKEN:[[id number]],FOLLOWUP:[1 week]],PROVIDER:[TOKEN:[[id number]],FOLLOWUP:[Routine]],PROVIDER:[TOKEN:[[id number]],FOLLOWUP:[1 week]]

## 2023-08-25 NOTE — DISCHARGE NOTE NURSING/CASE MANAGEMENT/SOCIAL WORK - NSDCPEFALRISK_GEN_ALL_CORE
For information on Fall & Injury Prevention, visit: [email or web address] OR  [email or web address] OR  https://www.cdc.gov/steadi/patient.html

## 2023-08-25 NOTE — DISCHARGE NOTE PROVIDER - HOSPITAL COURSE
46 y/o M PMH asthma and bipolar disorder presenting to the ED for fatigue, WEN and dizziness. admitted for symptomatic anemia 2/2 iron deficiency. Pt also had EGD & Colonoscopy on 8/24 which revealed a Large Malignant mass in the rectosigmoid colon and in the distal rectum, both were biopsied. Additionally had 2 polyps removed and biopsied.     Colorectal surgery followed, s/p sigmoidoscopy on 8/25.   Additionally CT Chest to r/o metastatic disease, was negative. [facility] Dr. Felix following.   Pt is s/p 3 units of PRBC and 3 doses of IV venofer, hemoglobin improved to 8.0.   Pt is now medically optimized for discharge.   Awaiting for pathology results. Patient will follow up outpatient with Heme/Onc Dr. Felix and Colorectal Surgery Dr. Robertson.   Pt will follow up outpatient for MRI of pelvis for staging.

## 2023-08-25 NOTE — PROGRESS NOTE ADULT - SUBJECTIVE AND OBJECTIVE BOX
GI Progress Note    Patient is a 47y old  Male who presents with a chief complaint of symptomatic anemia (25 Aug 2023 08:18)    GI was consulted for anemia.    24-HOUR INTERVAL EVENTS: Patient resting in bed. s/p ERCP/EUS. Tolerating meals. No n/v. BM x 1, brown in color, pt reports similar stool caliber as prior to admission. LFTs and bilirubin downtrending. Family at bedside. Patient denies cp, sob___.     MEDICATIONS  (STANDING):  divalproex  milliGRAM(s) Oral daily  lactated ringers. 1000 milliLiter(s) (75 mL/Hr) IV Continuous <Continuous>  OLANZapine 10 milliGRAM(s) Oral daily  saline laxative (FLEET) Rectal Enema 1 Enema Rectal once    MEDICATIONS  (PRN):  acetaminophen     Tablet .. 650 milliGRAM(s) Oral every 6 hours PRN Temp greater or equal to 38C (100.4F), Mild Pain (1 - 3)  aluminum hydroxide/magnesium hydroxide/simethicone Suspension 30 milliLiter(s) Oral every 4 hours PRN Dyspepsia  melatonin 3 milliGRAM(s) Oral at bedtime PRN Insomnia  ondansetron Injectable 4 milliGRAM(s) IV Push every 8 hours PRN Nausea and/or Vomiting    __________________________________________________  REVIEW OF SYSTEMS:  A detailed set of ROS were asked and negative except those outlined in GI HPI above/below.   ________________________________________________  PHYSICAL EXAM    Vital Signs Last 24 Hrs  T(C): 36.7 (25 Aug 2023 08:14), Max: 36.8 (24 Aug 2023 12:00)  T(F): 98 (25 Aug 2023 08:14), Max: 98.2 (24 Aug 2023 12:00)  HR: 79 (25 Aug 2023 08:14) (68 - 86)  BP: 102/62 (25 Aug 2023 08:14) (92/60 - 126/83)  BP(mean): --  RR: 17 (25 Aug 2023 08:14) (10 - 18)  SpO2: 98% (25 Aug 2023 05:23) (98% - 100%)    Parameters below as of 25 Aug 2023 05:23  Patient On (Oxygen Delivery Method): room air        GEN: NAD  HEENT: EOMI, conjunctivae anicteric, neck supple, moist mucous membranes  PULM: LCTAB, no wheezing, rales, or rhonchi  CV: RRR, no m/r/g  GI: soft, NT, ND; +BS in all four quadrants, no ascites, no Lira's sign  MSK: JERONIMO, no edema  NEURO: A&O x 3, no gross deficits  _________________________________________________  LABS:                        8.0    6.51  )-----------( 468      ( 25 Aug 2023 06:44 )             29.5     08-25    141  |  110<H>  |  11  ----------------------------<  95  4.0   |  25  |  0.85    Ca    8.2<L>      25 Aug 2023 06:44  Phos  3.7     08-25  Mg     2.1     08-25    TPro  6.3  /  Alb  2.8<L>  /  TBili  0.2  /  DBili  x   /  AST  10  /  ALT  12  /  AlkPhos  60  08-25    PT/INR - ( 24 Aug 2023 08:58 )   PT: 11.9 sec;   INR: 1.05 ratio           Urinalysis Basic - ( 25 Aug 2023 06:44 )    Color: x / Appearance: x / SG: x / pH: x  Gluc: 95 mg/dL / Ketone: x  / Bili: x / Urobili: x   Blood: x / Protein: x / Nitrite: x   Leuk Esterase: x / RBC: x / WBC x   Sq Epi: x / Non Sq Epi: x / Bacteria: x      CAPILLARY BLOOD GLUCOSE            RADIOLOGY & ADDITIONAL TESTS:      EGD/Colonoscopy (8/24/23):  EGD Findings:  Esophagus Mucosa Irregular Z-line. Biopsies taken with a cold forceps for  histology.  Stomach Mucosa Mild edema/congestion and erythema of the mucosa inthe gastric  body. Biopsies taken with a cold forceps for histology.  Duodenum Mucosa Normal examined duodenum. Biopsies taken with a cold forceps for  histology.    EGD Impressions:  Irregular Z-line. (Biopsy).  Mild gastropathy. (Biopsy).  Normal examined duodenum. (Biopsy).      Colonoscopy Findings:  Mucosa One large, malignant-appearing, fungating, ulcerated/depressed mass  occupying half the circumference of the colonic lumen was found in the  rectosigmoid colon at 20 cm from the anal verge. Biopsies taken with a cold  forceps for histology. The proximal and distal margins of the mass were injected  with 1 mL of tattoo ink.  Additional large, malignant-appearing, fungating, ulcerated/depressed mass  occupying half the circumference of the lumen was found in the distal rectum at  5 cm from the anal verge. Biopsies taken with a cold forceps for histology. The  distal margin was injected with 1 mL of tattoo.  Protruding lesions A single sessile 10 mm polyp of benign appearance was found  in the rectosigmoid junction.A single-piece polypectomy was performed using a  cold snare. The polyp was completely removed.  A single sessile 5 mm polyp of benign appearance was found in the descending  colon.A single-piece polypectomy was performed using a cold snare. The polyp was  completely removed.  Small internal and external hemorrhoids were noted.      Colonoscopy Impressions:  Large malignant-appearing mass in the rectosigmoid colon at 20 cm. (Biopsy,  Tattoo).  Additional large malignant-appearing mass in the distal rectum at 5 cm.  (Biopsy, Tattoo).  Polyp (10 mm) in the rectosigmoid junction. (Polypectomy).  Polyp (5 mm) in the descending colon. (Polypectomy).  Internal and external hemorrhoids.   GI Progress Note    Patient is a 47y old  Male who presents with a chief complaint of symptomatic anemia (25 Aug 2023 08:18)    GI was consulted for anemia.    24-HOUR INTERVAL EVENTS: Patient resting in bed, awaiting bedside sigmoidoscopy today with colorectal surgery. Enema x 1 this morning, denies hematochezia or melena. Offers no acute complaints, denies abd pain/tenderness, dysphagia, odynophagia, n/v. Endorses flatus. s/p EGD/coloscopy yesterday, discussed results and follow up plan.     MEDICATIONS  (STANDING):  divalproex  milliGRAM(s) Oral daily  lactated ringers. 1000 milliLiter(s) (75 mL/Hr) IV Continuous <Continuous>  OLANZapine 10 milliGRAM(s) Oral daily  saline laxative (FLEET) Rectal Enema 1 Enema Rectal once    MEDICATIONS  (PRN):  acetaminophen     Tablet .. 650 milliGRAM(s) Oral every 6 hours PRN Temp greater or equal to 38C (100.4F), Mild Pain (1 - 3)  aluminum hydroxide/magnesium hydroxide/simethicone Suspension 30 milliLiter(s) Oral every 4 hours PRN Dyspepsia  melatonin 3 milliGRAM(s) Oral at bedtime PRN Insomnia  ondansetron Injectable 4 milliGRAM(s) IV Push every 8 hours PRN Nausea and/or Vomiting    __________________________________________________  REVIEW OF SYSTEMS:  A detailed set of ROS were asked and negative except those outlined in GI HPI above/below.   ________________________________________________  PHYSICAL EXAM    Vital Signs Last 24 Hrs  T(C): 36.7 (25 Aug 2023 08:14), Max: 36.8 (24 Aug 2023 12:00)  T(F): 98 (25 Aug 2023 08:14), Max: 98.2 (24 Aug 2023 12:00)  HR: 79 (25 Aug 2023 08:14) (68 - 86)  BP: 102/62 (25 Aug 2023 08:14) (92/60 - 126/83)  BP(mean): --  RR: 17 (25 Aug 2023 08:14) (10 - 18)  SpO2: 98% (25 Aug 2023 05:23) (98% - 100%)    Parameters below as of 25 Aug 2023 05:23  Patient On (Oxygen Delivery Method): room air        GEN: NAD  HEENT: EOMI, conjunctivae anicteric, neck supple, moist mucous membranes  PULM: LCTAB, no wheezing, rales, or rhonchi  CV: RRR, no m/r/g  GI: soft, NT, ND; +BS in all four quadrants, no ascites, no Lira's sign  MSK: JERONIMO, no edema  NEURO: A&O x 3, no gross deficits  _________________________________________________  LABS:                        8.0    6.51  )-----------( 468      ( 25 Aug 2023 06:44 )             29.5     08-25    141  |  110<H>  |  11  ----------------------------<  95  4.0   |  25  |  0.85    Ca    8.2<L>      25 Aug 2023 06:44  Phos  3.7     08-25  Mg     2.1     08-25    TPro  6.3  /  Alb  2.8<L>  /  TBili  0.2  /  DBili  x   /  AST  10  /  ALT  12  /  AlkPhos  60  08-25    PT/INR - ( 24 Aug 2023 08:58 )   PT: 11.9 sec;   INR: 1.05 ratio           Urinalysis Basic - ( 25 Aug 2023 06:44 )    Color: x / Appearance: x / SG: x / pH: x  Gluc: 95 mg/dL / Ketone: x  / Bili: x / Urobili: x   Blood: x / Protein: x / Nitrite: x   Leuk Esterase: x / RBC: x / WBC x   Sq Epi: x / Non Sq Epi: x / Bacteria: x      CAPILLARY BLOOD GLUCOSE            RADIOLOGY & ADDITIONAL TESTS:      EGD/Colonoscopy (8/24/23):  EGD Findings:  Esophagus Mucosa Irregular Z-line. Biopsies taken with a cold forceps for  histology.  Stomach Mucosa Mild edema/congestion and erythema of the mucosa inthe gastric  body. Biopsies taken with a cold forceps for histology.  Duodenum Mucosa Normal examined duodenum. Biopsies taken with a cold forceps for  histology.    EGD Impressions:  Irregular Z-line. (Biopsy).  Mild gastropathy. (Biopsy).  Normal examined duodenum. (Biopsy).      Colonoscopy Findings:  Mucosa One large, malignant-appearing, fungating, ulcerated/depressed mass  occupying half the circumference of the colonic lumen was found in the  rectosigmoid colon at 20 cm from the anal verge. Biopsies taken with a cold  forceps for histology. The proximal and distal margins of the mass were injected  with 1 mL of tattoo ink.  Additional large, malignant-appearing, fungating, ulcerated/depressed mass  occupying half the circumference of the lumen was found in the distal rectum at  5 cm from the anal verge. Biopsies taken with a cold forceps for histology. The  distal margin was injected with 1 mL of tattoo.  Protruding lesions A single sessile 10 mm polyp of benign appearance was found  in the rectosigmoid junction.A single-piece polypectomy was performed using a  cold snare. The polyp was completely removed.  A single sessile 5 mm polyp of benign appearance was found in the descending  colon.A single-piece polypectomy was performed using a cold snare. The polyp was  completely removed.  Small internal and external hemorrhoids were noted.      Colonoscopy Impressions:  Large malignant-appearing mass in the rectosigmoid colon at 20 cm. (Biopsy,  Tattoo).  Additional large malignant-appearing mass in the distal rectum at 5 cm.  (Biopsy, Tattoo).  Polyp (10 mm) in the rectosigmoid junction. (Polypectomy).  Polyp (5 mm) in the descending colon. (Polypectomy).  Internal and external hemorrhoids.

## 2023-08-25 NOTE — PROGRESS NOTE ADULT - NS ATTEND AMEND GEN_ALL_CORE FT
No new complaints. S/p colon with 2 malignant appearing masses (rectosigmoid, rectum). Colorectal following. Recommend f/u with Colorectal Surgery and Medical Oncology at [place]. Reconsult inpatient GI PRN.    Total time spent to complete patient's bedside assessment, physical examination, review medical chart including labs & imaging, discuss medical plan of care with housestaff was more than 25 minutes

## 2023-08-25 NOTE — PROGRESS NOTE ADULT - SUBJECTIVE AND OBJECTIVE BOX
Patient seen and examined at bedside with no complaints.   Denies pain    Vital Signs Last 24 Hrs  T(F): 98 (08-25-23 @ 08:14), Max: 98.2 (08-24-23 @ 12:00)  HR: 79 (08-25-23 @ 08:14)  BP: 102/62 (08-25-23 @ 08:14)  RR: 17 (08-25-23 @ 08:14)  SpO2: 98% (08-25-23 @ 05:23)    GENERAL: Alert, NAD  CHEST/LUNG: respirations nonlabored  ABDOMEN: soft, Nontender, Nondistended    I&O's Detail    LABS:                        8.0    6.51  )-----------( 468      ( 25 Aug 2023 06:44 )             29.5     08-25    141  |  110<H>  |  11  ----------------------------<  95  4.0   |  25  |  0.85    Ca    8.2<L>      25 Aug 2023 06:44  Phos  3.7     08-25  Mg     2.1     08-25    TPro  6.3  /  Alb  2.8<L>  /  TBili  0.2  /  DBili  x   /  AST  10  /  ALT  12  /  AlkPhos  60  08-25    PT/INR - ( 24 Aug 2023 08:58 )   PT: 11.9 sec;   INR: 1.05 ratio

## 2023-08-25 NOTE — PROGRESS NOTE ADULT - REASON FOR ADMISSION
symptomatic anemia

## 2023-08-25 NOTE — DISCHARGE NOTE PROVIDER - CARE PROVIDER_API CALL
K[facility] Felix  [facility]  [address], [address]  [place] [address]  Phone: [phone]  Fax: [phone]  Follow Up Time: 1 week    Bhavna Robertson  Colon/Rectal Surgery  [address] [place] [address]  Phone: [phone]  Fax: [phone]  Follow Up Time: 1 week    Harpal Hanson  Gastroenterology  [address] [place] [address]  Phone: [phone]  Fax: [phone]  Follow Up Time: Routine   Keshia Felix  [facility]  [address], [address]  [place] [address]  Phone: [phone]  Fax: [phone]  Follow Up Time: 1 week    Bhavna Robertson  Colon/Rectal Surgery  [address] [place] [address]  Phone: [phone]  Fax: [phone]  Follow Up Time: 1 week    Harpal Hanson  Gastroenterology  [address] [place] [address]  Phone: [phone]  Fax: [phone]  Follow Up Time: [facility]  [address], [address]  [place] [address]  Phone: [phone]  Fax: [phone]  Follow Up Time: 1 week

## 2023-08-25 NOTE — PROGRESS NOTE ADULT - ASSESSMENT
47M PMH asthma and bipolar disorder presenting to the ED for fatigue, WEN and dizziness. Pt had his annual PCP visit yesterday and had routine blood work done. He was called by the PCP's office today stating his Hb was low and that he should go to the ED. Pt endorses that he has had WEN when walking uphill and palpitations for the past 6 months. He also endorses that he gets dizzy and lightheaded when he stands up too quickly. He lives at home with his mother who is now a hospice patient. He also reports that he was started on zyprexa 1.5 years ago, which he noted was the only change he's had since his symptoms have started. Pt denies any headache, blurry vision, chest pain, N/V/D, abdominal pain, dysuria, melena, BRBPR, numbness/tingling/weakness in extremities.     #Microcytic Anemia  abnormal lab as outpt, c/o WEN and diarrhea  on admit Hgb=4.2, MCV=62  CBC 02/2021 H/H was nl  pt does not follow with PMD  currently on zyprexa and depakote  smear shows hypochromia, tear drop cells, elliptocytes and only slight schistocytes  retic and LDH are nl  Iron panel shows trans sat 2% and nl TIBC, Ferritin=1   noted adequate vit B 12/FA levels   -Recommend Venofer 200mg QD x 3 days if no infection  -Transfuse PRBC if Hgb <7.0 or if symptomatic  -monitor H/H  noted  CT a/p +   -CT C/A/P   + Rectal wall thickening, possibly proctitis. Recommend colonoscopy.  and Mildly distended stomach with debris  Pt was seen by GI, for EGD/ colonoscopy 8/24/23  check CEA   further recommendations pending above    #Leukopenia  WBC improved  ANC=1,600, adequate, improved  no splenomegaly on CT a/p   afebrile  likely drug induced, pt on zyprexa, depakote  -daily CBC, monitor ANC    #  THROMBOCYTOSIS  in pt with iron def anemia, likely reactive  recommend observation    #VTE Prophylaxis  SCD boots     Will continue to monitor the patient.  Please call with any questions [phone]    [facility]  [address], [address], [place], [place]  [phone]    
47 year old male with distal rectal mass    - plan for bedside sigmoidoscopy today  - enema to be given this AM  - CT chest for staging pending  - 19-9 pending  - continue medical management  - discuss with Dr. Robertson 
Patient is a 47M with a PMHx of asthma and bipolar disorder (on zyprexa & depakote) who presented to the ED with WEN and low Hgb on outpatient labwork. GI was consulted for anemia.    #Microcytic anemia  #Mild gastropathy  #Colorectal masses  #Polyps (removed)  #Internal and external hemorrhoids  #CHUCK  #Proctitis  #Thrombocytosis  Patient presented with dizziness, sob, fatigue x 6 months, found to be anemic Hgb 2.99 per outpatient labs on 8/22, instructed to present to the ED. Hgb in the ED 4.2, transfused 3u PRBC with repeat Hgb 6.0 -> 7.3, appropriate response. Heme/onc was consulted, rec IV Venofer x 3 days. CTAP showing possibly proctitis and mild constipation. No reports of hematochezia, melena, or diarrhea. Underwent EGD/colonoscopy yesterday,  notable for mild gastropathy, large malignant-appearing mass in the rectosigmoid colon at 20cm (bx, tattoo), additional large malignant-appearing mass in distal rectum at 5cm (bx, tattoo), 1 polyp in the rectosigmoid junction (removed) and 1 polyp in the desc colon (polypectomy), internal and external hemorrhoids. Colorectal surgery was consulted, planned for bedside sigmoidoscopy today. CEA elevated 11.2. Hgb stable 8.     	- s/p EGD/colonoscopy 8/24  	- Colorectal surgery following  	- Await pathology   	- Heme/onc following  	- Diet as tolerated  	- Maintain active T&S, 2 large bore peripheral IVs, transfuse for goal Hgb >7 or if symptoamtic  	- Trend H/H  	- Remainder of care per primary team    This note and its recommendations herein are preliminary until such time as cosigned by an attending.    GI will sign off at this time.  Thank you for involving us in the care of Mr. Rashid Titus.  Please re-consult GI PRN. 
47M PMH asthma and bipolar disorder presenting to the ED for fatigue, WEN and dizziness. Pt had his annual PCP visit yesterday and had routine blood work done. He was called by the PCP's office today stating his Hb was low and that he should go to the ED. Pt endorses that he has had WEN when walking uphill and palpitations for the past 6 months. He also endorses that he gets dizzy and lightheaded when he stands up too quickly. He lives at home with his mother who is now a hospice patient. He also reports that he was started on zyprexa 1.5 years ago, which he noted was the only change he's had since his symptoms have started. Pt denies any headache, blurry vision, chest pain, N/V/D, abdominal pain, dysuria, melena, BRBPR, numbness/tingling/weakness in extremities.     #Microcytic Anemia  abnormal lab as outpt, c/o WEN and diarrhea  on admit Hgb=4.2, MCV=62  CBC 02/2021 H/H was nl  pt does not follow with PMD  currently on zyprexa and depakote  smear shows hypochromia, tear drop cells, elliptocytes and only slight schistocytes  retic and LDH are nl  Iron panel shows trans sat 2% and nl TIBC, Ferritin=1   noted adequate vit B 12/FA levels   -Recommend Venofer 200mg QD x 3 days if no infection  -Transfuse PRBC if Hgb <7.0 or if symptomatic  -monitor H/H  noted  CT a/p +   -CT C/A/P   + Rectal wall thickening, possibly proctitis. Recommend colonoscopy.  and Mildly distended stomach with debris  Pt was seen by GI, s/p  EGD/ colonoscopy 8/24/23  + rectal mass       RECTAL MASS   _+ change of bowel habits > 6 months  found on colonoscopy  awaiting path for tissue diagnosis    complete w/u with CT chest  seen by Sx service- recommended MRI of pelvis for staging  - Plan for bedside sigmoidoscopy tomorrow with Dr. Robertson  CEA-11.2 elevated     further recommendations will depend on findings   d/w pt above at length  emotional support provided     #Leukopenia  resolved   no splenomegaly on CT a/p   afebrile  likely drug induced, pt on zyprexa, depakote  -daily CBC,    #  THROMBOCYTOSIS  in pt with iron def anemia, GIB  likely reactive  recommend observation    #VTE Prophylaxis  SCD boots     Will continue to monitor the patient.  Please call with any questions [phone]    [facility]  [address], [address], [place], [place]  [phone]    
47M PMH asthma and bipolar disorder presenting to the ED for fatigue, WEN and dizziness. admitted for symptomatic anemia 2/2 iron deficiency. Pt also had EGD & Colonoscopy on 8/24 which revealed a Large Malignant mass in the rectosigmoid colon and in the distal rectum, both were biopsied. Additionally had 2 polyps removed and biopsied. Awaiting for pathology results. Colorectal surgery consulted, awaiting further evaluation with sigmoidoscopy on 8/25. Additionally CT Chest ordered to r/o metastatic disease. QMA Dr. Felix following. 
47M PMH asthma and bipolar disorder presenting to the ED for fatigue, WEN and dizziness admitted for symptomatic anemia

## 2023-08-25 NOTE — PROGRESS NOTE ADULT - PROVIDER SPECIALTY LIST ADULT
Internal Medicine
Heme/Onc
Internal Medicine
Internal Medicine
Surgery
Gastroenterology
Heme/Onc
Internal Medicine
Internal Medicine

## 2023-08-25 NOTE — DISCHARGE NOTE NURSING/CASE MANAGEMENT/SOCIAL WORK - PATIENT PORTAL LINK FT
You can access the FollowMyHealth Patient Portal offered by [facility] by registering at the following website: [email or web address]. By joining "[employer]’s FollowMyHealth portal, you will also be able to view your health information using other applications (apps) compatible with our system.

## 2023-08-25 NOTE — DISCHARGE NOTE PROVIDER - NSDCMRMEDTOKEN_GEN_ALL_CORE_FT
acetaminophen 325 mg oral tablet: 2 tab(s) orally every 6 hours As needed Temp greater or equal to 38C (100.4F), Mild Pain (1 - 3)  Depakote 500 mg oral delayed release tablet: 1 tab(s) orally once a day  ZyPREXA 10 mg oral tablet: 1 tab(s) orally once a day   acetaminophen 325 mg oral tablet: 2 tab(s) orally every 6 hours As needed Temp greater or equal to 38C (100.4F), Mild Pain (1 - 3)  ascorbic acid 500 mg oral tablet: 1 tab(s) orally once a day  Depakote 500 mg oral delayed release tablet: 1 tab(s) orally once a day  ferrous sulfate 325 mg (65 mg elemental iron) oral tablet: 2 tab(s) orally once a day  Senna 8.6 mg oral tablet: 2 tab(s) orally once a day (at bedtime)  ZyPREXA 10 mg oral tablet: 1 tab(s) orally once a day   acetaminophen 325 mg oral tablet: 2 tab(s) orally every 6 hours As needed Temp greater or equal to 38C (100.4F), Mild Pain (1 - 3)  ascorbic acid 500 mg oral tablet: 1 tab(s) orally once a day  Depakote 500 mg oral delayed release tablet: 1 tab(s) orally once a day  ferrous sulfate 325 mg (65 mg elemental iron) oral tablet: 2 tab(s) orally once a day  hydrOXYzine hydrochloride 25 mg oral tablet: 1 tab(s) orally every 6 hours as needed for  anxiety  Senna 8.6 mg oral tablet: 2 tab(s) orally once a day (at bedtime)  ZyPREXA 10 mg oral tablet: 1 tab(s) orally once a day

## 2023-08-25 NOTE — PROGRESS NOTE ADULT - SUBJECTIVE AND OBJECTIVE BOX
Patient is a 47y old  Male who presents with a chief complaint of symptomatic anemia (25 Aug 2023 08:23)    PATIENT IS SEEN AND EXAMINED IN MEDICAL FLOOR.  VALERIA [    ]    JAVIER [   ]      GT [   ]    ALLERGIES:  No Known Drug Allergies  dust (Eye Irritation)      Daily Height in cm: 165.1 (25 Aug 2023 08:14)    Daily     VITALS:    Vital Signs Last 24 Hrs  T(C): 36.7 (25 Aug 2023 08:14), Max: 36.8 (24 Aug 2023 12:00)  T(F): 98 (25 Aug 2023 08:14), Max: 98.2 (24 Aug 2023 12:00)  HR: 79 (25 Aug 2023 08:14) (68 - 86)  BP: 102/62 (25 Aug 2023 08:14) (92/60 - 126/83)  BP(mean): --  RR: 17 (25 Aug 2023 08:14) (10 - 18)  SpO2: 98% (25 Aug 2023 05:23) (98% - 100%)    Parameters below as of 25 Aug 2023 05:23  Patient On (Oxygen Delivery Method): room air        LABS:    CBC Full  -  ( 25 Aug 2023 06:44 )  WBC Count : 6.51 K/uL  RBC Count : 4.17 M/uL  Hemoglobin : 8.0 g/dL  Hematocrit : 29.5 %  Platelet Count - Automated : 468 K/uL  Mean Cell Volume : 70.7 fl  Mean Cell Hemoglobin : 19.2 pg  Mean Cell Hemoglobin Concentration : 27.1 gm/dL  Auto Neutrophil # : x  Auto Lymphocyte # : x  Auto Monocyte # : x  Auto Eosinophil # : x  Auto Basophil # : x  Auto Neutrophil % : x  Auto Lymphocyte % : x  Auto Monocyte % : x  Auto Eosinophil % : x  Auto Basophil % : x    PT/INR - ( 24 Aug 2023 08:58 )   PT: 11.9 sec;   INR: 1.05 ratio           08-25    141  |  110<H>  |  11  ----------------------------<  95  4.0   |  25  |  0.85    Ca    8.2<L>      25 Aug 2023 06:44  Phos  3.7     08-25  Mg     2.1     08-25    TPro  6.3  /  Alb  2.8<L>  /  TBili  0.2  /  DBili  x   /  AST  10  /  ALT  12  /  AlkPhos  60  08-25    CAPILLARY BLOOD GLUCOSE      POCT Blood Glucose.: 98 mg/dL (25 Aug 2023 08:24)        LIVER FUNCTIONS - ( 25 Aug 2023 06:44 )  Alb: 2.8 g/dL / Pro: 6.3 g/dL / ALK PHOS: 60 U/L / ALT: 12 U/L DA / AST: 10 U/L / GGT: x           Creatinine Trend: 0.85<--, 0.74<--, 0.70<--, 0.79<--  I&O's Summary              MEDICATIONS:    MEDICATIONS  (STANDING):  divalproex  milliGRAM(s) Oral daily  lactated ringers. 1000 milliLiter(s) (75 mL/Hr) IV Continuous <Continuous>  OLANZapine 10 milliGRAM(s) Oral daily      MEDICATIONS  (PRN):  acetaminophen     Tablet .. 650 milliGRAM(s) Oral every 6 hours PRN Temp greater or equal to 38C (100.4F), Mild Pain (1 - 3)  aluminum hydroxide/magnesium hydroxide/simethicone Suspension 30 milliLiter(s) Oral every 4 hours PRN Dyspepsia  melatonin 3 milliGRAM(s) Oral at bedtime PRN Insomnia  ondansetron Injectable 4 milliGRAM(s) IV Push every 8 hours PRN Nausea and/or Vomiting      REVIEW OF SYSTEMS:                           ALL ROS DONE [ X   ]    CONSTITUTIONAL:  LETHARGIC [   ], FEVER [   ], UNRESPONSIVE [   ]  CVS:  CP  [   ], SOB, [   ], PALPITATIONS [   ], DIZZYNESS [   ]  RS: COUGH [   ], SPUTUM [   ]  GI: ABDOMINAL PAIN [   ], NAUSEA [   ], VOMITINGS [   ], DIARRHEA [   ], CONSTIPATION [   ]  :  DYSURIA [   ], NOCTURIA [   ], INCREASED FREQUENCY [   ], DRIBLING [   ],  SKELETAL: PAINFUL JOINTS [   ], SWOLLEN JOINTS [   ], NECK ACHE [   ], LOW BACK ACHE [   ],  SKIN : ULCERS [   ], RASH [   ], ITCHING [   ]  CNS: HEAD ACHE [   ], DOUBLE VISION [   ], BLURRED VISION [   ], AMS / CONFUSION [   ], SEIZURES [   ], WEAKNESS [   ],TINGLING / NUMBNESS [   ]    PHYSICAL EXAMINATION:  GENERAL APPEARANCE: NO DISTRESS  HEENT:  NO PALLOR, NO  JVD,  NO   NODES, NECK SUPPLE  CVS: S1 +, S2 +,   RS: AEEB,  OCCASIONAL  RALES +,   NO RONCHI  ABD: SOFT, NT, NO, BS +  EXT: NO PE  SKIN: WARM,   SKELETAL:  ROM ACCEPTABLE  CNS:  AAO X 3    RADIOLOGY :    RADIOLOGY AND READINGS REVIEWED    ASSESSMENT :     Anemia    Asthma    Ex-smoker    Asthma      PLAN:  HPI:  47M PMH asthma and bipolar disorder presenting to the ED for fatigue, WEN and dizziness. Pt had his annual PCP visit yesterday and had routine blood work done. He was called by the PCP's office today stating his Hb was low and that he should go to the ED. Pt endorses that he has had WEN when walking uphill and palpitations for the past 6 months. He also endorses that he gets dizzy and lightheaded when he stands up too quickly. He lives at home with his mother who is now a hospice patient. He also reports that he was started on zyprexa 1.5 years ago, which he noted was the only change he's had since his symptoms have started. Pt denies any headache, blurry vision, chest pain, N/V/D, abdominal pain, dysuria, melena, BRBPR, numbness/tingling/weakness in extremities.  (22 Aug 2023 14:45)    # SYMPTOMATIC ANEMIA  # MICROCYTIC, IRON DEFICIENCY  # RECTOSIGMOID MASS, RECTAL MASS, COLONIC POLYPS  # LEUKOPENIA    - PPI BID, CLEAR LIQUID DIET  - TRANSFUSION THRESHOLD HGB < 7  - TREND HGB  - PLANNED FOR PRBC TRANSFUSION  - NOTED ANEMIA PANEL, PERIPHERAL SMEAR, LDH, HAPTOGLOBIN, RETIC COUNT  - NOTED CT A/P  - HEME/ONC CONSULT  - GI CONSULT  - S/P CRITICAL CARE CONSULT    - STARTED ON VENOFER    - PLANNED FOR EGD/COLONOSCOPY 8/24  - ESOPHAGUS IRREGULAR Z LINE, MILD GASTROPATHY.  LARGE MALIGNANT APPEARING MASS IN THE RECTOSIGMOID COLON AT 20 CM, ADDITIONAL LARGE MALIGNANT APPEARING MASS IN THE DISTAL RECTUM AT 5CM. POLYP IN RECTOSIGMOID JUNCTION. POLYP IN DESCENDING COLON. INTERNAL AND EXTERNAL HEMORRHOIDS.    - F/U PATH    - NOTED ELEVATED CEA, F/U CA 19-9  - F/U CT CHEST  - RECOMMENDED FOR MRI PELVIS  - PLANNED FOR SIGMOIDOSCOPY 8/26  - COLORECTAL SURGERY CONSULT    # BIPOLAR DISORDER  - PATIENT CLARIFIES THAT HE SPEAKS WITH A PSYCHIATRIST VIA TELEVISITS  - ON DEPAKOTE AND ZYPREXA  - PSYCHIATRY CONSULT    # HX OF POLYSUBSTANCE ABUSE  - COUNSELLED PATIENT AT LENGTH ON CESSATION    # ASTHMA    # GI AND DVT PPX.

## 2023-08-28 LAB
% GAMMA, URINE: 32.2 % — SIGNIFICANT CHANGE UP
ALBUMIN 24H MFR UR ELPH: 9 % — SIGNIFICANT CHANGE UP
ALPHA1 GLOB 24H MFR UR ELPH: 24.5 % — SIGNIFICANT CHANGE UP
ALPHA2 GLOB 24H MFR UR ELPH: 23.3 % — SIGNIFICANT CHANGE UP
B-GLOBULIN 24H MFR UR ELPH: 11 % — SIGNIFICANT CHANGE UP
COLLECT DURATION TIME UR: 24 HR — SIGNIFICANT CHANGE UP
INTERPRETATION 24H UR IFE-IMP: SIGNIFICANT CHANGE UP
M PROTEIN 24H UR ELPH-MRATE: 0 % — SIGNIFICANT CHANGE UP
M PROTEIN 24H UR ELPH-MRATE: 0 MG/24HR — SIGNIFICANT CHANGE UP (ref 0–0)
M PROTEIN 24H UR ELPH-MRATE: 0 MG/DL — SIGNIFICANT CHANGE UP
PROT PATTERN 24H UR ELPH-IMP: SIGNIFICANT CHANGE UP
PROTEIN QUANT CALC, URINE: 194 MG/24 H — HIGH (ref 50–100)
TOTAL VOLUME - 24 HOUR: 2150 ML — SIGNIFICANT CHANGE UP
URINE CREATININE CALCULATION: 0.8 G/24 H — LOW (ref 1–2)

## 2023-08-29 ENCOUNTER — TRANSCRIPTION ENCOUNTER (OUTPATIENT)
Age: 48
End: 2023-08-29

## 2023-08-30 LAB — SURGICAL PATHOLOGY STUDY: SIGNIFICANT CHANGE UP

## 2023-08-31 ENCOUNTER — APPOINTMENT (OUTPATIENT)
Dept: CARDIOLOGY | Facility: CLINIC | Age: 48
End: 2023-08-31

## 2023-08-31 ENCOUNTER — NON-APPOINTMENT (OUTPATIENT)
Age: 48
End: 2023-08-31

## 2023-09-05 ENCOUNTER — APPOINTMENT (OUTPATIENT)
Age: 48
End: 2023-09-05
Payer: MEDICAID

## 2023-09-05 VITALS
HEART RATE: 84 BPM | HEIGHT: 65 IN | WEIGHT: 140 LBS | SYSTOLIC BLOOD PRESSURE: 112 MMHG | DIASTOLIC BLOOD PRESSURE: 82 MMHG | BODY MASS INDEX: 23.32 KG/M2

## 2023-09-05 DIAGNOSIS — Z56.0 UNEMPLOYMENT, UNSPECIFIED: ICD-10-CM

## 2023-09-05 DIAGNOSIS — Z87.891 PERSONAL HISTORY OF NICOTINE DEPENDENCE: ICD-10-CM

## 2023-09-05 DIAGNOSIS — Z78.9 OTHER SPECIFIED HEALTH STATUS: ICD-10-CM

## 2023-09-05 DIAGNOSIS — F31.9 BIPOLAR DISORDER, UNSPECIFIED: ICD-10-CM

## 2023-09-05 DIAGNOSIS — Z87.898 PERSONAL HISTORY OF OTHER SPECIFIED CONDITIONS: ICD-10-CM

## 2023-09-05 DIAGNOSIS — K63.89 OTHER SPECIFIED DISEASES OF INTESTINE: ICD-10-CM

## 2023-09-05 DIAGNOSIS — Z82.49 FAMILY HISTORY OF ISCHEMIC HEART DISEASE AND OTHER DISEASES OF THE CIRCULATORY SYSTEM: ICD-10-CM

## 2023-09-05 PROCEDURE — 99213 OFFICE O/P EST LOW 20 MIN: CPT

## 2023-09-05 RX ORDER — FERROUS SULFATE 325(65) MG
325 TABLET ORAL
Refills: 0 | Status: ACTIVE | COMMUNITY

## 2023-09-05 RX ORDER — MULTIVIT-MIN/FOLIC/VIT K/LYCOP 400-300MCG
500 TABLET ORAL
Refills: 0 | Status: ACTIVE | COMMUNITY

## 2023-09-05 RX ORDER — SENNOSIDES 8.6 MG TABLETS 8.6 MG/1
8.6 TABLET ORAL
Refills: 0 | Status: ACTIVE | COMMUNITY

## 2023-09-05 SDOH — ECONOMIC STABILITY - INCOME SECURITY: UNEMPLOYMENT, UNSPECIFIED: Z56.0

## 2023-09-05 NOTE — ASSESSMENT
[FreeTextEntry1] : Mr. JESSICA WILSON  is a 47 year M presenting with colon and rectal cancer here for an initial visit.

## 2023-09-05 NOTE — REVIEW OF SYSTEMS
[Negative] : Neurological [As Noted in HPI] : as noted in HPI [Fever] : no fever [Chills] : no chills [Feeling Poorly] : not feeling poorly [Feeling Tired] : not feeling tired [Recent Weight Loss (___ Lbs)] : no recent weight loss [Eye Pain] : no eye pain [Earache] : no earache [Chest Pain] : no chest pain [Palpitations] : no palpitations [Shortness Of Breath] : no shortness of breath [Abdominal Pain] : no abdominal pain [Vomiting] : no vomiting [Constipation] : no constipation [Diarrhea] : no diarrhea [Dysuria] : no dysuria [FreeTextEntry7] : colon and rectal cancer  [de-identified] : bipolar  [de-identified] : anemia

## 2023-09-05 NOTE — PHYSICAL EXAM
[Normal Breath Sounds] : Normal breath sounds [Normal Heart Sounds] : normal heart sounds [Normal Rate and Rhythm] : normal rate and rhythm [Anxious] : anxious [Exam Deferred] : exam was deferred [JVD] : no jugular venous distention  [Wheezing] : no wheezing was heard [de-identified] : soft, non-distended, non-tender to palpation.  [de-identified] : awake, alert, NAD  [de-identified] : normocephalic, atraumatic, EOMI, normal conjunctiva  [de-identified] : b/l chest rise, EWOB on RA  [de-identified] : deferred [de-identified] : Normal strength  [de-identified] : normal mood and affect

## 2023-09-05 NOTE — PLAN
[TextEntry] : - Explained that staging MRI will determine plan of care; neoadjuvant therapy vs surgery  - Staging MRI pending - RTC after imaging

## 2023-09-05 NOTE — HISTORY OF PRESENT ILLNESS
[FreeTextEntry1] : Mr. JESSICA WILSON  is a 47 year M with a history of asthma (controlled), bipolar, presenting with rectosigmoid colon and rectal cancer here for an initial visit. On 8/22/2023 patient presented to [facility] ED for symptomatic anemia 2/2 iron deficiency. He underwent EDG/colonoscopy on 8/24/2023 which revealed two large malignant-appearing mass in the rectosigmoid colon at 20 cm and in the distal rectum at 5 cm. Pathology both positive for invasive adenocarcinoma. Today patient is here to discuss next steps. Denies abdominal pain, nausea, vomiting, fevers/chills.

## 2023-09-12 ENCOUNTER — APPOINTMENT (OUTPATIENT)
Dept: MRI IMAGING | Facility: IMAGING CENTER | Age: 48
End: 2023-09-12
Payer: MEDICAID

## 2023-09-12 ENCOUNTER — RESULT REVIEW (OUTPATIENT)
Age: 48
End: 2023-09-12

## 2023-09-12 ENCOUNTER — OUTPATIENT (OUTPATIENT)
Dept: OUTPATIENT SERVICES | Facility: HOSPITAL | Age: 48
LOS: 1 days | End: 2023-09-12
Payer: MEDICAID

## 2023-09-12 ENCOUNTER — APPOINTMENT (OUTPATIENT)
Dept: RADIOLOGY | Facility: IMAGING CENTER | Age: 48
End: 2023-09-12
Payer: MEDICAID

## 2023-09-12 ENCOUNTER — OUTPATIENT (OUTPATIENT)
Dept: OUTPATIENT SERVICES | Facility: HOSPITAL | Age: 48
LOS: 1 days | End: 2023-09-12
Payer: SELF-PAY

## 2023-09-12 DIAGNOSIS — Z00.8 ENCOUNTER FOR OTHER GENERAL EXAMINATION: ICD-10-CM

## 2023-09-12 DIAGNOSIS — C20 MALIGNANT NEOPLASM OF RECTUM: ICD-10-CM

## 2023-09-12 PROCEDURE — 74018 RADEX ABDOMEN 1 VIEW: CPT | Mod: 26

## 2023-09-12 PROCEDURE — 71045 X-RAY EXAM CHEST 1 VIEW: CPT | Mod: 26

## 2023-09-12 PROCEDURE — 72197 MRI PELVIS W/O & W/DYE: CPT | Mod: 26

## 2023-09-12 PROCEDURE — 71045 X-RAY EXAM CHEST 1 VIEW: CPT

## 2023-09-12 PROCEDURE — 74018 RADEX ABDOMEN 1 VIEW: CPT

## 2023-09-12 PROCEDURE — A9585: CPT

## 2023-09-12 PROCEDURE — 72197 MRI PELVIS W/O & W/DYE: CPT

## 2023-09-21 ENCOUNTER — NON-APPOINTMENT (OUTPATIENT)
Age: 48
End: 2023-09-21

## 2023-09-21 ENCOUNTER — APPOINTMENT (OUTPATIENT)
Dept: RADIATION ONCOLOGY | Facility: CLINIC | Age: 48
End: 2023-09-21
Payer: MEDICAID

## 2023-09-21 VITALS — HEIGHT: 65 IN | WEIGHT: 140 LBS | BODY MASS INDEX: 23.32 KG/M2 | RESPIRATION RATE: 18 BRPM

## 2023-09-21 PROCEDURE — 99204 OFFICE O/P NEW MOD 45 MIN: CPT

## 2023-09-28 ENCOUNTER — OUTPATIENT (OUTPATIENT)
Dept: OUTPATIENT SERVICES | Facility: HOSPITAL | Age: 48
LOS: 1 days | End: 2023-09-28
Payer: MEDICAID

## 2023-09-28 ENCOUNTER — APPOINTMENT (OUTPATIENT)
Dept: CT IMAGING | Facility: HOSPITAL | Age: 48
End: 2023-09-28

## 2023-09-28 DIAGNOSIS — C20 MALIGNANT NEOPLASM OF RECTUM: ICD-10-CM

## 2023-09-28 PROCEDURE — 77263 THER RADIOLOGY TX PLNG CPLX: CPT

## 2023-09-28 PROCEDURE — 77290 THER RAD SIMULAJ FIELD CPLX: CPT

## 2023-10-19 ENCOUNTER — APPOINTMENT (OUTPATIENT)
Dept: NUCLEAR MEDICINE | Facility: IMAGING CENTER | Age: 48
End: 2023-10-19
Payer: MEDICAID

## 2023-10-19 ENCOUNTER — APPOINTMENT (OUTPATIENT)
Dept: NUCLEAR MEDICINE | Facility: IMAGING CENTER | Age: 48
End: 2023-10-19

## 2023-10-19 ENCOUNTER — OUTPATIENT (OUTPATIENT)
Dept: OUTPATIENT SERVICES | Facility: HOSPITAL | Age: 48
LOS: 1 days | End: 2023-10-19
Payer: MEDICAID

## 2023-10-19 DIAGNOSIS — C20 MALIGNANT NEOPLASM OF RECTUM: ICD-10-CM

## 2023-10-19 PROCEDURE — 78815 PET IMAGE W/CT SKULL-THIGH: CPT | Mod: 26,PI

## 2023-10-19 PROCEDURE — 78815 PET IMAGE W/CT SKULL-THIGH: CPT

## 2023-10-19 PROCEDURE — A9552: CPT

## 2023-10-24 PROCEDURE — 77300 RADIATION THERAPY DOSE PLAN: CPT

## 2023-10-24 PROCEDURE — 77338 DESIGN MLC DEVICE FOR IMRT: CPT

## 2023-10-24 PROCEDURE — 77301 RADIOTHERAPY DOSE PLAN IMRT: CPT

## 2023-10-30 PROCEDURE — G6015: CPT

## 2023-10-30 PROCEDURE — 77014: CPT

## 2023-10-31 PROCEDURE — G6015: CPT

## 2023-10-31 PROCEDURE — 77014: CPT

## 2023-11-01 ENCOUNTER — NON-APPOINTMENT (OUTPATIENT)
Age: 48
End: 2023-11-01

## 2023-11-01 PROCEDURE — 77014: CPT

## 2023-11-01 PROCEDURE — G6015: CPT

## 2023-11-02 PROCEDURE — 77014: CPT

## 2023-11-02 PROCEDURE — G6015: CPT

## 2023-11-03 PROCEDURE — 77427 RADIATION TX MANAGEMENT X5: CPT

## 2023-11-03 PROCEDURE — 77014: CPT

## 2023-11-03 PROCEDURE — 77336 RADIATION PHYSICS CONSULT: CPT

## 2023-11-03 PROCEDURE — G6015: CPT

## 2023-11-06 PROCEDURE — G6015: CPT

## 2023-11-06 PROCEDURE — 77014: CPT

## 2023-11-07 PROCEDURE — 77014: CPT

## 2023-11-07 PROCEDURE — G6015: CPT

## 2023-11-08 PROCEDURE — G6015: CPT

## 2023-11-08 PROCEDURE — 77014: CPT

## 2023-11-09 ENCOUNTER — NON-APPOINTMENT (OUTPATIENT)
Age: 48
End: 2023-11-09

## 2023-11-09 VITALS — WEIGHT: 137 LBS | RESPIRATION RATE: 18 BRPM | BODY MASS INDEX: 22.82 KG/M2 | HEIGHT: 65 IN

## 2023-11-09 PROCEDURE — 77014: CPT

## 2023-11-09 PROCEDURE — G6015: CPT

## 2023-11-12 NOTE — DISCHARGE NOTE PROVIDER - NSDCFUSCHEDAPPT_GEN_ALL_CORE_FT
Subjective   History of Present Illness    Patient reports that she was at in [place] ER this morning at 5 AM for nausea and vomiting and was given IV fluids, Zofran, and droperidol.  Her nausea vomiting resolved but anxiety worsened.  She reports that she has a history of anxiety but her current anxiety is worse than usual.  Denies any homicidal or suicidal ideations.  Denies chest pain, shortness of breath, abdominal pain.  Review of Systems   Constitutional:  Negative for activity change and appetite change.   Eyes:  Negative for pain.   Respiratory:  Negative for shortness of breath.    Gastrointestinal:  Negative for nausea and vomiting.   Musculoskeletal:  Negative for arthralgias.   Skin:  Negative for color change.   Neurological:  Negative for dizziness.   Psychiatric/Behavioral:  The patient is nervous/anxious.    All other systems reviewed and are negative.      Past Medical History:   Diagnosis Date    Anxiety     Hypothyroid        Allergies   Allergen Reactions    Sulfa Antibiotics Unknown - High Severity       History reviewed. No pertinent surgical history.    History reviewed. No pertinent family history.    Social History     Socioeconomic History    Marital status: Single           Objective   Physical Exam  Vitals and nursing note reviewed.   Constitutional:       Appearance: Normal appearance. She is normal weight.      Comments: Appears anxious   HENT:      Head: Normocephalic and atraumatic.      Nose: Nose normal.      Mouth/Throat:      Mouth: Mucous membranes are moist.   Eyes:      Pupils: Pupils are equal, round, and reactive to light.   Cardiovascular:      Rate and Rhythm: Normal rate and regular rhythm.      Pulses: Normal pulses.      Heart sounds: Normal heart sounds.   Pulmonary:      Effort: Pulmonary effort is normal.      Breath sounds: Normal breath sounds.   Musculoskeletal:         General: Normal range of motion.      Cervical back: Normal range of motion.      Right  lower leg: No edema.      Left lower leg: No edema.   Skin:     General: Skin is warm.   Neurological:      General: No focal deficit present.      Mental Status: She is alert.   Psychiatric:         Mood and Affect: Mood is anxious.         Speech: Speech normal.         Behavior: Behavior is cooperative.         Thought Content: Thought content normal. Thought content does not include homicidal or suicidal ideation.         Procedures           ED Course                                           Medical Decision Making  Problems Addressed:  Anxiety: complicated acute illness or injury  Leukocytosis, unspecified type: complicated acute illness or injury    Amount and/or Complexity of Data Reviewed  Labs: ordered.  Radiology: ordered.    Risk  Prescription drug management.        Final diagnoses:   Anxiety   Leukocytosis, unspecified type       ED Disposition  ED Disposition       ED Disposition   Discharge    Condition   Stable    Comment   --               Aletha Lancaster, APRN  [address] [id number] [phone]               Medication List        Changed      hydrOXYzine 25 MG tablet  Commonly known as: ATARAX  Take 1 tablet by mouth 3 (Three) Times a Day As Needed for Anxiety for up to 5 days.  What changed:   medication strength  how much to take  when to take this  reasons to take this               Where to Get Your Medications        You can get these medications from any pharmacy    Bring a paper prescription for each of these medications  hydrOXYzine 25 MG tablet          Jacque Medley  [facility] Physician [facility]  CARDIOLOGY [address]  Scheduled Appointment: 08/31/2023

## 2023-11-14 ENCOUNTER — NON-APPOINTMENT (OUTPATIENT)
Age: 48
End: 2023-11-14

## 2023-11-14 PROCEDURE — 77014: CPT

## 2023-11-14 PROCEDURE — 77427 RADIATION TX MANAGEMENT X5: CPT

## 2023-11-14 PROCEDURE — G6015: CPT

## 2023-11-14 PROCEDURE — 77336 RADIATION PHYSICS CONSULT: CPT

## 2023-11-15 ENCOUNTER — NON-APPOINTMENT (OUTPATIENT)
Age: 48
End: 2023-11-15

## 2023-11-15 PROCEDURE — 77014: CPT

## 2023-11-15 PROCEDURE — G6015: CPT

## 2023-11-16 VITALS — WEIGHT: 139 LBS | BODY MASS INDEX: 23.16 KG/M2 | RESPIRATION RATE: 18 BRPM | HEIGHT: 65 IN

## 2023-11-16 PROCEDURE — 77014: CPT

## 2023-11-16 PROCEDURE — G6015: CPT

## 2023-11-17 ENCOUNTER — NON-APPOINTMENT (OUTPATIENT)
Age: 48
End: 2023-11-17

## 2023-11-19 PROCEDURE — 77014: CPT

## 2023-11-19 PROCEDURE — G6015: CPT

## 2023-11-20 PROCEDURE — 77014: CPT

## 2023-11-20 PROCEDURE — G6015: CPT

## 2023-11-21 ENCOUNTER — NON-APPOINTMENT (OUTPATIENT)
Age: 48
End: 2023-11-21

## 2023-11-21 PROCEDURE — 77427 RADIATION TX MANAGEMENT X5: CPT

## 2023-11-21 PROCEDURE — 77336 RADIATION PHYSICS CONSULT: CPT

## 2023-11-21 PROCEDURE — G6015: CPT

## 2023-11-22 ENCOUNTER — NON-APPOINTMENT (OUTPATIENT)
Age: 48
End: 2023-11-22

## 2023-11-22 PROCEDURE — G6015: CPT

## 2023-11-22 PROCEDURE — 77014: CPT

## 2023-11-27 ENCOUNTER — NON-APPOINTMENT (OUTPATIENT)
Age: 48
End: 2023-11-27

## 2023-11-27 PROCEDURE — G6015: CPT

## 2023-11-27 PROCEDURE — 77014: CPT

## 2023-11-28 PROCEDURE — G6015: CPT

## 2023-11-28 PROCEDURE — 77014: CPT

## 2023-11-29 PROCEDURE — G6015: CPT

## 2023-11-29 PROCEDURE — 77014: CPT

## 2023-11-30 PROCEDURE — G6015: CPT

## 2023-11-30 PROCEDURE — 77336 RADIATION PHYSICS CONSULT: CPT

## 2023-11-30 PROCEDURE — 77014: CPT

## 2023-12-01 PROCEDURE — 77014: CPT

## 2023-12-01 PROCEDURE — G6015: CPT

## 2023-12-04 ENCOUNTER — NON-APPOINTMENT (OUTPATIENT)
Age: 48
End: 2023-12-04

## 2023-12-04 PROCEDURE — G6015: CPT

## 2023-12-04 PROCEDURE — 77014: CPT

## 2023-12-05 ENCOUNTER — NON-APPOINTMENT (OUTPATIENT)
Age: 48
End: 2023-12-05

## 2023-12-05 ENCOUNTER — INPATIENT (INPATIENT)
Facility: HOSPITAL | Age: 48
LOS: 1 days | Discharge: HOME | DRG: 92 | End: 2023-12-07
Attending: HOSPITALIST | Admitting: HOSPITALIST
Payer: MEDICAID

## 2023-12-05 VITALS
DIASTOLIC BLOOD PRESSURE: 74 MMHG | RESPIRATION RATE: 19 BRPM | HEART RATE: 96 BPM | OXYGEN SATURATION: 99 % | HEIGHT: 70 IN | SYSTOLIC BLOOD PRESSURE: 150 MMHG | WEIGHT: 169.98 LBS

## 2023-12-05 DIAGNOSIS — G93.40 ENCEPHALOPATHY, UNSPECIFIED: ICD-10-CM

## 2023-12-05 DIAGNOSIS — C18.9 MALIGNANT NEOPLASM OF COLON, UNSPECIFIED: ICD-10-CM

## 2023-12-05 DIAGNOSIS — R10.9 UNSPECIFIED ABDOMINAL PAIN: ICD-10-CM

## 2023-12-05 DIAGNOSIS — F31.9 BIPOLAR DISORDER, UNSPECIFIED: ICD-10-CM

## 2023-12-05 DIAGNOSIS — R41.82 ALTERED MENTAL STATUS, UNSPECIFIED: ICD-10-CM

## 2023-12-05 DIAGNOSIS — Z29.9 ENCOUNTER FOR PROPHYLACTIC MEASURES, UNSPECIFIED: ICD-10-CM

## 2023-12-05 LAB
ALBUMIN SERPL ELPH-MCNC: 3.8 G/DL — SIGNIFICANT CHANGE UP (ref 3.5–5)
ALBUMIN SERPL ELPH-MCNC: 3.8 G/DL — SIGNIFICANT CHANGE UP (ref 3.5–5)
ALP SERPL-CCNC: 49 U/L — SIGNIFICANT CHANGE UP (ref 40–120)
ALP SERPL-CCNC: 49 U/L — SIGNIFICANT CHANGE UP (ref 40–120)
ALT FLD-CCNC: 17 U/L DA — SIGNIFICANT CHANGE UP (ref 10–60)
ALT FLD-CCNC: 17 U/L DA — SIGNIFICANT CHANGE UP (ref 10–60)
AMPHET UR-MCNC: NEGATIVE — SIGNIFICANT CHANGE UP
AMPHET UR-MCNC: NEGATIVE — SIGNIFICANT CHANGE UP
ANION GAP SERPL CALC-SCNC: 8 MMOL/L — SIGNIFICANT CHANGE UP (ref 5–17)
ANION GAP SERPL CALC-SCNC: 8 MMOL/L — SIGNIFICANT CHANGE UP (ref 5–17)
APAP SERPL-MCNC: <10 UG/ML — SIGNIFICANT CHANGE UP (ref 10–30)
APAP SERPL-MCNC: <10 UG/ML — SIGNIFICANT CHANGE UP (ref 10–30)
APPEARANCE UR: CLEAR — SIGNIFICANT CHANGE UP
APPEARANCE UR: CLEAR — SIGNIFICANT CHANGE UP
AST SERPL-CCNC: 9 U/L — LOW (ref 10–40)
AST SERPL-CCNC: 9 U/L — LOW (ref 10–40)
BARBITURATES UR SCN-MCNC: NEGATIVE — SIGNIFICANT CHANGE UP
BARBITURATES UR SCN-MCNC: NEGATIVE — SIGNIFICANT CHANGE UP
BASOPHILS # BLD AUTO: 0 K/UL — SIGNIFICANT CHANGE UP (ref 0–0.2)
BASOPHILS # BLD AUTO: 0 K/UL — SIGNIFICANT CHANGE UP (ref 0–0.2)
BASOPHILS NFR BLD AUTO: 0 % — SIGNIFICANT CHANGE UP (ref 0–2)
BASOPHILS NFR BLD AUTO: 0 % — SIGNIFICANT CHANGE UP (ref 0–2)
BENZODIAZ UR-MCNC: NEGATIVE — SIGNIFICANT CHANGE UP
BENZODIAZ UR-MCNC: NEGATIVE — SIGNIFICANT CHANGE UP
BILIRUB SERPL-MCNC: 0.3 MG/DL — SIGNIFICANT CHANGE UP (ref 0.2–1.2)
BILIRUB SERPL-MCNC: 0.3 MG/DL — SIGNIFICANT CHANGE UP (ref 0.2–1.2)
BILIRUB UR-MCNC: NEGATIVE — SIGNIFICANT CHANGE UP
BILIRUB UR-MCNC: NEGATIVE — SIGNIFICANT CHANGE UP
BUN SERPL-MCNC: 10 MG/DL — SIGNIFICANT CHANGE UP (ref 7–18)
BUN SERPL-MCNC: 10 MG/DL — SIGNIFICANT CHANGE UP (ref 7–18)
CALCIUM SERPL-MCNC: 9.4 MG/DL — SIGNIFICANT CHANGE UP (ref 8.4–10.5)
CALCIUM SERPL-MCNC: 9.4 MG/DL — SIGNIFICANT CHANGE UP (ref 8.4–10.5)
CHLORIDE SERPL-SCNC: 114 MMOL/L — HIGH (ref 96–108)
CHLORIDE SERPL-SCNC: 114 MMOL/L — HIGH (ref 96–108)
CO2 SERPL-SCNC: 21 MMOL/L — LOW (ref 22–31)
CO2 SERPL-SCNC: 21 MMOL/L — LOW (ref 22–31)
COCAINE METAB.OTHER UR-MCNC: NEGATIVE — SIGNIFICANT CHANGE UP
COCAINE METAB.OTHER UR-MCNC: NEGATIVE — SIGNIFICANT CHANGE UP
COLOR SPEC: YELLOW — SIGNIFICANT CHANGE UP
COLOR SPEC: YELLOW — SIGNIFICANT CHANGE UP
CREAT SERPL-MCNC: 1.25 MG/DL — SIGNIFICANT CHANGE UP (ref 0.5–1.3)
CREAT SERPL-MCNC: 1.25 MG/DL — SIGNIFICANT CHANGE UP (ref 0.5–1.3)
DIFF PNL FLD: ABNORMAL
DIFF PNL FLD: ABNORMAL
EGFR: 72 ML/MIN/1.73M2 — SIGNIFICANT CHANGE UP
EGFR: 72 ML/MIN/1.73M2 — SIGNIFICANT CHANGE UP
EOSINOPHIL # BLD AUTO: 0 K/UL — SIGNIFICANT CHANGE UP (ref 0–0.5)
EOSINOPHIL # BLD AUTO: 0 K/UL — SIGNIFICANT CHANGE UP (ref 0–0.5)
EOSINOPHIL NFR BLD AUTO: 0 % — SIGNIFICANT CHANGE UP (ref 0–6)
EOSINOPHIL NFR BLD AUTO: 0 % — SIGNIFICANT CHANGE UP (ref 0–6)
ETHANOL SERPL-MCNC: <3 MG/DL — SIGNIFICANT CHANGE UP (ref 0–10)
ETHANOL SERPL-MCNC: <3 MG/DL — SIGNIFICANT CHANGE UP (ref 0–10)
GLUCOSE SERPL-MCNC: 124 MG/DL — HIGH (ref 70–99)
GLUCOSE SERPL-MCNC: 124 MG/DL — HIGH (ref 70–99)
GLUCOSE UR QL: NEGATIVE MG/DL — SIGNIFICANT CHANGE UP
GLUCOSE UR QL: NEGATIVE MG/DL — SIGNIFICANT CHANGE UP
HCT VFR BLD CALC: 42.9 % — SIGNIFICANT CHANGE UP (ref 39–50)
HCT VFR BLD CALC: 42.9 % — SIGNIFICANT CHANGE UP (ref 39–50)
HGB BLD-MCNC: 14.1 G/DL — SIGNIFICANT CHANGE UP (ref 13–17)
HGB BLD-MCNC: 14.1 G/DL — SIGNIFICANT CHANGE UP (ref 13–17)
KETONES UR-MCNC: NEGATIVE MG/DL — SIGNIFICANT CHANGE UP
KETONES UR-MCNC: NEGATIVE MG/DL — SIGNIFICANT CHANGE UP
LACTATE SERPL-SCNC: 1.8 MMOL/L — SIGNIFICANT CHANGE UP (ref 0.7–2)
LACTATE SERPL-SCNC: 1.8 MMOL/L — SIGNIFICANT CHANGE UP (ref 0.7–2)
LACTATE SERPL-SCNC: 3.1 MMOL/L — HIGH (ref 0.7–2)
LACTATE SERPL-SCNC: 3.1 MMOL/L — HIGH (ref 0.7–2)
LEUKOCYTE ESTERASE UR-ACNC: NEGATIVE — SIGNIFICANT CHANGE UP
LEUKOCYTE ESTERASE UR-ACNC: NEGATIVE — SIGNIFICANT CHANGE UP
LYMPHOCYTES # BLD AUTO: 0.31 K/UL — LOW (ref 1–3.3)
LYMPHOCYTES # BLD AUTO: 0.31 K/UL — LOW (ref 1–3.3)
LYMPHOCYTES # BLD AUTO: 5 % — LOW (ref 13–44)
LYMPHOCYTES # BLD AUTO: 5 % — LOW (ref 13–44)
MCHC RBC-ENTMCNC: 30.2 PG — SIGNIFICANT CHANGE UP (ref 27–34)
MCHC RBC-ENTMCNC: 30.2 PG — SIGNIFICANT CHANGE UP (ref 27–34)
MCHC RBC-ENTMCNC: 32.9 GM/DL — SIGNIFICANT CHANGE UP (ref 32–36)
MCHC RBC-ENTMCNC: 32.9 GM/DL — SIGNIFICANT CHANGE UP (ref 32–36)
MCV RBC AUTO: 91.9 FL — SIGNIFICANT CHANGE UP (ref 80–100)
MCV RBC AUTO: 91.9 FL — SIGNIFICANT CHANGE UP (ref 80–100)
METHADONE UR-MCNC: NEGATIVE — SIGNIFICANT CHANGE UP
METHADONE UR-MCNC: NEGATIVE — SIGNIFICANT CHANGE UP
MONOCYTES # BLD AUTO: 0.49 K/UL — SIGNIFICANT CHANGE UP (ref 0–0.9)
MONOCYTES # BLD AUTO: 0.49 K/UL — SIGNIFICANT CHANGE UP (ref 0–0.9)
MONOCYTES NFR BLD AUTO: 8 % — SIGNIFICANT CHANGE UP (ref 2–14)
MONOCYTES NFR BLD AUTO: 8 % — SIGNIFICANT CHANGE UP (ref 2–14)
NEUTROPHILS # BLD AUTO: 5.38 K/UL — SIGNIFICANT CHANGE UP (ref 1.8–7.4)
NEUTROPHILS # BLD AUTO: 5.38 K/UL — SIGNIFICANT CHANGE UP (ref 1.8–7.4)
NEUTROPHILS NFR BLD AUTO: 87 % — HIGH (ref 43–77)
NEUTROPHILS NFR BLD AUTO: 87 % — HIGH (ref 43–77)
NITRITE UR-MCNC: NEGATIVE — SIGNIFICANT CHANGE UP
NITRITE UR-MCNC: NEGATIVE — SIGNIFICANT CHANGE UP
OPIATES UR-MCNC: NEGATIVE — SIGNIFICANT CHANGE UP
OPIATES UR-MCNC: NEGATIVE — SIGNIFICANT CHANGE UP
PCP SPEC-MCNC: SIGNIFICANT CHANGE UP
PCP SPEC-MCNC: SIGNIFICANT CHANGE UP
PCP UR-MCNC: NEGATIVE — SIGNIFICANT CHANGE UP
PCP UR-MCNC: NEGATIVE — SIGNIFICANT CHANGE UP
PH UR: 6.5 — SIGNIFICANT CHANGE UP (ref 5–8)
PH UR: 6.5 — SIGNIFICANT CHANGE UP (ref 5–8)
PLATELET # BLD AUTO: 240 K/UL — SIGNIFICANT CHANGE UP (ref 150–400)
PLATELET # BLD AUTO: 240 K/UL — SIGNIFICANT CHANGE UP (ref 150–400)
POTASSIUM SERPL-MCNC: 4.1 MMOL/L — SIGNIFICANT CHANGE UP (ref 3.5–5.3)
POTASSIUM SERPL-MCNC: 4.1 MMOL/L — SIGNIFICANT CHANGE UP (ref 3.5–5.3)
POTASSIUM SERPL-SCNC: 4.1 MMOL/L — SIGNIFICANT CHANGE UP (ref 3.5–5.3)
POTASSIUM SERPL-SCNC: 4.1 MMOL/L — SIGNIFICANT CHANGE UP (ref 3.5–5.3)
PROT SERPL-MCNC: 7.9 G/DL — SIGNIFICANT CHANGE UP (ref 6–8.3)
PROT SERPL-MCNC: 7.9 G/DL — SIGNIFICANT CHANGE UP (ref 6–8.3)
PROT UR-MCNC: 30 MG/DL
PROT UR-MCNC: 30 MG/DL
RAPID RVP RESULT: SIGNIFICANT CHANGE UP
RAPID RVP RESULT: SIGNIFICANT CHANGE UP
RBC # BLD: 4.67 M/UL — SIGNIFICANT CHANGE UP (ref 4.2–5.8)
RBC # BLD: 4.67 M/UL — SIGNIFICANT CHANGE UP (ref 4.2–5.8)
RBC # FLD: 16.1 % — HIGH (ref 10.3–14.5)
RBC # FLD: 16.1 % — HIGH (ref 10.3–14.5)
SALICYLATES SERPL-MCNC: <1.7 MG/DL — LOW (ref 2.8–20)
SALICYLATES SERPL-MCNC: <1.7 MG/DL — LOW (ref 2.8–20)
SARS-COV-2 RNA SPEC QL NAA+PROBE: SIGNIFICANT CHANGE UP
SODIUM SERPL-SCNC: 143 MMOL/L — SIGNIFICANT CHANGE UP (ref 135–145)
SODIUM SERPL-SCNC: 143 MMOL/L — SIGNIFICANT CHANGE UP (ref 135–145)
SP GR SPEC: 1.01 — SIGNIFICANT CHANGE UP (ref 1–1.03)
SP GR SPEC: 1.01 — SIGNIFICANT CHANGE UP (ref 1–1.03)
THC UR QL: POSITIVE
THC UR QL: POSITIVE
UROBILINOGEN FLD QL: 0.2 MG/DL — SIGNIFICANT CHANGE UP (ref 0.2–1)
UROBILINOGEN FLD QL: 0.2 MG/DL — SIGNIFICANT CHANGE UP (ref 0.2–1)
WBC # BLD: 6.18 K/UL — SIGNIFICANT CHANGE UP (ref 3.8–10.5)
WBC # BLD: 6.18 K/UL — SIGNIFICANT CHANGE UP (ref 3.8–10.5)
WBC # FLD AUTO: 6.18 K/UL — SIGNIFICANT CHANGE UP (ref 3.8–10.5)
WBC # FLD AUTO: 6.18 K/UL — SIGNIFICANT CHANGE UP (ref 3.8–10.5)

## 2023-12-05 PROCEDURE — 99222 1ST HOSP IP/OBS MODERATE 55: CPT

## 2023-12-05 PROCEDURE — 99285 EMERGENCY DEPT VISIT HI MDM: CPT | Mod: 25

## 2023-12-05 PROCEDURE — 99223 1ST HOSP IP/OBS HIGH 75: CPT | Mod: GC

## 2023-12-05 PROCEDURE — 74176 CT ABD & PELVIS W/O CONTRAST: CPT | Mod: 26

## 2023-12-05 PROCEDURE — 70450 CT HEAD/BRAIN W/O DYE: CPT | Mod: 26,MA

## 2023-12-05 PROCEDURE — 71045 X-RAY EXAM CHEST 1 VIEW: CPT | Mod: 26

## 2023-12-05 RX ORDER — OLANZAPINE 15 MG/1
10 TABLET, FILM COATED ORAL AT BEDTIME
Refills: 0 | Status: DISCONTINUED | OUTPATIENT
Start: 2023-12-05 | End: 2023-12-07

## 2023-12-05 RX ORDER — HALOPERIDOL DECANOATE 100 MG/ML
5 INJECTION INTRAMUSCULAR ONCE
Refills: 0 | Status: COMPLETED | OUTPATIENT
Start: 2023-12-05 | End: 2023-12-05

## 2023-12-05 RX ORDER — INFLUENZA VIRUS VACCINE 15; 15; 15; 15 UG/.5ML; UG/.5ML; UG/.5ML; UG/.5ML
0.5 SUSPENSION INTRAMUSCULAR ONCE
Refills: 0 | Status: DISCONTINUED | OUTPATIENT
Start: 2023-12-05 | End: 2023-12-07

## 2023-12-05 RX ORDER — DIVALPROEX SODIUM 500 MG/1
500 TABLET, DELAYED RELEASE ORAL DAILY
Refills: 0 | Status: DISCONTINUED | OUTPATIENT
Start: 2023-12-05 | End: 2023-12-07

## 2023-12-05 RX ORDER — SODIUM CHLORIDE 9 MG/ML
1200 INJECTION INTRAMUSCULAR; INTRAVENOUS; SUBCUTANEOUS ONCE
Refills: 0 | Status: COMPLETED | OUTPATIENT
Start: 2023-12-05 | End: 2023-12-05

## 2023-12-05 RX ORDER — IOHEXOL 300 MG/ML
30 INJECTION, SOLUTION INTRAVENOUS ONCE
Refills: 0 | Status: COMPLETED | OUTPATIENT
Start: 2023-12-05 | End: 2023-12-05

## 2023-12-05 RX ORDER — SODIUM CHLORIDE 9 MG/ML
1000 INJECTION INTRAMUSCULAR; INTRAVENOUS; SUBCUTANEOUS ONCE
Refills: 0 | Status: COMPLETED | OUTPATIENT
Start: 2023-12-05 | End: 2023-12-05

## 2023-12-05 RX ORDER — PIPERACILLIN AND TAZOBACTAM 4; .5 G/20ML; G/20ML
3.38 INJECTION, POWDER, LYOPHILIZED, FOR SOLUTION INTRAVENOUS ONCE
Refills: 0 | Status: COMPLETED | OUTPATIENT
Start: 2023-12-05 | End: 2023-12-05

## 2023-12-05 RX ORDER — FERROUS SULFATE 325(65) MG
325 TABLET ORAL DAILY
Refills: 0 | Status: DISCONTINUED | OUTPATIENT
Start: 2023-12-05 | End: 2023-12-07

## 2023-12-05 RX ORDER — ENOXAPARIN SODIUM 100 MG/ML
40 INJECTION SUBCUTANEOUS EVERY 24 HOURS
Refills: 0 | Status: DISCONTINUED | OUTPATIENT
Start: 2023-12-05 | End: 2023-12-07

## 2023-12-05 RX ORDER — SODIUM CHLORIDE 9 MG/ML
1000 INJECTION, SOLUTION INTRAVENOUS
Refills: 0 | Status: DISCONTINUED | OUTPATIENT
Start: 2023-12-05 | End: 2023-12-07

## 2023-12-05 RX ORDER — POLYETHYLENE GLYCOL 3350 17 G/17G
17 POWDER, FOR SOLUTION ORAL DAILY
Refills: 0 | Status: DISCONTINUED | OUTPATIENT
Start: 2023-12-05 | End: 2023-12-07

## 2023-12-05 RX ORDER — SENNA PLUS 8.6 MG/1
2 TABLET ORAL AT BEDTIME
Refills: 0 | Status: DISCONTINUED | OUTPATIENT
Start: 2023-12-05 | End: 2023-12-07

## 2023-12-05 RX ORDER — ACETAMINOPHEN 500 MG
650 TABLET ORAL EVERY 6 HOURS
Refills: 0 | Status: DISCONTINUED | OUTPATIENT
Start: 2023-12-05 | End: 2023-12-07

## 2023-12-05 RX ORDER — DIPHENHYDRAMINE HCL 50 MG
25 CAPSULE ORAL ONCE
Refills: 0 | Status: COMPLETED | OUTPATIENT
Start: 2023-12-05 | End: 2023-12-05

## 2023-12-05 RX ORDER — MAGNESIUM SULFATE 500 MG/ML
4 VIAL (ML) INJECTION ONCE
Refills: 0 | Status: COMPLETED | OUTPATIENT
Start: 2023-12-05 | End: 2023-12-05

## 2023-12-05 RX ADMIN — ENOXAPARIN SODIUM 40 MILLIGRAM(S): 100 INJECTION SUBCUTANEOUS at 12:52

## 2023-12-05 RX ADMIN — SODIUM CHLORIDE 75 MILLILITER(S): 9 INJECTION, SOLUTION INTRAVENOUS at 12:51

## 2023-12-05 RX ADMIN — OLANZAPINE 10 MILLIGRAM(S): 15 TABLET, FILM COATED ORAL at 19:39

## 2023-12-05 RX ADMIN — IOHEXOL 30 MILLILITER(S): 300 INJECTION, SOLUTION INTRAVENOUS at 12:04

## 2023-12-05 RX ADMIN — Medication 25 MILLIGRAM(S): at 01:58

## 2023-12-05 RX ADMIN — Medication 1 MILLIGRAM(S): at 04:44

## 2023-12-05 RX ADMIN — Medication 1 MILLIGRAM(S): at 01:58

## 2023-12-05 RX ADMIN — HALOPERIDOL DECANOATE 5 MILLIGRAM(S): 100 INJECTION INTRAMUSCULAR at 01:58

## 2023-12-05 RX ADMIN — SODIUM CHLORIDE 2400 MILLILITER(S): 9 INJECTION INTRAMUSCULAR; INTRAVENOUS; SUBCUTANEOUS at 03:47

## 2023-12-05 RX ADMIN — SODIUM CHLORIDE 1000 MILLILITER(S): 9 INJECTION INTRAMUSCULAR; INTRAVENOUS; SUBCUTANEOUS at 02:39

## 2023-12-05 RX ADMIN — SODIUM CHLORIDE 75 MILLILITER(S): 9 INJECTION, SOLUTION INTRAVENOUS at 08:55

## 2023-12-05 RX ADMIN — PIPERACILLIN AND TAZOBACTAM 200 GRAM(S): 4; .5 INJECTION, POWDER, LYOPHILIZED, FOR SOLUTION INTRAVENOUS at 03:48

## 2023-12-05 NOTE — ED PROVIDER NOTE - CLINICAL SUMMARY MEDICAL DECISION MAKING FREE TEXT BOX
3:30 AM I spoke to patient's mother who states at approximately 12:30 AM he delete word she heard a fall and found patient on floor screaming "no "no".  Mother states patient had similar episode of change in mental status 1-1/2 months ago and was treated at [facility].  Mother states patient with rectal cancer and received radiation therapy yesterday morning. 3:30 AM I spoke to patient's mother who states at approximately 12:30 AM he delete word she heard a fall and found patient on floor screaming "no "no".  Mother states patient had similar episode of change in mental status 1-1/2 months ago and was treated at [facility].  Mother states patient with rectal cancer and received radiation therapy yesterday morning. 3:30 AM I spoke to patient's mother who states at approximately 12:30 AM he delete word she heard a fall and found patient on floor screaming "no "no".  Mother states patient had similar episode of change in mental status 1-1/2 months ago and was treated at [facility] [place].  Mother states patient with rectal cancer and received radiation therapy yesterday morning.    615a- pt more interactive and conversant, states his name is "Rashid" and his is "OK"    MAR and hospitalist endorsed pt admitted change in mental status and elevated lactic acid levels.   Mother agreed with admission. 3:30 AM I spoke to patient's mother who states at approximately 12:30 AM he delete word she heard a fall and found patient on floor screaming "no "no".  Mother states patient had similar episode of change in mental status 1-1/2 months ago and was treated at [facility].  Mother states patient with rectal cancer and received radiation therapy yesterday morning.    615a- pt more interactive and conversant, states his name is "Rashid" and his is "OK"    MAR and hospitalist endorsed pt admitted change in mental status and elevated lactic acid levels.   Mother agreed with admission.

## 2023-12-05 NOTE — CONSULT NOTE ADULT - SUBJECTIVE AND OBJECTIVE BOX
Patient is a 48y old  Male who presents with a chief complaint of     HPI:    Reports that he was at home when he lost consciousness.  His mother heard him fall and she found him unresponsive.  Earlier he had developed abdominal pain nausea and vomiting following initiation of chemotherapy.  PAST MEDICAL & SURGICAL HISTORY:  Rectal cancer      Bipolar disorder          FAMILY HISTORY:  Family history of asthma          Social Hx:  Nonsmoker, no drug or alcohol use    MEDICATIONS  (STANDING):  divalproex  milliGRAM(s) Oral daily  enoxaparin Injectable 40 milliGRAM(s) SubCutaneous every 24 hours  ferrous    sulfate 325 milliGRAM(s) Oral daily  influenza   Vaccine 0.5 milliLiter(s) IntraMuscular once  lactated ringers. 1000 milliLiter(s) (75 mL/Hr) IV Continuous <Continuous>  OLANZapine 10 milliGRAM(s) Oral at bedtime  senna 2 Tablet(s) Oral at bedtime       Allergies    dust (Eye Irritation)  No Known Drug Allergies    Intolerances        ROS: Pertinent positives in HPI, all other ROS were reviewed and are negative.      Vital Signs Last 24 Hrs  T(C): 36.8 (05 Dec 2023 21:01), Max: 36.8 (05 Dec 2023 21:01)  T(F): 98.3 (05 Dec 2023 21:01), Max: 98.3 (05 Dec 2023 21:01)  HR: 67 (05 Dec 2023 21:01) (67 - 86)  BP: 136/90 (05 Dec 2023 21:01) (110/73 - 136/90)  BP(mean): 103 (05 Dec 2023 19:15) (97 - 103)  RR: 17 (05 Dec 2023 21:01) (17 - 18)  SpO2: 96% (05 Dec 2023 21:01) (96% - 100%)    Parameters below as of 05 Dec 2023 21:01  Patient On (Oxygen Delivery Method): room air            Constitutional: awake and alert.  HEENT: PERRLA, EOMI,   Neck: Supple.  Respiratory: Breath sounds are clear bilaterally  Cardiovascular: S1 and S2, regular / rhythm  Gastrointestinal: soft, nontender  Extremities:  no edema  Vascular: Carotid Bruit - no  Musculoskeletal: no joint swelling/tenderness, no abnormal movements  Skin: No rashes    Neurological exam:  HF: A x O x 3. Appropriately interactive, normal affect. Speech fluent, No Aphasia or paraphasic errors. Naming /repetition intact   CN: MARK, EOMI, VFF, facial sensation normal, no NLFD, tongue midline, Palate moves equally, SCM equal bilaterally  Motor: No pronator drift, Strength 5/5 in all 4 ext, normal bulk and tone, no tremor, rigidity or bradykinesia.    Sens: Intact to light touch / PP/ VS/ JS    Reflexes: Symmetric and normal . BJ 2+, BR 2+, KJ 2+, AJ 2+, downgoing toes b/l  Coord:  No FNFA, dysmetria, [place] intact   Gait/Balance: Normal/Cannot test    NIHSS:          Labs:                        14.1   6.18  )-----------( 240      ( 05 Dec 2023 02:04 )             42.9     12-05    143  |  114<H>  |  10  ----------------------------<  124<H>  4.1   |  21<L>  |  1.25    Ca    9.4      05 Dec 2023 02:04    TPro  7.9  /  Alb  3.8  /  TBili  0.3  /  DBili  x   /  AST  9<L>  /  ALT  17  /  AlkPhos  49  12-05            Radiology report:  - CT head:  < from: CT Head No Cont (12.05.23 @ 04:47) >    ACC: [id number] EXAM:  CT BRAIN   ORDERED BY: LESLI RITTER     PROCEDURE DATE:  12/05/2023          INTERPRETATION:  CLINICAL INFORMATION:  AMS    TECHNIQUE: Multiple axial CT images of the brain were obtained without   the administration of IV contrast.  Multiplanar 2D reformations obtained   from thin slice transverse reconstructions.    COMPARISON:  None available.    FINDINGS:    There is focal encephalomalacia of the left temporal lobe. The ventricles   and sulci are otherwise within normal limits. There is no sulcal   effacement. There is no intracranial hemorrhage, extra axial fluid   collection, mass effect or midline shift. There is no acute large vessel   territorial infarct. The skull is intact. Mucosal thickening of the   visualized left maxillary sinus. The remaining visualized paranasal   sinuses and mastoid air cells are clear.      IMPRESSION:    No acute intracranial pathology.    Left temporal lobe encephalomalacia. This may be related to previous   infarct or sequelae of prior trauma.    --- End of Report ---             BALDOMERO MONTOYA M.D., Attending Radiologist  This document has been electronically signed. Dec  5 2023  4:59AM    < end of copied text >  < from: CT Head No Cont (12.05.23 @ 04:47) >    ACC: [id number] EXAM:  CT BRAIN   ORDERED BY: LESLI RITTER     PROCEDURE DATE:  12/05/2023          INTERPRETATION:  CLINICAL INFORMATION:  AMS    TECHNIQUE: Multiple axial CT images of the brain were obtained without   the administration of IV contrast.  Multiplanar 2D reformations obtained   from thin slice transverse reconstructions.    COMPARISON:  None available.    FINDINGS:    There is focal encephalomalacia of the left temporal lobe. The ventricles   and sulci are otherwise within normal limits. There is no sulcal   effacement. There is no intracranial hemorrhage, extra axial fluid   collection, mass effect or midline shift. There is no acute large vessel   territorial infarct. The skull is intact. Mucosal thickening of the   visualized left maxillary sinus. The remaining visualized paranasal   sinuses and mastoid air cells are clear.      IMPRESSION:    No acute intracranial pathology.    Left temporal lobe encephalomalacia. This may be related to previous   infarct or sequelae of prior trauma.    --- End of Report ---             BALDOMERO MONTOYA M.D., Attending Radiologist  This document has been electronically signed. Dec  5 2023  4:59AM    < end of copied text >  < from: CT Head No Cont (12.05.23 @ 04:47) >    ACC: [id number] EXAM:  CT BRAIN   ORDERED BY: LESLI RITTER     PROCEDURE DATE:  12/05/2023          INTERPRETATION:  CLINICAL INFORMATION:  AMS    TECHNIQUE: Multiple axial CT images of the brain were obtained without   the administration of IV contrast.  Multiplanar 2D reformations obtained   from thin slice transverse reconstructions.    COMPARISON:  None available.    FINDINGS:    There is focal encephalomalacia of the left temporal lobe. The ventricles   and sulci are otherwise within normal limits. There is no sulcal   effacement. There is no intracranial hemorrhage, extra axial fluid   collection, mass effect or midline shift. There is no acute large vessel   territorial infarct. The skull is intact. Mucosal thickening of the   visualized left maxillary sinus. The remaining visualized paranasal   sinuses and mastoid air cells are clear.      IMPRESSION:    No acute intracranial pathology.    Left temporal lobe encephalomalacia. This may be related to previous   infarct or sequelae of prior trauma.    --- End of Report ---             BALDOMERO MONTOYA M.D., Attending Radiologist  This document has been electronically signed. Dec  5 2023  4:59AM    < end of copied text >  < from: CT Head No Cont (12.05.23 @ 04:47) >    ACC: [id number] EXAM:  CT BRAIN   ORDERED BY: LESLI RITTER     PROCEDURE DATE:  12/05/2023          INTERPRETATION:  CLINICAL INFORMATION:  AMS    TECHNIQUE: Multiple axial CT images of the brain were obtained without   the administration of IV contrast.  Multiplanar 2D reformations obtained   from thin slice transverse reconstructions.    COMPARISON:  None available.    FINDINGS:    There is focal encephalomalacia of the left temporal lobe. The ventricles   and sulci are otherwise within normal limits. There is no sulcal   effacement. There is no intracranial hemorrhage, extra axial fluid   collection, mass effect or midline shift. There is no acute large vessel   territorial infarct. The skull is intact. Mucosal thickening of the   visualized left maxillary sinus. The remaining visualized paranasal   sinuses and mastoid air cells are clear.      IMPRESSION:    No acute intracranial pathology.    Left temporal lobe encephalomalacia. This may be related to previous   infarct or sequelae of prior trauma.    --- End of Report ---             BALDOMERO MONTOYA M.D., Attending Radiologist  This document has been electronically signed. Dec  5 2023  4:59AM    < end of copied text >    < from: NM PET/CT Onc FDG Skull to Thigh, Inital (10.19.23 @ 21:00) >  COMPARISON:  No prior FDG-PET/CT    OTHER STUDIES USED FOR CORRELATION: Pelvic MRI dated 9/12/2023, CT chest   dated 8/25/2023, and CT abdomen and pelvis dated 8/22/2023    FINDINGS:    HEAD/NECK: Physiologic FDG activity in visualized brain, head, and neck.    THORAX: There is a Mediport in the right anterior chest wall with tip of   catheter in the superior vena cava. Myocutaneous FDG activity adjacentto   the Mediport insertion site probably is related to recent procedure (SUV   2.9; image 49). Mild, symmetric gynecomastia. No enlarged or FDG-avid   mediastinal, hilar, or axillary lymph node.    LUNGS: No abnormal FDG activity. No nodule.    PLEURA/PERICARDIUM: No abnormal FDG activity. No effusion.    HEPATOBILIARY/PANCREAS: Physiologic FDG activity.  For reference, normal   liver demonstrates SUV mean 2.7.    SPLEEN: Diffusely increased FDG activity in the spleen, more intense than   hepatic uptake of FDG (SUV mean 4.1). Normal size.    ADRENAL GLANDS: No abnormal FDG activity. No nodule.    KIDNEYS/URINARY BLADDER: Physiologic excreted FDG activity.    REPRODUCTIVE ORGANS: No abnormal FDG activity.    ABDOMINOPELVIC LYMPH NODES/RETROPERITONEUM: Enlarged FDG-avid obturator   lymph nodes measure 2.0 x 1.6 cm, SUV 14.4 on the right (image 206), and   2.0 x 1.6 cm, SUV 13.4 on the left (image 206). On 8/22/2023, the right   obturator node measures 1.4 x 1.3 cm, and the left obturator node   measures 2.6 x 1.6 cm.    ESOPHAGUS/STOMACH/BOWEL/PERITONEUM/MESENTERY: Large, intense foci of   hypermetabolism in the mid sigmoid colon (SUV 18.9; image 211) and rectum   (SUV 23.3; image 215), corresponding to lesions identified on prior   studies, and not well delineated on CT in the absence of intravenous   contrast.    VESSELS: Unremarkable.    BONES/SOFT TISSUES: Diffuse bone marrow hypermetabolism, without   corresponding abnormalities on CT. For reference, the L5 vertebral body   demonstrates SUV 3.8.    IMPRESSION: Abnormal skull-to-thigh FDG-PET/CT scan.    1. FDG-avid lesions in the rectum and sigmoid colon correspond to   biopsy-proven adenocarcinoma.    2. FDG-avid enlarged bilateral obturator lymph nodes are compatible with   metastatic disease. The right obturator node is slightly increased in   size, and the left obturator node slightly decreased in size as compared   to CT scans dated 8/22/2023.    3. Nonspecific diffuse bone marrow and splenic hypermetabolism may be   related to recent treatment with colony stimulating factors. Please   correlate clinically.    --- End of Report ---               ISABEL MARIE MD; Attending Nuclear Medicine   This document has been electronically signed. Oct 24 2023  8:57AM    < end of copied text >  - Patient is a 48y old  Male who presents with a chief complaint of     HPI:    Reports that he was at home when he lost consciousness.  His mother heard him fall and she found him unresponsive.  Earlier he had developed abdominal pain nausea and vomiting following initiation of chemotherapy.  PAST MEDICAL & SURGICAL HISTORY:  Rectal cancer      Bipolar disorder          FAMILY HISTORY:  Family history of asthma          Social Hx:  Nonsmoker, no drug or alcohol use    MEDICATIONS  (STANDING):  divalproex  milliGRAM(s) Oral daily  enoxaparin Injectable 40 milliGRAM(s) SubCutaneous every 24 hours  ferrous    sulfate 325 milliGRAM(s) Oral daily  influenza   Vaccine 0.5 milliLiter(s) IntraMuscular once  lactated ringers. 1000 milliLiter(s) (75 mL/Hr) IV Continuous <Continuous>  OLANZapine 10 milliGRAM(s) Oral at bedtime  senna 2 Tablet(s) Oral at bedtime       Allergies    dust (Eye Irritation)  No Known Drug Allergies    Intolerances        ROS: Pertinent positives in HPI, all other ROS were reviewed and are negative.      Vital Signs Last 24 Hrs  T(C): 36.8 (05 Dec 2023 21:01), Max: 36.8 (05 Dec 2023 21:01)  T(F): 98.3 (05 Dec 2023 21:01), Max: 98.3 (05 Dec 2023 21:01)  HR: 67 (05 Dec 2023 21:01) (67 - 86)  BP: 136/90 (05 Dec 2023 21:01) (110/73 - 136/90)  BP(mean): 103 (05 Dec 2023 19:15) (97 - 103)  RR: 17 (05 Dec 2023 21:01) (17 - 18)  SpO2: 96% (05 Dec 2023 21:01) (96% - 100%)    Parameters below as of 05 Dec 2023 21:01  Patient On (Oxygen Delivery Method): room air            Constitutional: awake and alert.  HEENT: PERRLA, EOMI,   Neck: Supple.  Respiratory: Breath sounds are clear bilaterally  Cardiovascular: S1 and S2, regular / rhythm  Gastrointestinal: soft, nontender  Extremities:  no edema  Vascular: Carotid Bruit - no  Musculoskeletal: no joint swelling/tenderness, no abnormal movements  Skin: No rashes    Neurological exam:  HF: A x O x 3. Appropriately interactive, normal affect. Speech fluent, No Aphasia or paraphasic errors. Naming /repetition intact   CN: MARK, EOMI, VFF, facial sensation normal, no NLFD, tongue midline, Palate moves equally, SCM equal bilaterally  Motor: No pronator drift, Strength 5/5 in all 4 ext, normal bulk and tone, no tremor, rigidity or bradykinesia.    Sens: Intact to light touch / PP/ VS/ JS    Reflexes: Symmetric and normal . BJ 2+, BR 2+, KJ 2+, AJ 2+, downgoing toes b/l  Coord:  No FNFA, dysmetria, [place] intact   Gait/Balance: Normal/Cannot test    NIHSS:          Labs:                        14.1   6.18  )-----------( 240      ( 05 Dec 2023 02:04 )             42.9     12-05    143  |  114<H>  |  10  ----------------------------<  124<H>  4.1   |  21<L>  |  1.25    Ca    9.4      05 Dec 2023 02:04    TPro  7.9  /  Alb  3.8  /  TBili  0.3  /  DBili  x   /  AST  9<L>  /  ALT  17  /  AlkPhos  49  12-05            Radiology report:  - CT head:  < from: CT Head No Cont (12.05.23 @ 04:47) >    ACC: [id number] EXAM:  CT BRAIN   ORDERED BY: LESLI RITTER     PROCEDURE DATE:  12/05/2023          INTERPRETATION:  CLINICAL INFORMATION:  AMS    TECHNIQUE: Multiple axial CT images of the brain were obtained without   the administration of IV contrast.  Multiplanar 2D reformations obtained   from thin slice transverse reconstructions.    COMPARISON:  None available.    FINDINGS:    There is focal encephalomalacia of the left temporal lobe. The ventricles   and sulci are otherwise within normal limits. There is no sulcal   effacement. There is no intracranial hemorrhage, extra axial fluid   collection, mass effect or midline shift. There is no acute large vessel   territorial infarct. The skull is intact. Mucosal thickening of the   visualized left maxillary sinus. The remaining visualized paranasal   sinuses and mastoid air cells are clear.      IMPRESSION:    No acute intracranial pathology.    Left temporal lobe encephalomalacia. This may be related to previous   infarct or sequelae of prior trauma.    --- End of Report ---             BALDOMERO MONTOYA M.D., Attending Radiologist  This document has been electronically signed. Dec  5 2023  4:59AM    < end of copied text >  < from: CT Head No Cont (12.05.23 @ 04:47) >    ACC: [id number] EXAM:  CT BRAIN   ORDERED BY: LESLI RITTER     PROCEDURE DATE:  12/05/2023          INTERPRETATION:  CLINICAL INFORMATION:  AMS    TECHNIQUE: Multiple axial CT images of the brain were obtained without   the administration of IV contrast.  Multiplanar 2D reformations obtained   from thin slice transverse reconstructions.    COMPARISON:  None available.    FINDINGS:    There is focal encephalomalacia of the left temporal lobe. The ventricles   and sulci are otherwise within normal limits. There is no sulcal   effacement. There is no intracranial hemorrhage, extra axial fluid   collection, mass effect or midline shift. There is no acute large vessel   territorial infarct. The skull is intact. Mucosal thickening of the   visualized left maxillary sinus. The remaining visualized paranasal   sinuses and mastoid air cells are clear.      IMPRESSION:    No acute intracranial pathology.    Left temporal lobe encephalomalacia. This may be related to previous   infarct or sequelae of prior trauma.    --- End of Report ---             BALDOMERO MONTOYA M.D., Attending Radiologist  This document has been electronically signed. Dec  5 2023  4:59AM    < end of copied text >  < from: CT Head No Cont (12.05.23 @ 04:47) >    ACC: [id number] EXAM:  CT BRAIN   ORDERED BY: LESLI RITTER     PROCEDURE DATE:  12/05/2023          INTERPRETATION:  CLINICAL INFORMATION:  AMS    TECHNIQUE: Multiple axial CT images of the brain were obtained without   the administration of IV contrast.  Multiplanar 2D reformations obtained   from thin slice transverse reconstructions.    COMPARISON:  None available.    FINDINGS:    There is focal encephalomalacia of the left temporal lobe. The ventricles   and sulci are otherwise within normal limits. There is no sulcal   effacement. There is no intracranial hemorrhage, extra axial fluid   collection, mass effect or midline shift. There is no acute large vessel   territorial infarct. The skull is intact. Mucosal thickening of the   visualized left maxillary sinus. The remaining visualized paranasal   sinuses and mastoid air cells are clear.      IMPRESSION:    No acute intracranial pathology.    Left temporal lobe encephalomalacia. This may be related to previous   infarct or sequelae of prior trauma.    --- End of Report ---             BALDOMERO MONTOYA M.D., Attending Radiologist  This document has been electronically signed. Dec  5 2023  4:59AM    < end of copied text >  < from: CT Head No Cont (12.05.23 @ 04:47) >    ACC: [id number] EXAM:  CT BRAIN   ORDERED BY: LESLI RITTER     PROCEDURE DATE:  12/05/2023          INTERPRETATION:  CLINICAL INFORMATION:  AMS    TECHNIQUE: Multiple axial CT images of the brain were obtained without   the administration of IV contrast.  Multiplanar 2D reformations obtained   from thin slice transverse reconstructions.    COMPARISON:  None available.    FINDINGS:    There is focal encephalomalacia of the left temporal lobe. The ventricles   and sulci are otherwise within normal limits. There is no sulcal   effacement. There is no intracranial hemorrhage, extra axial fluid   collection, mass effect or midline shift. There is no acute large vessel   territorial infarct. The skull is intact. Mucosal thickening of the   visualized left maxillary sinus. The remaining visualized paranasal   sinuses and mastoid air cells are clear.      IMPRESSION:    No acute intracranial pathology.    Left temporal lobe encephalomalacia. This may be related to previous   infarct or sequelae of prior trauma.    --- End of Report ---             BALDOMERO MONTOYA M.D., Attending Radiologist  This document has been electronically signed. Dec  5 2023  4:59AM    < end of copied text >    < from: NM PET/CT Onc FDG Skull to Thigh, Inital (10.19.23 @ 21:00) >  COMPARISON:  No prior FDG-PET/CT    OTHER STUDIES USED FOR CORRELATION: Pelvic MRI dated 9/12/2023, CT chest   dated 8/25/2023, and CT abdomen and pelvis dated 8/22/2023    FINDINGS:    HEAD/NECK: Physiologic FDG activity in visualized brain, head, and neck.    THORAX: There is a Mediport in the right anterior chest wall with tip of   catheter in the superior vena cava. Myocutaneous FDG activity adjacentto   the Mediport insertion site probably is related to recent procedure (SUV   2.9; image 49). Mild, symmetric gynecomastia. No enlarged or FDG-avid   mediastinal, hilar, or axillary lymph node.    LUNGS: No abnormal FDG activity. No nodule.    PLEURA/PERICARDIUM: No abnormal FDG activity. No effusion.    HEPATOBILIARY/PANCREAS: Physiologic FDG activity.  For reference, normal   liver demonstrates SUV mean 2.7.    SPLEEN: Diffusely increased FDG activity in the spleen, more intense than   hepatic uptake of FDG (SUV mean 4.1). Normal size.    ADRENAL GLANDS: No abnormal FDG activity. No nodule.    KIDNEYS/URINARY BLADDER: Physiologic excreted FDG activity.    REPRODUCTIVE ORGANS: No abnormal FDG activity.    ABDOMINOPELVIC LYMPH NODES/RETROPERITONEUM: Enlarged FDG-avid obturator   lymph nodes measure 2.0 x 1.6 cm, SUV 14.4 on the right (image 206), and   2.0 x 1.6 cm, SUV 13.4 on the left (image 206). On 8/22/2023, the right   obturator node measures 1.4 x 1.3 cm, and the left obturator node   measures 2.6 x 1.6 cm.    ESOPHAGUS/STOMACH/BOWEL/PERITONEUM/MESENTERY: Large, intense foci of   hypermetabolism in the mid sigmoid colon (SUV 18.9; image 211) and rectum   (SUV 23.3; image 215), corresponding to lesions identified on prior   studies, and not well delineated on CT in the absence of intravenous   contrast.    VESSELS: Unremarkable.    BONES/SOFT TISSUES: Diffuse bone marrow hypermetabolism, without   corresponding abnormalities on CT. For reference, the L5 vertebral body   demonstrates SUV 3.8.    IMPRESSION: Abnormal skull-to-thigh FDG-PET/CT scan.    1. FDG-avid lesions in the rectum and sigmoid colon correspond to   biopsy-proven adenocarcinoma.    2. FDG-avid enlarged bilateral obturator lymph nodes are compatible with   metastatic disease. The right obturator node is slightly increased in   size, and the left obturator node slightly decreased in size as compared   to CT scans dated 8/22/2023.    3. Nonspecific diffuse bone marrow and splenic hypermetabolism may be   related to recent treatment with colony stimulating factors. Please   correlate clinically.    --- End of Report ---               ISABEL MARIE MD; Attending Nuclear Medicine   This document has been electronically signed. Oct 24 2023  8:57AM    < end of copied text >  -

## 2023-12-05 NOTE — H&P ADULT - ATTENDING COMMENTS
Patient seen and examined at approximately 10:30am this AM. Case discussed with Dr. Mota. In brief this is a 47 yo M with recently diagnosed rectal CA (on chemotherapy-follows with Dr. Felix at [facility]) and bipolar disorder who presents with an episode of AMS and agitation at home. Reportedly, the patient had chemotherapy yesterday and came thereafter. He did not eat or drink as he usually feels nauseous after his chemotherapy. Later, his mother heard a noise and found him on the ground both confused and agitated. She was concerned and decided to call EMS. At time of my evaluation, the patient is AOx3. He reports that he has been having 2 days of abdominal pain with associated nausea, vomiting, and loose stool. He denies any pain at this particular time. CT A/P ordered and performed and negative for any obvious cause of his symptoms except for his known rectal CA. Will continue IVF, hold antibiotics at this time. Will consult neuro for the episode of confusion (?post-ictal) though family denies any bowel or bladder incontinence. Remaining care as noted above. Patient seen and examined at approximately 10:30am this AM. Case discussed with Dr. Mota. In brief this is a 47 yo M with recently diagnosed rectal CA (on chemotherapy-follows with Dr. Felix at [facility]) and bipolar disorder who presents with an episode of AMS and agitation at home. Reportedly, the patient had chemotherapy yesterday and came thereafter. He did not eat or drink as he usually feels nauseous after his chemotherapy. Later, his mother heard a noise and found him on the ground both confused and agitated. She was concerned and decided to call EMS. At time of my evaluation, the patient is AOx3. He reports that he has been having 2 days of abdominal pain with associated nausea, vomiting, and loose stool. He denies any pain at this particular time. CT A/P ordered and performed and negative for any obvious cause of his symptoms except for his known rectal CA. Will continue IVF, hold antibiotics at this time. Will consult neuro for the episode of confusion (?post-ictal) though family denies any bowel or bladder incontinence. Remaining care as noted above.

## 2023-12-05 NOTE — H&P ADULT - NSHPREVIEWOFSYSTEMS_GEN_ALL_CORE
REVIEW OF SYSTEMS:    CONSTITUTIONAL: No weakness, + subjective fevers no chills  EYES/ENT: No visual changes;  No vertigo or throat pain   NECK: No pain or stiffness  RESPIRATORY: No cough, wheezing, hemoptysis; No shortness of breath  CARDIOVASCULAR: No chest pain or palpitations  GASTROINTESTINAL: + abdominal or epigastric pain. + nausea, vomiting, No hematemesis; + diarrhea no constipation. No melena or hematochezia.  GENITOURINARY: No dysuria, frequency or hematuria  NEUROLOGICAL: No numbness or weakness  SKIN: No itching, rashes

## 2023-12-05 NOTE — ED PROVIDER NOTE - CARE PLAN
Principal Discharge DX:	Change in mental state  Secondary Diagnosis:	Agitation  Secondary Diagnosis:	Elevated lactic acid level   1

## 2023-12-05 NOTE — H&P ADULT - ASSESSMENT
48 yrs old M, from home, pmhx of colon cancer, rectal mass on Chemotherapy following with Dr. Felix oncology [facility], Bipolar disorder presented with abdominal pain, Nausea and vomiting.

## 2023-12-05 NOTE — H&P ADULT - PROBLEM SELECTOR PLAN 1
p/w abdominal pain, N/V/D   in ED Temp 97.6, , /70, Sat 99% on RA  no leukocytosis   Lactate 3.1 > 1.8  U/A neg   Salicylate, Acetaminophen and alcohol neg   Urine tox: +ve THC   s/p Zosyn and 2.2 L NS bolus in ED   c/w LR 75 ml/hr   will hold off on Abx for now and monitor brought to ED for AMS, agitation, repeatedly saying no s/p fall, ?head trauma, no seizure or LOC as per pt's mother  CT head: No acute intracranial pathology. Left temporal lobe encephalomalacia. This may be related to previous infarct or sequelae of prior trauma.  Salicylate, Acetaminophen and alcohol neg   Urine tox: +ve THC  Lactate 3.1 > 1.8  [? seizure activity]  ?? EEG  ??? neurology consult brought to ED for AMS, agitation, repeatedly saying no s/p fall, ?head trauma, no seizure or LOC as per pt's mother  CT head: No acute intracranial pathology. Left temporal lobe encephalomalacia. This may be related to previous infarct or sequelae of prior trauma.  Salicylate, Acetaminophen and alcohol neg   Urine tox: +ve THC  Lactate 3.1 > 1.8  [? seizure activity]  Neuro Dr. Varma consulted

## 2023-12-05 NOTE — ED ADULT NURSE NOTE - CAS EDP DISCH DISPOSITION ADMI
[facility] [place] [facility]

## 2023-12-05 NOTE — ED PROVIDER NOTE - OBJECTIVE STATEMENT
Patient is agitated, screaming, combative with ED staff.  Patient requires Ativan and Haldol and Benadryl to relieve agitation. Patient is agitated, screaming, combative with ED staff.  Patient requires Ativan and Haldol and Benadryl to relieve agitation.  3am- Patient less competitive but agitated during evaluation attempted core temperature.  Lactate reported 3.1 IV bolus administered, zosyn ordered. Patient is agitated, screaming, combative with ED staff.  Patient requires Ativan and Haldol and Benadryl to relieve agitation.  3am- Patient less combative but agitated during evaluation attempted core temperature.  Lactate reported 3.1 IV bolus administered, zosyn ordered.

## 2023-12-05 NOTE — ED PROVIDER NOTE - PHYSICAL EXAMINATION
Agitated, combative sitting upright unsupported pushing ER staff away. No meningeal signs.  Bilateral airway entry, no respiratory distress   [employer] regular rate and rhythm S1-S2  Abdomen soft nontender nondistended  Extremity no bony deformities  Strong motor activity.

## 2023-12-05 NOTE — H&P ADULT - PROBLEM SELECTOR PLAN 3
hx of Colon cancer, rectal mass on chemoRx  following with [facility] Dr. Felix as outpt hx of Bipolar disorder on Depakote 500 and Zyprexa 10 at home  c/w home meds

## 2023-12-05 NOTE — CONSULT NOTE ADULT - ASSESSMENT
Unknown cause of LOC likely seizures related to temporal lobe encephalomalacia. Plan EEG, MR head w/wo contrast ; will contact his mother (he reportedly lives with his mother)need more history regarding head trauma and  previous risk factors for stroke. W

## 2023-12-05 NOTE — H&P ADULT - HISTORY OF PRESENT ILLNESS
48 yrs old M, from home, pmhx of colon cancer, rectal mass on Chemotherapy following with Dr. Felix oncology [facility], Bipolar disorder presented with abdominal pain, Nausea and vomiting. Hx obtained from the patient AAOx3, he endorsed abdominal pain about 2  days ago, followed by nausea, 4 episodes of vomiting, 2 episodes of diarrhea [soft stool, not watery], with subjective fever, no chills, headache, cough, sore throat, or other respiratory symptoms, dizziness, visual changes, dyspnea, chest pain, palpitation, decreased strength or sensation in the extremities. Pt stated that he lives with his mother, did not eat outside, no recent travel, no recent antibiotic use, no similar symptoms in the family however had contact with his sister who had flu. Pt has been on chemotherapy for the past 6 weeks, following in [facility] with Dr. Felix. He reported being compliant with his home medication, denied alcohol consumption, smoking, marijuana or illicit drugs use.   Pt reported his symptoms improved and no longer has abdominal pain, or N/V at the time of exam however feels warm.    48 yrs old M, from home, pmhx of colon cancer, rectal mass on Chemotherapy following with Dr. Felix oncology [facility], Bipolar disorder presented with abdominal pain, Nausea and vomiting. Hx obtained from the patient AAOx3, he endorsed abdominal pain about 2  days ago, followed by nausea, 4 episodes of vomiting, 2 episodes of diarrhea [soft stool, not watery], with subjective fever, no chills, headache, cough, sore throat, or other respiratory symptoms, dizziness, visual changes, dyspnea, chest pain, palpitation, decreased strength or sensation in the extremities. Pt stated that he lives with his mother, did not eat outside, no recent travel, no recent antibiotic use, no similar symptoms in the family however had contact with his sister who had flu. Pt has been on chemotherapy for the past 6 weeks, following in [facility] with Dr. Felix. He reported being compliant with his home medication, denied alcohol consumption, smoking, marijuana or illicit drugs use.   Pt reported his symptoms improved and no longer has abdominal pain, or N/V at the time of exam however feels warm.       Collateral information obtained from the mother Ms. Yanely Rodrigues [phone]. She stated that patient had chemotherapy yesterday and had the symptoms stated by the patient himself including nausea, vomiting and diarrhea. When he got home, he did not eat, had a nap. At around 1 Am mother heard a noise, reported that patient fell down, unsure if he had a head trauma however no LOC, seizure, urine or fecal incontinence noticed by the mother. He then was altered, agitated, and would repeatedly say No.   Pt's mother called the ambulance and brought him to the hospital. She is unsure if patient takes his medication regularly and stated that she is not in good shape to help him out due to her COPD and other medical conditions.    48 yrs old M, from home, pmhx of colon cancer, rectal mass on Chemotherapy following with Dr. Felix oncology [facility], Bipolar disorder presented with abdominal pain, Nausea and vomiting. Hx obtained from the patient AAOx3, he endorsed abdominal pain about 2  days ago, followed by nausea, 4 episodes of vomiting, 2 episodes of diarrhea [soft stool, not watery], with subjective fever, no chills, headache, cough, sore throat, or other respiratory symptoms, dizziness, visual changes, dyspnea, chest pain, palpitation, decreased strength or sensation in the extremities. Pt stated that he lives with his mother, did not eat outside, no recent travel, no recent antibiotic use, no similar symptoms in the family however had contact with his sister who had flu. Pt has been on chemotherapy for the past 6 weeks, following in [facility] with Dr. Felix. He reported being compliant with his home medication, denied alcohol consumption, smoking, marijuana or illicit drugs use.   Pt reported his symptoms improved and no longer has abdominal pain, or N/V at the time of exam however feels warm.       Collateral information obtained from the mother Ms. Yanely Rodrigues [phone]. She stated that patient had chemotherapy yesterday and had the symptoms stated by the patient himself including nausea, vomiting and diarrhea. When he got home, he did not eat, had a nap. At around 1 Am mother heard a noise, reported that patient fell down, unsure if he had a head trauma however no LOC, seizure, urine or fecal incontinence noticed by the mother. He then was altered, agitated, and would repeatedly say No.   Pt's mother called the ambulance and brought him to the hospital. She is unsure if patient takes his medication regularly and stated that she is not in good shape to help him out due to her COPD and other medical conditions.

## 2023-12-05 NOTE — ED ADULT NURSE NOTE - NSFALLHARMRISKINTERV_ED_ALL_ED
Assistance OOB with selected safe patient handling equipment if applicable/Assistance with ambulation/Communicate risk of Fall with Harm to all staff, patient, and family/Monitor gait and stability/Monitor for mental status changes and reorient to person, place, and time, as needed/Provide visual cue: red socks, yellow wristband, yellow gown, etc/Reinforce activity limits and safety measures with patient and family/Toileting schedule using arm’s reach rule for commode and bathroom/Use of alarms - bed, stretcher, chair and/or video monitoring/Bed in lowest position, wheels locked, appropriate side rails in place/Call bell, personal items and telephone in reach/Instruct patient to call for assistance before getting out of bed/chair/stretcher/Non-slip footwear applied when patient is off stretcher/[place] to call system/Physically safe environment - no spills, clutter or unnecessary equipment/Purposeful Proactive Rounding/Room/bathroom lighting operational, light cord in reach Assistance OOB with selected safe patient handling equipment if applicable/Assistance with ambulation/Communicate risk of Fall with Harm to all staff, patient, and family/Monitor gait and stability/Monitor for mental status changes and reorient to person, place, and time, as needed/Provide visual cue: red socks, yellow wristband, yellow gown, etc/Reinforce activity limits and safety measures with patient and family/Toileting schedule using arm’s reach rule for commode and bathroom/Use of alarms - bed, stretcher, chair and/or video monitoring/Bed in lowest position, wheels locked, appropriate side rails in place/Call bell, personal items and telephone in reach/Instruct patient to call for assistance before getting out of bed/chair/stretcher/Non-slip footwear applied when patient is off stretcher/[place] to call system/Physically safe environment - no spills, clutter or unnecessary equipment/Purposeful Proactive Rounding/Room/bathroom lighting operational, light cord in reach

## 2023-12-05 NOTE — ED ADULT NURSE REASSESSMENT NOTE - NS ED NURSE REASSESS COMMENT FT1
Pt received upon changing shift, easily arousable to verbal stimuli, no distress noted, clam and cooperative. awaiting for inpatient bed assignment.

## 2023-12-05 NOTE — H&P ADULT - NSHPPHYSICALEXAM_GEN_ALL_CORE
GENERAL: NAD, lying in bed comfortably, looks depressed   HEAD:  Atraumatic, Normocephalic,   EYES: EOMI, PERRLA, conjunctiva and sclera clear  ENT: Moist mucous membranes  NECK: Supple, No JVD  CHEST/LUNG: Clear to auscultation bilaterally; No rales, rhonchi, wheezing, or rubs. Unlabored respirations  HEART: Regular rate and rhythm; No murmurs, rubs, or gallops  ABDOMEN: Bowel sounds present; Soft, Nontender, Nondistended.   EXTREMITIES:  2+ Peripheral Pulses, brisk capillary refill. No clubbing, cyanosis, or edema  NERVOUS SYSTEM:  Alert & Oriented X3, speech clear. No deficits   MSK: FROM all 4 extremities, full and equal strength  SKIN: No rashes or lesions GENERAL: NAD, lying in bed comfortably, looks depressed   HEAD:  Atraumatic, Normocephalic,   EYES: EOMI, PERRLA, conjunctiva and sclera clear  ENT: Moist mucous membranes  NECK: Supple, No JVD  CHEST/LUNG: Clear to auscultation bilaterally; No rales, rhonchi, wheezing, or rubs. Unlabored respirations  HEART: Regular rate and rhythm; No murmurs, rubs, or gallops  ABDOMEN: Bowel sounds present; Soft, Nontender, Nondistended.   EXTREMITIES:  2+ Peripheral Pulses, brisk capillary refill. No clubbing, cyanosis, or edema  NERVOUS SYSTEM:  Alert & Oriented X3, speech clear. moving extremities spontaneously   SKIN: No rashes or lesions

## 2023-12-05 NOTE — H&P ADULT - PROBLEM SELECTOR PLAN 2
hx of Bipolar disorder on Depakote 500 and Zyprexa 10 at home  c/w home meds p/w abdominal pain, N/V/D   in ED Temp 97.6, , /70, Sat 99% on RA  no leukocytosis   Lactate 3.1 > 1.8  U/A neg  s/p Zosyn and 2.2 L NS bolus in ED   c/w LR 75 ml/hr   will hold off on Abx for now and monitor p/w abdominal pain, N/V/D   in ED Temp 97.6, , /70, Sat 99% on RA  no leukocytosis   Lactate 3.1 > 1.8  U/A neg  s/p Zosyn and 2.2 L NS bolus in ED   c/w LR 75 ml/hr   will hold off on Abx for now and monitor  CT abd w/ contrast to rule out obstruction/perforation  RVP panel [hx of contact with flu]

## 2023-12-05 NOTE — PATIENT PROFILE ADULT - FALL HARM RISK - UNIVERSAL INTERVENTIONS
Bed in lowest position, wheels locked, appropriate side rails in place/Call bell, personal items and telephone in reach/Instruct patient to call for assistance before getting out of bed or chair/Non-slip footwear when patient is out of bed/[place] to call system/Physically safe environment - no spills, clutter or unnecessary equipment/Purposeful Proactive Rounding/Room/bathroom lighting operational, light cord in reach Bed in lowest position, wheels locked, appropriate side rails in place/Call bell, personal items and telephone in reach/Instruct patient to call for assistance before getting out of bed or chair/Non-slip footwear when patient is out of bed/[place] to call system/Physically safe environment - no spills, clutter or unnecessary equipment/Purposeful Proactive Rounding/Room/bathroom lighting operational, light cord in reach

## 2023-12-06 LAB
ALBUMIN SERPL ELPH-MCNC: 2.8 G/DL — LOW (ref 3.5–5)
ALBUMIN SERPL ELPH-MCNC: 2.8 G/DL — LOW (ref 3.5–5)
ALP SERPL-CCNC: 39 U/L — LOW (ref 40–120)
ALP SERPL-CCNC: 39 U/L — LOW (ref 40–120)
ALT FLD-CCNC: 15 U/L DA — SIGNIFICANT CHANGE UP (ref 10–60)
ALT FLD-CCNC: 15 U/L DA — SIGNIFICANT CHANGE UP (ref 10–60)
ANION GAP SERPL CALC-SCNC: 4 MMOL/L — LOW (ref 5–17)
ANION GAP SERPL CALC-SCNC: 4 MMOL/L — LOW (ref 5–17)
AST SERPL-CCNC: 18 U/L — SIGNIFICANT CHANGE UP (ref 10–40)
AST SERPL-CCNC: 18 U/L — SIGNIFICANT CHANGE UP (ref 10–40)
BASOPHILS # BLD AUTO: 0.03 K/UL — SIGNIFICANT CHANGE UP (ref 0–0.2)
BASOPHILS # BLD AUTO: 0.03 K/UL — SIGNIFICANT CHANGE UP (ref 0–0.2)
BASOPHILS NFR BLD AUTO: 1.1 % — SIGNIFICANT CHANGE UP (ref 0–2)
BASOPHILS NFR BLD AUTO: 1.1 % — SIGNIFICANT CHANGE UP (ref 0–2)
BILIRUB SERPL-MCNC: 0.2 MG/DL — SIGNIFICANT CHANGE UP (ref 0.2–1.2)
BILIRUB SERPL-MCNC: 0.2 MG/DL — SIGNIFICANT CHANGE UP (ref 0.2–1.2)
BUN SERPL-MCNC: 16 MG/DL — SIGNIFICANT CHANGE UP (ref 7–18)
BUN SERPL-MCNC: 16 MG/DL — SIGNIFICANT CHANGE UP (ref 7–18)
CALCIUM SERPL-MCNC: 8.1 MG/DL — LOW (ref 8.4–10.5)
CALCIUM SERPL-MCNC: 8.1 MG/DL — LOW (ref 8.4–10.5)
CHLORIDE SERPL-SCNC: 116 MMOL/L — HIGH (ref 96–108)
CHLORIDE SERPL-SCNC: 116 MMOL/L — HIGH (ref 96–108)
CO2 SERPL-SCNC: 24 MMOL/L — SIGNIFICANT CHANGE UP (ref 22–31)
CO2 SERPL-SCNC: 24 MMOL/L — SIGNIFICANT CHANGE UP (ref 22–31)
CREAT SERPL-MCNC: 0.87 MG/DL — SIGNIFICANT CHANGE UP (ref 0.5–1.3)
CREAT SERPL-MCNC: 0.87 MG/DL — SIGNIFICANT CHANGE UP (ref 0.5–1.3)
CULTURE RESULTS: NO GROWTH — SIGNIFICANT CHANGE UP
CULTURE RESULTS: NO GROWTH — SIGNIFICANT CHANGE UP
EGFR: 108 ML/MIN/1.73M2 — SIGNIFICANT CHANGE UP
EGFR: 108 ML/MIN/1.73M2 — SIGNIFICANT CHANGE UP
EOSINOPHIL # BLD AUTO: 0.14 K/UL — SIGNIFICANT CHANGE UP (ref 0–0.5)
EOSINOPHIL # BLD AUTO: 0.14 K/UL — SIGNIFICANT CHANGE UP (ref 0–0.5)
EOSINOPHIL NFR BLD AUTO: 5.1 % — SIGNIFICANT CHANGE UP (ref 0–6)
EOSINOPHIL NFR BLD AUTO: 5.1 % — SIGNIFICANT CHANGE UP (ref 0–6)
GLUCOSE SERPL-MCNC: 92 MG/DL — SIGNIFICANT CHANGE UP (ref 70–99)
GLUCOSE SERPL-MCNC: 92 MG/DL — SIGNIFICANT CHANGE UP (ref 70–99)
HCT VFR BLD CALC: 34.8 % — LOW (ref 39–50)
HCT VFR BLD CALC: 34.8 % — LOW (ref 39–50)
HGB BLD-MCNC: 11.2 G/DL — LOW (ref 13–17)
HGB BLD-MCNC: 11.2 G/DL — LOW (ref 13–17)
IMM GRANULOCYTES NFR BLD AUTO: 0.4 % — SIGNIFICANT CHANGE UP (ref 0–0.9)
IMM GRANULOCYTES NFR BLD AUTO: 0.4 % — SIGNIFICANT CHANGE UP (ref 0–0.9)
LYMPHOCYTES # BLD AUTO: 0.46 K/UL — LOW (ref 1–3.3)
LYMPHOCYTES # BLD AUTO: 0.46 K/UL — LOW (ref 1–3.3)
LYMPHOCYTES # BLD AUTO: 16.9 % — SIGNIFICANT CHANGE UP (ref 13–44)
LYMPHOCYTES # BLD AUTO: 16.9 % — SIGNIFICANT CHANGE UP (ref 13–44)
MAGNESIUM SERPL-MCNC: 2 MG/DL — SIGNIFICANT CHANGE UP (ref 1.6–2.6)
MAGNESIUM SERPL-MCNC: 2 MG/DL — SIGNIFICANT CHANGE UP (ref 1.6–2.6)
MCHC RBC-ENTMCNC: 29.3 PG — SIGNIFICANT CHANGE UP (ref 27–34)
MCHC RBC-ENTMCNC: 29.3 PG — SIGNIFICANT CHANGE UP (ref 27–34)
MCHC RBC-ENTMCNC: 32.2 GM/DL — SIGNIFICANT CHANGE UP (ref 32–36)
MCHC RBC-ENTMCNC: 32.2 GM/DL — SIGNIFICANT CHANGE UP (ref 32–36)
MCV RBC AUTO: 91.1 FL — SIGNIFICANT CHANGE UP (ref 80–100)
MCV RBC AUTO: 91.1 FL — SIGNIFICANT CHANGE UP (ref 80–100)
MONOCYTES # BLD AUTO: 0.47 K/UL — SIGNIFICANT CHANGE UP (ref 0–0.9)
MONOCYTES # BLD AUTO: 0.47 K/UL — SIGNIFICANT CHANGE UP (ref 0–0.9)
MONOCYTES NFR BLD AUTO: 17.3 % — HIGH (ref 2–14)
MONOCYTES NFR BLD AUTO: 17.3 % — HIGH (ref 2–14)
NEUTROPHILS # BLD AUTO: 1.61 K/UL — LOW (ref 1.8–7.4)
NEUTROPHILS # BLD AUTO: 1.61 K/UL — LOW (ref 1.8–7.4)
NEUTROPHILS NFR BLD AUTO: 59.2 % — SIGNIFICANT CHANGE UP (ref 43–77)
NEUTROPHILS NFR BLD AUTO: 59.2 % — SIGNIFICANT CHANGE UP (ref 43–77)
NRBC # BLD: 0 /100 WBCS — SIGNIFICANT CHANGE UP (ref 0–0)
NRBC # BLD: 0 /100 WBCS — SIGNIFICANT CHANGE UP (ref 0–0)
PHOSPHATE SERPL-MCNC: 3.9 MG/DL — SIGNIFICANT CHANGE UP (ref 2.5–4.5)
PHOSPHATE SERPL-MCNC: 3.9 MG/DL — SIGNIFICANT CHANGE UP (ref 2.5–4.5)
PLATELET # BLD AUTO: 158 K/UL — SIGNIFICANT CHANGE UP (ref 150–400)
PLATELET # BLD AUTO: 158 K/UL — SIGNIFICANT CHANGE UP (ref 150–400)
POTASSIUM SERPL-MCNC: 3.7 MMOL/L — SIGNIFICANT CHANGE UP (ref 3.5–5.3)
POTASSIUM SERPL-MCNC: 3.7 MMOL/L — SIGNIFICANT CHANGE UP (ref 3.5–5.3)
POTASSIUM SERPL-SCNC: 3.7 MMOL/L — SIGNIFICANT CHANGE UP (ref 3.5–5.3)
POTASSIUM SERPL-SCNC: 3.7 MMOL/L — SIGNIFICANT CHANGE UP (ref 3.5–5.3)
PROT SERPL-MCNC: 5.8 G/DL — LOW (ref 6–8.3)
PROT SERPL-MCNC: 5.8 G/DL — LOW (ref 6–8.3)
RBC # BLD: 3.82 M/UL — LOW (ref 4.2–5.8)
RBC # BLD: 3.82 M/UL — LOW (ref 4.2–5.8)
RBC # FLD: 16.5 % — HIGH (ref 10.3–14.5)
RBC # FLD: 16.5 % — HIGH (ref 10.3–14.5)
SODIUM SERPL-SCNC: 144 MMOL/L — SIGNIFICANT CHANGE UP (ref 135–145)
SODIUM SERPL-SCNC: 144 MMOL/L — SIGNIFICANT CHANGE UP (ref 135–145)
SPECIMEN SOURCE: SIGNIFICANT CHANGE UP
SPECIMEN SOURCE: SIGNIFICANT CHANGE UP
WBC # BLD: 2.72 K/UL — LOW (ref 3.8–10.5)
WBC # BLD: 2.72 K/UL — LOW (ref 3.8–10.5)
WBC # FLD AUTO: 2.72 K/UL — LOW (ref 3.8–10.5)
WBC # FLD AUTO: 2.72 K/UL — LOW (ref 3.8–10.5)

## 2023-12-06 PROCEDURE — 99232 SBSQ HOSP IP/OBS MODERATE 35: CPT

## 2023-12-06 RX ORDER — LANOLIN ALCOHOL/MO/W.PET/CERES
5 CREAM (GRAM) TOPICAL AT BEDTIME
Refills: 0 | Status: DISCONTINUED | OUTPATIENT
Start: 2023-12-06 | End: 2023-12-07

## 2023-12-06 RX ADMIN — Medication 325 MILLIGRAM(S): at 11:41

## 2023-12-06 RX ADMIN — DIVALPROEX SODIUM 500 MILLIGRAM(S): 500 TABLET, DELAYED RELEASE ORAL at 11:41

## 2023-12-06 RX ADMIN — OLANZAPINE 10 MILLIGRAM(S): 15 TABLET, FILM COATED ORAL at 21:10

## 2023-12-06 RX ADMIN — Medication 5 MILLIGRAM(S): at 22:09

## 2023-12-06 RX ADMIN — ENOXAPARIN SODIUM 40 MILLIGRAM(S): 100 INJECTION SUBCUTANEOUS at 14:29

## 2023-12-06 NOTE — PROGRESS NOTE ADULT - ASSESSMENT
48 yrs old M, from home, pmhx of colon cancer, rectal mass on Chemotherapy following with Dr. Felix oncology A, Bipolar disorder presented with abdominal pain, Nausea and vomiting and acute encephalopathy post chemo. suspected seizure, Pending EEG, Pt refused MRI for now, Neuro following.

## 2023-12-06 NOTE — PROGRESS NOTE ADULT - SUBJECTIVE AND OBJECTIVE BOX
Patient is a 48y old  Male who presents with a chief complaint of Syncope (05 Dec 2023 12:32)      INTERVAL HPI/OVERNIGHT EVENTS: No acute events overnight       REVIEW OF SYSTEMS:  CONSTITUTIONAL: No fever, chills  ENMT:  No difficulty hearing, no change in vision  NECK: No pain or stiffness  RESPIRATORY: No cough, SOB  CARDIOVASCULAR: No chest pain, palpitations  GASTROINTESTINAL: No abdominal pain. No nausea, vomiting, or diarrhea  GENITOURINARY: No dysuria  NEUROLOGICAL: No HA  SKIN: No itching, burning, rashes, or lesions   LYMPH NODES: No enlarged glands  ENDOCRINE: No heat or cold intolerance; No hair loss  MUSCULOSKELETAL: No joint pain or swelling; No muscle, back, or extremity pain  PSYCHIATRIC: No depression, anxiety  HEME/LYMPH: No easy bruising, or bleeding gums    T(C): 36.3 (12-06-23 @ 05:05), Max: 36.8 (12-05-23 @ 21:01)  HR: 75 (12-06-23 @ 05:05) (67 - 81)  BP: 115/76 (12-06-23 @ 05:05) (110/73 - 136/90)  RR: 17 (12-06-23 @ 05:05) (17 - 18)  SpO2: 97% (12-06-23 @ 05:05) (96% - 100%)  Wt(kg): --Vital Signs Last 24 Hrs  T(C): 36.3 (06 Dec 2023 05:05), Max: 36.8 (05 Dec 2023 21:01)  T(F): 97.3 (06 Dec 2023 05:05), Max: 98.3 (05 Dec 2023 21:01)  HR: 75 (06 Dec 2023 05:05) (67 - 81)  BP: 115/76 (06 Dec 2023 05:05) (110/73 - 136/90)  BP(mean): 103 (05 Dec 2023 19:15) (97 - 103)  RR: 17 (06 Dec 2023 05:05) (17 - 18)  SpO2: 97% (06 Dec 2023 05:05) (96% - 100%)    Parameters below as of 06 Dec 2023 05:05  Patient On (Oxygen Delivery Method): room air    MEDICATIONS  (STANDING):  divalproex  milliGRAM(s) Oral daily  enoxaparin Injectable 40 milliGRAM(s) SubCutaneous every 24 hours  ferrous    sulfate 325 milliGRAM(s) Oral daily  influenza   Vaccine 0.5 milliLiter(s) IntraMuscular once  lactated ringers. 1000 milliLiter(s) (75 mL/Hr) IV Continuous <Continuous>  OLANZapine 10 milliGRAM(s) Oral at bedtime  senna 2 Tablet(s) Oral at bedtime    MEDICATIONS  (PRN):  acetaminophen     Tablet .. 650 milliGRAM(s) Oral every 6 hours PRN Temp greater or equal to 38C (100.4F), Mild Pain (1 - 3)  polyethylene glycol 3350 17 Gram(s) Oral daily PRN Constipation      PHYSICAL EXAM:  GENERAL: NAD  EYES: clear conjunctiva; EOMI  ENMT: Moist mucous membranes  NECK: Supple, No JVD, Normal thyroid  CHEST/LUNG: Clear to auscultation bilaterally; No rales, rhonchi, wheezing, or rubs  HEART: S1, S2, Regular rate and rhythm  ABDOMEN: Soft, Nontender, Nondistended; Bowel sounds present  NEURO: Alert & Oriented X3  EXTREMITIES: No LE edema, no calf tenderness  LYMPH: No lymphadenopathy noted  SKIN: No rashes or lesions    Consultant(s) Notes Reviewed:  [x ] YES  [ ] NO  Care Discussed with Consultants/Other Providers [ x] YES  [ ] NO    LABS:                        11.2   2.72  )-----------( 158      ( 06 Dec 2023 06:00 )             34.8     12-06    144  |  116<H>  |  16  ----------------------------<  92  3.7   |  24  |  0.87    Ca    8.1<L>      06 Dec 2023 06:00  Phos  3.9     12-06  Mg     2.0     12-06    TPro  5.8<L>  /  Alb  2.8<L>  /  TBili  0.2  /  DBili  x   /  AST  18  /  ALT  15  /  AlkPhos  39<L>  12-06      CAPILLARY BLOOD GLUCOSE        Urinalysis Basic - ( 06 Dec 2023 06:00 )    Color: x / Appearance: x / SG: x / pH: x  Gluc: 92 mg/dL / Ketone: x  / Bili: x / Urobili: x   Blood: x / Protein: x / Nitrite: x   Leuk Esterase: x / RBC: x / WBC x   Sq Epi: x / Non Sq Epi: x / Bacteria: x        RADIOLOGY & ADDITIONAL TESTS:    Imaging Personally Reviewed:  [x ] YES  [ ] NO    < from: CT Abdomen and Pelvis w/ Oral Cont (12.05.23 @ 13:20) >    ACC: [id number] EXAM:  CT ABDOMEN AND PELVIS OC   ORDERED BY: MYRIAM AGUILERA     PROCEDURE DATE:  12/05/2023          INTERPRETATION:  CLINICAL INFORMATION: 48 years  Male with r/o   obstruction. History of rectosigmoid carcinoma.    COMPARISON: Contrast-enhanced CT abdomen and pelvis 8/22/2023 and PET/CT   10/19/2023    CONTRAST/COMPLICATIONS:  IV Contrast: NONE  Oral Contrast: Omnipaque 300  Complications: None reported at time of study completion    PROCEDURE:  CT of the Abdomen and Pelvis was performed.  Sagittal and coronal reformats were performed.    LIMITATION: Evaluation of the solid organs is limited by lack of IV   contrast.    FINDINGS:  LOWER CHEST: Within normal limits.    LIVER: Within normal limits.  BILE DUCTS: Normal caliber.  GALLBLADDER: Cholelithiasis.  SPLEEN: Within normal limits.  PANCREAS: Within normal limits.  ADRENALS: Within normal limits.  KIDNEYS/URETERS: Within normal limits.    BLADDER: Within normal limits.  REPRODUCTIVE ORGANS: Prostate within normal limits.    BOWEL: No bowel obstruction. Appendix is not visualized. No evidence of   inflammation in the pericecal region.. Persistent rectal wall thickening   with perirectal fat stranding either related to tumor infiltration or   posttreatment change.  Subtle mid sigmoid wall thickening (2:109), limited without oral and IV   contrast.  Moderate colonic stool burden. Enteric contrast progresses to the ileum.  PERITONEUM: No ascites.  VESSELS: Within normal limits.  RETROPERITONEUM/LYMPH NODES:  Right obturator node measuring 1.7 x 1.3 cm on the right (2:105),   previously 2.0 x 1.6 cm on PET/CT.  Left obturator node measuring 1.4 x 1.2 cm (2:106), previously 2.0 x 1.6   cm on PET/CT.  Left mesorectal lymph node (2:108) 7 mm short axis, previously 9mm short   axis on MRI 9/12/2023.  ABDOMINAL WALL: Small bilateral fat-containing inguinal hernias.  BONES: Within normal limits.    IMPRESSION:  Evaluation of the solid organs is limited by lack of IV contrast.    No bowel obstruction. Constipated colon.    Rectal wall thickening and perirectal fat stranding secondary to   transmural extension of tumor or posttreatment change.    Subtle sigmoid wall thickening in the region of known adenocarcinoma.   Limited evaluation without oral and IV contrast.    Bilateral obturator adenopathy and mesorectal lymph node minimally   improved from PET CT 10/19/2023.        --- End of Report ---            MARIO JACOBO MD; Attending Radiologist  This document has been electronically signed. Dec  5 2023  1:51PM    < end of copied text >       Patient is a 48y old  Male who presents with a chief complaint of Syncope (05 Dec 2023 12:32)      INTERVAL HPI/OVERNIGHT EVENTS: No acute events overnight       REVIEW OF SYSTEMS:  CONSTITUTIONAL: No fever, chills  ENMT:  No difficulty hearing, no change in vision  NECK: No pain or stiffness  RESPIRATORY: No cough, SOB  CARDIOVASCULAR: No chest pain, palpitations  GASTROINTESTINAL: No abdominal pain. No nausea, vomiting, or diarrhea  GENITOURINARY: No dysuria  NEUROLOGICAL: No HA  SKIN: No itching, burning, rashes, or lesions   LYMPH NODES: No enlarged glands  ENDOCRINE: No heat or cold intolerance; No hair loss  MUSCULOSKELETAL: No joint pain or swelling; No muscle, back, or extremity pain  PSYCHIATRIC: No depression, anxiety  HEME/LYMPH: No easy bruising, or bleeding gums    T(C): 36.3 (12-06-23 @ 05:05), Max: 36.8 (12-05-23 @ 21:01)  HR: 75 (12-06-23 @ 05:05) (67 - 81)  BP: 115/76 (12-06-23 @ 05:05) (110/73 - 136/90)  RR: 17 (12-06-23 @ 05:05) (17 - 18)  SpO2: 97% (12-06-23 @ 05:05) (96% - 100%)  Wt(kg): --Vital Signs Last 24 Hrs  T(C): 36.3 (06 Dec 2023 05:05), Max: 36.8 (05 Dec 2023 21:01)  T(F): 97.3 (06 Dec 2023 05:05), Max: 98.3 (05 Dec 2023 21:01)  HR: 75 (06 Dec 2023 05:05) (67 - 81)  BP: 115/76 (06 Dec 2023 05:05) (110/73 - 136/90)  BP(mean): 103 (05 Dec 2023 19:15) (97 - 103)  RR: 17 (06 Dec 2023 05:05) (17 - 18)  SpO2: 97% (06 Dec 2023 05:05) (96% - 100%)    Parameters below as of 06 Dec 2023 05:05  Patient On (Oxygen Delivery Method): room air    MEDICATIONS  (STANDING):  divalproex  milliGRAM(s) Oral daily  enoxaparin Injectable 40 milliGRAM(s) SubCutaneous every 24 hours  ferrous    sulfate 325 milliGRAM(s) Oral daily  influenza   Vaccine 0.5 milliLiter(s) IntraMuscular once  lactated ringers. 1000 milliLiter(s) (75 mL/Hr) IV Continuous <Continuous>  OLANZapine 10 milliGRAM(s) Oral at bedtime  senna 2 Tablet(s) Oral at bedtime    MEDICATIONS  (PRN):  acetaminophen     Tablet .. 650 milliGRAM(s) Oral every 6 hours PRN Temp greater or equal to 38C (100.4F), Mild Pain (1 - 3)  polyethylene glycol 3350 17 Gram(s) Oral daily PRN Constipation      PHYSICAL EXAM:  GENERAL: NAD  EYES: clear conjunctiva; EOMI  ENMT: Moist mucous membranes  NECK: Supple, No JVD, Normal thyroid  CHEST/LUNG: Clear to auscultation bilaterally; No rales, rhonchi, wheezing, or rubs  HEART: S1, S2, Regular rate and rhythm  ABDOMEN: Soft, Nontender, Nondistended; Bowel sounds present  NEURO: Alert & Oriented X3  EXTREMITIES: No LE edema, no calf tenderness  LYMPH: No lymphadenopathy noted  SKIN: No rashes or lesions    Consultant(s) Notes Reviewed:  [x ] YES  [ ] NO  Care Discussed with Consultants/Other Providers [ x] YES  [ ] NO    LABS:                        11.2   2.72  )-----------( 158      ( 06 Dec 2023 06:00 )             34.8     12-06    144  |  116<H>  |  16  ----------------------------<  92  3.7   |  24  |  0.87    Ca    8.1<L>      06 Dec 2023 06:00  Phos  3.9     12-06  Mg     2.0     12-06    TPro  5.8<L>  /  Alb  2.8<L>  /  TBili  0.2  /  DBili  x   /  AST  18  /  ALT  15  /  AlkPhos  39<L>  12-06      CAPILLARY BLOOD GLUCOSE        Urinalysis Basic - ( 06 Dec 2023 06:00 )    Color: x / Appearance: x / SG: x / pH: x  Gluc: 92 mg/dL / Ketone: x  / Bili: x / Urobili: x   Blood: x / Protein: x / Nitrite: x   Leuk Esterase: x / RBC: x / WBC x   Sq Epi: x / Non Sq Epi: x / Bacteria: x        RADIOLOGY & ADDITIONAL TESTS:    Imaging Personally Reviewed:  [x ] YES  [ ] NO    < from: CT Abdomen and Pelvis w/ Oral Cont (12.05.23 @ 13:20) >    ACC: [id number] EXAM:  CT ABDOMEN AND PELVIS OC   ORDERED BY: MYRIAM AGUILERA     PROCEDURE DATE:  12/05/2023          INTERPRETATION:  CLINICAL INFORMATION: 48 years  Male with r/o   obstruction. History of rectosigmoid carcinoma.    COMPARISON: Contrast-enhanced CT abdomen and pelvis 8/22/2023 and PET/CT   10/19/2023    CONTRAST/COMPLICATIONS:  IV Contrast: NONE  Oral Contrast: Omnipaque 300  Complications: None reported at time of study completion    PROCEDURE:  CT of the Abdomen and Pelvis was performed.  Sagittal and coronal reformats were performed.    LIMITATION: Evaluation of the solid organs is limited by lack of IV   contrast.    FINDINGS:  LOWER CHEST: Within normal limits.    LIVER: Within normal limits.  BILE DUCTS: Normal caliber.  GALLBLADDER: Cholelithiasis.  SPLEEN: Within normal limits.  PANCREAS: Within normal limits.  ADRENALS: Within normal limits.  KIDNEYS/URETERS: Within normal limits.    BLADDER: Within normal limits.  REPRODUCTIVE ORGANS: Prostate within normal limits.    BOWEL: No bowel obstruction. Appendix is not visualized. No evidence of   inflammation in the pericecal region.. Persistent rectal wall thickening   with perirectal fat stranding either related to tumor infiltration or   posttreatment change.  Subtle mid sigmoid wall thickening (2:109), limited without oral and IV   contrast.  Moderate colonic stool burden. Enteric contrast progresses to the ileum.  PERITONEUM: No ascites.  VESSELS: Within normal limits.  RETROPERITONEUM/LYMPH NODES:  Right obturator node measuring 1.7 x 1.3 cm on the right (2:105),   previously 2.0 x 1.6 cm on PET/CT.  Left obturator node measuring 1.4 x 1.2 cm (2:106), previously 2.0 x 1.6   cm on PET/CT.  Left mesorectal lymph node (2:108) 7 mm short axis, previously 9mm short   axis on MRI 9/12/2023.  ABDOMINAL WALL: Small bilateral fat-containing inguinal hernias.  BONES: Within normal limits.    IMPRESSION:  Evaluation of the solid organs is limited by lack of IV contrast.    No bowel obstruction. Constipated colon.    Rectal wall thickening and perirectal fat stranding secondary to   transmural extension of tumor or posttreatment change.    Subtle sigmoid wall thickening in the region of known adenocarcinoma.   Limited evaluation without oral and IV contrast.    Bilateral obturator adenopathy and mesorectal lymph node minimally   improved from PET CT 10/19/2023.        --- End of Report ---            MARIO JACOBO MD; Attending Radiologist  This document has been electronically signed. Dec  5 2023  1:51PM    < end of copied text >

## 2023-12-06 NOTE — CHART NOTE - NSCHARTNOTEFT_GEN_A_CORE
I spoke with the patient's PCP, SINDY Abbasi, and alerted her to the patient's admission to the hospital and planned work-up. She expressed appreciation.

## 2023-12-06 NOTE — PROGRESS NOTE ADULT - NS ATTEND AMEND GEN_ALL_CORE FT
Patient seen and examined. Case discussed with SINDY Mallory. Patient is notably more awake and alert today. He is ambulating around the unit and previously took a shower. He is requesting to go home so that he can resume his radiation for his rectal CA. I discussed the plan with him regarding the MRI of the brain and the EEG to rule out seizure. He is amenable to staying for the EEG but declines the MRI w/wo contrast as he reports that he had one done just last month at [facility] and it did not show anything. Case discussed with Dr. Varma who reports that if the EEG is unremarkable and the patient is refusing MRI, he may go home. Still awaiting EEG. Labs, including CBC, CMP, Mg, Phos reviewed and notable for leukopenia. Suspect likely related to the chemotherapy that the patient received just prior to admission. Remaining care as noted above. Patient seen and examined. Case discussed with SINDY Mallory. Patient is notably more awake and alert today. He is ambulating around the unit and previously took a shower. He is requesting to go home so that he can resume his radiation for his rectal CA. I discussed the plan with him regarding the MRI of the brain and the EEG to rule out seizure. He is amenable to staying for the EEG but declines the MRI w/wo contrast as he reports that he had one done just last month at [facility] and it did not show anything. Case discussed with Dr. Varma who reports that if the EEG is unremarkable and the patient is refusing MRI, he may go home. Still awaiting EEG. Labs, including CBC, CMP, Mg, Phos reviewed and notable for leukopenia. Suspect likely related to the chemotherapy that the patient received just prior to admission. Remaining care as noted above. Patient seen and examined. Case discussed with SINDY Mallory. Patient is notably more awake and alert today. He is ambulating around the unit and previously took a shower. He is requesting to go home so that he can resume his radiation for his rectal CA. I discussed the plan with him regarding the MRI of the brain and the EEG to rule out seizure. He is amenable to staying for the EEG but declines the MRI w/wo contrast as he reports that he had one done just last month at [facility] and it did not show anything. Case discussed with Dr. Varma who reports that if the EEG is unremarkable and the patient is refusing MRI, he may go home. Still awaiting EEG. Labs, including CBC, CMP, Mg, Phos reviewed and notable for leukopenia. Will order CBC in the AM to monitor leukopenia. Suspect likely related to the chemotherapy that the patient received just prior to admission. Remaining care as noted above. Patient seen and examined. Case discussed with SINDY Mallory. Patient is notably more awake and alert today. He is ambulating around the unit and previously took a shower. He is requesting to go home so that he can resume his radiation for his rectal CA. I discussed the plan with him regarding the MRI of the brain and the EEG to rule out seizure. He is amenable to staying for the EEG but declines the MRI w/wo contrast as he reports that he had one done just last month at [facility] and it did not show anything. Case discussed with Dr. Varma who reports that if the EEG is unremarkable and the patient is refusing MRI, he may go home. Still awaiting EEG. Labs, including CBC, CMP, Mg, Phos reviewed and notable for leukopenia. Will order CBC in the AM to monitor leukopenia. Suspect likely related to the chemotherapy that the patient received just prior to admission. Remaining care as noted above.

## 2023-12-06 NOTE — PROGRESS NOTE ADULT - PROBLEM SELECTOR PLAN 1
p/w AMS,per mother, post  chemo infusion  CT head: No acute pathology  suspected seizure- pending EEG   Pt refused MRI for now   Neuro Dr. Varma

## 2023-12-07 ENCOUNTER — TRANSCRIPTION ENCOUNTER (OUTPATIENT)
Age: 48
End: 2023-12-07

## 2023-12-07 VITALS
OXYGEN SATURATION: 100 % | DIASTOLIC BLOOD PRESSURE: 73 MMHG | RESPIRATION RATE: 18 BRPM | TEMPERATURE: 97 F | HEART RATE: 92 BPM | SYSTOLIC BLOOD PRESSURE: 108 MMHG

## 2023-12-07 DIAGNOSIS — Z02.9 ENCOUNTER FOR ADMINISTRATIVE EXAMINATIONS, UNSPECIFIED: ICD-10-CM

## 2023-12-07 LAB
ANION GAP SERPL CALC-SCNC: 5 MMOL/L — SIGNIFICANT CHANGE UP (ref 5–17)
ANION GAP SERPL CALC-SCNC: 5 MMOL/L — SIGNIFICANT CHANGE UP (ref 5–17)
BUN SERPL-MCNC: 17 MG/DL — SIGNIFICANT CHANGE UP (ref 7–18)
BUN SERPL-MCNC: 17 MG/DL — SIGNIFICANT CHANGE UP (ref 7–18)
CALCIUM SERPL-MCNC: 8.4 MG/DL — SIGNIFICANT CHANGE UP (ref 8.4–10.5)
CALCIUM SERPL-MCNC: 8.4 MG/DL — SIGNIFICANT CHANGE UP (ref 8.4–10.5)
CHLORIDE SERPL-SCNC: 109 MMOL/L — HIGH (ref 96–108)
CHLORIDE SERPL-SCNC: 109 MMOL/L — HIGH (ref 96–108)
CO2 SERPL-SCNC: 26 MMOL/L — SIGNIFICANT CHANGE UP (ref 22–31)
CO2 SERPL-SCNC: 26 MMOL/L — SIGNIFICANT CHANGE UP (ref 22–31)
CREAT SERPL-MCNC: 0.91 MG/DL — SIGNIFICANT CHANGE UP (ref 0.5–1.3)
CREAT SERPL-MCNC: 0.91 MG/DL — SIGNIFICANT CHANGE UP (ref 0.5–1.3)
EGFR: 105 ML/MIN/1.73M2 — SIGNIFICANT CHANGE UP
EGFR: 105 ML/MIN/1.73M2 — SIGNIFICANT CHANGE UP
GLUCOSE SERPL-MCNC: 82 MG/DL — SIGNIFICANT CHANGE UP (ref 70–99)
GLUCOSE SERPL-MCNC: 82 MG/DL — SIGNIFICANT CHANGE UP (ref 70–99)
HCT VFR BLD CALC: 37.4 % — LOW (ref 39–50)
HCT VFR BLD CALC: 37.4 % — LOW (ref 39–50)
HGB BLD-MCNC: 11.8 G/DL — LOW (ref 13–17)
HGB BLD-MCNC: 11.8 G/DL — LOW (ref 13–17)
MCHC RBC-ENTMCNC: 29.7 PG — SIGNIFICANT CHANGE UP (ref 27–34)
MCHC RBC-ENTMCNC: 29.7 PG — SIGNIFICANT CHANGE UP (ref 27–34)
MCHC RBC-ENTMCNC: 31.6 GM/DL — LOW (ref 32–36)
MCHC RBC-ENTMCNC: 31.6 GM/DL — LOW (ref 32–36)
MCV RBC AUTO: 94.2 FL — SIGNIFICANT CHANGE UP (ref 80–100)
MCV RBC AUTO: 94.2 FL — SIGNIFICANT CHANGE UP (ref 80–100)
MRSA PCR RESULT.: SIGNIFICANT CHANGE UP
MRSA PCR RESULT.: SIGNIFICANT CHANGE UP
NRBC # BLD: 0 /100 WBCS — SIGNIFICANT CHANGE UP (ref 0–0)
NRBC # BLD: 0 /100 WBCS — SIGNIFICANT CHANGE UP (ref 0–0)
PLATELET # BLD AUTO: 192 K/UL — SIGNIFICANT CHANGE UP (ref 150–400)
PLATELET # BLD AUTO: 192 K/UL — SIGNIFICANT CHANGE UP (ref 150–400)
POTASSIUM SERPL-MCNC: 4.1 MMOL/L — SIGNIFICANT CHANGE UP (ref 3.5–5.3)
POTASSIUM SERPL-MCNC: 4.1 MMOL/L — SIGNIFICANT CHANGE UP (ref 3.5–5.3)
POTASSIUM SERPL-SCNC: 4.1 MMOL/L — SIGNIFICANT CHANGE UP (ref 3.5–5.3)
POTASSIUM SERPL-SCNC: 4.1 MMOL/L — SIGNIFICANT CHANGE UP (ref 3.5–5.3)
RBC # BLD: 3.97 M/UL — LOW (ref 4.2–5.8)
RBC # BLD: 3.97 M/UL — LOW (ref 4.2–5.8)
RBC # FLD: 16.4 % — HIGH (ref 10.3–14.5)
RBC # FLD: 16.4 % — HIGH (ref 10.3–14.5)
S AUREUS DNA NOSE QL NAA+PROBE: DETECTED
S AUREUS DNA NOSE QL NAA+PROBE: DETECTED
SODIUM SERPL-SCNC: 140 MMOL/L — SIGNIFICANT CHANGE UP (ref 135–145)
SODIUM SERPL-SCNC: 140 MMOL/L — SIGNIFICANT CHANGE UP (ref 135–145)
WBC # BLD: 3.44 K/UL — LOW (ref 3.8–10.5)
WBC # BLD: 3.44 K/UL — LOW (ref 3.8–10.5)
WBC # FLD AUTO: 3.44 K/UL — LOW (ref 3.8–10.5)
WBC # FLD AUTO: 3.44 K/UL — LOW (ref 3.8–10.5)

## 2023-12-07 PROCEDURE — 96374 THER/PROPH/DIAG INJ IV PUSH: CPT

## 2023-12-07 PROCEDURE — 80307 DRUG TEST PRSMV CHEM ANLYZR: CPT

## 2023-12-07 PROCEDURE — 36415 COLL VENOUS BLD VENIPUNCTURE: CPT

## 2023-12-07 PROCEDURE — 95957 EEG DIGITAL ANALYSIS: CPT

## 2023-12-07 PROCEDURE — 95816 EEG AWAKE AND DROWSY: CPT

## 2023-12-07 PROCEDURE — 0225U NFCT DS DNA&RNA 21 SARSCOV2: CPT

## 2023-12-07 PROCEDURE — 83605 ASSAY OF LACTIC ACID: CPT

## 2023-12-07 PROCEDURE — 99285 EMERGENCY DEPT VISIT HI MDM: CPT | Mod: 25

## 2023-12-07 PROCEDURE — 99239 HOSP IP/OBS DSCHRG MGMT >30: CPT

## 2023-12-07 PROCEDURE — 83735 ASSAY OF MAGNESIUM: CPT

## 2023-12-07 PROCEDURE — 82962 GLUCOSE BLOOD TEST: CPT

## 2023-12-07 PROCEDURE — 80053 COMPREHEN METABOLIC PANEL: CPT

## 2023-12-07 PROCEDURE — 93005 ELECTROCARDIOGRAM TRACING: CPT

## 2023-12-07 PROCEDURE — 87086 URINE CULTURE/COLONY COUNT: CPT

## 2023-12-07 PROCEDURE — 85027 COMPLETE CBC AUTOMATED: CPT

## 2023-12-07 PROCEDURE — 95816 EEG AWAKE AND DROWSY: CPT | Mod: 26

## 2023-12-07 PROCEDURE — 74176 CT ABD & PELVIS W/O CONTRAST: CPT

## 2023-12-07 PROCEDURE — 71045 X-RAY EXAM CHEST 1 VIEW: CPT

## 2023-12-07 PROCEDURE — 81001 URINALYSIS AUTO W/SCOPE: CPT

## 2023-12-07 PROCEDURE — 87641 MR-STAPH DNA AMP PROBE: CPT

## 2023-12-07 PROCEDURE — 96376 TX/PRO/DX INJ SAME DRUG ADON: CPT

## 2023-12-07 PROCEDURE — 87040 BLOOD CULTURE FOR BACTERIA: CPT

## 2023-12-07 PROCEDURE — 84100 ASSAY OF PHOSPHORUS: CPT

## 2023-12-07 PROCEDURE — 96375 TX/PRO/DX INJ NEW DRUG ADDON: CPT

## 2023-12-07 PROCEDURE — 87640 STAPH A DNA AMP PROBE: CPT

## 2023-12-07 PROCEDURE — 87635 SARS-COV-2 COVID-19 AMP PRB: CPT

## 2023-12-07 PROCEDURE — 85025 COMPLETE CBC W/AUTO DIFF WBC: CPT

## 2023-12-07 PROCEDURE — 80048 BASIC METABOLIC PNL TOTAL CA: CPT

## 2023-12-07 PROCEDURE — 70450 CT HEAD/BRAIN W/O DYE: CPT | Mod: MA

## 2023-12-07 RX ORDER — CHLORHEXIDINE GLUCONATE 213 G/1000ML
1 SOLUTION TOPICAL
Refills: 0 | Status: DISCONTINUED | OUTPATIENT
Start: 2023-12-07 | End: 2023-12-07

## 2023-12-07 RX ORDER — MUPIROCIN 20 MG/G
1 OINTMENT TOPICAL
Refills: 0 | Status: DISCONTINUED | OUTPATIENT
Start: 2023-12-07 | End: 2023-12-07

## 2023-12-07 RX ADMIN — Medication 325 MILLIGRAM(S): at 12:59

## 2023-12-07 RX ADMIN — ENOXAPARIN SODIUM 40 MILLIGRAM(S): 100 INJECTION SUBCUTANEOUS at 12:59

## 2023-12-07 RX ADMIN — DIVALPROEX SODIUM 500 MILLIGRAM(S): 500 TABLET, DELAYED RELEASE ORAL at 12:59

## 2023-12-07 NOTE — DISCHARGE NOTE NURSING/CASE MANAGEMENT/SOCIAL WORK - NSDCPEFALRISK_GEN_ALL_CORE
For information on Fall & Injury Prevention, visit: [email or web address] OR  [email or web address] OR  https://www.cdc.gov/steadi/patient.html For information on Fall & Injury Prevention, visit: [email or web address] OR  [email or web address] OR  https://www.cdc.gov/steadi/patient.html

## 2023-12-07 NOTE — DISCHARGE NOTE PROVIDER - PROVIDER TOKENS
PROVIDER:[TOKEN:[[id number]],FOLLOWUP:[1 week]] PROVIDER:[TOKEN:[[id number]],FOLLOWUP:[1 week]],PROVIDER:[TOKEN:[[id number]],FOLLOWUP:[1 month]] PROVIDER:[TOKEN:[[id number]],FOLLOWUP:[1 week]],PROVIDER:[TOKEN:[[id number]],FOLLOWUP:[1 month]] PROVIDER:[TOKEN:[[id number]],FOLLOWUP:[1 week]],PROVIDER:[TOKEN:[[id number]],FOLLOWUP:[1 month]],PROVIDER:[TOKEN:[[id number]]] PROVIDER:[TOKEN:[[id number]],FOLLOWUP:[1 week]],PROVIDER:[TOKEN:[[id number]],FOLLOWUP:[1 month]],PROVIDER:[TOKEN:[[id number]]]

## 2023-12-07 NOTE — DIETITIAN INITIAL EVALUATION ADULT - PROBLEM SELECTOR PLAN 2
p/w abdominal pain, N/V/D   in ED Temp 97.6, , /70, Sat 99% on RA  no leukocytosis   Lactate 3.1 > 1.8  U/A neg  s/p Zosyn and 2.2 L NS bolus in ED   c/w LR 75 ml/hr   will hold off on Abx for now and monitor  CT abd w/ contrast to rule out obstruction/perforation  RVP panel [hx of contact with flu]

## 2023-12-07 NOTE — PROGRESS NOTE ADULT - SUBJECTIVE AND OBJECTIVE BOX
NP Note discussed with  Primary Attending    Patient is a 48y old  Male who presents with a chief complaint of Altered mental status     (07 Dec 2023 11:42)      INTERVAL HPI/OVERNIGHT EVENTS: no new complaints    MEDICATIONS  (STANDING):  divalproex  milliGRAM(s) Oral daily  enoxaparin Injectable 40 milliGRAM(s) SubCutaneous every 24 hours  ferrous    sulfate 325 milliGRAM(s) Oral daily  influenza   Vaccine 0.5 milliLiter(s) IntraMuscular once  lactated ringers. 1000 milliLiter(s) (75 mL/Hr) IV Continuous <Continuous>  melatonin 5 milliGRAM(s) Oral at bedtime  OLANZapine 10 milliGRAM(s) Oral at bedtime  senna 2 Tablet(s) Oral at bedtime    MEDICATIONS  (PRN):  acetaminophen     Tablet .. 650 milliGRAM(s) Oral every 6 hours PRN Temp greater or equal to 38C (100.4F), Mild Pain (1 - 3)  polyethylene glycol 3350 17 Gram(s) Oral daily PRN Constipation      __________________________________________________  REVIEW OF SYSTEMS:    CONSTITUTIONAL: No fever,   EYES: no acute visual disturbances  NECK: No pain or stiffness  RESPIRATORY: No cough; No shortness of breath  CARDIOVASCULAR: No chest pain, no palpitations  GASTROINTESTINAL: No pain. No nausea or vomiting; No diarrhea   NEUROLOGICAL: No headache or numbness, no tremors  MUSCULOSKELETAL: No joint pain, no muscle pain  GENITOURINARY: no dysuria, no frequency, no hesitancy  PSYCHIATRY: no depression , no anxiety  ALL OTHER  ROS negative        Vital Signs Last 24 Hrs  T(C): 36.2 (07 Dec 2023 05:00), Max: 36.8 (06 Dec 2023 12:59)  T(F): 97.2 (07 Dec 2023 05:00), Max: 98.3 (06 Dec 2023 12:59)  HR: 67 (07 Dec 2023 06:38) (65 - 69)  BP: 95/57 (07 Dec 2023 06:38) (92/59 - 113/87)  BP(mean): --  RR: 17 (07 Dec 2023 05:00) (16 - 17)  SpO2: 99% (07 Dec 2023 05:00) (97% - 100%)    Parameters below as of 07 Dec 2023 05:00  Patient On (Oxygen Delivery Method): room air        ________________________________________________  PHYSICAL EXAM:  GENERAL: NAD  HEENT: Normocephalic;  conjunctivae and sclerae clear; moist mucous membranes;   NECK : supple  CHEST/LUNG: Clear to auscultation bilaterally with good air entry   HEART: S1 S2  regular; no murmurs, gallops or rubs  ABDOMEN: Soft, Nontender, Nondistended; Bowel sounds present  EXTREMITIES: no cyanosis; no edema; no calf tenderness  SKIN: warm and dry; no rash  NERVOUS SYSTEM:  Awake and alert; Oriented  to place, person and time ; no new deficits    _________________________________________________  LABS:                        11.8   3.44  )-----------( 192      ( 07 Dec 2023 07:44 )             37.4     12-07    140  |  109<H>  |  17  ----------------------------<  82  4.1   |  26  |  0.91    Ca    8.4      07 Dec 2023 07:44  Phos  3.9     12-06  Mg     2.0     12-06    TPro  5.8<L>  /  Alb  2.8<L>  /  TBili  0.2  /  DBili  x   /  AST  18  /  ALT  15  /  AlkPhos  39<L>  12-06      Urinalysis Basic - ( 07 Dec 2023 07:44 )    Color: x / Appearance: x / SG: x / pH: x  Gluc: 82 mg/dL / Ketone: x  / Bili: x / Urobili: x   Blood: x / Protein: x / Nitrite: x   Leuk Esterase: x / RBC: x / WBC x   Sq Epi: x / Non Sq Epi: x / Bacteria: x      CAPILLARY BLOOD GLUCOSE            RADIOLOGY & ADDITIONAL TESTS:    Imaging Personally Reviewed:  YES/NO    Consultant(s) Notes Reviewed:   YES/ No    Care Discussed with Consultants :     Plan of care was discussed with patient and /or primary care giver; all questions and concerns were addressed and care was aligned with patient's wishes.

## 2023-12-07 NOTE — DISCHARGE NOTE PROVIDER - DETAILS OF MALNUTRITION DIAGNOSIS/DIAGNOSES
This patient has been assessed with a concern for Malnutrition and was treated during this hospitalization for the following Nutrition diagnosis/diagnoses:     -  12/07/2023: Severe protein-calorie malnutrition

## 2023-12-07 NOTE — PROGRESS NOTE ADULT - NS ATTEND AMEND GEN_ALL_CORE FT
Patient seen and examined. Case discussed with SINDY Corona. Unfortunately, patient did not undergo EEG yesterday as one of the EEG machines was down. I spoke with Dr. Varma regarding the patient as the patient would really like to go home because he needs to get back to his chemo and radiation treatments for his rectal cancer. Dr. Varma reports he will try to expedite the study. He has also asked that we try to obtain the MRI records from [facility] as he is very concerned about the left temporal lobe encephalomalacia being a trigger for possible seizures. Patient does not need to stay in the hospital for the records per Dr. Varma, but he would like it for completeness sake. Spoke with SINDY Corona who will send the request form over to [facility] medical records. If EEG is performed and is unremarkable, patient can be discharged home to resume chemo and RT. Remaining care as noted above. Patient seen and examined. Case discussed with SINDY Corona. Unfortunately, patient did not undergo EEG yesterday as one of the EEG machines was down. I spoke with Dr. Varma regarding the patient as the patient would really like to go home because he needs to get back to his chemo and radiation treatments for his rectal cancer. Dr. Varma reports he will try to expedite the study. He has also asked that we try to obtain the MRI records from [facility] as he is very concerned about the left temporal lobe encephalomalacia being a trigger for possible seizures. Patient does not need to stay in the hospital for the records per Dr. Varma, but he would like it for completeness sake. Spoke with SINDY Corona who will send the request form over to [facility] medical records. If EEG is performed and is unremarkable, patient can be discharged home to resume chemo and RT. Remaining care as noted above.

## 2023-12-07 NOTE — DISCHARGE NOTE PROVIDER - CARE PROVIDER_API CALL
Keshia Felix  Hematology  [address], [address]  [place] [address]  Phone: [phone]  Fax: [phone]  Follow Up Time: 1 week   Keshia Felix  Hematology  [address], [address]  [place] [address]  Phone: [phone]  Fax: [phone]  Follow Up Time: 1 week   Keshia Felix  Hematology  [address], [address]  [place] [address]  Phone: [phone]  Fax: [phone]  Follow Up Time: 1 week    Yessi Varma)  Neurology  [address] [place] [address]  Phone: [phone]  Fax: [phone]  Follow Up Time: 1 month   Keshia Felix  Hematology  [address], [address]  [place] [address]  Phone: [phone]  Fax: [phone]  Follow Up Time: 1 week    Yessi Varma)  Neurology  [address] [place] [address]  Phone: [phone]  Fax: [phone]  Follow Up Time: 1 month   Keshia Felix  Hematology  [address]  [place] [address]  Phone: [phone]  Fax: [phone]  Follow Up Time: 1 week    Yessi Varma)  Neurology  [address] [place] [address]  Phone: [phone]  Fax: [phone]  Follow Up Time: 1 month    [facility]  [address]  [place] [address]  Phone: [phone]  Fax: [phone]  Follow Up Time:    Keshia Felix  Hematology  [address]  [place] [address]  Phone: [phone]  Fax: [phone]  Follow Up Time: 1 week    Yessi Varma)  Neurology  [address] [place] [address]  Phone: [phone]  Fax: [phone]  Follow Up Time: 1 month    [facility] S[facility]  [address]  [place] [address]  Phone: [phone]  Fax: [phone]  Follow Up Time:

## 2023-12-07 NOTE — PROGRESS NOTE ADULT - PROBLEM SELECTOR PLAN 1
p/w AMS,per mother, post  chemo infusion  CT head: No acute pathology  suspected seizure- pending EEG   Pt refused MRI for now   Neuro Dr. Varma p/w AMS,per mother, post  chemo infusion  CT head: No acute pathology  suspected seizure-  EEG obtained this afternoon   Pt refused MRI  Neuro Dr. Varma  request radiology imaging report to [facility] ( per them pt only had CTH in Nov/23 at [place]) p/w AMS,per mother, post  chemo infusion  CT head: No acute pathology  suspected seizure-  EEG obtained this afternoon   Pt refused MRI  Neuro Dr. Varma  request radiology imaging report to [facility] ( per them pt only had CTH in Nov/23 at [place])

## 2023-12-07 NOTE — PROGRESS NOTE ADULT - PROBLEM SELECTOR PLAN 5
pt wants to leave if EEG is negative  Dr. Varma neurology recs MRI however, pt refusing given recent MRI at [facility] however, pt never had MRI at [facility] per their radiology dept - Medical record release form sent to [facility] 12/7 pt wants to leave if EEG is negative  Dr. Varma neurology recs MRI however, pt refusing given recent MRI at [facility] however, pt never had MRI at [facility] per their radiology dept - Medical record release form sent to [facility] 12/7

## 2023-12-07 NOTE — DIETITIAN INITIAL EVALUATION ADULT - OTHER INFO
Pt visited. Pt Reports eating good at present. NKFA Reported. No N/V/ Diarrhea Reported. Pt Reports he lives with his  Mother at home.  Pt receives food from  "[employer] ". Per Pt he is on Food stamps. Mother is not able to cook at Home D/T Mother is on O2 . Per Pt  S/P Chemo. Per Pt  Started on chemo 6 weeks ago. Per Pt Ht 65 inches,  lb Current WT bed scale 133 lb. Pt Reports + wt loss. WT loss of ~ 12 lbs  x ~ 3 months. NFPE Performed. Food choices Obtained. P{t avoids Red meat . Agreed to try  Nutritional supplement . F & N dept Notified.  Labs  Noted. Pt  educated on  Healthy Nutrition if Develops   Diarrhea, Nausea, Vomiting at Home. Pt receptive.

## 2023-12-07 NOTE — PROGRESS NOTE ADULT - PROBLEM SELECTOR PLAN 2
hx of Bipolar disorder on Depakote 500 and Zyprexa 10 at home  c/w home meds
hx of Bipolar disorder on Depakote 500 and Zyprexa 10 at home  c/w home meds

## 2023-12-07 NOTE — DISCHARGE NOTE PROVIDER - NPI NUMBER (FOR SYSADMIN USE ONLY) :
[[id number]] [[id number] [[id number]],[[id number]] [[id number]],[[id number]] [[id number]],[[id number]],[[id number]] [[id number]],[[id number]]

## 2023-12-07 NOTE — DISCHARGE NOTE NURSING/CASE MANAGEMENT/SOCIAL WORK - PATIENT PORTAL LINK FT
You can access the FollowMyHealth Patient Portal offered by [facility] by registering at the following website: [email or web address]. By joining [employer]’s FollowMyHealth portal, you will also be able to view your health information using other applications (apps) compatible with our system. You can access the FollowMyHealth Patient Portal offered by [facility] by registering at the following website: [email or web address]. By joining [employer]’s FollowMyHealth portal, you will also be able to view your health information using other applications (apps) compatible with our system.

## 2023-12-07 NOTE — DIETITIAN INITIAL EVALUATION ADULT - PROBLEM SELECTOR PLAN 1
brought to ED for AMS, agitation, repeatedly saying no s/p fall, ?head trauma, no seizure or LOC as per pt's mother  CT head: No acute intracranial pathology. Left temporal lobe encephalomalacia. This may be related to previous infarct or sequelae of prior trauma.  Salicylate, Acetaminophen and alcohol neg   Urine tox: +ve THC  Lactate 3.1 > 1.8  [? seizure activity]  Neuro Dr. Varma consulted

## 2023-12-07 NOTE — DIETITIAN INITIAL EVALUATION ADULT - PERTINENT LABORATORY DATA
12-07    140  |  109<H>  |  17  ----------------------------<  82  4.1   |  26  |  0.91    Ca    8.4      07 Dec 2023 07:44  Phos  3.9     12-06  Mg     2.0     12-06    TPro  5.8<L>  /  Alb  2.8<L>  /  TBili  0.2  /  DBili  x   /  AST  18  /  ALT  15  /  AlkPhos  39<L>  12-06

## 2023-12-07 NOTE — PROGRESS NOTE ADULT - ASSESSMENT
48 yrs old M, from home, pmhx of colon cancer, rectal mass on Chemotherapy following with Dr. Felix oncology A, Bipolar disorder presented with abdominal pain, Nausea and vomiting and acute encephalopathy post chemo. suspected seizure, Pending EEG, Pt refused MRI for now, Neuro following. 48 yrs old M, from home, pmhx of colon cancer, rectal mass on Chemotherapy following with Dr. Felix oncology [facility], Bipolar disorder presented with abdominal pain, Nausea and vomiting and acute encephalopathy post chemo. suspected seizure,  EEG done this afternoon, pending reading,  Pt refused MRI given recent MRI at [facility], per [place] radiology dept pt only had CT head in Nov /2023 no MRI in 12 months.Pt is AOx 3, stable, wanted to leave if EEG is unremarkable.  48 yrs old M, from home, pmhx of colon cancer, rectal mass on Chemotherapy following with Dr. Felix oncology [facility], Bipolar disorder presented with abdominal pain, Nausea and vomiting and acute encephalopathy post chemo. suspected seizure,  EEG done this afternoon, pending reading,  Pt refused MRI given recent MRI at [facility], per [place] radiology dept pt only had CT head in Nov /2023 no MRI in 12 months.Pt is AOx 3, stable, wanted to leave if EEG is unremarkable.

## 2023-12-07 NOTE — PROGRESS NOTE ADULT - PROBLEM SELECTOR PLAN 3
hx of Colon cancer, rectal mass on chemoRx  following with [facility] Dr. Felix as outpt
hx of Colon cancer, rectal mass on chemoRx  following with [facility] Dr. Felix as outpt

## 2023-12-07 NOTE — DISCHARGE NOTE PROVIDER - NSDCMRMEDTOKEN_GEN_ALL_CORE_FT
acetaminophen 325 mg oral tablet: 2 tab(s) orally every 6 hours As needed Temp greater or equal to 38C (100.4F), Mild Pain (1 - 3)  ascorbic acid 500 mg oral tablet: 1 tab(s) orally once a day  Depakote 500 mg oral delayed release tablet: 1 tab(s) orally once a day  ferrous sulfate 325 mg (65 mg elemental iron) oral tablet: 2 tab(s) orally once a day  Senna 8.6 mg oral tablet: 2 tab(s) orally once a day (at bedtime)  ZyPREXA 10 mg oral tablet: 1 tab(s) orally once a day

## 2023-12-07 NOTE — DISCHARGE NOTE PROVIDER - NSDCCPCAREPLAN_GEN_ALL_CORE_FT
PRINCIPAL DISCHARGE DIAGNOSIS  Diagnosis: Acute encephalopathy  Assessment and Plan of Treatment: presented with GI discomfort with concerning of seizure   CT head unremakable, [place] also unremarkable   Neurology followed the your case, cleared for discharge     PRINCIPAL DISCHARGE DIAGNOSIS  Diagnosis: Acute encephalopathy  Assessment and Plan of Treatment: presented with GI discomfort with concerning of seizure   CT head unremakable, [place] also unremarkable   Neurology followed the your case, cleared for discharge     PRINCIPAL DISCHARGE DIAGNOSIS  Diagnosis: Acute encephalopathy  Assessment and Plan of Treatment: You presented with a fall and confusion. You were seen by neurology over concerns for a possible seizure. You were recommended for an MRI of your brain, but you refused. EEG was performed and it reveals a normal EEG. Nevertheless, given the concerns for possible seizure, you MAY NOT DRIVE OR OPERATE HEAVY MACHINERY FOR ONE YEAR PER [facility] LAW. Please follow-up with a neurologist.      SECONDARY DISCHARGE DIAGNOSES  Diagnosis: Rectal cancer  Assessment and Plan of Treatment:      PRINCIPAL DISCHARGE DIAGNOSIS  Diagnosis: Acute encephalopathy  Assessment and Plan of Treatment: You presented with a fall and confusion. You were seen by neurology over concerns for a possible seizure. You were recommended for an MRI of your brain, but you refused. EEG was performed and it reveals a normal EEG. Nevertheless, given the concerns for possible seizure, you MAY NOT DRIVE OR OPERATE HEAVY MACHINERY FOR ONE YEAR PER [facility] LAW. Please follow-up with a neurologist.      SECONDARY DISCHARGE DIAGNOSES  Diagnosis: Rectal cancer  Assessment and Plan of Treatment:      PRINCIPAL DISCHARGE DIAGNOSIS  Diagnosis: Acute encephalopathy  Assessment and Plan of Treatment: You presented with a fall and confusion. You were seen by neurology over concerns for a possible seizure. You were recommended for an MRI of your brain, but you refused. EEG was performed and it reveals a normal EEG. Nevertheless, given the concerns for possible seizure, you MAY NOT DRIVE OR OPERATE HEAVY MACHINERY FOR ONE YEAR PER [facility] LAW. Please follow-up with a neurologist.      SECONDARY DISCHARGE DIAGNOSES  Diagnosis: Rectal cancer  Assessment and Plan of Treatment: Continue your radiation and chemotherapy schedule as recommended by Dr. Felix.     PRINCIPAL DISCHARGE DIAGNOSIS  Diagnosis: Acute encephalopathy  Assessment and Plan of Treatment: You presented with a fall and confusion. You were seen by neurology over concerns for a possible seizure. You were recommended for an MRI of your brain, but you refused. EEG was performed and it reveals a normal EEG. Nevertheless, given the concerns for possible seizure, you MAY NOT DRIVE OR OPERATE HEAVY MACHINERY FOR ONE YEAR PER [facility] LAW. Please follow-up with a neurologist.      SECONDARY DISCHARGE DIAGNOSES  Diagnosis: Rectal cancer  Assessment and Plan of Treatment: Continue your radiation and chemotherapy schedule as recommended by Dr. Felix.

## 2023-12-07 NOTE — DIETITIAN INITIAL EVALUATION ADULT - PERTINENT MEDS FT
MEDICATIONS  (STANDING):  divalproex  milliGRAM(s) Oral daily  enoxaparin Injectable 40 milliGRAM(s) SubCutaneous every 24 hours  ferrous    sulfate 325 milliGRAM(s) Oral daily  influenza   Vaccine 0.5 milliLiter(s) IntraMuscular once  lactated ringers. 1000 milliLiter(s) (75 mL/Hr) IV Continuous <Continuous>  melatonin 5 milliGRAM(s) Oral at bedtime  OLANZapine 10 milliGRAM(s) Oral at bedtime  senna 2 Tablet(s) Oral at bedtime    MEDICATIONS  (PRN):  acetaminophen     Tablet .. 650 milliGRAM(s) Oral every 6 hours PRN Temp greater or equal to 38C (100.4F), Mild Pain (1 - 3)  polyethylene glycol 3350 17 Gram(s) Oral daily PRN Constipation

## 2023-12-07 NOTE — EEG REPORT - NS EEG TEXT BOX
STEVEJESSICA [id number]     Study Date: 		12-07-23  Duration: 28 mins  --------------------------------------------------------------------------------------------------  History:  CC/ HPI Patient is a 48y old  Male who presents with a chief complaint of Altered mental status     (07 Dec 2023 11:42)    MEDICATIONS  (STANDING):  chlorhexidine 2% Cloths 1 Application(s) Topical <User Schedule>  divalproex  milliGRAM(s) Oral daily  enoxaparin Injectable 40 milliGRAM(s) SubCutaneous every 24 hours  ferrous    sulfate 325 milliGRAM(s) Oral daily  influenza   Vaccine 0.5 milliLiter(s) IntraMuscular once  lactated ringers. 1000 milliLiter(s) (75 mL/Hr) IV Continuous <Continuous>  melatonin 5 milliGRAM(s) Oral at bedtime  mupirocin 2% Ointment 1 Application(s) Both Nostrils two times a day  OLANZapine 10 milliGRAM(s) Oral at bedtime  senna 2 Tablet(s) Oral at bedtime    --------------------------------------------------------------------------------------------------  Study Interpretation:    [[[Abbreviation Key:  PDR=alpha rhythm/posterior dominant rhythm. A-P=anterior posterior.  Amplitude: ‘very low’:<20; ‘low’:20-49; ‘medium’:; ‘high’:>150uV.  Persistence for periodic/rhythmic patterns (% of epoch) ‘rare’:<1%; ‘occasional’:1-10%; ‘frequent’:10-50%; ‘abundant’:50-90%; ‘continuous’:>90%.  Persistence for sporadic discharges: ‘rare’:<1/hr; ‘occasional’:1/min-1/hr; ‘frequent’:>1/min; ‘abundant’:>1/10 sec.  RPP=rhythmic and periodic patterns; GRDA=generalized rhythmic delta activity; FIRDA=frontal intermittent GRDA; LRDA=lateralized rhythmic delta activity; TIRDA=temporal intermittent rhythmic delta activity;  LPD=PLED=lateralized periodic discharges; GPD=generalized periodic discharges; BIPDs =bilateral independent periodic discharges; Mf=multifocal; SIRPDs=stimulus induced rhythmic, periodic, or ictal appearing discharges; BIRDs=brief potentially ictal rhythmic discharges >4 Hz, lasting .5-10s; PFA (paroxysmal bursts >13 Hz or =8 Hz <10s).  Modifiers: +F=with fast component; +S=with spike component; +R=with rhythmic component.  S-B=burst suppression pattern.  Max=maximal. N1-drowsy; N2-stage II sleep; N3-slow wave sleep. SSS/BETS=small sharp spikes/benign epileptiform transients of sleep. HV=hyperventilation; PS=photic stimulation]]]    Daily EEG Visual Analysis    FINDINGS:      Background:  Continuity: continuous  Symmetry: symmetric  PDR: 11 Hz activity, with amplitude to 40 uV, that attenuated to eye opening.  Low amplitude frontal beta noted in wakefulness.  Reactivity: present  Voltage: normal, mostly 20-150uV  Anterior Posterior Gradient: present  Other background findings: none  Breach: absent    Background Slowing:  Generalized slowing: none was present.  Focal slowing: none was present.    State Changes:   -Absent    Sporadic Epileptiform Discharges:    None    Rhythmic and Periodic Patterns (RPPs):  None     Electrographic and Electroclinical seizures:  None    Other Clinical Events:  None    Activation Procedures:   -Hyperventilation was not performed.    -Photic stimulation was not performed.    Artifacts:  Intermittent myogenic and movement artifacts were noted.    ECG:  The heart rate on single channel ECG was predominantly between 70-80 BPM.    EEG Classification / Summary:  Normal EEG in the awake state.    Clinical Impression:  Normal EEG in the awake state.  There were no epileptiform abnormalities recorded.    This is a preliminary report pending attending review and attestation.    Britni Manzo MD  Fellow, [facility] Epilepsy [place]          -------------------------------------------------------------------------------------------------------  [facility] EEG Reading Room Ph#: [phone]  Epilepsy Answering Service after 5PM and before 8:30AM: Ph#: [phone]   STEVEJESSICA [id number]     Study Date: 		12-07-23  Duration: 28 mins  --------------------------------------------------------------------------------------------------  History:  CC/ HPI Patient is a 48y old  Male who presents with a chief complaint of Altered mental status     (07 Dec 2023 11:42)    MEDICATIONS  (STANDING):  chlorhexidine 2% Cloths 1 Application(s) Topical <User Schedule>  divalproex  milliGRAM(s) Oral daily  enoxaparin Injectable 40 milliGRAM(s) SubCutaneous every 24 hours  ferrous    sulfate 325 milliGRAM(s) Oral daily  influenza   Vaccine 0.5 milliLiter(s) IntraMuscular once  lactated ringers. 1000 milliLiter(s) (75 mL/Hr) IV Continuous <Continuous>  melatonin 5 milliGRAM(s) Oral at bedtime  mupirocin 2% Ointment 1 Application(s) Both Nostrils two times a day  OLANZapine 10 milliGRAM(s) Oral at bedtime  senna 2 Tablet(s) Oral at bedtime    --------------------------------------------------------------------------------------------------  Study Interpretation:    [[[Abbreviation Key:  PDR=alpha rhythm/posterior dominant rhythm. A-P=anterior posterior.  Amplitude: ‘very low’:<20; ‘low’:20-49; ‘medium’:; ‘high’:>150uV.  Persistence for periodic/rhythmic patterns (% of epoch) ‘rare’:<1%; ‘occasional’:1-10%; ‘frequent’:10-50%; ‘abundant’:50-90%; ‘continuous’:>90%.  Persistence for sporadic discharges: ‘rare’:<1/hr; ‘occasional’:1/min-1/hr; ‘frequent’:>1/min; ‘abundant’:>1/10 sec.  RPP=rhythmic and periodic patterns; GRDA=generalized rhythmic delta activity; FIRDA=frontal intermittent GRDA; LRDA=lateralized rhythmic delta activity; TIRDA=temporal intermittent rhythmic delta activity;  LPD=PLED=lateralized periodic discharges; GPD=generalized periodic discharges; BIPDs =bilateral independent periodic discharges; Mf=multifocal; SIRPDs=stimulus induced rhythmic, periodic, or ictal appearing discharges; BIRDs=brief potentially ictal rhythmic discharges >4 Hz, lasting .5-10s; PFA (paroxysmal bursts >13 Hz or =8 Hz <10s).  Modifiers: +F=with fast component; +S=with spike component; +R=with rhythmic component.  S-B=burst suppression pattern.  Max=maximal. N1-drowsy; N2-stage II sleep; N3-slow wave sleep. SSS/BETS=small sharp spikes/benign epileptiform transients of sleep. HV=hyperventilation; PS=photic stimulation]]]    Daily EEG Visual Analysis    FINDINGS:      Background:  Continuity: continuous  Symmetry: symmetric  PDR: 11 Hz activity, with amplitude to 40 uV, that attenuated to eye opening.  Low amplitude frontal beta noted in wakefulness.  Reactivity: present  Voltage: normal, mostly 20-150uV  Anterior Posterior Gradient: present  Other background findings: none  Breach: absent    Background Slowing:  Generalized slowing: none was present.  Focal slowing: none was present.    State Changes:   -Absent    Sporadic Epileptiform Discharges:    None    Rhythmic and Periodic Patterns (RPPs):  None     Electrographic and Electroclinical seizures:  None    Other Clinical Events:  None    Activation Procedures:   -Hyperventilation was not performed.    -Photic stimulation was not performed.    Artifacts:  Intermittent myogenic and movement artifacts were noted.    ECG:  The heart rate on single channel ECG was predominantly between 70-80 BPM.    EEG Classification / Summary:  Normal EEG in the awake state.    Clinical Impression:  Normal EEG in the awake state.  There were no epileptiform abnormalities recorded.    This is a preliminary report pending attending review and attestation.    Britni Manzo MD  Fellow, [facility] Epilepsy [place]          -------------------------------------------------------------------------------------------------------  [facility] EEG Reading Room Ph#: [phone]  Epilepsy Answering Service after 5PM and before 8:30AM: Ph#: [phone]   JESSICA WILSON [id number]     Study Date: 12-07-23  Duration: 28 min  --------------------------------------------------------------------------------------------------  History:  CC/ HPI Patient is a 48y old  Male who presents with a chief complaint of Altered mental status     (07 Dec 2023 11:42)    MEDICATIONS  (STANDING):  chlorhexidine 2% Cloths 1 Application(s) Topical <User Schedule>  divalproex  milliGRAM(s) Oral daily  enoxaparin Injectable 40 milliGRAM(s) SubCutaneous every 24 hours  ferrous    sulfate 325 milliGRAM(s) Oral daily  influenza   Vaccine 0.5 milliLiter(s) IntraMuscular once  lactated ringers. 1000 milliLiter(s) (75 mL/Hr) IV Continuous <Continuous>  melatonin 5 milliGRAM(s) Oral at bedtime  mupirocin 2% Ointment 1 Application(s) Both Nostrils two times a day  OLANZapine 10 milliGRAM(s) Oral at bedtime  senna 2 Tablet(s) Oral at bedtime    --------------------------------------------------------------------------------------------------  Study Interpretation:    [[[Abbreviation Key:  PDR=alpha rhythm/posterior dominant rhythm. A-P=anterior posterior.  Amplitude: ‘very low’:<20; ‘low’:20-49; ‘medium’:; ‘high’:>150uV.  Persistence for periodic/rhythmic patterns (% of epoch) ‘rare’:<1%; ‘occasional’:1-10%; ‘frequent’:10-50%; ‘abundant’:50-90%; ‘continuous’:>90%.  Persistence for sporadic discharges: ‘rare’:<1/hr; ‘occasional’:1/min-1/hr; ‘frequent’:>1/min; ‘abundant’:>1/10 sec.  RPP=rhythmic and periodic patterns; GRDA=generalized rhythmic delta activity; FIRDA=frontal intermittent GRDA; LRDA=lateralized rhythmic delta activity; TIRDA=temporal intermittent rhythmic delta activity;  LPD=PLED=lateralized periodic discharges; GPD=generalized periodic discharges; BIPDs =bilateral independent periodic discharges; Mf=multifocal; SIRPDs=stimulus induced rhythmic, periodic, or ictal appearing discharges; BIRDs=brief potentially ictal rhythmic discharges >4 Hz, lasting .5-10s; PFA (paroxysmal bursts >13 Hz or =8 Hz <10s).  Modifiers: +F=with fast component; +S=with spike component; +R=with rhythmic component.  S-B=burst suppression pattern.  Max=maximal. N1-drowsy; N2-stage II sleep; N3-slow wave sleep. SSS/BETS=small sharp spikes/benign epileptiform transients of sleep. HV=hyperventilation; PS=photic stimulation]]]    Daily EEG Visual Analysis    FINDINGS:      Background:  Continuity: continuous  Symmetry: symmetric  PDR: 10.5-11 Hz, attenuated to eye opening.  Low amplitude frontal beta in wakefulness.  Voltage: normal  Anterior Posterior Gradient: present  Other background findings: none  Breach: absent    Background Slowing:  Generalized slowing: none was present.  Focal slowing: none was present.    State Changes:   Drowsiness and stage 2 sleep not captured.    Sporadic Epileptiform Discharges:    None    Rhythmic and Periodic Patterns (RPPs):  None     Electrographic and Electroclinical seizures:  None    Other Clinical Events:  None    Activation Procedures:   -Hyperventilation was not performed.    -Photic stimulation was not performed.    Artifacts:  Intermittent myogenic and movement artifacts were present.    Single-lead EKG: Regular rhythm.    EEG Classification / Summary:  Normal routine EEG in the awake state.  No focal or epileptiform abnormalities captured.    Clinical Impression:  A normal routine EEG neither refutes nor supports a diagnosis of epilepsy.         -------------------------------------------------------------------------------------------------------    Britni Manzo MD  Fellow, [facility] Epilepsy [place]    Oksana Santos MD  Attending Physician, [facility] Epilepsy [facility] EEG Reading Room Ph#: [phone]  Epilepsy Answering Service after 5PM and before 8:30AM: Ph#: [phone]   JESSICA WILSON [id number]     Study Date: 12-07-23  Duration: 28 min  --------------------------------------------------------------------------------------------------  History:  CC/ HPI Patient is a 48y old  Male who presents with a chief complaint of Altered mental status     (07 Dec 2023 11:42)    MEDICATIONS  (STANDING):  chlorhexidine 2% Cloths 1 Application(s) Topical <User Schedule>  divalproex  milliGRAM(s) Oral daily  enoxaparin Injectable 40 milliGRAM(s) SubCutaneous every 24 hours  ferrous    sulfate 325 milliGRAM(s) Oral daily  influenza   Vaccine 0.5 milliLiter(s) IntraMuscular once  lactated ringers. 1000 milliLiter(s) (75 mL/Hr) IV Continuous <Continuous>  melatonin 5 milliGRAM(s) Oral at bedtime  mupirocin 2% Ointment 1 Application(s) Both Nostrils two times a day  OLANZapine 10 milliGRAM(s) Oral at bedtime  senna 2 Tablet(s) Oral at bedtime    --------------------------------------------------------------------------------------------------  Study Interpretation:    [[[Abbreviation Key:  PDR=alpha rhythm/posterior dominant rhythm. A-P=anterior posterior.  Amplitude: ‘very low’:<20; ‘low’:20-49; ‘medium’:; ‘high’:>150uV.  Persistence for periodic/rhythmic patterns (% of epoch) ‘rare’:<1%; ‘occasional’:1-10%; ‘frequent’:10-50%; ‘abundant’:50-90%; ‘continuous’:>90%.  Persistence for sporadic discharges: ‘rare’:<1/hr; ‘occasional’:1/min-1/hr; ‘frequent’:>1/min; ‘abundant’:>1/10 sec.  RPP=rhythmic and periodic patterns; GRDA=generalized rhythmic delta activity; FIRDA=frontal intermittent GRDA; LRDA=lateralized rhythmic delta activity; TIRDA=temporal intermittent rhythmic delta activity;  LPD=PLED=lateralized periodic discharges; GPD=generalized periodic discharges; BIPDs =bilateral independent periodic discharges; Mf=multifocal; SIRPDs=stimulus induced rhythmic, periodic, or ictal appearing discharges; BIRDs=brief potentially ictal rhythmic discharges >4 Hz, lasting .5-10s; PFA (paroxysmal bursts >13 Hz or =8 Hz <10s).  Modifiers: +F=with fast component; +S=with spike component; +R=with rhythmic component.  S-B=burst suppression pattern.  Max=maximal. N1-drowsy; N2-stage II sleep; N3-slow wave sleep. SSS/BETS=small sharp spikes/benign epileptiform transients of sleep. HV=hyperventilation; PS=photic stimulation]]]    Daily EEG Visual Analysis    FINDINGS:      Background:  Continuity: continuous  Symmetry: symmetric  PDR: 10.5-11 Hz, attenuated to eye opening.  Low amplitude frontal beta in wakefulness.  Voltage: normal  Anterior Posterior Gradient: present  Other background findings: none  Breach: absent    Background Slowing:  Generalized slowing: none was present.  Focal slowing: none was present.    State Changes:   Drowsiness and stage 2 sleep not captured.    Sporadic Epileptiform Discharges:    None    Rhythmic and Periodic Patterns (RPPs):  None     Electrographic and Electroclinical seizures:  None    Other Clinical Events:  None    Activation Procedures:   -Hyperventilation was not performed.    -Photic stimulation was not performed.    Artifacts:  Intermittent myogenic and movement artifacts were present.    Single-lead EKG: Regular rhythm.    EEG Classification / Summary:  Normal routine EEG in the awake state.  No focal or epileptiform abnormalities captured.    Clinical Impression:  A normal routine EEG neither refutes nor supports a diagnosis of epilepsy.         -------------------------------------------------------------------------------------------------------    Britni Manzo MD  Fellow, [facility] Epilepsy [place]    Oksana Santos MD  Attending Physician, [facility] Epilepsy [facility] EEG Reading Room Ph#: [phone]  Epilepsy Answering Service after 5PM and before 8:30AM: Ph#: [phone]

## 2023-12-07 NOTE — DISCHARGE NOTE PROVIDER - CARE PROVIDERS DIRECT ADDRESSES
,DirectAddress_Unknown ,DirectAddress_Unknown,[email or web address] ,DirectAddress_Unknown,[email or web address] ,[email or web address],[email or web address],DirectAddress_Unknown ,DirectAddress_Unknown,[email or web address],DirectAddress_Unknown

## 2023-12-07 NOTE — DISCHARGE NOTE PROVIDER - ATTENDING DISCHARGE PHYSICAL EXAMINATION:
Patient seen and examined. Case discussed with SINDY Corona. Patient underwent EEG this afternoon that was a normal study. Patient continues to decline MRI of the brain as he is convinced he had one recently but per medical records at [facility], he only had a CT scan. We have sent a request for a copy of the CT scan. Case discussed with Dr. Varma and patient is cleared for discharge. He was counseled that he may not drive or operate heavy machinery for one year per [facility] law given concern for seizures.    PHYSICAL EXAM:  GENERAL: NAD, well-developed, lying in bed  HEAD:  Atraumatic, Normocephalic  EYES: conjunctiva and sclera clear  NECK: Supple, No JVD  CHEST/LUNG: Clear to auscultation bilaterally; No wheeze, rhonchi, rales  HEART: Regular rate and rhythm; No murmurs,   ABDOMEN: Soft, Nontender, Nondistended; Bowel sounds present  EXTREMITIES:  2+ Peripheral Pulses, No clubbing, cyanosis, or edema  PSYCH: AAOx3, pleansant, cooperative  NEUROLOGY: non-focal  SKIN: No rashes or lesions Patient seen and examined. Case discussed with SINDY Corona. Patient underwent EEG this afternoon that was a normal study. Patient continues to decline MRI of the brain as he is convinced he had one recently but per medical records at [facility], he only had a CT scan. We have sent a request for a copy of the CT scan. Case discussed with Dr. Varma and patient is cleared for discharge. He was counseled that he may not drive or operate heavy machinery for one year per [facility] law given concern for seizures.    PHYSICAL EXAM:  GENERAL: NAD, well-developed, lying in bed  HEAD:  Atraumatic, Normocephalic  EYES: conjunctiva and sclera clear  NECK: Supple, No JVD  CHEST/LUNG: Clear to auscultation bilaterally; No wheeze, rhonchi, rales  HEART: Regular rate and rhythm; No murmurs,   ABDOMEN: Soft, Nontender, Nondistended; Bowel sounds present  EXTREMITIES:  2+ Peripheral Pulses, No clubbing, cyanosis, or edema  PSYCH: AAOx3, pleansant, cooperative  NEUROLOGY: non-focal  SKIN: No rashes or lesions

## 2023-12-07 NOTE — DIETITIAN INITIAL EVALUATION ADULT - PROBLEM SELECTOR PROBLEM 1
Acute encephalopathy The resident's documentation has been prepared under my direction and personally reviewed by me in its entirety. I confirm that the note above accurately reflects all work, treatment, procedures, and medical decision making performed by me. Please see DEBBY Menjivar MD PEM Attending

## 2023-12-07 NOTE — DIETITIAN INITIAL EVALUATION ADULT - NSFNSGIIOFT_GEN_A_CORE
12-06-23 @ 07:01  -  12-07-23 @ 07:00  --------------------------------------------------------  OUT:    Stool (mL): 1 mL  Total OUT: 1 mL    Total NET: -1 mL

## 2023-12-07 NOTE — DISCHARGE NOTE PROVIDER - HOSPITAL COURSE
48 yrs old M, from home, pmhx of colon cancer, rectal mass on Chemotherapy following with Dr. Felix oncology [facility], Bipolar disorder presented with abdominal pain, Nausea and vomiting and acute encephalopathy post chemo. suspected seizure,  Normal EEG in the awake state. There were no epileptiform abnormalities recorded on 12/7/23.   Pt refused MRI given recent MRI at [facility], per [place] radiology dept pt only had CT head in Nov /2023, Pt also had CTH  on admission which is unremarkable. Attending and neurologist discussed the case and pt is cleared for discharge      48 yrs old M, from home, pmhx of colon cancer, rectal mass on Chemotherapy following with Dr. Felix oncology [facility], Bipolar disorder presented with abdominal pain, Nausea and vomiting and acute encephalopathy post chemo. suspected seizure,  Normal EEG in the awake state. There were no epileptiform abnormalities recorded on 12/7/23.   Pt refused MRI given recent MRI at [facility], per [place] radiology dept pt only had CT head in Nov /2023, Pt also had CTH  on admission which is unremarkable. Attending and neurologist discussed the case and pt is cleared for discharge      48 yrs old M, from home, pmhx of colon cancer, rectal mass on Chemotherapy following with Dr. Felix oncology A, Bipolar disorder presented with abdominal pain, Nausea and vomiting and acute encephalopathy post chemo. suspected seizure,  Normal EEG in the awake state. There were no epileptiform abnormalities recorded on 12/7/23.   Pt refused MRI of the brain that was recommended by neurology given recent MRI at [facility], but per [place] radiology dept pt only had CT head in Nov /2023, Pt also had CTH  on admission which is unremarkable. Attending and neurologist discussed the case and pt is cleared for discharge. Patient was counseled at length that he may not operate heavy machinery or motor vehicles for one year per [facility] law over concerns for seizure. Patient reports he does drive, but has no vehicle to operate. He reports he will follow-up with a neurologist and not drive.     48 yrs old M, from home, pmhx of colon cancer, rectal mass on Chemotherapy following with Dr. Felix oncology [facility], Bipolar disorder presented with abdominal pain, Nausea and vomiting and acute encephalopathy post chemo. suspected seizure,  Normal EEG in the awake state. There were no epileptiform abnormalities recorded on 12/7/23.   Pt refused MRI of the brain that was recommended by neurology given recent MRI at [facility], but per [place] radiology dept pt only had CT head in Nov /2023, Pt also had CTH  on admission which is unremarkable. Attending and neurologist discussed the case and pt is cleared for discharge. Patient was counseled at length that he may not operate heavy machinery or motor vehicles for one year per [facility] law over concerns for seizure. Patient reports he does drive, but has no vehicle to operate. He reports he will follow-up with a neurologist and not drive.

## 2023-12-08 ENCOUNTER — TRANSCRIPTION ENCOUNTER (OUTPATIENT)
Age: 48
End: 2023-12-08

## 2023-12-08 PROCEDURE — 77014: CPT

## 2023-12-08 PROCEDURE — G6015: CPT

## 2023-12-10 LAB
CULTURE RESULTS: SIGNIFICANT CHANGE UP
SPECIMEN SOURCE: SIGNIFICANT CHANGE UP

## 2023-12-11 PROCEDURE — G6015: CPT

## 2023-12-11 PROCEDURE — 77014: CPT

## 2023-12-12 PROCEDURE — 77427 RADIATION TX MANAGEMENT X5: CPT

## 2023-12-12 PROCEDURE — G6015: CPT

## 2023-12-12 PROCEDURE — 77336 RADIATION PHYSICS CONSULT: CPT

## 2023-12-12 PROCEDURE — 77014: CPT

## 2023-12-13 PROBLEM — C20 MALIGNANT NEOPLASM OF RECTUM: Chronic | Status: ACTIVE | Noted: 2023-12-05

## 2023-12-13 PROBLEM — F31.9 BIPOLAR DISORDER, UNSPECIFIED: Chronic | Status: ACTIVE | Noted: 2023-12-05

## 2023-12-13 NOTE — ED ADULT NURSE NOTE - SUICIDE SCREENING QUESTION 1
Refill request received for KLOR and Norvasc    Last office visit: 11/13/2023  Next office visit: 12/21/2023  Last labs: 9/7/23   Last refilled: 10/10/23     Filled per protocol-until next OV  
Patient unable to complete

## 2023-12-15 ENCOUNTER — APPOINTMENT (OUTPATIENT)
Dept: FAMILY MEDICINE | Facility: CLINIC | Age: 48
End: 2023-12-15
Payer: MEDICAID

## 2023-12-15 VITALS
HEART RATE: 92 BPM | SYSTOLIC BLOOD PRESSURE: 114 MMHG | OXYGEN SATURATION: 98 % | DIASTOLIC BLOOD PRESSURE: 72 MMHG | RESPIRATION RATE: 18 BRPM | TEMPERATURE: 98 F | HEIGHT: 65 IN | BODY MASS INDEX: 23.16 KG/M2 | WEIGHT: 139 LBS

## 2023-12-15 DIAGNOSIS — Z74.2 NEED FOR ASSISTANCE AT HOME AND NO OTHER HOUSEHOLD MEMBER ABLE TO RENDER CARE: ICD-10-CM

## 2023-12-15 PROCEDURE — 99212 OFFICE O/P EST SF 10 MIN: CPT

## 2023-12-16 PROBLEM — Z74.2 NEED FOR HOME HEALTH CARE: Status: ACTIVE | Noted: 2023-12-15

## 2023-12-16 NOTE — HISTORY OF PRESENT ILLNESS
[FreeTextEntry1] : [facility] form for home services [phone]   [de-identified] : 47yr old male w/ PMHx of asthma (controlled), bipolar disorder, colon cancer (rectal mass - on chemo). Presents today for completion of [facility] form for home care services.  Patient denies dizziness, headache, chest pain, shortness of breath, and abdominal pain at this time.

## 2023-12-17 NOTE — HISTORY OF PRESENT ILLNESS
[FreeTextEntry1] : Mr. Titus is 48 year old male with newly diagnosed WHITNEY colorectal cancer with two foci, 1) rectosigmoid colon mass at 20 cm and 2) distal rectum 5 cm from anal verge, presenting to discuss radiation therapy recommendations. His PMHx is notable for asthma (controlled), and bipolar disorder.  The course of his illness began with presentation to [facility] ED with symptomatic anemia due to iron deficiency. Thereafter he underwent EDG/colonoscopy on 8/24/2023 which revealed two large malignant-appearing masses in the rectosigmoid colon at 20 cm and in the distal rectum at 5 cm from the anal verge. Pathology both positive for invasive adenocarcinoma.  9/12/23 MRI pelvis/ rectal/anal notable for primary mid rectal tumor T3cN+, tumor margin > 2 mm from MRF, enlarged bilateral obturator chain lymph nodes measuring 1.3 cm in short axis on right (series 6 image 17) and 1.6 cm on left (series 7 image 14). Noted as well was circumferential mass in mid sigmoid colon 3.8 cm, with extension through muscularis propria extramural vascular invasion. Lymph node in sigmoid mesentery anteriorly appreciated 5 mm in size (series 7 image 7).   9/21/2023 Consultation. No abd pain, nausea. He reports progressive rectal bleeding, initially with wiping of toilet paper, now appreciating some drops of blood in toilet bowl. At time of intial presentation he endorsed lightheadedness in setting of the resulting anemia, but notes that this has improved since he started taking ferrous sulfate and vitamin c. Med Onc Dr Felix.   11/22/2023 OTV. 17/25 fractions of radiation to rectum completed. c/o occasional diarrhea. c/o rectal pain which was managed by Med Onc. No abd pain. Weight stable. On chemo pills from Dr Weinberg @ [facility].

## 2023-12-26 ENCOUNTER — NON-APPOINTMENT (OUTPATIENT)
Age: 48
End: 2023-12-26

## 2024-01-12 NOTE — H&P ADULT - MLM HIDDEN
01/19/24                            Deborah ARNOLD Golden  [id number]  [facility] [address]    To Whom It May Concern:    This is to certify Deborah Franks was evaluated with Dalila Young MD on 01/19/24 and {RETURN TO WORK OR SCHOOL:880751}.     RESTRICTIONS: ***            {Signature Options:[id number]}       yes

## 2024-01-16 ENCOUNTER — NON-APPOINTMENT (OUTPATIENT)
Age: 49
End: 2024-01-16

## 2024-01-16 NOTE — VITALS
[Maximal Pain Intensity: 3/10] : 3/10 [Least Pain Intensity: 1/10] : 1/10 [Pain Description/Quality: ___] : Pain description/quality: [unfilled] [Pain Duration: ___] : Pain duration: [unfilled] [Pain Location: ___] : Pain Location: [unfilled] [Opioid] : opioid [80: Normal activity with effort; some signs or symptoms of disease.] : 80: Normal activity with effort; some signs or symptoms of disease.  [ECOG Performance Status: 2 - Ambulatory and capable of all self care but unable to carry out any work activities] : Performance Status: 2 - Ambulatory and capable of all self care but unable to carry out any work activities. Up and about more than 50% of waking hours [4 - Distress Level] : Distress Level: 4

## 2024-01-19 ENCOUNTER — APPOINTMENT (OUTPATIENT)
Dept: RADIATION ONCOLOGY | Facility: CLINIC | Age: 49
End: 2024-01-19
Payer: MEDICAID

## 2024-01-19 PROCEDURE — 99024 POSTOP FOLLOW-UP VISIT: CPT

## 2024-01-19 NOTE — PHYSICAL EXAM
[Normal] : no focal deficits [Oriented To Time, Place, And Person] : oriented to person, place, and time

## 2024-01-21 NOTE — DISEASE MANAGEMENT
[Clinical] : TNM Stage: c [III] : III [TTNM] : 3c [NTNM] : x [MTNM] : x [de-identified] : 59 Gy [de-identified] : rectum and LN nodes

## 2024-01-21 NOTE — HISTORY OF PRESENT ILLNESS
[FreeTextEntry1] : Mr. Titus is 48 year old male with newly diagnosed WHITNEY colorectal cancer with two foci, 1) rectosigmoid colon mass at 20 cm and 2) distal rectum 5 cm from anal verge, presenting to discuss radiation therapy recommendations. His PMHx is notable for asthma (controlled), and bipolar disorder. h/o radiation for rectal cancer in 11/2023.   The course of his illness began with presentation to [facility] ED with symptomatic anemia due to iron deficiency. Thereafter he underwent EDG/colonoscopy on 8/24/2023 which revealed two large malignant-appearing masses in the rectosigmoid colon at 20 cm and in the distal rectum at 5 cm from the anal verge. Pathology both positive for invasive adenocarcinoma.  9/12/23 MRI pelvis/ rectal/anal notable for primary mid rectal tumor T3cN+, tumor margin > 2 mm from MRF, enlarged bilateral obturator chain lymph nodes measuring 1.3 cm in short axis on right (series 6 image 17) and 1.6 cm on left (series 7 image 14). Noted as well was circumferential mass in mid sigmoid colon 3.8 cm, with extension through muscularis propria extramural vascular invasion. Lymph node in sigmoid mesentery anteriorly appreciated 5 mm in size (series 7 image 7).   9/21/2023 Consultation. No abd pain, nausea. He reports progressive rectal bleeding, initially with wiping of toilet paper, now appreciating some drops of blood in toilet bowl. At time of intial presentation he endorsed lightheadedness in setting of the resulting anemia, but notes that this has improved since he started taking ferrous sulfate and vitamin c. Med Onc Dr Felix.   11/16/2023 OTV. 12/25 fractions of radiation to rectum completed. c/o occasional diarrhea. c/o rectal pain which was managed by Med Onc. No abd pain. Weight stable this wk. On chemo pills from Dr Weinberg @ [facility].  1/19/2024 F/U. Pt completed 59 Gy / 25 Fx of RT for rectal cancer in 11/2023.  He started on chemo cycles  with Dr. Dr Weinberg @ [facility] in end of Dec, will have MRI after 4 months of chemo.

## 2024-01-21 NOTE — REVIEW OF SYSTEMS
[Fatigue] : fatigue [Negative] : Allergic/Immunologic [Anal Pain: Grade 0] : Anal Pain: Grade 0 [Diarrhea: Grade 1 - Increase of <4 stools per day over baseline; mild increase in ostomy output compared to baseline] : Diarrhea: Grade 1 - Increase of <4 stools per day over baseline; mild increase in ostomy output compared to baseline [Rectal Pain: Grade 1 - Mild pain] : Rectal Pain: Grade 1 - Mild pain [Rectal Pain: Grade 0] : Rectal Pain: Grade 0 [FreeTextEntry7] : colorectal cancer [de-identified] : anemia

## 2024-01-21 NOTE — END OF VISIT
[] : Resident [FreeTextEntry3] : Agree with above.  [Time Spent: ___ minutes] : I have spent [unfilled] minutes of time on the encounter. [>50% of the face to face encounter time was spent on counseling and/or coordination of care for ___] : Greater than 50% of the face to face encounter time was spent on counseling and/or coordination of care for [unfilled]

## 2024-04-19 ENCOUNTER — INPATIENT (INPATIENT)
Facility: HOSPITAL | Age: 49
LOS: 2 days | Discharge: HOME | DRG: 92 | End: 2024-04-22
Attending: INTERNAL MEDICINE | Admitting: INTERNAL MEDICINE
Payer: MEDICAID

## 2024-04-19 VITALS
DIASTOLIC BLOOD PRESSURE: 76 MMHG | SYSTOLIC BLOOD PRESSURE: 113 MMHG | TEMPERATURE: 98 F | RESPIRATION RATE: 16 BRPM | HEART RATE: 81 BPM | OXYGEN SATURATION: 99 %

## 2024-04-19 DIAGNOSIS — Z29.9 ENCOUNTER FOR PROPHYLACTIC MEASURES, UNSPECIFIED: ICD-10-CM

## 2024-04-19 DIAGNOSIS — F31.9 BIPOLAR DISORDER, UNSPECIFIED: ICD-10-CM

## 2024-04-19 DIAGNOSIS — D72.819 DECREASED WHITE BLOOD CELL COUNT, UNSPECIFIED: ICD-10-CM

## 2024-04-19 DIAGNOSIS — C18.9 MALIGNANT NEOPLASM OF COLON, UNSPECIFIED: ICD-10-CM

## 2024-04-19 DIAGNOSIS — R41.82 ALTERED MENTAL STATUS, UNSPECIFIED: ICD-10-CM

## 2024-04-19 DIAGNOSIS — G93.40 ENCEPHALOPATHY, UNSPECIFIED: ICD-10-CM

## 2024-04-19 LAB
ALBUMIN SERPL ELPH-MCNC: 3.8 G/DL — SIGNIFICANT CHANGE UP (ref 3.5–5)
ALP SERPL-CCNC: 59 U/L — SIGNIFICANT CHANGE UP (ref 40–120)
ALT FLD-CCNC: 36 U/L DA — SIGNIFICANT CHANGE UP (ref 10–60)
AMMONIA BLD-MCNC: 67 UMOL/L — HIGH (ref 11–32)
AMPHET UR-MCNC: NEGATIVE — SIGNIFICANT CHANGE UP
ANION GAP SERPL CALC-SCNC: 9 MMOL/L — SIGNIFICANT CHANGE UP (ref 5–17)
APPEARANCE UR: CLEAR — SIGNIFICANT CHANGE UP
APTT BLD: 30 SEC — SIGNIFICANT CHANGE UP (ref 24.5–35.6)
AST SERPL-CCNC: 36 U/L — SIGNIFICANT CHANGE UP (ref 10–40)
BACTERIA # UR AUTO: ABNORMAL /HPF
BARBITURATES UR SCN-MCNC: NEGATIVE — SIGNIFICANT CHANGE UP
BASE EXCESS BLDV CALC-SCNC: 2.2 MMOL/L — SIGNIFICANT CHANGE UP
BASOPHILS # BLD AUTO: 0 K/UL — SIGNIFICANT CHANGE UP (ref 0–0.2)
BASOPHILS NFR BLD AUTO: 0 % — SIGNIFICANT CHANGE UP (ref 0–2)
BENZODIAZ UR-MCNC: NEGATIVE — SIGNIFICANT CHANGE UP
BILIRUB SERPL-MCNC: 0.5 MG/DL — SIGNIFICANT CHANGE UP (ref 0.2–1.2)
BILIRUB UR-MCNC: NEGATIVE — SIGNIFICANT CHANGE UP
BLOOD GAS COMMENTS, VENOUS: SIGNIFICANT CHANGE UP
BUN SERPL-MCNC: 10 MG/DL — SIGNIFICANT CHANGE UP (ref 7–18)
CA-I SERPL-SCNC: SIGNIFICANT CHANGE UP MMOL/L (ref 1.15–1.33)
CALCIUM SERPL-MCNC: 9.7 MG/DL — SIGNIFICANT CHANGE UP (ref 8.4–10.5)
CHLORIDE SERPL-SCNC: 115 MMOL/L — HIGH (ref 96–108)
CO2 SERPL-SCNC: 22 MMOL/L — SIGNIFICANT CHANGE UP (ref 22–31)
COCAINE METAB.OTHER UR-MCNC: NEGATIVE — SIGNIFICANT CHANGE UP
COLOR SPEC: YELLOW — SIGNIFICANT CHANGE UP
CREAT SERPL-MCNC: 1.19 MG/DL — SIGNIFICANT CHANGE UP (ref 0.5–1.3)
DIFF PNL FLD: NEGATIVE — SIGNIFICANT CHANGE UP
EGFR: 76 ML/MIN/1.73M2 — SIGNIFICANT CHANGE UP
EOSINOPHIL # BLD AUTO: 0 K/UL — SIGNIFICANT CHANGE UP (ref 0–0.5)
EOSINOPHIL NFR BLD AUTO: 0 % — SIGNIFICANT CHANGE UP (ref 0–6)
EPI CELLS # UR: SIGNIFICANT CHANGE UP
ETHANOL SERPL-MCNC: <3 MG/DL — SIGNIFICANT CHANGE UP (ref 0–10)
GAS PNL BLDV: 140 MMOL/L — SIGNIFICANT CHANGE UP (ref 136–145)
GAS PNL BLDV: SIGNIFICANT CHANGE UP
GLUCOSE SERPL-MCNC: 105 MG/DL — HIGH (ref 70–99)
GLUCOSE UR QL: NEGATIVE MG/DL — SIGNIFICANT CHANGE UP
HCO3 BLDV-SCNC: 26 MMOL/L — SIGNIFICANT CHANGE UP (ref 22–29)
HCT VFR BLD CALC: 45.2 % — SIGNIFICANT CHANGE UP (ref 39–50)
HGB BLD-MCNC: 15.1 G/DL — SIGNIFICANT CHANGE UP (ref 13–17)
HOROWITZ INDEX BLDV+IHG-RTO: 21 — SIGNIFICANT CHANGE UP
INR BLD: 0.96 RATIO — SIGNIFICANT CHANGE UP (ref 0.85–1.18)
KETONES UR-MCNC: ABNORMAL MG/DL
LACTATE BLDV-MCNC: 1.2 MMOL/L — SIGNIFICANT CHANGE UP (ref 0.5–2)
LACTATE SERPL-SCNC: 1 MMOL/L — SIGNIFICANT CHANGE UP (ref 0.7–2)
LEUKOCYTE ESTERASE UR-ACNC: NEGATIVE — SIGNIFICANT CHANGE UP
LYMPHOCYTES # BLD AUTO: 0.44 K/UL — LOW (ref 1–3.3)
LYMPHOCYTES # BLD AUTO: 15 % — SIGNIFICANT CHANGE UP (ref 13–44)
MCHC RBC-ENTMCNC: 32.4 PG — SIGNIFICANT CHANGE UP (ref 27–34)
MCHC RBC-ENTMCNC: 33.4 GM/DL — SIGNIFICANT CHANGE UP (ref 32–36)
MCV RBC AUTO: 97 FL — SIGNIFICANT CHANGE UP (ref 80–100)
METHADONE UR-MCNC: NEGATIVE — SIGNIFICANT CHANGE UP
MONOCYTES # BLD AUTO: 0.5 K/UL — SIGNIFICANT CHANGE UP (ref 0–0.9)
MONOCYTES NFR BLD AUTO: 17 % — HIGH (ref 2–14)
NEUTROPHILS # BLD AUTO: 2 K/UL — SIGNIFICANT CHANGE UP (ref 1.8–7.4)
NEUTROPHILS NFR BLD AUTO: 68 % — SIGNIFICANT CHANGE UP (ref 43–77)
NITRITE UR-MCNC: NEGATIVE — SIGNIFICANT CHANGE UP
OPIATES UR-MCNC: NEGATIVE — SIGNIFICANT CHANGE UP
PCO2 BLDV: 35 MMHG — LOW (ref 42–55)
PCP SPEC-MCNC: SIGNIFICANT CHANGE UP
PCP UR-MCNC: NEGATIVE — SIGNIFICANT CHANGE UP
PH BLDV: 7.47 — HIGH (ref 7.32–7.43)
PH UR: 8.5 (ref 5–8)
PLATELET # BLD AUTO: 153 K/UL — SIGNIFICANT CHANGE UP (ref 150–400)
PO2 BLDV: 36 MMHG — SIGNIFICANT CHANGE UP
POTASSIUM BLDV-SCNC: 4.2 MMOL/L — SIGNIFICANT CHANGE UP (ref 3.5–5.1)
POTASSIUM SERPL-MCNC: 4 MMOL/L — SIGNIFICANT CHANGE UP (ref 3.5–5.3)
POTASSIUM SERPL-SCNC: 4 MMOL/L — SIGNIFICANT CHANGE UP (ref 3.5–5.3)
PROT SERPL-MCNC: 7.3 G/DL — SIGNIFICANT CHANGE UP (ref 6–8.3)
PROT UR-MCNC: ABNORMAL MG/DL
PROTHROM AB SERPL-ACNC: 11 SEC — SIGNIFICANT CHANGE UP (ref 9.5–13)
RBC # BLD: 4.66 M/UL — SIGNIFICANT CHANGE UP (ref 4.2–5.8)
RBC # FLD: 15.1 % — HIGH (ref 10.3–14.5)
RBC CASTS # UR COMP ASSIST: 1 /HPF — SIGNIFICANT CHANGE UP (ref 0–4)
SAO2 % BLDV: 60.9 % — SIGNIFICANT CHANGE UP
SODIUM SERPL-SCNC: 146 MMOL/L — HIGH (ref 135–145)
SP GR SPEC: 1.01 — SIGNIFICANT CHANGE UP (ref 1–1.03)
THC UR QL: NEGATIVE — SIGNIFICANT CHANGE UP
UROBILINOGEN FLD QL: 1 MG/DL — SIGNIFICANT CHANGE UP (ref 0.2–1)
VALPROATE SERPL-MCNC: 11 UG/ML — LOW (ref 50–100)
WBC # BLD: 2.94 K/UL — LOW (ref 3.8–10.5)
WBC # FLD AUTO: 2.94 K/UL — LOW (ref 3.8–10.5)
WBC UR QL: 2 /HPF — SIGNIFICANT CHANGE UP (ref 0–5)

## 2024-04-19 PROCEDURE — 71045 X-RAY EXAM CHEST 1 VIEW: CPT | Mod: 26

## 2024-04-19 PROCEDURE — 70450 CT HEAD/BRAIN W/O DYE: CPT | Mod: 26,MC

## 2024-04-19 PROCEDURE — 99222 1ST HOSP IP/OBS MODERATE 55: CPT

## 2024-04-19 PROCEDURE — 99285 EMERGENCY DEPT VISIT HI MDM: CPT

## 2024-04-19 RX ORDER — ENOXAPARIN SODIUM 100 MG/ML
40 INJECTION SUBCUTANEOUS EVERY 24 HOURS
Refills: 0 | Status: DISCONTINUED | OUTPATIENT
Start: 2024-04-19 | End: 2024-04-22

## 2024-04-19 RX ORDER — OLANZAPINE 15 MG/1
10 TABLET, FILM COATED ORAL ONCE
Refills: 0 | Status: COMPLETED | OUTPATIENT
Start: 2024-04-19 | End: 2024-04-19

## 2024-04-19 RX ORDER — LANOLIN ALCOHOL/MO/W.PET/CERES
5 CREAM (GRAM) TOPICAL AT BEDTIME
Refills: 0 | Status: COMPLETED | OUTPATIENT
Start: 2024-04-19 | End: 2024-04-21

## 2024-04-19 RX ADMIN — OLANZAPINE 10 MILLIGRAM(S): 15 TABLET, FILM COATED ORAL at 23:20

## 2024-04-19 NOTE — ED PROVIDER NOTE - OBJECTIVE STATEMENT
47 yo M pmh (obtained from charting) of colon cancer on chemo and ?seizures  Presents from MD office for AMS  Pt unable to provide history, AAOx1, when I ask where are we he answers "1975"  Spoke with pt's mother Yanely [phone] who provides a very limited history but states pt was awake all night, couldn't sleep, had some vomiting, got lost while out on his own today, reportedly AAOx4 at baseline

## 2024-04-19 NOTE — H&P ADULT - HISTORY OF PRESENT ILLNESS
48 yrs old M, from home, pmhx of colon cancer, rectal mass on Chemotherapy following with Dr. Felix oncology A, Bipolar disorder presented with altered mental status. Pt is AAOx3, reported of not feeling well and had a black out today, unsure if he had LOC, denied chest pain, palpitation, dyspnea, abdominal pain, N/V/D, urinary symptoms, fever, chills, cough, unilateral weakness or numbness in the extremities or the face, speech abnormalities. Denied recent travel or sick contacts. Collateral information obtained from pt's mother Ms. Ny via [phone], endorsed no LOC, seizure however patient looked confused and weak. She stated that patient usually feels very weak and the chemotherapy is very strong for him. Pt denied loss/decreased appetite or decreased PO intake however as pt's mother, he has been eating less.   Pt denied alcohol consumption, smoking, use of marijuana or illicit drugs use.

## 2024-04-19 NOTE — ED PROVIDER NOTE - PHYSICAL EXAMINATION
GENERAL: well appearing, no acute distress   HEAD: atraumatic   EYES: EOMI   ENT: moist oral mucosa   CARDIAC: regular rate, R chest wall with port present and portable infusion pump running   RESPIRATORY: no increased work of breathing   MUSCULOSKELETAL: no deformity   NEUROLOGICAL: alert, oriented to person but not place/time/situation, spontaneous movement of extremities, ambulatory, normal strength and coordination    SKIN: no visible rash  PSYCHIATRIC: cooperative
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
## 2024-04-19 NOTE — H&P ADULT - PROBLEM SELECTOR PLAN 3
hx of colon cancer on active chemoRx  p/w Leukopenia of 2.94  baseline WBC based on DEc/2024 3.4  no signs or symptoms of infection  U/A neg  f/u Lactate  f/u CXR

## 2024-04-19 NOTE — H&P ADULT - NSHPPHYSICALEXAM_GEN_ALL_CORE
VITALS:   Vital Signs Last 24 Hrs  T(C): 36.7 (19 Apr 2024 21:16), Max: 36.7 (19 Apr 2024 14:34)  T(F): 98.1 (19 Apr 2024 21:16), Max: 98.1 (19 Apr 2024 14:34)  HR: 65 (19 Apr 2024 21:16) (65 - 81)  BP: 115/79 (19 Apr 2024 21:16) (113/76 - 115/79)  BP(mean): --  RR: 16 (19 Apr 2024 21:16) (16 - 16)  SpO2: 98% (19 Apr 2024 21:16) (98% - 99%)    Parameters below as of 19 Apr 2024 21:16  Patient On (Oxygen Delivery Method): room air      GENERAL: NAD, lying in bed comfortably, good eye contact, however looks depressed   HEAD:  Atraumatic, Normocephalic  EYES: EOMI, PERRLA, conjunctiva and sclera clear  ENT: Moist mucous membranes  NECK: Supple, No JVD  CHEST/LUNG: Clear to auscultation bilaterally; No rales, rhonchi, wheezing, or rubs. Unlabored respirations  HEART: Regular rate and rhythm; No murmurs, rubs, or gallops  ABDOMEN: Bowel sounds present; Soft, Nontender, Nondistended.   EXTREMITIES:  2+ Peripheral Pulses, brisk capillary refill. No clubbing, cyanosis, or edema  NERVOUS SYSTEM:  Alert & Oriented X3, speech clear. No focal sensory or motor deficit   MSK: FROM all 4 extremities, full and equal strength  SKIN: No rashes or lesions

## 2024-04-19 NOTE — ED ADULT NURSE NOTE - OBJECTIVE STATEMENT
Patient presents to ED c/o AMS. Pt verbalized "no" when asked if any chest pain or sob. As per patient medication "Oxaliplatin" IV gtt via Right upper chest port-a-cath.

## 2024-04-19 NOTE — ED PROVIDER NOTE - PROGRESS NOTE DETAILS
Labs with WBCs 2.9 which is similar to previous, others nonactionable  UA negative for UTI  CT head without ICH  On reassessment patient's sisters arrived to the ED and his orientation has improved, he now knows that he is in the hospital.  Sister states he is not back to baseline yet.  PCP not on admitting list.  Endorsed to Dr. Mireles for admission and endorsed MAR

## 2024-04-19 NOTE — ED ADULT NURSE NOTE - DRUG PRE-SCREENING (DAST -1)
Patient would like to talk to the nurse regarding a prescription. Please call her back   Statement Selected

## 2024-04-19 NOTE — H&P ADULT - PROBLEM SELECTOR PLAN 4
hx of Colon cancer, rectal mass on chemoRx  following with [facility] Dr. Felix as outpt  primary team to consider inpt Onc consult as pt's mother concern for weakness or altered mental status change due to chemorx [repeated admission with similar presentation]

## 2024-04-19 NOTE — ED ADULT NURSE NOTE - NSFALLUNIVINTERV_ED_ALL_ED
Bed/Stretcher in lowest position, wheels locked, appropriate side rails in place/Call bell, personal items and telephone in reach/Instruct patient to call for assistance before getting out of bed/chair/stretcher/Non-slip footwear applied when patient is off stretcher/[place] to call system/Physically safe environment - no spills, clutter or unnecessary equipment/Purposeful proactive rounding/Room/bathroom lighting operational, light cord in reach

## 2024-04-19 NOTE — H&P ADULT - PROBLEM SELECTOR PLAN 1
p/w altered mental status  on exam AAOx3, however reports feeling confused and not back to baseline   in ED: Afebrile, HR 81, /76, Sat 99%  no signs/symptoms of infection:  CT head neg for acute findings  leukopenia of 2.94 at baseline  U/A neg, blood alcohol neg  f/u Lactate, ammonia, salicylate, acetaminophen, VBG  f/u Depakote level  f/u CXR

## 2024-04-19 NOTE — H&P ADULT - ATTENDING COMMENTS
Vital Signs Last 24 Hrs  T(C): 36.7 (19 Apr 2024 21:16), Max: 36.7 (19 Apr 2024 14:34)  T(F): 98.1 (19 Apr 2024 21:16), Max: 98.1 (19 Apr 2024 14:34)  HR: 65 (19 Apr 2024 21:16) (65 - 81)  BP: 115/79 (19 Apr 2024 21:16) (113/76 - 115/79)  RR: 16 (19 Apr 2024 21:16) (16 - 16)  SpO2: 98% (19 Apr 2024 21:16) (98% - 99%)  Parameters below as of 19 Apr 2024 21:16  Patient On (Oxygen Delivery Method): room air    Labs   wbc 2.9 with monocytosis  Na 146  Cl - 115  Valproic acid - 11    CT head - unremarkable     Impression   48 year old man with hx of colon CA on chemotherapy here with acute changes in mental status along the line of feeling fuzzy and confused though he is alert and oriented. Clinical findings and work up show no brain metastasis, no evidence of infection, trauma , metabolic changes or stroke   He is admitted for management and close observation.    Problems   # Acute encephalopathy   # Mild hypernatremia  # Colon cancer on treatment     Plan   IVF hydration with hypotonic fluids  Resume appropriate home meds after reconciliation  Neurology consult if condition persist    Other plans as above

## 2024-04-19 NOTE — ED PROVIDER NOTE - CLINICAL SUMMARY MEDICAL DECISION MAKING FREE TEXT BOX
49 yo M with AMS in setting of chemo - 2/2 to chemo vs metastatic disease vs seizure vs other  Plan - FSBG, labs, CT head, admit

## 2024-04-19 NOTE — ED ADULT NURSE NOTE - NSFALLRISKFACTORS_ED_ALL_ED
[address] [place] [id number]     [phone]  [email or web address]    Cinthya Russo  [address] [facility] [address]        Dear Cinthya,    Hope you are doing well! Your records indicate that you are due for some Health Maintenance updates. Please contact our office at your earliest convenience to schedule an appointment.    If you have already completed these tests, please call the office to update our records.         Health Maintenance Due   Topic Date Due   • Depression Screening  08/31/1972   • Pneumococcal 19-64 Medium Risk (1 of 1 - PPSV23) 08/31/1979   • Cervical Cancer Screening HPV CO-Testing  08/31/1990   • DTaP/Tdap/Td Vaccine (1 - Tdap) 04/29/1998   • Colorectal Cancer Screening-Colonoscopy  08/31/2010   • Hepatitis C Screening  08/31/2011   • Breast Cancer Screening  08/31/2015           Thank you for allowing us to assist you with your well being.     Sincerely,    Jesus Fam MD  [address] [address]  Ph: [phone]                                                       No indicators present

## 2024-04-19 NOTE — H&P ADULT - ASSESSMENT
48 yrs old M, from home, pmhx of colon cancer, rectal mass on Chemotherapy following with Dr. Felix oncology A, Bipolar disorder presented with altered mental status. Pt is admitted for acute encephalopathy.

## 2024-04-20 LAB
ALBUMIN SERPL ELPH-MCNC: 3.4 G/DL — LOW (ref 3.5–5)
ALP SERPL-CCNC: 50 U/L — SIGNIFICANT CHANGE UP (ref 40–120)
ALT FLD-CCNC: 37 U/L DA — SIGNIFICANT CHANGE UP (ref 10–60)
ANION GAP SERPL CALC-SCNC: 6 MMOL/L — SIGNIFICANT CHANGE UP (ref 5–17)
APAP SERPL-MCNC: <2 UG/ML — LOW (ref 10–30)
AST SERPL-CCNC: 33 U/L — SIGNIFICANT CHANGE UP (ref 10–40)
BASOPHILS # BLD AUTO: 0.03 K/UL — SIGNIFICANT CHANGE UP (ref 0–0.2)
BASOPHILS NFR BLD AUTO: 1.4 % — SIGNIFICANT CHANGE UP (ref 0–2)
BILIRUB SERPL-MCNC: 0.5 MG/DL — SIGNIFICANT CHANGE UP (ref 0.2–1.2)
BUN SERPL-MCNC: 17 MG/DL — SIGNIFICANT CHANGE UP (ref 7–18)
CALCIUM SERPL-MCNC: 8.9 MG/DL — SIGNIFICANT CHANGE UP (ref 8.4–10.5)
CHLORIDE SERPL-SCNC: 118 MMOL/L — HIGH (ref 96–108)
CO2 SERPL-SCNC: 22 MMOL/L — SIGNIFICANT CHANGE UP (ref 22–31)
CREAT SERPL-MCNC: 0.86 MG/DL — SIGNIFICANT CHANGE UP (ref 0.5–1.3)
CULTURE RESULTS: NO GROWTH — SIGNIFICANT CHANGE UP
EGFR: 108 ML/MIN/1.73M2 — SIGNIFICANT CHANGE UP
EOSINOPHIL # BLD AUTO: 0.01 K/UL — SIGNIFICANT CHANGE UP (ref 0–0.5)
EOSINOPHIL NFR BLD AUTO: 0.5 % — SIGNIFICANT CHANGE UP (ref 0–6)
GLUCOSE SERPL-MCNC: 90 MG/DL — SIGNIFICANT CHANGE UP (ref 70–99)
HCT VFR BLD CALC: 41.5 % — SIGNIFICANT CHANGE UP (ref 39–50)
HGB BLD-MCNC: 13.9 G/DL — SIGNIFICANT CHANGE UP (ref 13–17)
IMM GRANULOCYTES NFR BLD AUTO: 0.5 % — SIGNIFICANT CHANGE UP (ref 0–0.9)
LYMPHOCYTES # BLD AUTO: 0.6 K/UL — LOW (ref 1–3.3)
LYMPHOCYTES # BLD AUTO: 27.1 % — SIGNIFICANT CHANGE UP (ref 13–44)
MAGNESIUM SERPL-MCNC: 2.8 MG/DL — HIGH (ref 1.6–2.6)
MCHC RBC-ENTMCNC: 32.5 PG — SIGNIFICANT CHANGE UP (ref 27–34)
MCHC RBC-ENTMCNC: 33.5 GM/DL — SIGNIFICANT CHANGE UP (ref 32–36)
MCV RBC AUTO: 97 FL — SIGNIFICANT CHANGE UP (ref 80–100)
MONOCYTES # BLD AUTO: 0.3 K/UL — SIGNIFICANT CHANGE UP (ref 0–0.9)
MONOCYTES NFR BLD AUTO: 13.6 % — SIGNIFICANT CHANGE UP (ref 2–14)
NEUTROPHILS # BLD AUTO: 1.26 K/UL — LOW (ref 1.8–7.4)
NEUTROPHILS NFR BLD AUTO: 56.9 % — SIGNIFICANT CHANGE UP (ref 43–77)
NRBC # BLD: 0 /100 WBCS — SIGNIFICANT CHANGE UP (ref 0–0)
PHOSPHATE SERPL-MCNC: 5.4 MG/DL — HIGH (ref 2.5–4.5)
PLATELET # BLD AUTO: 133 K/UL — LOW (ref 150–400)
POTASSIUM SERPL-MCNC: 3.4 MMOL/L — LOW (ref 3.5–5.3)
POTASSIUM SERPL-SCNC: 3.4 MMOL/L — LOW (ref 3.5–5.3)
PROT SERPL-MCNC: 6.5 G/DL — SIGNIFICANT CHANGE UP (ref 6–8.3)
RBC # BLD: 4.28 M/UL — SIGNIFICANT CHANGE UP (ref 4.2–5.8)
RBC # FLD: 15 % — HIGH (ref 10.3–14.5)
SALICYLATES SERPL-MCNC: <1.7 MG/DL — LOW (ref 2.8–20)
SODIUM SERPL-SCNC: 146 MMOL/L — HIGH (ref 135–145)
SPECIMEN SOURCE: SIGNIFICANT CHANGE UP
WBC # BLD: 2.21 K/UL — LOW (ref 3.8–10.5)
WBC # FLD AUTO: 2.21 K/UL — LOW (ref 3.8–10.5)

## 2024-04-20 PROCEDURE — 99233 SBSQ HOSP IP/OBS HIGH 50: CPT

## 2024-04-20 RX ORDER — POTASSIUM CHLORIDE 20 MEQ
40 PACKET (EA) ORAL ONCE
Refills: 0 | Status: COMPLETED | OUTPATIENT
Start: 2024-04-20 | End: 2024-04-20

## 2024-04-20 RX ORDER — SODIUM CHLORIDE 9 MG/ML
1000 INJECTION, SOLUTION INTRAVENOUS
Refills: 0 | Status: DISCONTINUED | OUTPATIENT
Start: 2024-04-20 | End: 2024-04-21

## 2024-04-20 RX ADMIN — Medication 40 MILLIEQUIVALENT(S): at 08:47

## 2024-04-20 RX ADMIN — ENOXAPARIN SODIUM 40 MILLIGRAM(S): 100 INJECTION SUBCUTANEOUS at 06:52

## 2024-04-20 RX ADMIN — SODIUM CHLORIDE 75 MILLILITER(S): 9 INJECTION, SOLUTION INTRAVENOUS at 11:31

## 2024-04-20 RX ADMIN — Medication 5 MILLIGRAM(S): at 21:12

## 2024-04-20 NOTE — CONSULT NOTE ADULT - CONSULT REQUESTED BY NAME
Dr. Aguila
Pt in ED c/o bilat flank pain.  Pt breathing symmetrical and unlabored, cardiac monitoring in place, #20 IV inserted into LAC, bloods drawn and sent to lab.  Pt in no acute distress at this time.

## 2024-04-20 NOTE — PROGRESS NOTE ADULT - SUBJECTIVE AND OBJECTIVE BOX
Patient seen and examined with Housestaff      Labs   wbc 2.9 with monocytosis  Na 146  Cl - 115  Valproic acid - 11    CT head - unremarkable     Impression   48 year old man with hx of colon CA on chemotherapy here with acute changes in mental status along the line of feeling fuzzy and confused though he is alert and oriented. Clinical findings and work up show no brain metastasis, no evidence of infection, trauma , metabolic changes or stroke       Problems   # Acute encephalopathy   # Mild hypernatremia  # Colon cancer on treatment     Plan   IVF hydration with hypotonic fluids  Resume appropriate home meds after reconciliation  Neurology consult if condition persist    Other plans as above . Patient seen and examined with Housestaff    48 year old man with hx of colon CA on active chemotherapy (with Dr. Felix [facility]) last one last Tuesday here with acute changes in mental status along the line of feeling fuzzy and confused though he is alert and oriented. Clinical findings and work up show no brain metastasis, no evidence of infection, trauma , metabolic changes or stroke     Patient admitted with presumed dehydration improving with IV fluids; Reports diarrhea otherwise feels better this morning; noted to be ambulating to restroom    MEDICATIONS  (STANDING):  enoxaparin Injectable 40 milliGRAM(s) SubCutaneous every 24 hours  lactated ringers. 1000 milliLiter(s) (75 mL/Hr) IV Continuous <Continuous>  melatonin 5 milliGRAM(s) Oral at bedtime    MEDICATIONS  (PRN):      Vital Signs Last 24 Hrs  T(C): 36.7 (20 Apr 2024 21:15), Max: 36.8 (20 Apr 2024 04:50)  T(F): 98.1 (20 Apr 2024 21:15), Max: 98.2 (20 Apr 2024 04:50)  HR: 81 (20 Apr 2024 21:15) (73 - 81)  BP: 126/92 (20 Apr 2024 21:15) (107/79 - 126/92)  RR: 16 (20 Apr 2024 21:15) (16 - 18)  SpO2: 98% (20 Apr 2024 21:15) (96% - 100%): room air    P/E:  middle aged male, comfortably resting in bed  Mucosa appears dry  Psych: AAO x3  Neuro: No gross focal deficits; Power and sensation intact  CVS: S1S2 present, regular, no edema  Resp: BLAE+, No wheeze or Rhonchi  GI: Soft, BS+, Non tender, non distended  Extr: No  calf tenderness B/L Lower extremities  Skin: Warm and moist without any rashes    Labs:                        13.9   2.21  )-----------( 133      ( 20 Apr 2024 06:02 )             41.5   04-20    146<H>  |  118<H>  |  17  ----------------------------<  90  3.4<L>   |  22  |  0.86    Ca    8.9      20 Apr 2024 06:02  Phos  5.4     04-20  Mg     2.8     04-20  TPro  6.5  /  Alb  3.4<L>  /  TBili  0.5  /  DBili  x   /  AST  33  /  ALT  37  /  AlkPhos  50  04-20

## 2024-04-20 NOTE — CONSULT NOTE ADULT - SUBJECTIVE AND OBJECTIVE BOX
CC: Rectal cancer    HPI: 49 y/o M w/ rectal cancer s/p chemoRT (completed 12/2023) undergoing FOLFOX as part of OMAR, Bipolar disorder p/w encephlopathy. Pt is AAOx3, reported of not feeling well and had a black out today, unsure if he had LOC, denied chest pain, palpitation, dyspnea, abdominal pain, N/V/D, urinary symptoms, fever, chills, cough, unilateral weakness or numbness in the extremities or the face, speech abnormalities. Denied recent travel or sick contacts. Collateral information obtained from pt's mother who denies any LOC, or seizure but states that patient looked confused and weak post chemotherapy. Pt denied loss/decreased appetite or decreased PO intake however as pt's mother, he has been eating less.   Pt denied alcohol consumption, smoking, use of marijuana or illicit drugs use.     INTERVAL HPI: Patient seen and examined at bedside; Events noted; Patient c/o     PMH/PSH as above; non-contributory    FmHx/Sox Hx as above; non-contributory    ROS as above; pt is not able to provide detailed review of systems  General: Non-contributory; Skin/Breast: NC; Ophthalmologic:NC; ENMT: NC; Respiratory and Thorax: NC; Cardiovascular: NC; 	  Gastrointestinal:NC; Genitourinary:NC; 	Musculoskeletal:NC; Neurological: NC; Psychiatric: NC; Hematology/Lymphatics: NC; Endocrine: NC; Allergic/Immunologic: NC    MEDICATIONS  (STANDING):  enoxaparin Injectable 40 milliGRAM(s) SubCutaneous every 24 hours  melatonin 5 milliGRAM(s) Oral at bedtime    MEDICATIONS  (PRN):      Vital Signs Last 24 Hrs  T(C): 36.8 (20 Apr 2024 04:50), Max: 36.8 (20 Apr 2024 04:50)  T(F): 98.2 (20 Apr 2024 04:50), Max: 98.2 (20 Apr 2024 04:50)  HR: 81 (20 Apr 2024 04:50) (65 - 81)  BP: 116/88 (20 Apr 2024 04:50) (111/78 - 116/88)  BP(mean): --  RR: 17 (20 Apr 2024 04:50) (16 - 17)  SpO2: 100% (20 Apr 2024 04:50) (96% - 100%)    Parameters below as of 20 Apr 2024 04:50  Patient On (Oxygen Delivery Method): room air      _________________  PHYSICAL EXAM:  ---------------------------  GEN: NAD; NC/AT; A and O x 3  HEENT: MMM; PERRLA: EOMI  LUNGS: no wheezing; decreased bilateral air entry; no use of accessory muscles for breathing  HEART: Nl S1 S2; no M   ABDOMEN: Soft, Nontender, non distended  EXTREMITIES: no cyanosis; no edema; warm and dry  SKIN: Warm and dry  NERVOUS SYSTEM: no focal neuro  deficits    _________________________________________________  LABS:                        13.9   2.21  )-----------( 133      ( 20 Apr 2024 06:02 )             41.5     04-20    146<H>  |  118<H>  |  17  ----------------------------<  90  3.4<L>   |  22  |  0.86    Ca    8.9      20 Apr 2024 06:02  Phos  5.4     04-20  Mg     2.8     04-20    TPro  6.5  /  Alb  3.4<L>  /  TBili  0.5  /  DBili  x   /  AST  33  /  ALT  37  /  AlkPhos  50  04-20    PT/INR - ( 19 Apr 2024 15:55 )   PT: 11.0 sec;   INR: 0.96 ratio      PTT - ( 19 Apr 2024 15:55 )  PTT:30.0 sec    < from: CT Head No Cont (04.19.24 @ 19:08) >  ACC: [id number] EXAM:  CT BRAIN   ORDERED BY: NISH HERRERA     PROCEDURE DATE:  04/19/2024          INTERPRETATION:  CT OF THE HEAD WITHOUT CONTRAST    CLINICAL INDICATION: Altered mental status.    TECHNIQUE: Volumetric CT acquisition was performed through the brain and   reviewed using brain and bone window technique.      COMPARISON: CT head 12/5/2023    FINDINGS:    The ventricular and sulcal size and configuration is age appropriate.     There is no acute loss of gray-white differentiation. Stable left   anterior temporal small wedge-shaped area of encephalomalacia consistent   with a chronic infarction.    There is no evidence of mass effect, midline shift, acute intracranial   hemorrhage, or extra-axial collections.     The calvarium is intact. The paranasal sinuses are clear.The mastoid air   cells are predominantly clear. The orbits are unremarkable.      IMPRESSION:  No acute intracranial hemorrhage or acute territorial infarction. Chronic   findings as above.    --- End of Report ---      < end of copied text >                         CC: Rectal cancer    HPI: 49 y/o M w/ rectal cancer s/p chemoRT (completed 12/2023) undergoing FOLFOX as part of OMAR, Bipolar disorder p/w encephlopathy. Pt is AAOx3, reported of not feeling well and had a black out today, unsure if he had LOC, denied chest pain, palpitation, dyspnea, abdominal pain, N/V/D, urinary symptoms, fever, chills, cough, unilateral weakness or numbness in the extremities or the face, speech abnormalities. Denied recent travel or sick contacts. Collateral information obtained from pt's mother who denies any LOC, or seizure but states that patient looked confused and weak post chemotherapy. Pt denied loss/decreased appetite or decreased PO intake however as pt's mother, he has been eating less.   Pt denied alcohol consumption, smoking, use of marijuana or illicit drugs use.     INTERVAL HPI: Patient seen and examined at bedside; Events noted; Patient c/o fatigue and diarrhea    PMH/PSH as above; non-contributory    FmHx/Sox Hx as above; non-contributory    ROS as above; pt is not able to provide detailed review of systems  General: Non-contributory; Skin/Breast: NC; Ophthalmologic:NC; ENMT: NC; Respiratory and Thorax: NC; Cardiovascular: NC; 	  Gastrointestinal:NC; Genitourinary:NC; 	Musculoskeletal:NC; Neurological: NC; Psychiatric: NC; Hematology/Lymphatics: NC; Endocrine: NC; Allergic/Immunologic: NC    MEDICATIONS  (STANDING):  enoxaparin Injectable 40 milliGRAM(s) SubCutaneous every 24 hours  melatonin 5 milliGRAM(s) Oral at bedtime    MEDICATIONS  (PRN):      Vital Signs Last 24 Hrs  T(C): 36.8 (20 Apr 2024 04:50), Max: 36.8 (20 Apr 2024 04:50)  T(F): 98.2 (20 Apr 2024 04:50), Max: 98.2 (20 Apr 2024 04:50)  HR: 81 (20 Apr 2024 04:50) (65 - 81)  BP: 116/88 (20 Apr 2024 04:50) (111/78 - 116/88)  BP(mean): --  RR: 17 (20 Apr 2024 04:50) (16 - 17)  SpO2: 100% (20 Apr 2024 04:50) (96% - 100%)    Parameters below as of 20 Apr 2024 04:50  Patient On (Oxygen Delivery Method): room air      _________________  PHYSICAL EXAM:  ---------------------------  GEN: NAD; NC/AT; A and O x 3  HEENT: MMM; PERRLA: EOMI  LUNGS: no wheezing; decreased bilateral air entry; no use of accessory muscles for breathing  HEART: Nl S1 S2; no M   ABDOMEN: Soft, Nontender, non distended  EXTREMITIES: no cyanosis; no edema; warm and dry  SKIN: Warm and dry  NERVOUS SYSTEM: no focal neuro  deficits    _________________________________________________  LABS:                        13.9   2.21  )-----------( 133      ( 20 Apr 2024 06:02 )             41.5     04-20    146<H>  |  118<H>  |  17  ----------------------------<  90  3.4<L>   |  22  |  0.86    Ca    8.9      20 Apr 2024 06:02  Phos  5.4     04-20  Mg     2.8     04-20    TPro  6.5  /  Alb  3.4<L>  /  TBili  0.5  /  DBili  x   /  AST  33  /  ALT  37  /  AlkPhos  50  04-20    PT/INR - ( 19 Apr 2024 15:55 )   PT: 11.0 sec;   INR: 0.96 ratio      PTT - ( 19 Apr 2024 15:55 )  PTT:30.0 sec    < from: CT Head No Cont (04.19.24 @ 19:08) >  ACC: [id number] EXAM:  CT BRAIN   ORDERED BY: NISH HERRERA     PROCEDURE DATE:  04/19/2024          INTERPRETATION:  CT OF THE HEAD WITHOUT CONTRAST    CLINICAL INDICATION: Altered mental status.    TECHNIQUE: Volumetric CT acquisition was performed through the brain and   reviewed using brain and bone window technique.      COMPARISON: CT head 12/5/2023    FINDINGS:    The ventricular and sulcal size and configuration is age appropriate.     There is no acute loss of gray-white differentiation. Stable left   anterior temporal small wedge-shaped area of encephalomalacia consistent   with a chronic infarction.    There is no evidence of mass effect, midline shift, acute intracranial   hemorrhage, or extra-axial collections.     The calvarium is intact. The paranasal sinuses are clear.The mastoid air   cells are predominantly clear. The orbits are unremarkable.      IMPRESSION:  No acute intracranial hemorrhage or acute territorial infarction. Chronic   findings as above.    --- End of Report ---      < end of copied text >

## 2024-04-20 NOTE — CONSULT NOTE ADULT - ASSESSMENT
FULL NOTE TO FOLLOW A/P    Problem #1 Rectal Ca -MSI intact; undergoing total neoadjuvant therapy s/p concurrent chemotherapy and radiation (12/2023) followed by FOLFOX (last session 4/17)  -MRI scheduled as outpatient on 4/26  -next chemotherapy session on 5/1/24; will discuss with Dr. Felix   -patient is under the care Dr. Robertson from surgery and Dr. Erickson from supportive care oncology    Problem #2 Encephalopathy - likely fatigue from chemotherapy as well as dehydration from dirrhea  -reviewed labs and CT head; no clinical evidence of occult infection at this time  -continue with aggressive IVF and nutrition support    Problem #3 Diarrhea - likely from recent chemotherapy; supportive tx including loperamide    Call with questions [phone]; d/w Dr. Aguila and medicine team; will continue to follow; if stable for discharge, patient can f/up with med onc in 1 week    Bernardo Mendoza MD

## 2024-04-20 NOTE — PATIENT PROFILE ADULT - FALL HARM RISK - HARM RISK INTERVENTIONS
Assistance with ambulation/Assistance OOB with selected safe patient handling equipment/Communicate Risk of Fall with Harm to all staff/Reinforce activity limits and safety measures with patient and family/Tailored Fall Risk Interventions/Use of alarms - bed, chair and/or voice tab/Visual Cue: Yellow wristband and red socks/Bed in lowest position, wheels locked, appropriate side rails in place/Call bell, personal items and telephone in reach/Instruct patient to call for assistance before getting out of bed or chair/Non-slip footwear when patient is out of bed/[place] to call system/Physically safe environment - no spills, clutter or unnecessary equipment/Purposeful Proactive Rounding/Room/bathroom lighting operational, light cord in reach

## 2024-04-21 DIAGNOSIS — E87.0 HYPEROSMOLALITY AND HYPERNATREMIA: ICD-10-CM

## 2024-04-21 LAB
ANION GAP SERPL CALC-SCNC: 6 MMOL/L — SIGNIFICANT CHANGE UP (ref 5–17)
BUN SERPL-MCNC: 19 MG/DL — HIGH (ref 7–18)
CALCIUM SERPL-MCNC: 8.1 MG/DL — LOW (ref 8.4–10.5)
CHLORIDE SERPL-SCNC: 112 MMOL/L — HIGH (ref 96–108)
CO2 SERPL-SCNC: 24 MMOL/L — SIGNIFICANT CHANGE UP (ref 22–31)
CREAT SERPL-MCNC: 0.94 MG/DL — SIGNIFICANT CHANGE UP (ref 0.5–1.3)
EGFR: 101 ML/MIN/1.73M2 — SIGNIFICANT CHANGE UP
GLUCOSE SERPL-MCNC: 100 MG/DL — HIGH (ref 70–99)
HCT VFR BLD CALC: 39.4 % — SIGNIFICANT CHANGE UP (ref 39–50)
HGB BLD-MCNC: 13.3 G/DL — SIGNIFICANT CHANGE UP (ref 13–17)
MAGNESIUM SERPL-MCNC: 2.1 MG/DL — SIGNIFICANT CHANGE UP (ref 1.6–2.6)
MCHC RBC-ENTMCNC: 33.6 PG — SIGNIFICANT CHANGE UP (ref 27–34)
MCHC RBC-ENTMCNC: 33.8 GM/DL — SIGNIFICANT CHANGE UP (ref 32–36)
MCV RBC AUTO: 99.5 FL — SIGNIFICANT CHANGE UP (ref 80–100)
NRBC # BLD: 0 /100 WBCS — SIGNIFICANT CHANGE UP (ref 0–0)
PHOSPHATE SERPL-MCNC: 2.9 MG/DL — SIGNIFICANT CHANGE UP (ref 2.5–4.5)
PLATELET # BLD AUTO: 130 K/UL — LOW (ref 150–400)
POTASSIUM SERPL-MCNC: 4 MMOL/L — SIGNIFICANT CHANGE UP (ref 3.5–5.3)
POTASSIUM SERPL-SCNC: 4 MMOL/L — SIGNIFICANT CHANGE UP (ref 3.5–5.3)
RBC # BLD: 3.96 M/UL — LOW (ref 4.2–5.8)
RBC # FLD: 14.1 % — SIGNIFICANT CHANGE UP (ref 10.3–14.5)
SODIUM SERPL-SCNC: 142 MMOL/L — SIGNIFICANT CHANGE UP (ref 135–145)
WBC # BLD: 2.72 K/UL — LOW (ref 3.8–10.5)
WBC # FLD AUTO: 2.72 K/UL — LOW (ref 3.8–10.5)

## 2024-04-21 PROCEDURE — 99232 SBSQ HOSP IP/OBS MODERATE 35: CPT | Mod: GC

## 2024-04-21 RX ORDER — SODIUM CHLORIDE 9 MG/ML
1000 INJECTION, SOLUTION INTRAVENOUS
Refills: 0 | Status: DISCONTINUED | OUTPATIENT
Start: 2024-04-21 | End: 2024-04-22

## 2024-04-21 RX ADMIN — Medication 5 MILLIGRAM(S): at 21:31

## 2024-04-21 RX ADMIN — SODIUM CHLORIDE 75 MILLILITER(S): 9 INJECTION, SOLUTION INTRAVENOUS at 22:36

## 2024-04-21 NOTE — PROGRESS NOTE ADULT - SUBJECTIVE AND OBJECTIVE BOX
INTERVAL HPI: Patient seen and examined at bedside; Events noted; Patient reports improved symptoms    MEDICATIONS  (STANDING):  enoxaparin Injectable 40 milliGRAM(s) SubCutaneous every 24 hours  lactated ringers. 1000 milliLiter(s) (75 mL/Hr) IV Continuous <Continuous>  melatonin 5 milliGRAM(s) Oral at bedtime    MEDICATIONS  (PRN):      Vital Signs Last 24 Hrs  T(C): 36.5 (21 Apr 2024 04:47), Max: 36.7 (20 Apr 2024 14:11)  T(F): 97.7 (21 Apr 2024 04:47), Max: 98.1 (20 Apr 2024 14:11)  HR: 75 (21 Apr 2024 04:47) (75 - 81)  BP: 103/77 (21 Apr 2024 04:47) (103/77 - 126/92)  BP(mean): --  RR: 17 (21 Apr 2024 04:47) (16 - 18)  SpO2: 96% (21 Apr 2024 04:47) (96% - 99%)    Parameters below as of 21 Apr 2024 04:47  Patient On (Oxygen Delivery Method): room air      _________________  PHYSICAL EXAM:  ---------------------------  GEN: NAD; NC/AT; A and O x 3  HEENT: MMM; PERRLA: EOMI  LUNGS: no wheezing; decreased bilateral air entry; no use of accessory muscles for breathing  HEART: Nl S1 S2; no M   ABDOMEN: Soft, Nontender, non distended  EXTREMITIES: no cyanosis; no edema; warm and dry  SKIN: Warm and dry  NERVOUS SYSTEM: no focal neuro  deficits    _________________________________________________  LABS:                        13.9   2.21  )-----------( 133      ( 20 Apr 2024 06:02 )             41.5     04-20    146<H>  |  118<H>  |  17  ----------------------------<  90  3.4<L>   |  22  |  0.86    Ca    8.9      20 Apr 2024 06:02  Phos  5.4     04-20  Mg     2.8     04-20    TPro  6.5  /  Alb  3.4<L>  /  TBili  0.5  /  DBili  x   /  AST  33  /  ALT  37  /  AlkPhos  50  04-20    PT/INR - ( 19 Apr 2024 15:55 )   PT: 11.0 sec;   INR: 0.96 ratio         PTT - ( 19 Apr 2024 15:55 )  PTT:30.0 sec  CAPILLARY BLOOD GLUCOSE

## 2024-04-21 NOTE — PROGRESS NOTE ADULT - PROBLEM SELECTOR PLAN 4
hx of colon cancer on active chemoRx  p/w Leukopenia of 2.94  baseline WBC based on DEc/2024 3.4  no signs or symptoms of infection  U/A neg  CXR negative   f/u Lactate  f/u CXR hx of colon cancer on active chemoRx  p/w Leukopenia of 2.94  baseline WBC based on DEc/2024 3.4  no signs or symptoms of infection  U/A neg  CXR negative   likely from malignancy

## 2024-04-21 NOTE — PROGRESS NOTE ADULT - PROBLEM SELECTOR PLAN 1
p/w altered mental status  on exam AAOx3, however reports feeling confused and not back to baseline   in ED: Afebrile, HR 81, /76, Sat 99%  CT head neg for acute findings  leukopenia of 2.94 at baseline  U/A neg, blood alcohol neg  CXR negative for acute process  likely presentation chemo induced since had a recent chemo session  f/u Depakote level

## 2024-04-21 NOTE — PROGRESS NOTE ADULT - ATTENDING COMMENTS
Patient seen and examined with Housestaff    48 year old man with hx of colon CA on active chemotherapy (with Dr. Isidro BEAR/[facility]) last one last Tuesday here with acute changes in mental status along the line of feeling fuzzy and confused though he is alert and oriented. Clinical findings and work up show no brain metastasis, no evidence of infection, trauma , metabolic changes or stroke     Patient admitted with presumed dehydration improving with IV fluids; Reports diarrhea otherwise feels better this morning; noted to be ambulating to restroom; Doing better; diarrhea nearly resolved; still feels weak but overall improved;     Vital Signs Last 24 Hrs  T(C): 36.5 (21 Apr 2024 14:03), Max: 36.7 (20 Apr 2024 21:15)  T(F): 97.7 (21 Apr 2024 14:03), Max: 98.1 (20 Apr 2024 21:15)  HR: 78 (21 Apr 2024 14:03) (75 - 81)  BP: 124/92 (21 Apr 2024 14:03) (103/77 - 126/92)  RR: 17 (21 Apr 2024 14:03) (16 - 17)  SpO2: 99% (21 Apr 2024 14:03) (96% - 99%): room air    P/E:  middle aged male, comfortably resting in bed  Mucosa appears dry  Psych: AAO x3  Neuro: No gross focal deficits; Power and sensation intact  CVS: S1S2 present, regular, no edema  Resp: BLAE+, No wheeze or Rhonchi  GI: Soft, BS+, Non tender, non distended  Extr: No  calf tenderness B/L Lower extremities  Skin: Warm and moist without any rashes    Labs:                        13.3   2.72  )-----------( 130      ( 21 Apr 2024 10:21 )             39.4     04-21    142  |  112<H>  |  19<H>  ----------------------------<  100<H>  4.0   |  24  |  0.94    Ca    8.1<L>      21 Apr 2024 10:21  Phos  2.9     04-21  Mg     2.1     04-21    TPro  6.5  /  Alb  3.4<L>  /  TBili  0.5  /  DBili  x   /  AST  33  /  ALT  37  /  AlkPhos  50  04-20  	  A/p;   # Acute encephalopathy   # Mild hypernatremia due to dehydration  #Hypokalemia normaized s/p replacement  #Chemotherapy induced enteritis  # Colorectal cancer on chemotherapy    Plan   Continue IVF hydration x one more day  Potassium normalized  Imodium PRN for diarrhea as diarrhea does not appear infectious  Oncology follow up appreciated; d/w Dr. Mendoza  DVT ppx given active cancer although patient is ambulating  D/C Plan AM  if remain stable  Dr. Mendoza will arrange follow up with Dr. Felix next week; likely will delay chemo for a week next schedule  Discussed with patient and RN  and PGY1 Dr. lr

## 2024-04-21 NOTE — PROGRESS NOTE ADULT - REASON FOR ADMISSION
Altered mentation/ Encephalopathy
Colorectal cancer
Altered mentation due to dehydration form chemotherapy

## 2024-04-21 NOTE — PROGRESS NOTE ADULT - SUBJECTIVE AND OBJECTIVE BOX
PGY-1 Progress Note discussed with attending    PAGER #: [[phone]] TILL 5:00 PM  PLEASE CONTACT ON CALL TEAM:  - On Call Team (Please refer to [place]) FROM 5:00 PM - 8:30PM  - Nightfloat Team FROM 8:30 -7:30 AM    INTERVAL HPI/OVERNIGHT EVENTS: No acute events overnight.  Patient examined at bedside this AM.  Patient denies acute complaints       REVIEW OF SYSTEMS:  CONSTITUTIONAL: No fever, weight loss, or fatigue  RESPIRATORY: No cough, wheezing, chills or hemoptysis; No shortness of breath  CARDIOVASCULAR: No chest pain, palpitations, dizziness, or leg swelling  GASTROINTESTINAL: No abdominal pain. No nausea, vomiting, or hematemesis; No diarrhea or constipation. No melena or hematochezia.  GENITOURINARY: No dysuria or hematuria, urinary frequency  NEUROLOGICAL: No headaches, memory loss, loss of strength, numbness, or tremors  SKIN: No itching, burning, rashes, or lesions     Vital Signs Last 24 Hrs  T(C): 36.5 (21 Apr 2024 04:47), Max: 36.7 (20 Apr 2024 14:11)  T(F): 97.7 (21 Apr 2024 04:47), Max: 98.1 (20 Apr 2024 14:11)  HR: 75 (21 Apr 2024 04:47) (75 - 81)  BP: 103/77 (21 Apr 2024 04:47) (103/77 - 126/92)  BP(mean): --  RR: 17 (21 Apr 2024 04:47) (16 - 18)  SpO2: 96% (21 Apr 2024 04:47) (96% - 99%)    Parameters below as of 21 Apr 2024 04:47  Patient On (Oxygen Delivery Method): room air        PHYSICAL EXAMINATION:  GENERAL: NAD, lying in bed comfortably, good eye contact  HEAD:  Atraumatic, Normocephalic  EYES: EOMI, PERRLA, conjunctiva and sclera clear  ENT: Moist mucous membranes  NECK: Supple, No JVD  CHEST/LUNG: Clear to auscultation bilaterally; No rales, rhonchi, wheezing, or rubs. Unlabored respirations  HEART: Regular rate and rhythm; No murmurs, rubs, or gallops  ABDOMEN: Bowel sounds present; Soft, Nontender, Nondistended.   EXTREMITIES:  2+ Peripheral Pulses, brisk capillary refill. No clubbing, cyanosis, or edema  NERVOUS SYSTEM:  Alert & Oriented X3, speech clear. No focal sensory or motor deficit   MSK: FROM all 4 extremities, full and equal strength  SKIN: No rashes or lesions                        13.9   2.21  )-----------( 133      ( 20 Apr 2024 06:02 )             41.5     04-20    146<H>  |  118<H>  |  17  ----------------------------<  90  3.4<L>   |  22  |  0.86    Ca    8.9      20 Apr 2024 06:02  Phos  5.4     04-20  Mg     2.8     04-20    TPro  6.5  /  Alb  3.4<L>  /  TBili  0.5  /  DBili  x   /  AST  33  /  ALT  37  /  AlkPhos  50  04-20    LIVER FUNCTIONS - ( 20 Apr 2024 06:02 )  Alb: 3.4 g/dL / Pro: 6.5 g/dL / ALK PHOS: 50 U/L / ALT: 37 U/L DA / AST: 33 U/L / GGT: x               PT/INR - ( 19 Apr 2024 15:55 )   PT: 11.0 sec;   INR: 0.96 ratio         PTT - ( 19 Apr 2024 15:55 )  PTT:30.0 sec    CAPILLARY BLOOD GLUCOSE      RADIOLOGY & ADDITIONAL TESTS:

## 2024-04-21 NOTE — PROGRESS NOTE ADULT - ASSESSMENT
A/P    Problem #1 Rectal Ca -MSI intact; undergoing total neoadjuvant therapy s/p concurrent chemotherapy and radiation (12/2023) followed by FOLFOX (last session 4/17)  -MRI scheduled as outpatient on 4/26; next chemotherapy session on 5/1/24    Problem #2 Encephalopathy - likely fatigue from chemotherapy as well as dehydration from diarrhea  -reviewed labs and CT head; no clinical evidence of occult infection at this time  -continue with aggressive IVF and nutrition support    Problem #3 Diarrhea - likely from recent chemotherapy; supportive tx including loperamide    Call with questions [phone]; d/w Dr. Aguila and medicine team; will continue to follow; if stable for discharge, patient can f/up with med onc in 1 week    Bernardo Mendoza MD
A/p;   # Acute encephalopathy   # Mild hypernatremia due to dehydration  #Chemotherapy induced enteritis  # Colorectal cancer on chemotherapy    Plan   Continue IVF hydration   Replace potassium  If potassium remain low add to IV fluid  Imodium PRN for diarrhea as diarrhea does not appear infectious  Oncology follow up appreciated; d/w Dr. Mendoza  DVT ppx given active cancer although patient is ambulating  D/C Plan 1-2 days by Monday if remain stable  Dr. Mendoza will arrange follow up with Dr. Felix next week; likely will delay chemo for a week next schedule  Discussed with patient and RN
48 yrs old M, from home, pmhx of colon cancer, rectal mass on Chemotherapy following with Dr. Felix oncology A, Bipolar disorder presented with altered mental status. Pt is admitted for acute encephalopathy.

## 2024-04-22 ENCOUNTER — TRANSCRIPTION ENCOUNTER (OUTPATIENT)
Age: 49
End: 2024-04-22

## 2024-04-22 VITALS
DIASTOLIC BLOOD PRESSURE: 95 MMHG | OXYGEN SATURATION: 100 % | SYSTOLIC BLOOD PRESSURE: 128 MMHG | TEMPERATURE: 98 F | RESPIRATION RATE: 16 BRPM | HEART RATE: 84 BPM

## 2024-04-22 LAB
ANION GAP SERPL CALC-SCNC: 5 MMOL/L — SIGNIFICANT CHANGE UP (ref 5–17)
BUN SERPL-MCNC: 15 MG/DL — SIGNIFICANT CHANGE UP (ref 7–18)
CALCIUM SERPL-MCNC: 7.9 MG/DL — LOW (ref 8.4–10.5)
CHLORIDE SERPL-SCNC: 112 MMOL/L — HIGH (ref 96–108)
CO2 SERPL-SCNC: 23 MMOL/L — SIGNIFICANT CHANGE UP (ref 22–31)
CREAT SERPL-MCNC: 0.73 MG/DL — SIGNIFICANT CHANGE UP (ref 0.5–1.3)
EGFR: 114 ML/MIN/1.73M2 — SIGNIFICANT CHANGE UP
GLUCOSE SERPL-MCNC: 86 MG/DL — SIGNIFICANT CHANGE UP (ref 70–99)
HCT VFR BLD CALC: 36.3 % — LOW (ref 39–50)
HGB BLD-MCNC: 12.5 G/DL — LOW (ref 13–17)
MAGNESIUM SERPL-MCNC: 1.9 MG/DL — SIGNIFICANT CHANGE UP (ref 1.6–2.6)
MCHC RBC-ENTMCNC: 33.2 PG — SIGNIFICANT CHANGE UP (ref 27–34)
MCHC RBC-ENTMCNC: 34.4 GM/DL — SIGNIFICANT CHANGE UP (ref 32–36)
MCV RBC AUTO: 96.3 FL — SIGNIFICANT CHANGE UP (ref 80–100)
NRBC # BLD: 0 /100 WBCS — SIGNIFICANT CHANGE UP (ref 0–0)
PHOSPHATE SERPL-MCNC: 2.6 MG/DL — SIGNIFICANT CHANGE UP (ref 2.5–4.5)
PLATELET # BLD AUTO: 121 K/UL — LOW (ref 150–400)
POTASSIUM SERPL-MCNC: 3.9 MMOL/L — SIGNIFICANT CHANGE UP (ref 3.5–5.3)
POTASSIUM SERPL-SCNC: 3.9 MMOL/L — SIGNIFICANT CHANGE UP (ref 3.5–5.3)
RBC # BLD: 3.77 M/UL — LOW (ref 4.2–5.8)
RBC # FLD: 13.7 % — SIGNIFICANT CHANGE UP (ref 10.3–14.5)
SODIUM SERPL-SCNC: 140 MMOL/L — SIGNIFICANT CHANGE UP (ref 135–145)
WBC # BLD: 2.68 K/UL — LOW (ref 3.8–10.5)
WBC # FLD AUTO: 2.68 K/UL — LOW (ref 3.8–10.5)

## 2024-04-22 PROCEDURE — 82803 BLOOD GASES ANY COMBINATION: CPT

## 2024-04-22 PROCEDURE — 85025 COMPLETE CBC W/AUTO DIFF WBC: CPT

## 2024-04-22 PROCEDURE — 85027 COMPLETE CBC AUTOMATED: CPT

## 2024-04-22 PROCEDURE — 82140 ASSAY OF AMMONIA: CPT

## 2024-04-22 PROCEDURE — 83735 ASSAY OF MAGNESIUM: CPT

## 2024-04-22 PROCEDURE — 80307 DRUG TEST PRSMV CHEM ANLYZR: CPT

## 2024-04-22 PROCEDURE — 82330 ASSAY OF CALCIUM: CPT

## 2024-04-22 PROCEDURE — 70450 CT HEAD/BRAIN W/O DYE: CPT | Mod: MC

## 2024-04-22 PROCEDURE — 71045 X-RAY EXAM CHEST 1 VIEW: CPT

## 2024-04-22 PROCEDURE — 80053 COMPREHEN METABOLIC PANEL: CPT

## 2024-04-22 PROCEDURE — 84132 ASSAY OF SERUM POTASSIUM: CPT

## 2024-04-22 PROCEDURE — 83605 ASSAY OF LACTIC ACID: CPT

## 2024-04-22 PROCEDURE — 80164 ASSAY DIPROPYLACETIC ACD TOT: CPT

## 2024-04-22 PROCEDURE — 87086 URINE CULTURE/COLONY COUNT: CPT

## 2024-04-22 PROCEDURE — 84295 ASSAY OF SERUM SODIUM: CPT

## 2024-04-22 PROCEDURE — 99239 HOSP IP/OBS DSCHRG MGMT >30: CPT | Mod: GC

## 2024-04-22 PROCEDURE — 80048 BASIC METABOLIC PNL TOTAL CA: CPT

## 2024-04-22 PROCEDURE — 36415 COLL VENOUS BLD VENIPUNCTURE: CPT

## 2024-04-22 PROCEDURE — 85730 THROMBOPLASTIN TIME PARTIAL: CPT

## 2024-04-22 PROCEDURE — 99285 EMERGENCY DEPT VISIT HI MDM: CPT

## 2024-04-22 PROCEDURE — 84100 ASSAY OF PHOSPHORUS: CPT

## 2024-04-22 PROCEDURE — 85610 PROTHROMBIN TIME: CPT

## 2024-04-22 PROCEDURE — 81001 URINALYSIS AUTO W/SCOPE: CPT

## 2024-04-22 RX ADMIN — SODIUM CHLORIDE 75 MILLILITER(S): 9 INJECTION, SOLUTION INTRAVENOUS at 05:56

## 2024-04-22 RX ADMIN — ENOXAPARIN SODIUM 40 MILLIGRAM(S): 100 INJECTION SUBCUTANEOUS at 05:56

## 2024-04-22 NOTE — DIETITIAN NUTRITION RISK NOTIFICATION - ADDITIONAL COMMENTS/DIETITIAN RECOMMENDATIONS
Malnutrition Diagnosis Parameters: eating less recently ( details ?), wt loss 9% x 8m, mild muscle mass loss, mild body fat loss

## 2024-04-22 NOTE — DISCHARGE NOTE NURSING/CASE MANAGEMENT/SOCIAL WORK - NSDCPEFALRISK_GEN_ALL_CORE
For information on Fall & Injury Prevention, visit: [email or web address] OR  [email or web address] OR  https://www.cdc.gov/steadi/patient.html

## 2024-04-22 NOTE — DIETITIAN INITIAL EVALUATION ADULT - SIGNS/SYMPTOMS
eating less recently ( details ?), wt loss 9% x 8m, mild muscle loss, mild fat loss  eating less recently ( details ?), wt loss 9% x 8m, mild muscle mass loss, mild body fat loss

## 2024-04-22 NOTE — DISCHARGE NOTE PROVIDER - HOSPITAL COURSE
48 yrs old M, from home, past medical history of colon cancer, rectal mass on Chemotherapy following with Dr. Felix oncology [facility], Bipolar disorder presented with altered mental status. Pt is admitted for acute encephalopathy. CT head negative. Urinalysis neg, blood alcohol neg. CXR negative for acute process. . AMS likely in setting of  chemo induced since had a recent chemo session. Patient has hx of Colon cancer, rectal mass on chemotherapy, follows Dr. Felix as OP. AHas a known history of chronic leucopenia.  [facility] consulted -MRI scheduled as outpatient on 4/26; next chemotherapy session on 5/1/24.   Course complicated by mild hypernatremia, resolved with IVF.   Home medications ( depakote and zyprexa ) held ivo AMS. Depakote level subtherapeutic    Patient is at baseline mental status, has been deemed fit for discharge home as per primary team, with [facility] follow up.

## 2024-04-22 NOTE — DISCHARGE NOTE PROVIDER - CARE PROVIDER_API CALL
Princess Ayleen  NP in Family Health  [address], [address]  [place] [address]  Phone: [phone]  Fax: [phone]  Follow Up Time:     Keshia Felix  Hematology/Oncology  [address], [address]  [place] [address]  Phone: [phone]  Fax: [phone]  Follow Up Time:

## 2024-04-22 NOTE — DIETITIAN NUTRITION RISK NOTIFICATION - TREATMENT: THE FOLLOWING DIET HAS BEEN RECOMMENDED
Diet, Regular:   Supplement Feeding Modality:  Oral  Ensure Enlive Cans or Servings Per Day:  1       Frequency:  Daily (04-22-24 @ 10:38) [Pending Verification By Attending]

## 2024-04-22 NOTE — DIETITIAN INITIAL EVALUATION ADULT - PERTINENT LABORATORY DATA
04-22    140  |  112<H>  |  15  ----------------------------<  86  3.9   |  23  |  0.73    Ca    7.9<L>      22 Apr 2024 06:01  Phos  2.6     04-22  Mg     1.9     04-22

## 2024-04-22 NOTE — DISCHARGE NOTE PROVIDER - NSDCMRMEDTOKEN_GEN_ALL_CORE_FT
acetaminophen 325 mg oral tablet: 2 tab(s) orally every 6 hours As needed Temp greater or equal to 38C (100.4F), Mild Pain (1 - 3)  Depakote 500 mg oral delayed release tablet: 1 tab(s) orally once a day  ferrous sulfate 325 mg (65 mg elemental iron) oral tablet: 2 tab(s) orally once a day  Senna 8.6 mg oral tablet: 2 tab(s) orally once a day (at bedtime)  ZyPREXA 10 mg oral tablet: 1 tab(s) orally once a day

## 2024-04-22 NOTE — DIETITIAN INITIAL EVALUATION ADULT - ETIOLOGY
DISPLAY PLAN FREE TEXT
inadequate intake with chronic comorbidities including cancer on chemotherapy

## 2024-04-22 NOTE — DIETITIAN INITIAL EVALUATION ADULT - OTHER INFO
Pt lives home with family PTA, previous admission with Severe Malnutrition identified by RD 12/7/23; alert, verbally responsive, well-communicated, but forgetful at times per chart; Reported appetite good, denied recent wt changes, usual dk=837 lb ? 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 per pt, denied GI distress, chewing or swallowing problem; Unknown food allergy, no specific food choices, >75% breakfast intake today observed; on meal delivery service from [employer] x past 5-6m; Wt data in EMR reviewed: 143 lb 8/25/24; 133 lb 12/7/24; 130.2 lb 4/9/24 (has been eating less per family, see above)

## 2024-04-22 NOTE — DISCHARGE NOTE NURSING/CASE MANAGEMENT/SOCIAL WORK - PATIENT PORTAL LINK FT
You can access the FollowMyHealth Patient Portal offered by [facility] by registering at the following website: [email or web address]. By joining [facility]’s FollowMyHealth portal, you will also be able to view your health information using other applications (apps) compatible with our system.

## 2024-04-22 NOTE — DISCHARGE NOTE PROVIDER - DETAILS OF MALNUTRITION DIAGNOSIS/DIAGNOSES
This patient has been assessed with a concern for Malnutrition and was treated during this hospitalization for the following Nutrition diagnosis/diagnoses:     -  04/22/2024: Moderate protein-calorie malnutrition

## 2024-04-22 NOTE — DIETITIAN INITIAL EVALUATION ADULT - ENERGY INTAKE
From H&P: "Pt denied loss/decreased appetite or decreased PO intake however as pt's mother, he has been eating less" per MD

## 2024-04-22 NOTE — DISCHARGE NOTE PROVIDER - NSDCFUSCHEDAPPT_GEN_ALL_CORE_FT
Peter Vargas  [facility] Physician [facility]  [facility] [phone]  Scheduled Appointment: 04/24/2024    [facility]  MRI  [place]  Scheduled Appointment: 04/26/2024

## 2024-04-22 NOTE — DISCHARGE NOTE PROVIDER - NSDCCPCAREPLAN_GEN_ALL_CORE_FT
PRINCIPAL DISCHARGE DIAGNOSIS  Diagnosis: Acute encephalopathy  Assessment and Plan of Treatment:   You presented to ER with confusion , altered mental status. Your CT head was normal. Your chest X ray , urinalysis were negative for infection . Your home medications - depakote and zyprexa were held in view of your mental status. Your depakote serum level was found to be low. You are back at your baseline mental status. This event was likely in setting of your recent chemotherapy session. You will be discharged on your home  medications and you are recommeded to follow up with your primary care within 1-2 weeks of discharge.         SECONDARY DISCHARGE DIAGNOSES  Diagnosis: Hypernatremia  Assessment and Plan of Treatment: You were found to have elevated sodium on labs. This was likely in setting of dehydration and poor fluid intake. You were traeted with IV fluids and monitored with daily labs. Your sodium is now within normal limits. You are recommended to maintain adequate fluid intake. You are recommended to follow up with your [facility] within 1-2 weeks of discharge.    Diagnosis: Colon cancer  Assessment and Plan of Treatment:   You have a known history of Colon cancer, rectal mass on chemotherpy. You have established care with hematology- oncology - Dr. Felix as outpatient.   You have undergone total neoadjuvant therapy -  concurrent chemotherapy and radiation (12/2023) followed by FOLFOX chemotherapy (last session 4/17)  Heme- oncology was consulted in patient and you are recommended to follow up with Dr. Felix for following -   1. MRI scheduled as outpatient on 4/26  2. Next chemotherapy session on 5/1/24      Diagnosis: Chronic leukopenia  Assessment and Plan of Treatment: You have Chronic leukopenia ( low white cell count) , in setting of your history of colon cancer on chemotherapy. Your infectious workup was negative.   You willbe discharged  home and are recommedned to follow up with your primary care and Heme- oncology doctor within 1-2 weeks of discharge for further monitoring       Diagnosis: Bipolar disease, chronic  Assessment and Plan of Treatment:   You have a known history of chronic bipolar disorder on home medications - Depakote and zyprexa. Your home mediactions were held in view of your confusion / altered mental status as explained above. Your depakote - serum level was found to be low. You will be discharged on your home medications and are recommended to follow up with your primary care or psychitrist within 1-2 weeks of discharge.       Diagnosis: Prophylactic measure  Assessment and Plan of Treatment: You were given lovenox ( blood thinner) as a preventive measure to rpevent blood clot formation in your legs in view of your decreased ambulatory state during admission.     PRINCIPAL DISCHARGE DIAGNOSIS  Diagnosis: Acute encephalopathy  Assessment and Plan of Treatment: You presented to ER with worsening confusion and altered mental status from baseline. Your CT head was normal. Your chest X ray , urinalysis were negative for infection. Your home medications - depakote and zyprexa were initially held in view of your mental status. Your depakote serum level was found to be low. You had diarrhea on admission likely related to recent chemotherapy. You was admitted with Acute encephalopathy, given vigorous IV hydration and your symptoms improved, diarrhea resolved and tolerated regular diet  You are back at your baseline mental status. This event was likely in setting of your recent chemotherapy session induced enteritis You will be discharged on your home  medications and you are recommeded to follow up with your primary care and your Oncologist Dr. Felix in 3 to 5 days. You was seen by Dr. Russ Mendoza form [facility] (works with Dr. Felix)        SECONDARY DISCHARGE DIAGNOSES  Diagnosis: Bipolar disease, chronic  Assessment and Plan of Treatment:   You have a known history of chronic bipolar disorder on home medications - Depakote and zyprexa. Your home mediactions were held in view of your confusion / altered mental status as explained above. Your depakote - serum level was found to be low. You will be discharged on your home medications and are recommended to follow up with your primary care or psychitrist within 1-2 weeks of discharge.       Diagnosis: Colon cancer  Assessment and Plan of Treatment: You have a known history of Colrectal cancer, rectal mass on chemotherpy. You have established care with hematology- oncology - Dr. Keshia Felix as outpatient.   You have undergone total neoadjuvant therapy -  concurrent chemotherapy and radiation (12/2023) followed by FOLFOX chemotherapy (last session 4/17)  Heme- oncology was consulted in patient and you are recommended to follow up with Dr. Felix for following -   1. MRI scheduled as outpatient on 4/26  2. Next chemotherapy session on 5/1/24 which may be postponed for a week or so to recuperate from current illness      Diagnosis: Prophylactic measure  Assessment and Plan of Treatment: You were given lovenox ( blood thinner) as a preventive measure to prevent blood clot formation in your legs in view of your decreased ambulatory state during admission.    Diagnosis: Hypernatremia  Assessment and Plan of Treatment: You were found to have elevated sodium on labs. This was likely in setting of dehydration and poor fluid intake. You were traeted with IV fluids and monitored with daily labs. Your sodium is now within normal limits. You are recommended to maintain adequate fluid intake. You are recommended to follow up with your [facility] care within 1-2 weeks of discharge.    Diagnosis: Chronic leukopenia  Assessment and Plan of Treatment: You have Chronic leukopenia ( low white cell count) , in setting of your history of colon cancer on chemotherapy induced bone marrow suppression. Your infectious work up was negative.   You will be discharged  home and are recommedned to follow up with your primary care and Heme- oncology doctor this week.   We spoke with your Oncologist office and Dr. Mendoza will arrange outpatient follow up appointment within a week.

## 2024-04-22 NOTE — DIETITIAN INITIAL EVALUATION ADULT - PROBLEM SELECTOR PROBLEM 2
2mg bolus of versed given  Versed drip increased to 4mg/hour     Elissa Mueller RN  12/01/21 4491 Bipolar disease, chronic

## 2024-04-23 ENCOUNTER — TRANSCRIPTION ENCOUNTER (OUTPATIENT)
Age: 49
End: 2024-04-23

## 2024-04-24 ENCOUNTER — APPOINTMENT (OUTPATIENT)
Dept: RADIATION ONCOLOGY | Facility: CLINIC | Age: 49
End: 2024-04-24
Payer: MEDICAID

## 2024-04-24 VITALS — RESPIRATION RATE: 16 BRPM | WEIGHT: 138 LBS | BODY MASS INDEX: 22.99 KG/M2 | HEIGHT: 65 IN

## 2024-04-24 PROCEDURE — 99214 OFFICE O/P EST MOD 30 MIN: CPT

## 2024-04-24 NOTE — VITALS
[80: Normal activity with effort; some signs or symptoms of disease.] : 80: Normal activity with effort; some signs or symptoms of disease.  [Maximal Pain Intensity: 0/10] : 0/10 [Least Pain Intensity: 0/10] : 0/10 [ECOG Performance Status: 1 - Restricted in physically strenuous activity but ambulatory and able to carry out work of a light or sedentary nature] : Performance Status: 1 - Restricted in physically strenuous activity but ambulatory and able to carry out work of a light or sedentary nature, e.g., light house work, office work

## 2024-04-28 NOTE — HISTORY OF PRESENT ILLNESS
[FreeTextEntry1] : Mr. Titus is 48 year old male with newly diagnosed WHITNEY colorectal cancer with two foci, 1) rectosigmoid colon mass at 20 cm and 2) distal rectum 5 cm from anal verge, presenting to discuss radiation therapy recommendations. His PMHx is notable for asthma (controlled), and bipolar disorder. h/o radiation for rectal cancer in 12/2023.   The course of his illness began with presentation to [facility] ED with symptomatic anemia due to iron deficiency. Thereafter he underwent EDG/colonoscopy on 8/24/2023 which revealed two large malignant-appearing masses in the rectosigmoid colon at 20 cm and in the distal rectum at 5 cm from the anal verge. Pathology both positive for invasive adenocarcinoma.  9/12/23 MRI pelvis/ rectal/anal notable for primary mid rectal tumor T3cN+, tumor margin > 2 mm from MRF, enlarged bilateral obturator chain lymph nodes measuring 1.3 cm in short axis on right (series 6 image 17) and 1.6 cm on left (series 7 image 14). Noted as well was circumferential mass in mid sigmoid colon 3.8 cm, with extension through muscularis propria extramural vascular invasion. Lymph node in sigmoid mesentery anteriorly appreciated 5 mm in size (series 7 image 7).   9/21/2023 Consultation. No abd pain, nausea. He reports progressive rectal bleeding, initially with wiping of toilet paper, now appreciating some drops of blood in toilet bowl. At time of intial presentation he endorsed lightheadedness in setting of the resulting anemia, but notes that this has improved since he started taking ferrous sulfate and vitamin c. Med Onc Dr Felix.   11/16/2023 OTV. 12/25 fractions of radiation to rectum completed. c/o occasional diarrhea. c/o rectal pain which was managed by Med Onc. No abd pain. Weight stable this wk. On chemo pills from Dr Felix @ [facility].  1/19/2024 F/U. Pt completed 59 Gy / 25 Fx of RT for rectal cancer in 12/2023.  He started on chemo cycles with Dr. Dr Felix @ [facility] in end of Dec, will have MRI after 4 months of chemo.   4/24/2024 F/U. Pt completed 59 Gy / 25 Fx of RT for rectal cancer on 12/12/2023. c/o diarrhea or constipation while still on chemo. No rectal pain or bleeding. Will get MRI soon this week.

## 2024-04-28 NOTE — REVIEW OF SYSTEMS
[Fatigue] : fatigue [Negative] : Allergic/Immunologic [Anal Pain: Grade 0] : Anal Pain: Grade 0 [Diarrhea: Grade 1 - Increase of <4 stools per day over baseline; mild increase in ostomy output compared to baseline] : Diarrhea: Grade 1 - Increase of <4 stools per day over baseline; mild increase in ostomy output compared to baseline [Rectal Pain: Grade 0] : Rectal Pain: Grade 0 [Hematuria: Grade 0] : Hematuria: Grade 0 [Urinary Incontinence: Grade 0] : Urinary Incontinence: Grade 0  [Urinary Retention: Grade 0] : Urinary Retention: Grade 0 [Urinary Tract Pain: Grade 0] : Urinary Tract Pain: Grade 0 [Urinary Urgency: Grade 0] : Urinary Urgency: Grade 0 [Urinary Frequency: Grade 0] : Urinary Frequency: Grade 0 [FreeTextEntry7] : colorectal cancer [de-identified] : anemia

## 2024-05-02 ENCOUNTER — APPOINTMENT (OUTPATIENT)
Dept: MRI IMAGING | Facility: IMAGING CENTER | Age: 49
End: 2024-05-02
Payer: MEDICAID

## 2024-05-02 ENCOUNTER — OUTPATIENT (OUTPATIENT)
Dept: OUTPATIENT SERVICES | Facility: HOSPITAL | Age: 49
LOS: 1 days | End: 2024-05-02
Payer: MEDICAID

## 2024-05-02 DIAGNOSIS — K59.00 CONSTIPATION, UNSPECIFIED: ICD-10-CM

## 2024-05-02 DIAGNOSIS — C18.7 MALIGNANT NEOPLASM OF SIGMOID COLON: ICD-10-CM

## 2024-05-02 DIAGNOSIS — D50.9 IRON DEFICIENCY ANEMIA, UNSPECIFIED: ICD-10-CM

## 2024-05-02 DIAGNOSIS — C20 MALIGNANT NEOPLASM OF RECTUM: ICD-10-CM

## 2024-05-02 DIAGNOSIS — F41.9 ANXIETY DISORDER, UNSPECIFIED: ICD-10-CM

## 2024-05-02 PROCEDURE — 72197 MRI PELVIS W/O & W/DYE: CPT

## 2024-05-02 PROCEDURE — A9585: CPT

## 2024-05-02 PROCEDURE — 72197 MRI PELVIS W/O & W/DYE: CPT | Mod: 26

## 2024-05-08 ENCOUNTER — APPOINTMENT (OUTPATIENT)
Dept: RADIATION ONCOLOGY | Facility: CLINIC | Age: 49
End: 2024-05-08
Payer: MEDICAID

## 2024-05-08 PROCEDURE — 99443: CPT

## 2024-05-08 NOTE — REASON FOR VISIT
[Routine Follow-Up] : routine follow-up visit for [Rectal Cancer] : cancer [Home] : at home, [unfilled] , at the time of the visit. [Medical Office: ([facility])___] : at the medical office located in  [Verbal consent obtained from patient] : the patient, [unfilled]

## 2024-05-16 NOTE — HISTORY OF PRESENT ILLNESS
[FreeTextEntry1] : Mr. Titus is 48 year old male with newly diagnosed WHITNEY colorectal cancer with two foci, 1) rectosigmoid colon mass at 20 cm and 2) distal rectum 5 cm from anal verge, presenting to discuss radiation therapy recommendations. His PMHx is notable for asthma (controlled), and bipolar disorder. h/o radiation for rectal cancer in 12/2023.   The course of his illness began with presentation to [facility] ED with symptomatic anemia due to iron deficiency. Thereafter he underwent EDG/colonoscopy on 8/24/2023 which revealed two large malignant-appearing masses in the rectosigmoid colon at 20 cm and in the distal rectum at 5 cm from the anal verge. Pathology both positive for invasive adenocarcinoma.  9/12/23 MRI pelvis/ rectal/anal notable for primary mid rectal tumor T3cN+, tumor margin > 2 mm from MRF, enlarged bilateral obturator chain lymph nodes measuring 1.3 cm in short axis on right (series 6 image 17) and 1.6 cm on left (series 7 image 14). Noted as well was circumferential mass in mid sigmoid colon 3.8 cm, with extension through muscularis propria extramural vascular invasion. Lymph node in sigmoid mesentery anteriorly appreciated 5 mm in size (series 7 image 7).   9/21/2023 Consultation. No abd pain, nausea. He reports progressive rectal bleeding, initially with wiping of toilet paper, now appreciating some drops of blood in toilet bowl. At time of intial presentation he endorsed lightheadedness in setting of the resulting anemia, but notes that this has improved since he started taking ferrous sulfate and vitamin c. Med Onc Dr Felix.   11/16/2023 OTV. 12/25 fractions of radiation to rectum completed. c/o occasional diarrhea. c/o rectal pain which was managed by Med Onc. No abd pain. Weight stable this wk. On chemo pills from Dr Felix @ [facility].  5/2/2024 -- MRI showed IMPRESSION: Since the prior examination 9/12/2023, the primary tumor shows: Incomplete response (likely residual tumor) Favorable response with substantial decrease in size of the primary rectal tumor with associated T2 dark scarring. Persistent wall thickening and T2 intermediate signal is consistent with residual tumor. Partial regression of EMVI and regression of previously seen mesorectal and obturator lymphadenopathy. Suspicious Mesorectal Lymph Nodes: No Suspicious Extra Mesorectal Lymph Nodes: No Incompletely assessed sigmoid colon mass. Findings suggest incomplete response with residual circumferential wall thickening, decreased in size compared with prior MRI.  5/8/2024 F/U. Pt completed 59 Gy / 25 Fx of RT for rectal cancer on 12/12/2023. c/o diarrhea or constipation while still on chemo. Almost done with chemotherapy. No rectal pain or bleeding.

## 2024-05-16 NOTE — REVIEW OF SYSTEMS
[Fatigue] : fatigue [Negative] : Allergic/Immunologic [Anal Pain: Grade 0] : Anal Pain: Grade 0 [Diarrhea: Grade 1 - Increase of <4 stools per day over baseline; mild increase in ostomy output compared to baseline] : Diarrhea: Grade 1 - Increase of <4 stools per day over baseline; mild increase in ostomy output compared to baseline [Rectal Pain: Grade 0] : Rectal Pain: Grade 0 [Hematuria: Grade 0] : Hematuria: Grade 0 [Urinary Incontinence: Grade 0] : Urinary Incontinence: Grade 0  [Urinary Retention: Grade 0] : Urinary Retention: Grade 0 [Urinary Tract Pain: Grade 0] : Urinary Tract Pain: Grade 0 [Urinary Urgency: Grade 0] : Urinary Urgency: Grade 0 [Urinary Frequency: Grade 0] : Urinary Frequency: Grade 0 [FreeTextEntry7] : colorectal cancer [de-identified] : anemia

## 2024-05-16 NOTE — DISEASE MANAGEMENT
[Clinical] : TNM Stage: c [III] : III [TTNM] : 3c [NTNM] : x [MTNM] : x [de-identified] : 59 Gy [de-identified] : rectum and LN nodes

## 2024-05-17 ENCOUNTER — APPOINTMENT (OUTPATIENT)
Dept: COLORECTAL SURGERY | Facility: CLINIC | Age: 49
End: 2024-05-17
Payer: MEDICAID

## 2024-05-17 PROCEDURE — 99213 OFFICE O/P EST LOW 20 MIN: CPT

## 2024-05-17 RX ORDER — NEOMYCIN SULFATE 500 MG/1
500 TABLET ORAL
Qty: 3 | Refills: 0 | Status: ACTIVE | COMMUNITY
Start: 2024-05-17 | End: 1900-01-01

## 2024-05-17 RX ORDER — METRONIDAZOLE 500 MG/1
500 TABLET ORAL
Qty: 3 | Refills: 0 | Status: ACTIVE | COMMUNITY
Start: 2024-05-17 | End: 1900-01-01

## 2024-05-17 NOTE — PHYSICAL EXAM
[Normal] : was normal [None] : there was no rectal mass  [Normal Breath Sounds] : Normal breath sounds [Normal Heart Sounds] : normal heart sounds [Normal Rate and Rhythm] : normal rate and rhythm [Alert] : alert [Oriented to Person] : oriented to person [Oriented to Place] : oriented to place [Oriented to Time] : oriented to time [Calm] : calm [Excoriation] : no perianal excoriation [Fistula] : no fistulas [Wart] : no warts [Ulcer ___ cm] : no ulcers [Wheezing] : no wheezing was heard [de-identified] : soft, NT/ND [de-identified] : palpable firmness at 5cm [de-identified] : Flexible sigmoidoscopy performed up to 20cm to evaluate the tumors. Tattoo is seen at 20cm, no gross tumor seen. ANother area of scarring at 5cm identified. No luminal nodularity seen but biopsy performed and sent to pathology.  [de-identified] : NAD [de-identified] : NCAT [de-identified] : supple [de-identified] : KANDACE [de-identified] : warm

## 2024-05-17 NOTE — CONSULT LETTER
[Dear  ___] : Dear  [unfilled], [Consult Letter:] : I had the pleasure of evaluating your patient, [unfilled]. [Please see my note below.] : Please see my note below. [Consult Closing:] : Thank you very much for allowing me to participate in the care of this patient.  If you have any questions, please do not hesitate to contact me. [Sincerely,] : Sincerely, [FreeTextEntry3] : Saulo Mott MD, FACS, FASCRS Colorectal Surgery [facility] Assistant Professor of Surgery [employer] at [address], [address] [place] [address] Tel: [phone]  Cell: [phone]  Email: [email or web address]

## 2024-05-17 NOTE — HISTORY OF PRESENT ILLNESS
[FreeTextEntry1] : The patient was diagnosed with rectosigmoid and rectal CA in the fall. He completed up front radiation therapy and is now completing his OMAR chemo. His last chemo will be the first week of June.  He feels well without any pain or bleeding.

## 2024-05-17 NOTE — ASSESSMENT
[FreeTextEntry1] : His MRI still shows residual tumor. WE discussed proceeding with a robotic/laparoscopic, possible open LAR with diverting loop ileostomy and intraop ureteral catheters.  The risks benefits and alternatives of the procedure were discussed with the patient including but not limited to: Bleeding, infection, pain, anastomotic leak requiring further intervention or further surgery, MI, stroke, DVT, PE, renal failure, postoperative ileus, postoperative wound complications, need for a permanent stoma/APR, urinary and sexual dysfunction, as well as death.  The following has been addressed with the patient during their preop visit.   o	Antibiotics ordered o	Fasting times reviewed o	Bowel prep instructions given o	Oral carbohydrate given o	Goals for nutrition intake/ diet information given o	Pain management information given o	Discharge criteria & expected hospital stay

## 2024-05-24 ENCOUNTER — INPATIENT (INPATIENT)
Facility: HOSPITAL | Age: 49
LOS: 0 days | Discharge: HOME | DRG: 92 | End: 2024-05-25
Attending: INTERNAL MEDICINE | Admitting: INTERNAL MEDICINE
Payer: MEDICAID

## 2024-05-24 VITALS
HEART RATE: 89 BPM | RESPIRATION RATE: 16 BRPM | OXYGEN SATURATION: 98 % | SYSTOLIC BLOOD PRESSURE: 133 MMHG | WEIGHT: 149.91 LBS | HEIGHT: 69 IN | DIASTOLIC BLOOD PRESSURE: 97 MMHG | TEMPERATURE: 99 F

## 2024-05-24 DIAGNOSIS — E87.0 HYPEROSMOLALITY AND HYPERNATREMIA: ICD-10-CM

## 2024-05-24 DIAGNOSIS — E86.0 DEHYDRATION: ICD-10-CM

## 2024-05-24 DIAGNOSIS — Z29.9 ENCOUNTER FOR PROPHYLACTIC MEASURES, UNSPECIFIED: ICD-10-CM

## 2024-05-24 DIAGNOSIS — F31.9 BIPOLAR DISORDER, UNSPECIFIED: ICD-10-CM

## 2024-05-24 DIAGNOSIS — G93.40 ENCEPHALOPATHY, UNSPECIFIED: ICD-10-CM

## 2024-05-24 DIAGNOSIS — C18.9 MALIGNANT NEOPLASM OF COLON, UNSPECIFIED: ICD-10-CM

## 2024-05-24 LAB
ALBUMIN SERPL ELPH-MCNC: 4 G/DL — SIGNIFICANT CHANGE UP (ref 3.5–5)
ALP SERPL-CCNC: 61 U/L — SIGNIFICANT CHANGE UP (ref 40–120)
ALT FLD-CCNC: 27 U/L DA — SIGNIFICANT CHANGE UP (ref 10–60)
ANION GAP SERPL CALC-SCNC: 4 MMOL/L — LOW (ref 5–17)
ANISOCYTOSIS BLD QL: SLIGHT — SIGNIFICANT CHANGE UP
AST SERPL-CCNC: 25 U/L — SIGNIFICANT CHANGE UP (ref 10–40)
BASOPHILS # BLD AUTO: 0.01 K/UL — SIGNIFICANT CHANGE UP (ref 0–0.2)
BASOPHILS NFR BLD AUTO: 0.3 % — SIGNIFICANT CHANGE UP (ref 0–2)
BILIRUB SERPL-MCNC: 0.5 MG/DL — SIGNIFICANT CHANGE UP (ref 0.2–1.2)
BUN SERPL-MCNC: 11 MG/DL — SIGNIFICANT CHANGE UP (ref 7–18)
CALCIUM SERPL-MCNC: 10.3 MG/DL — SIGNIFICANT CHANGE UP (ref 8.4–10.5)
CHLORIDE SERPL-SCNC: 117 MMOL/L — HIGH (ref 96–108)
CO2 SERPL-SCNC: 25 MMOL/L — SIGNIFICANT CHANGE UP (ref 22–31)
CREAT SERPL-MCNC: 1.11 MG/DL — SIGNIFICANT CHANGE UP (ref 0.5–1.3)
EGFR: 83 ML/MIN/1.73M2 — SIGNIFICANT CHANGE UP
ELLIPTOCYTES BLD QL SMEAR: SLIGHT — SIGNIFICANT CHANGE UP
EOSINOPHIL # BLD AUTO: 0 K/UL — SIGNIFICANT CHANGE UP (ref 0–0.5)
EOSINOPHIL NFR BLD AUTO: 0 % — SIGNIFICANT CHANGE UP (ref 0–6)
GLUCOSE SERPL-MCNC: 116 MG/DL — HIGH (ref 70–99)
HCT VFR BLD CALC: 44.1 % — SIGNIFICANT CHANGE UP (ref 39–50)
HGB BLD-MCNC: 15 G/DL — SIGNIFICANT CHANGE UP (ref 13–17)
IMM GRANULOCYTES NFR BLD AUTO: 0.3 % — SIGNIFICANT CHANGE UP (ref 0–0.9)
LG PLATELETS BLD QL AUTO: SLIGHT — SIGNIFICANT CHANGE UP
LIDOCAIN IGE QN: 36 U/L — SIGNIFICANT CHANGE UP (ref 13–75)
LYMPHOCYTES # BLD AUTO: 0.42 K/UL — LOW (ref 1–3.3)
LYMPHOCYTES # BLD AUTO: 13 % — SIGNIFICANT CHANGE UP (ref 13–44)
MACROCYTES BLD QL: SLIGHT — SIGNIFICANT CHANGE UP
MAGNESIUM SERPL-MCNC: 2.5 MG/DL — SIGNIFICANT CHANGE UP (ref 1.6–2.6)
MANUAL SMEAR VERIFICATION: SIGNIFICANT CHANGE UP
MCHC RBC-ENTMCNC: 32.8 PG — SIGNIFICANT CHANGE UP (ref 27–34)
MCHC RBC-ENTMCNC: 34 GM/DL — SIGNIFICANT CHANGE UP (ref 32–36)
MCV RBC AUTO: 96.3 FL — SIGNIFICANT CHANGE UP (ref 80–100)
MICROCYTES BLD QL: SLIGHT — SIGNIFICANT CHANGE UP
MONOCYTES # BLD AUTO: 0.62 K/UL — SIGNIFICANT CHANGE UP (ref 0–0.9)
MONOCYTES NFR BLD AUTO: 19.1 % — HIGH (ref 2–14)
NEUTROPHILS # BLD AUTO: 2.18 K/UL — SIGNIFICANT CHANGE UP (ref 1.8–7.4)
NEUTROPHILS NFR BLD AUTO: 67.3 % — SIGNIFICANT CHANGE UP (ref 43–77)
NRBC # BLD: 0 /100 WBCS — SIGNIFICANT CHANGE UP (ref 0–0)
OVALOCYTES BLD QL SMEAR: SLIGHT — SIGNIFICANT CHANGE UP
PLAT MORPH BLD: NORMAL — SIGNIFICANT CHANGE UP
PLATELET # BLD AUTO: 203 K/UL — SIGNIFICANT CHANGE UP (ref 150–400)
PLATELET COUNT - ESTIMATE: ABNORMAL
POIKILOCYTOSIS BLD QL AUTO: SLIGHT — SIGNIFICANT CHANGE UP
POTASSIUM SERPL-MCNC: 4.1 MMOL/L — SIGNIFICANT CHANGE UP (ref 3.5–5.3)
POTASSIUM SERPL-SCNC: 4.1 MMOL/L — SIGNIFICANT CHANGE UP (ref 3.5–5.3)
PROT SERPL-MCNC: 7.6 G/DL — SIGNIFICANT CHANGE UP (ref 6–8.3)
RBC # BLD: 4.58 M/UL — SIGNIFICANT CHANGE UP (ref 4.2–5.8)
RBC # FLD: 13.7 % — SIGNIFICANT CHANGE UP (ref 10.3–14.5)
RBC BLD AUTO: ABNORMAL
SODIUM SERPL-SCNC: 146 MMOL/L — HIGH (ref 135–145)
WBC # BLD: 3.24 K/UL — LOW (ref 3.8–10.5)
WBC # FLD AUTO: 3.24 K/UL — LOW (ref 3.8–10.5)

## 2024-05-24 PROCEDURE — 99285 EMERGENCY DEPT VISIT HI MDM: CPT

## 2024-05-24 PROCEDURE — 99223 1ST HOSP IP/OBS HIGH 75: CPT | Mod: GC

## 2024-05-24 RX ORDER — ONDANSETRON 8 MG/1
4 TABLET, FILM COATED ORAL ONCE
Refills: 0 | Status: COMPLETED | OUTPATIENT
Start: 2024-05-24 | End: 2024-05-24

## 2024-05-24 RX ORDER — ENOXAPARIN SODIUM 100 MG/ML
40 INJECTION SUBCUTANEOUS EVERY 24 HOURS
Refills: 0 | Status: DISCONTINUED | OUTPATIENT
Start: 2024-05-24 | End: 2024-05-25

## 2024-05-24 RX ORDER — SODIUM CHLORIDE 9 MG/ML
2000 INJECTION INTRAMUSCULAR; INTRAVENOUS; SUBCUTANEOUS ONCE
Refills: 0 | Status: COMPLETED | OUTPATIENT
Start: 2024-05-24 | End: 2024-05-24

## 2024-05-24 RX ORDER — OLANZAPINE 15 MG/1
10 TABLET, FILM COATED ORAL DAILY
Refills: 0 | Status: DISCONTINUED | OUTPATIENT
Start: 2024-05-24 | End: 2024-05-25

## 2024-05-24 RX ORDER — SODIUM CHLORIDE 9 MG/ML
1000 INJECTION, SOLUTION INTRAVENOUS
Refills: 0 | Status: DISCONTINUED | OUTPATIENT
Start: 2024-05-24 | End: 2024-05-25

## 2024-05-24 RX ORDER — DIVALPROEX SODIUM 500 MG/1
500 TABLET, DELAYED RELEASE ORAL DAILY
Refills: 0 | Status: DISCONTINUED | OUTPATIENT
Start: 2024-05-24 | End: 2024-05-25

## 2024-05-24 RX ADMIN — ONDANSETRON 4 MILLIGRAM(S): 8 TABLET, FILM COATED ORAL at 17:14

## 2024-05-24 RX ADMIN — SODIUM CHLORIDE 2000 MILLILITER(S): 9 INJECTION INTRAMUSCULAR; INTRAVENOUS; SUBCUTANEOUS at 17:14

## 2024-05-24 NOTE — ED ADULT NURSE NOTE - NSFALLRISKINTERV_ED_ALL_ED

## 2024-05-24 NOTE — H&P ADULT - ATTENDING COMMENTS
48M PMH colon CA and rectal mass on chemo (FOLFOX) follows with Dr. Felix at [facility], bipolar disorder presenting to the ED with incoherent speech. Pt seen at bedside, AAOx1, only repeating his name. Pt noted to have similar symptoms on last admission with acute encephalpathy likely secondary to dehydration vs chemotherapy effect. As per ED note, he had been noted to be vomiting all night.    Alert, distracted man, answers inquiries with monosyllabic response.  Appears dehydrated.  Vital Signs Last 24 Hrs  T(C): 36.8 (24 May 2024 22:15), Max: 37.2 (24 May 2024 12:44)  T(F): 98.2 (24 May 2024 22:15), Max: 98.9 (24 May 2024 12:44)  HR: 66 (24 May 2024 22:15) (66 - 89)  BP: 114/77 (24 May 2024 22:15) (114/77 - 133/97)  BP(mean): --  RR: 18 (24 May 2024 22:15) (16 - 20)  SpO2: 98% (24 May 2024 22:15) (98% - 100%)    Parameters below as of 24 May 2024 12:44  Patient On (Oxygen Delivery Method): room air  Temporal wasting  Neck, supple  Lungs, clear  Cor, RRR  Abdomen, soft, non-tender, bs present  Neurological, in fetal posture, able to move all extremities                          15.0   3.24  )-----------( 203      ( 24 May 2024 17:16 )             44.1       05-24    146<H>  |  117<H>  |  11  ----------------------------<  116<H>  4.1   |  25  |  1.11    Ca    10.3      24 May 2024 17:16  Mg     2.5     05-24    TPro  7.6  /  Alb  4.0  /  TBili  0.5  /  DBili  x   /  AST  25  /  ALT  27  /  AlkPhos  61  05-24      IMP:  Hypernatremia, hemoconcentration, elevated  x creatinine, all c/w severe dehydration            vomiting, possibly secondary to fol/durbin, r/o other toxin            encephalopathy, previous history of similar episode associated with dehydration.  r/o other toxin            leukopenia, no fever after chemotherapy.  Not c/w alfonso post recent dose two days ago            rectal and sigmoid cancer, undergoing neoadjuvant chemotherapy            bipolar disorder  Plan: IV hydration            urine tox screen            Discussed with Oncology Consultant covering [facility], Dr. Combs.

## 2024-05-24 NOTE — ED ADULT NURSE NOTE - SUICIDE SCREENING QUESTION 1
Patient unable to complete

## 2024-05-24 NOTE — H&P ADULT - HISTORY OF PRESENT ILLNESS
48M PMH colon CA and rectal mass on chemo (FOLFOX) follows with Dr. Felix at [facility], bipolar disorder presenting to the ED with incoherent speech. Pt seen at bedside, AAOx1, only repeating his name. Pt noted to have similar symptoms on last admission with acute encephalpathy likely secondary to dehydration vs chemotherapy effect. As per ED note, he was noted to be vomiting all night. Unable to assess ROS due to mental status

## 2024-05-24 NOTE — ED PROVIDER NOTE - PROGRESS NOTE DETAILS
Received sign out from Dr Mcdonald.  Cause of encephalopathy not elucidated on workup to date - could be side effect of chemotherapy?  Will require admission given ongoing confusion.  Discussed case with attending hospitalist Dr Chatterjee who will admit.

## 2024-05-24 NOTE — H&P ADULT - NSHPPHYSICALEXAM_GEN_ALL_CORE
General - NAD, sitting up in bed, well groomed  Eyes - PERRLA, EOM intact  ENT - Nonicteric sclerae, PERRLA, EOMI. Oropharynx clear. Moist mucous membranes. Conjunctivae appear well perfused.   Neck - No noticeable or palpable swelling, redness or rash around throat or on face  Lymph Nodes - No lymphadenopathy  Cardiovascular - RRR no m/r/g, no JVD, no carotid bruits  Lungs - Clear to auscultation, no use of accessory muscles, no crackles or wheezes.  Skin - No rashes, skin warm and dry, no erythematous areas  Abdomen - Normal bowel sounds, abdomen soft and nontender  Rectal – Rectal exam not performed since no symptoms indicated blood loss.  Extremities - No edema, cyanosis or clubbing  Musculoskeletal - 5/5 strength, normal range of motion, no swollen or erythematous joints.  Neuro– Alert and oriented x 3, CN 2-12 grossly intact. General - NAD, lyingin bed, appears confused  Eyes - PERRLA, EOM intact  ENT - Nonicteric sclerae, PERRLA, EOMI. Oropharynx clear. Dry mucous membranes. Conjunctivae appear well perfused.   Neck - No noticeable or palpable swelling, redness or rash around throat or on face  Lymph Nodes - No lymphadenopathy  Cardiovascular - RRR no m/r/g, no JVD, no carotid bruits  Lungs - Clear to auscultation, no use of accessory muscles, no crackles or wheezes.  Skin - No rashes, skin warm and dry, no erythematous areas  Abdomen - Normal bowel sounds, abdomen soft and nontender  Rectal – Rectal exam not performed since no symptoms indicated blood loss.  Extremities - No edema, cyanosis or clubbing  Musculoskeletal - Unable to assess strength due to mental status, normal range of motion, no swollen or erythematous joints.  Neuro– Alert and oriented x 1, responds to name, CN 2-12 grossly intact.

## 2024-05-24 NOTE — ED PROVIDER NOTE - CLINICAL SUMMARY MEDICAL DECISION MAKING FREE TEXT BOX
47 y/o male on chemotherapy , pt was incoherent on Wednesday ad Thursday for chemo   became confused last night . mother   heard him vomiting all night , no unknown if he has diarrhea   pt is unable to communicate   pt is confused   cannot answer any questions    abdomen diffusely

## 2024-05-24 NOTE — H&P ADULT - PROBLEM SELECTOR PLAN 1
presenting with acute encephalopathy  AAOx1 on admission, baseline AAOX3  CTH negative  afebrile, VSS on admission  similar symptoms on last admission in April, likely due to dehydration and from chemotherapy   gentle hydration  f/u depakote levels  [facility] consulted presenting with acute encephalopathy  AAOx1 on admission, baseline AAOX3  CTH negative  afebrile, VSS on admission  similar symptoms on last admission in April, likely due to dehydration and from chemotherapy   gentle hydration  f/u depakote levels  QMA consulted, as per heme/onc, pt has been on FOLFOX for past 11 months, last dose 2 days ago

## 2024-05-24 NOTE — ED ADULT NURSE NOTE - TEMPLATE LIST FOR HEAD TO TOE ASSESSMENT
Patient is going for blood work tomorrow and she wanted to know if Dr. Aishwarya Mo could add a script to see why she is gaining so much weight. She is putting on a pound a day. She went from 171 to 185 she will be going for blood work around  [place].
done
General

## 2024-05-24 NOTE — H&P ADULT - PROBLEM SELECTOR PLAN 3
h/o colon CA, rectal mass on chemotherapy  followi Uma. Isidro [facility] oupatient  [facility]  consulted h/o colon CA, rectal mass on chemotherapy  following Dr. Felix [facility] outpatient  [facility]  consulted

## 2024-05-24 NOTE — H&P ADULT - PROBLEM SELECTOR PLAN 2
h/o bipolar disorder  on depakote 500 and zyprexa 10 at home  will continue with [facility]  f/u depakote levels h/o bipolar disorder  on depakote 500 and zyprexa 10 at home  will continue with home meds  f/u depakote levels

## 2024-05-24 NOTE — ED ADULT NURSE NOTE - NS ED NURSE DISCH DISPOSITION
MEDICATIONS  (STANDING):  OLANZapine 2.5 milliGRAM(s) Oral at bedtime    MEDICATIONS  (PRN):  haloperidol     Tablet 2 milliGRAM(s) Oral every 6 hours PRN psychotic agitation  haloperidol    Injectable 2 milliGRAM(s) IntraMuscular Once PRN severe psychotic agitation  hydrOXYzine hydrochloride 25 milliGRAM(s) Oral every 6 hours PRN anxiety/ off label for sleep disturbances  LORazepam     Tablet 2 milliGRAM(s) Oral every 6 hours PRN Agitation  LORazepam   Injectable 2 milliGRAM(s) IntraMuscular Once PRN severe agitation   Admitted MEDICATIONS  (STANDING):  OLANZapine 2.5 milliGRAM(s) Oral at bedtime  prenatal multivitamin 1 Tablet(s) Oral daily    MEDICATIONS  (PRN):  haloperidol     Tablet 2 milliGRAM(s) Oral every 6 hours PRN psychotic agitation  haloperidol    Injectable 2 milliGRAM(s) IntraMuscular Once PRN severe psychotic agitation  hydrOXYzine hydrochloride 25 milliGRAM(s) Oral every 6 hours PRN anxiety/ off label for sleep disturbances  LORazepam     Tablet 2 milliGRAM(s) Oral every 6 hours PRN Agitation  LORazepam   Injectable 2 milliGRAM(s) IntraMuscular Once PRN severe agitation

## 2024-05-24 NOTE — H&P ADULT - ASSESSMENT
73M PMH colon CA and rectal mass on chemo (FOLFOX)n follows with Dr. Felix at [facility], bipolar disorder presenting to the ED with incoherent speech admitted for acute encephalopathy 48M PMH colon CA and rectal mass on chemo (FOLFOX) follows with Dr. Felix at [facility], bipolar disorder presenting to the ED with incoherent speech admitted for acute encephalopathy

## 2024-05-24 NOTE — ED ADULT NURSE NOTE - OBJECTIVE STATEMENT
Pt presents to ED for weakness and vomiting. Pt not answering questions. Chemo port to right chest wall noted.

## 2024-05-25 ENCOUNTER — TRANSCRIPTION ENCOUNTER (OUTPATIENT)
Age: 49
End: 2024-05-25

## 2024-05-25 VITALS
HEART RATE: 89 BPM | DIASTOLIC BLOOD PRESSURE: 87 MMHG | TEMPERATURE: 98 F | RESPIRATION RATE: 17 BRPM | SYSTOLIC BLOOD PRESSURE: 127 MMHG | OXYGEN SATURATION: 98 %

## 2024-05-25 LAB
AMPHET UR-MCNC: NEGATIVE — SIGNIFICANT CHANGE UP
ANION GAP SERPL CALC-SCNC: 3 MMOL/L — LOW (ref 5–17)
APPEARANCE UR: CLEAR — SIGNIFICANT CHANGE UP
BACTERIA # UR AUTO: ABNORMAL /HPF
BARBITURATES UR SCN-MCNC: NEGATIVE — SIGNIFICANT CHANGE UP
BENZODIAZ UR-MCNC: NEGATIVE — SIGNIFICANT CHANGE UP
BILIRUB UR-MCNC: NEGATIVE — SIGNIFICANT CHANGE UP
BUN SERPL-MCNC: 17 MG/DL — SIGNIFICANT CHANGE UP (ref 7–18)
CALCIUM SERPL-MCNC: 8.6 MG/DL — SIGNIFICANT CHANGE UP (ref 8.4–10.5)
CHLORIDE SERPL-SCNC: 120 MMOL/L — HIGH (ref 96–108)
CO2 SERPL-SCNC: 25 MMOL/L — SIGNIFICANT CHANGE UP (ref 22–31)
COCAINE METAB.OTHER UR-MCNC: NEGATIVE — SIGNIFICANT CHANGE UP
COLOR SPEC: YELLOW — SIGNIFICANT CHANGE UP
COMMENT - URINE: SIGNIFICANT CHANGE UP
CREAT SERPL-MCNC: 1.04 MG/DL — SIGNIFICANT CHANGE UP (ref 0.5–1.3)
DIFF PNL FLD: NEGATIVE — SIGNIFICANT CHANGE UP
EGFR: 90 ML/MIN/1.73M2 — SIGNIFICANT CHANGE UP
EPI CELLS # UR: PRESENT
GLUCOSE SERPL-MCNC: 91 MG/DL — SIGNIFICANT CHANGE UP (ref 70–99)
GLUCOSE UR QL: NEGATIVE MG/DL — SIGNIFICANT CHANGE UP
HCT VFR BLD CALC: 39.7 % — SIGNIFICANT CHANGE UP (ref 39–50)
HGB BLD-MCNC: 13.5 G/DL — SIGNIFICANT CHANGE UP (ref 13–17)
KETONES UR-MCNC: ABNORMAL MG/DL
LEUKOCYTE ESTERASE UR-ACNC: NEGATIVE — SIGNIFICANT CHANGE UP
MAGNESIUM SERPL-MCNC: 2.4 MG/DL — SIGNIFICANT CHANGE UP (ref 1.6–2.6)
MCHC RBC-ENTMCNC: 33.8 PG — SIGNIFICANT CHANGE UP (ref 27–34)
MCHC RBC-ENTMCNC: 34 GM/DL — SIGNIFICANT CHANGE UP (ref 32–36)
MCV RBC AUTO: 99.5 FL — SIGNIFICANT CHANGE UP (ref 80–100)
METHADONE UR-MCNC: NEGATIVE — SIGNIFICANT CHANGE UP
NITRITE UR-MCNC: NEGATIVE — SIGNIFICANT CHANGE UP
NRBC # BLD: 0 /100 WBCS — SIGNIFICANT CHANGE UP (ref 0–0)
OPIATES UR-MCNC: NEGATIVE — SIGNIFICANT CHANGE UP
PCP SPEC-MCNC: SIGNIFICANT CHANGE UP
PCP UR-MCNC: NEGATIVE — SIGNIFICANT CHANGE UP
PH UR: 8 — SIGNIFICANT CHANGE UP (ref 5–8)
PHOSPHATE SERPL-MCNC: 4.4 MG/DL — SIGNIFICANT CHANGE UP (ref 2.5–4.5)
PLATELET # BLD AUTO: 171 K/UL — SIGNIFICANT CHANGE UP (ref 150–400)
POTASSIUM SERPL-MCNC: 3.5 MMOL/L — SIGNIFICANT CHANGE UP (ref 3.5–5.3)
POTASSIUM SERPL-SCNC: 3.5 MMOL/L — SIGNIFICANT CHANGE UP (ref 3.5–5.3)
PROT UR-MCNC: 30 MG/DL
RBC # BLD: 3.99 M/UL — LOW (ref 4.2–5.8)
RBC # FLD: 13.7 % — SIGNIFICANT CHANGE UP (ref 10.3–14.5)
RBC CASTS # UR COMP ASSIST: 0 /HPF — SIGNIFICANT CHANGE UP (ref 0–4)
SODIUM SERPL-SCNC: 148 MMOL/L — HIGH (ref 135–145)
SP GR SPEC: 1.01 — SIGNIFICANT CHANGE UP (ref 1–1.03)
THC UR QL: NEGATIVE — SIGNIFICANT CHANGE UP
UROBILINOGEN FLD QL: 0.2 MG/DL — SIGNIFICANT CHANGE UP (ref 0.2–1)
VALPROATE SERPL-MCNC: 3 UG/ML — LOW (ref 50–100)
WBC # BLD: 2.42 K/UL — LOW (ref 3.8–10.5)
WBC # FLD AUTO: 2.42 K/UL — LOW (ref 3.8–10.5)
WBC UR QL: 0 /HPF — SIGNIFICANT CHANGE UP (ref 0–5)

## 2024-05-25 PROCEDURE — 80053 COMPREHEN METABOLIC PANEL: CPT

## 2024-05-25 PROCEDURE — 96374 THER/PROPH/DIAG INJ IV PUSH: CPT

## 2024-05-25 PROCEDURE — 83690 ASSAY OF LIPASE: CPT

## 2024-05-25 PROCEDURE — 83735 ASSAY OF MAGNESIUM: CPT

## 2024-05-25 PROCEDURE — 99239 HOSP IP/OBS DSCHRG MGMT >30: CPT

## 2024-05-25 PROCEDURE — 93005 ELECTROCARDIOGRAM TRACING: CPT

## 2024-05-25 PROCEDURE — 80307 DRUG TEST PRSMV CHEM ANLYZR: CPT

## 2024-05-25 PROCEDURE — 85027 COMPLETE CBC AUTOMATED: CPT

## 2024-05-25 PROCEDURE — 36415 COLL VENOUS BLD VENIPUNCTURE: CPT

## 2024-05-25 PROCEDURE — 80164 ASSAY DIPROPYLACETIC ACD TOT: CPT

## 2024-05-25 PROCEDURE — 85025 COMPLETE CBC W/AUTO DIFF WBC: CPT

## 2024-05-25 PROCEDURE — 97161 PT EVAL LOW COMPLEX 20 MIN: CPT

## 2024-05-25 PROCEDURE — 84100 ASSAY OF PHOSPHORUS: CPT

## 2024-05-25 PROCEDURE — 80048 BASIC METABOLIC PNL TOTAL CA: CPT

## 2024-05-25 PROCEDURE — 81001 URINALYSIS AUTO W/SCOPE: CPT

## 2024-05-25 PROCEDURE — 99285 EMERGENCY DEPT VISIT HI MDM: CPT

## 2024-05-25 RX ORDER — SODIUM CHLORIDE 9 MG/ML
1000 INJECTION, SOLUTION INTRAVENOUS
Refills: 0 | Status: DISCONTINUED | OUTPATIENT
Start: 2024-05-25 | End: 2024-05-25

## 2024-05-25 RX ADMIN — SODIUM CHLORIDE 100 MILLILITER(S): 9 INJECTION, SOLUTION INTRAVENOUS at 01:46

## 2024-05-25 RX ADMIN — ENOXAPARIN SODIUM 40 MILLIGRAM(S): 100 INJECTION SUBCUTANEOUS at 05:49

## 2024-05-25 NOTE — DISCHARGE NOTE PROVIDER - HOSPITAL COURSE
48M PMH colon CA and rectal mass on chemo (FOLFOX) follows with Dr. Felix at [facility], bipolar disorder presenting to the ED with incoherent speech. Pt seen at bedside, AAOx1, only repeating his name. Pt noted to have similar symptoms on last admission with acute encephalopathy likely secondary to dehydration vs chemotherapy effect. As per ED note, he was noted to be vomiting all night.     [facility] consulted.    CTH negative. afebrile, VSS on admission.   Patient seen bedside, awake, alert and oriented x 3. Patient able to tolerate diet and ambulate to bathroom with standby assist.    case discussed with Dr. Chatterjee, pt medically stable for d/c home.

## 2024-05-25 NOTE — PATIENT PROFILE ADULT - FUNCTIONAL ASSESSMENT - BASIC MOBILITY 2.
Normotensive on admit 135/78  Home meds Diltiazem, Spironolactone 50mg qd, and metoprolol      Plan:  Cnt home meds  Cnt to monitor for HTN  Hydralazine PRN for SPB >180   4 = No assist / stand by assistance

## 2024-05-25 NOTE — DISCHARGE NOTE NURSING/CASE MANAGEMENT/SOCIAL WORK - PATIENT PORTAL LINK FT
You can access the FollowMyHealth Patient Portal offered by [facility] by registering at the following website: [email or web address]. By joining [employer]’s FollowMyHealth portal, you will also be able to view your health information using other applications (apps) compatible with our system.

## 2024-05-25 NOTE — PHYSICAL THERAPY INITIAL EVALUATION ADULT - IMPAIRMENTS CONTRIBUTING TO GAIT DEVIATIONS, PT EVAL
decreased balance in turning; noted shaking of LE and occasional narrow base of support that he self-corrects./impaired balance

## 2024-05-25 NOTE — DISCHARGE NOTE PROVIDER - ATTENDING DISCHARGE PHYSICAL EXAMINATION:
Alert, cooperative man in NAD, ambulates well  Vital Signs Last 24 Hrs  T(C): 36.9 (05-25-24 @ 14:28), Max: 37 (05-24-24 @ 16:00)  T(F): 98.4 (05-25-24 @ 14:28), Max: 98.6 (05-24-24 @ 16:00)  HR: 89 (05-25-24 @ 14:28) (66 - 89)  BP: 127/87 (05-25-24 @ 14:28) (111/78 - 127/87)  BP(mean): --  RR: 17 (05-25-24 @ 14:28) (17 - 20)  SpO2: 98% (05-25-24 @ 14:28) (98% - 100%)  Neck, supple  Lungs, clear  Cor, RRR  Abdomen, soft  Neurological, alert, verbal, non-focal

## 2024-05-25 NOTE — PATIENT PROFILE ADULT - FUNCTIONAL SCREEN CURRENT LEVEL: COMMUNICATION, MLM
Add 52 Modifier (Optional): no Detail Level: Detailed Post-Care Instructions: I reviewed with the patient in detail post-care instructions. Patient is to wear sunprotection, and avoid picking at any of the treated lesions. Pt may apply Vaseline to crusted or scabbing areas. Medical Necessity Information: It is in your best interest to select a reason for this procedure from the list below. All of these items fulfill various CMS LCD requirements except the new and changing color options. Number Of Freeze-Thaw Cycles: 1 freeze-thaw cycle Medical Necessity Clause: This procedure was medically necessary because the lesions that were treated were: Duration Of Freeze Thaw-Cycle (Seconds): 5 Consent: The patient's consent was obtained including but not limited to risks of crusting, scabbing, blistering, scarring, darker or lighter pigmentary change, recurrence, incomplete removal and infection. 0 = understands/communicates without difficulty

## 2024-05-25 NOTE — PHYSICAL THERAPY INITIAL EVALUATION ADULT - NSPTDISCHREC_GEN_A_CORE
Patient will not be placed in in-patient hospital Rehab program as he is independent in his transfer and ambulation, walks within his room and to bathroom independently. However, he will benefit with an Outpatient PT referral to maximize improvement of strength and balance while undergoing chemotherapy treatment./Outpatient PT

## 2024-05-25 NOTE — DISCHARGE NOTE PROVIDER - NSDCCPCAREPLAN_GEN_ALL_CORE_FT
PRINCIPAL DISCHARGE DIAGNOSIS  Diagnosis: Acute encephalopathy  Assessment and Plan of Treatment: You presented to the hospital with a change in mental status. The cause of your encephalopathy is likely chemo induced. Encephalopathy is a term used to describe brain disease or brain damage.   Call 911 or have someone else call for any of the following: You cannot be woken. You had a seizure.  Seek care immediately if: You had a seizure. You feel confused, dizzy, or lightheaded.  Your heart is beating faster than is normal for you. You have sudden shortness of breath.  Contact your healthcare provider if: You have a fever. You are sleeping more than usual.  Manage encephalopathy:  Take your medication as prescribed. Do not drink alcohol.   Follow up with your healthcare provider        SECONDARY DISCHARGE DIAGNOSES  Diagnosis: Colon cancer  Assessment and Plan of Treatment: You have a history of colon cancer. Please continue to follow- up with your oncologist Dr. Felix for further care. Call your local emergency number (911 in the ) for any of the following: You suddenly feel lightheaded and short of breath. You have chest pain when you take a deep breath or cough. You cough up blood.  Seek care immediately if: Your arm or leg feels warm, tender, and painful. It may look swollen and red. You vomit multiple times and cannot keep any food or liquids down. You see blood in your bowel movements.  Call your doctor or oncologist if: You have a fever. You cannot control your diarrhea or constipation. Your pain is worse or does not go away after you take your pain medicine.  Manage colorectal cancer: Do not smoke. Nicotine can damage blood vessels and make it hard to manage colorectal cancer. Limit or do not drink alcohol as directed. Drink liquids as directed. Eat healthy foods.    Diagnosis: Bipolar disorder  Assessment and Plan of Treatment: You have a history of bipolar disorder. Please take your medications as prescribed. Follow- up with your PCP for further care.  Bipolar disorder is a long-term chemical imbalance that causes rapid changes in mood and behavior. Call 911 if: You think about hurting yourself or someone else.  Manage bipolar disorder: Watch for triggers of bipolar disorder symptoms, such as stress. Learn new ways to relax, such as deep breathing, to manage your stress. Tell someone if you feel a manic or depressive period might be coming on. Ask a friend or family member to help watch you for bipolar symptoms. Work to develop skills that will help you manage bipolar disorder. You may need to make lifestyle changes.

## 2024-05-25 NOTE — PATIENT PROFILE ADULT - FALL HARM RISK - HARM RISK INTERVENTIONS
Assistance with ambulation/Assistance OOB with selected safe patient handling equipment/Communicate Risk of Fall with Harm to all staff/Reinforce activity limits and safety measures with patient and family/Tailored Fall Risk Interventions/Use of alarms - bed, chair and/or voice tab/Visual Cue: Yellow wristband and red socks/Bed in lowest position, wheels locked, appropriate side rails in place/Call bell, personal items and telephone in reach/Instruct patient to call for assistance before getting out of bed or chair/Non-slip footwear when patient is out of bed/[place] to call system/Physically safe environment - no spills, clutter or unnecessary equipment/Purposeful Proactive Rounding/Room/bathroom lighting operational, light cord in reach Assistance with ambulation/Communicate Risk of Fall with Harm to all staff/Monitor for mental status changes/Monitor gait and stability/Reinforce activity limits and safety measures with patient and family/Reorient to person, place and time as needed/Review medications for side effects contributing to fall risk/Sit up slowly, dangle for a short time, stand at bedside before walking/Tailored Fall Risk Interventions/Use of alarms - bed, chair and/or voice tab/Visual Cue: Yellow wristband and red socks/Bed in lowest position, wheels locked, appropriate side rails in place/Call bell, personal items and telephone in reach/Instruct patient to call for assistance before getting out of bed or chair/Non-slip footwear when patient is out of bed/[place] to call system/Physically safe environment - no spills, clutter or unnecessary equipment/Purposeful Proactive Rounding/Room/bathroom lighting operational, light cord in reach

## 2024-05-25 NOTE — PATIENT PROFILE ADULT - STATED REASON FOR ADMISSION
pt states he remembers vomiting and nothing after that  pt states he remembers vomiting and does not remember anything after that

## 2024-05-25 NOTE — PHYSICAL THERAPY INITIAL EVALUATION ADULT - GENERAL OBSERVATIONS, REHAB EVAL
Patient was seen for PT evaluation today. EMR, laboratory and radiology results reviewed. Pt received sitting up in bed, NAD, cooperative for PT eval

## 2024-05-25 NOTE — DISCHARGE NOTE PROVIDER - NSDCFUSCHEDAPPT_GEN_ALL_CORE_FT
Saulo Mott  [facility]  Scheduled Appointment: 06/06/2024    [facility]  UROLOGY [address]  Scheduled Appointment: 06/25/2024    Saulo Mott  [facility]  Scheduled Appointment: 06/26/2024    Saulo Mott  [facility]  COLOSURG [address]  Scheduled Appointment: 06/26/2024    Nicholas Manning  [facility]  UROLOGY [address]  Scheduled Appointment: 06/26/2024

## 2024-05-25 NOTE — PHYSICAL THERAPY INITIAL EVALUATION ADULT - DIAGNOSIS, PT EVAL
Pt. present w/deficits in  Body Structures/Function including strength and balance leading to deficits in ambulation endurance.

## 2024-05-25 NOTE — PHYSICAL THERAPY INITIAL EVALUATION ADULT - ACTIVE RANGE OF MOTION EXAMINATION, REHAB EVAL
sanjana. upper extremity Active ROM was WNL (within normal limits)/bilateral lower extremity Active ROM was WNL (within normal limits)

## 2024-05-25 NOTE — PHYSICAL THERAPY INITIAL EVALUATION ADULT - ADDITIONAL COMMENTS
Patient lives in an apartment building with 2 steps to get into part. 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 Patient has never used any assistive mobility device and states "I do not want to start using one".

## 2024-05-25 NOTE — DISCHARGE NOTE PROVIDER - CARE PROVIDER_API CALL
Keshia Felix  Hematology/Oncology  [address], [address]  [place] [address]  Phone: [phone]  Fax: [phone]  Established Patient  Follow Up Time:

## 2024-05-28 LAB — CORE LAB BIOPSY: NORMAL

## 2024-05-30 ENCOUNTER — TRANSCRIPTION ENCOUNTER (OUTPATIENT)
Age: 49
End: 2024-05-30

## 2024-06-06 ENCOUNTER — OUTPATIENT (OUTPATIENT)
Dept: OUTPATIENT SERVICES | Facility: HOSPITAL | Age: 49
LOS: 1 days | End: 2024-06-06
Payer: MEDICAID

## 2024-06-06 VITALS
WEIGHT: 145.06 LBS | OXYGEN SATURATION: 98 % | TEMPERATURE: 98 F | DIASTOLIC BLOOD PRESSURE: 72 MMHG | HEIGHT: 65 IN | RESPIRATION RATE: 18 BRPM | SYSTOLIC BLOOD PRESSURE: 104 MMHG | HEART RATE: 86 BPM

## 2024-06-06 DIAGNOSIS — F19.91 OTHER PSYCHOACTIVE SUBSTANCE USE, UNSPECIFIED, IN REMISSION: ICD-10-CM

## 2024-06-06 DIAGNOSIS — Z01.818 ENCOUNTER FOR OTHER PREPROCEDURAL EXAMINATION: ICD-10-CM

## 2024-06-06 DIAGNOSIS — K63.89 OTHER SPECIFIED DISEASES OF INTESTINE: ICD-10-CM

## 2024-06-06 DIAGNOSIS — Z98.890 OTHER SPECIFIED POSTPROCEDURAL STATES: Chronic | ICD-10-CM

## 2024-06-06 DIAGNOSIS — Z29.9 ENCOUNTER FOR PROPHYLACTIC MEASURES, UNSPECIFIED: ICD-10-CM

## 2024-06-06 LAB
AMPHET UR-MCNC: NEGATIVE — SIGNIFICANT CHANGE UP
ANION GAP SERPL CALC-SCNC: 13 MMOL/L — SIGNIFICANT CHANGE UP (ref 5–17)
BARBITURATES UR SCN-MCNC: NEGATIVE — SIGNIFICANT CHANGE UP
BENZODIAZ UR-MCNC: NEGATIVE — SIGNIFICANT CHANGE UP
BLD GP AB SCN SERPL QL: NEGATIVE — SIGNIFICANT CHANGE UP
BUN SERPL-MCNC: 10 MG/DL — SIGNIFICANT CHANGE UP (ref 7–23)
CALCIUM SERPL-MCNC: 9.4 MG/DL — SIGNIFICANT CHANGE UP (ref 8.4–10.5)
CHLORIDE SERPL-SCNC: 109 MMOL/L — HIGH (ref 96–108)
CO2 SERPL-SCNC: 20 MMOL/L — LOW (ref 22–31)
COCAINE METAB.OTHER UR-MCNC: NEGATIVE — SIGNIFICANT CHANGE UP
CREAT SERPL-MCNC: 0.75 MG/DL — SIGNIFICANT CHANGE UP (ref 0.5–1.3)
EGFR: 113 ML/MIN/1.73M2 — SIGNIFICANT CHANGE UP
GLUCOSE SERPL-MCNC: 117 MG/DL — HIGH (ref 70–99)
HCT VFR BLD CALC: 37.7 % — LOW (ref 39–50)
HGB BLD-MCNC: 12.8 G/DL — LOW (ref 13–17)
MCHC RBC-ENTMCNC: 33.1 PG — SIGNIFICANT CHANGE UP (ref 27–34)
MCHC RBC-ENTMCNC: 34 GM/DL — SIGNIFICANT CHANGE UP (ref 32–36)
MCV RBC AUTO: 97.4 FL — SIGNIFICANT CHANGE UP (ref 80–100)
METHADONE UR-MCNC: NEGATIVE — SIGNIFICANT CHANGE UP
NRBC # BLD: 0 /100 WBCS — SIGNIFICANT CHANGE UP (ref 0–0)
OPIATES UR-MCNC: NEGATIVE — SIGNIFICANT CHANGE UP
OXYCODONE UR-MCNC: NEGATIVE — SIGNIFICANT CHANGE UP
PCP SPEC-MCNC: SIGNIFICANT CHANGE UP
PCP UR-MCNC: NEGATIVE — SIGNIFICANT CHANGE UP
PLATELET # BLD AUTO: 209 K/UL — SIGNIFICANT CHANGE UP (ref 150–400)
POTASSIUM SERPL-MCNC: 4.3 MMOL/L — SIGNIFICANT CHANGE UP (ref 3.5–5.3)
POTASSIUM SERPL-SCNC: 4.3 MMOL/L — SIGNIFICANT CHANGE UP (ref 3.5–5.3)
RBC # BLD: 3.87 M/UL — LOW (ref 4.2–5.8)
RBC # FLD: 13 % — SIGNIFICANT CHANGE UP (ref 10.3–14.5)
RH IG SCN BLD-IMP: POSITIVE — SIGNIFICANT CHANGE UP
SODIUM SERPL-SCNC: 142 MMOL/L — SIGNIFICANT CHANGE UP (ref 135–145)
THC UR QL: NEGATIVE — SIGNIFICANT CHANGE UP
WBC # BLD: 6.7 K/UL — SIGNIFICANT CHANGE UP (ref 3.8–10.5)
WBC # FLD AUTO: 6.7 K/UL — SIGNIFICANT CHANGE UP (ref 3.8–10.5)

## 2024-06-06 PROCEDURE — 83036 HEMOGLOBIN GLYCOSYLATED A1C: CPT

## 2024-06-06 PROCEDURE — 86850 RBC ANTIBODY SCREEN: CPT

## 2024-06-06 PROCEDURE — 85027 COMPLETE CBC AUTOMATED: CPT

## 2024-06-06 PROCEDURE — 86901 BLOOD TYPING SEROLOGIC RH(D): CPT

## 2024-06-06 PROCEDURE — 80307 DRUG TEST PRSMV CHEM ANLYZR: CPT

## 2024-06-06 PROCEDURE — G0463: CPT

## 2024-06-06 PROCEDURE — 80048 BASIC METABOLIC PNL TOTAL CA: CPT

## 2024-06-06 PROCEDURE — 87086 URINE CULTURE/COLONY COUNT: CPT

## 2024-06-06 PROCEDURE — 86900 BLOOD TYPING SEROLOGIC ABO: CPT

## 2024-06-06 PROCEDURE — 82378 CARCINOEMBRYONIC ANTIGEN: CPT

## 2024-06-06 NOTE — H&P PST ADULT - ASSESSMENT
CAPRINI SCORE [CLOT updated 18]    AGE RELATED RISK FACTORS                                                       MOBILITY RELATED FACTORS  [ ] Age 41-60 years                                            (1 Point)                    [ ] Bed rest                                                        (1 Point)  [ ] Age: 61-74 years                                           (2 Points)                  [ ] Plaster cast                                                   (2 Points)  [ ] Age= 75 years                                              (3 Points)                    [ ] Bed bound for more than 72 hours                 (2 Points)    DISEASE RELATED RISK FACTORS                                               GENDER SPECIFIC FACTORS  [ ] Edema in the lower extremities                       (1 Point)              [ ] Pregnancy                                                     (1 Point)  [ ] Varicose veins                                               (1 Point)                     [ ] Post-partum < 6 weeks                                   (1 Point)             [ ] BMI > 25 Kg/m2                                            (1 Point)                     [ ] Hormonal therapy  or oral contraception          (1 Point)                 [ ] Sepsis (in the previous month)                        (1 Point)               [ ] History of pregnancy complications                 (1 point)  [ ] Pneumonia or serious lung disease                                               [ ] Unexplained or recurrent                     (1 Point)           (in the previous month)                               (1 Point)  [ ] Abnormal pulmonary function test                     (1 Point)                 SURGERY RELATED RISK FACTORS  [ ] Acute myocardial infarction                              (1 Point)               [ ]  Section                                             (1 Point)  [ ] Congestive heart failure (in the previous month)  (1 Point)      [ ] Minor surgery                                                  (1 Point)   [ ] Inflammatory bowel disease                             (1 Point)               [ ] Arthroscopic surgery                                        (2 Points)  [ ] Central venous access                                      (2 Points)                [ ] General surgery lasting more than 45 minutes (2 points)  [ ] Present or previous malignancy                     (2 Points)                [ ] Elective arthroplasty                                         (5 points)    [ ] Stroke (in the previous month)                          (5 Points)                                                                                                                                                           HEMATOLOGY RELATED FACTORS                                                 TRAUMA RELATED RISK FACTORS  [ ] Prior episodes of VTE                                     (3 Points)                [ ] Fracture of the hip, pelvis, or leg                       (5 Points)  [ ] Positive family history for VTE                         (3 Points)             [ ] Acute spinal cord injury (in the previous month)  (5 Points)  [ ] Prothrombin 13177 A                                     (3 Points)               [ ] Paralysis  (less than 1 month)                             (5 Points)  [ ] Factor V Leiden                                             (3 Points)                  [ ] Multiple Trauma within 1 month                        (5 Points)  [ ] Lupus anticoagulants                                     (3 Points)                                                           [ ] Anticardiolipin antibodies                               (3 Points)                                                       [ ] High homocysteine in the blood                      (3 Points)                                             [ ] Other congenital or acquired thrombophilia      (3 Points)                                                [ ] Heparin induced thrombocytopenia                  (3 Points)                                     Total Score [ 8  ]  Denies loose/cracked/removable teeth  Mallampati III

## 2024-06-06 NOTE — H&P PST ADULT - PROBLEM SELECTOR PLAN 1
Scheduled for surgery on 6/26/2024. Surgical, ERP and chlorhexidine instructions reviewed and copy provided to pt.

## 2024-06-06 NOTE — H&P PST ADULT - STREET DRUG/MEDICATION/INHALANT, DURATION OF USE, PROFILE
refuses to discuss
Tae Cohn)  Obstetrics and Gynecology  [facility] [place] [address]  Phone: [phone]  Fax: [phone]  Follow Up Time:

## 2024-06-06 NOTE — H&P PST ADULT - GIT GEN HX ROS MEA POS PC
coagulation(Bleeding disorder R/T clinical cond/anti-coags)
nausea/diarrhea/constipation/change in bowel habits

## 2024-06-06 NOTE — H&P PST ADULT - HISTORY OF PRESENT ILLNESS
48 year old male with w/ PMHx of asthma (well controlled, denies inhaler use for many years), bipolar, substance abuse (reluctant to discuss, but states used cocaine and occasional Marijuana w/ last use in Nov) and rectal and rectosigmoid CA (completed up front radiation therapy and is now completing his OMAR chemo thru right chest Mediport). On 8/22/2023 he presented to [facility] ED for symptomatic anemia 2/2 iron deficiency and underwent EDG/colonoscopy on 8/24/2023 which revealed two large malignant-appearing masses in the rectosigmoid colon at 20 cm and in the distal rectum at 5 cm from the anal verge. Pathology both positive for invasive adenocarcinoma. He now presents to PST for a scheduled Robotic LAR on 6/26/2024. States has some nausea but denies recent fevers, chills, cough, chest pain or SOB.

## 2024-06-06 NOTE — H&P PST ADULT - NSICDXPASTMEDICALHX_GEN_ALL_CORE_FT
PAST MEDICAL HISTORY:  Bipolar disorder     History of asthma     History of drug use     Other specified diseases of intestine     Rectal cancer

## 2024-06-07 LAB
A1C WITH ESTIMATED AVERAGE GLUCOSE RESULT: 5.2 % — SIGNIFICANT CHANGE UP (ref 4–5.6)
CEA SERPL-MCNC: 1.6 NG/ML — SIGNIFICANT CHANGE UP (ref 0–3.8)
CULTURE RESULTS: SIGNIFICANT CHANGE UP
ESTIMATED AVERAGE GLUCOSE: 103 MG/DL — SIGNIFICANT CHANGE UP (ref 68–114)
SPECIMEN SOURCE: SIGNIFICANT CHANGE UP

## 2024-06-08 ENCOUNTER — EMERGENCY (EMERGENCY)
Facility: HOSPITAL | Age: 49
LOS: 1 days | Discharge: HOME | End: 2024-06-08
Attending: STUDENT IN AN ORGANIZED HEALTH CARE EDUCATION/TRAINING PROGRAM
Payer: MEDICAID

## 2024-06-08 VITALS
HEIGHT: 65 IN | HEART RATE: 88 BPM | SYSTOLIC BLOOD PRESSURE: 112 MMHG | WEIGHT: 133.82 LBS | OXYGEN SATURATION: 97 % | RESPIRATION RATE: 18 BRPM | TEMPERATURE: 98 F | DIASTOLIC BLOOD PRESSURE: 84 MMHG

## 2024-06-08 DIAGNOSIS — Z98.890 OTHER SPECIFIED POSTPROCEDURAL STATES: Chronic | ICD-10-CM

## 2024-06-08 PROCEDURE — 99282 EMERGENCY DEPT VISIT SF MDM: CPT

## 2024-06-08 PROCEDURE — 99284 EMERGENCY DEPT VISIT MOD MDM: CPT | Mod: 25

## 2024-06-08 NOTE — ED PROVIDER NOTE - PHYSICAL EXAMINATION
General: well appearing male, no acute distress   HEENT: normocephalic, atraumatic   Respiratory: normal work of breathing   MSK: right chest wall chemoport in place with tubing attached   Skin: warm, dry   Neuro: A&Ox3  Psych: appropriate affect

## 2024-06-08 NOTE — ED PROVIDER NOTE - OBJECTIVE STATEMENT
48M, pmh of  CA and rectal mass on chemo (FOLFOX) follows with Dr. Felix at [facility], bipolar disorder, presenting with chemotherapy tubing disconnected. patient reports yesterday he was supposed to have his chemo port removed, however he felt too weak to go in. when he awoke this morning he realized the tube connected to the infuser was out. reports he feels otherwise well today.

## 2024-06-08 NOTE — ED ADULT NURSE NOTE - OBJECTIVE STATEMENT
Pt walk in for chemo pump disconnection. Pt has a hx of colon CA. Pt has a R chest pump. Pt referred to ED by doctor for evaluation

## 2024-06-08 NOTE — ED PROVIDER NOTE - CLINICAL SUMMARY MEDICAL DECISION MAKING FREE TEXT BOX
48M presenting with chemotherapy tubing disconnected from infuser. called the on-call service for [facility] to obtain recommends/plan for port removal. [phone] 48M presenting with chemotherapy tubing disconnected from infuser. called the on-call service for [facility] to obtain recommends/plan for port removal. [phone]    spoke with the on-call oncologist Dr. Robin who agreed with plan to deaccess the chemo prot. patient to return pump to office on Monday. patient informed of the plan.

## 2024-06-08 NOTE — ED PROVIDER NOTE - NSFOLLOWUPINSTRUCTIONS_ED_ALL_ED_FT
You were seen in the emergency department for problems with your chemotherapy port.     Please follow-up with your Oncologist on Monday June 10th to return your port and obtain follow-up instructions.     If you have any worsening symptoms, severe headache, chest pain, trouble breathing, nausea, vomiting or weakness, please return to the emergency department.

## 2024-06-08 NOTE — ED PROVIDER NOTE - PATIENT PORTAL LINK FT
You can access the FollowMyHealth Patient Portal offered by [facility] by registering at the following website: [email or web address]. By joining [facility]’s FollowMyHealth portal, you will also be able to view your health information using other applications (apps) compatible with our system.

## 2024-06-08 NOTE — ED CLERICAL - NS ED CARE COORDINATION INFORMATION
This patient is eligable for (or currently enrolled in) an outpatient care management program available through [employer] [facility]. This program can coordinate outpatient follow up and assist the patient in accessing a variety of outpatient resources.  If discharged from the ED, the patient will be contacted to see if any additional resources are needed.                                                                                    Please call the Nurse Clinical Call Center at [phone] with any questions or for assistance in discharge planning.

## 2024-06-11 PROBLEM — F19.91: Chronic | Status: ACTIVE | Noted: 2024-06-06

## 2024-06-11 PROBLEM — K63.89 OTHER SPECIFIED DISEASES OF INTESTINE: Chronic | Status: ACTIVE | Noted: 2024-06-06

## 2024-06-11 PROBLEM — Z87.09 PERSONAL HISTORY OF OTHER DISEASES OF THE RESPIRATORY SYSTEM: Chronic | Status: ACTIVE | Noted: 2024-06-06

## 2024-06-25 ENCOUNTER — TRANSCRIPTION ENCOUNTER (OUTPATIENT)
Age: 49
End: 2024-06-25

## 2024-06-25 ENCOUNTER — APPOINTMENT (OUTPATIENT)
Dept: UROLOGY | Facility: CLINIC | Age: 49
End: 2024-06-25
Payer: MEDICAID

## 2024-06-25 DIAGNOSIS — C20 MALIGNANT NEOPLASM OF RECTUM: ICD-10-CM

## 2024-06-25 PROCEDURE — 99202 OFFICE O/P NEW SF 15 MIN: CPT | Mod: 57

## 2024-06-26 ENCOUNTER — APPOINTMENT (OUTPATIENT)
Dept: COLORECTAL SURGERY | Facility: HOSPITAL | Age: 49
End: 2024-06-26
Payer: MEDICAID

## 2024-06-26 ENCOUNTER — INPATIENT (INPATIENT)
Facility: HOSPITAL | Age: 49
LOS: 6 days | Discharge: HOME HEALTH | DRG: 395 | End: 2024-07-03
Attending: SURGERY | Admitting: SURGERY
Payer: MEDICAID

## 2024-06-26 ENCOUNTER — RESULT REVIEW (OUTPATIENT)
Age: 49
End: 2024-06-26

## 2024-06-26 ENCOUNTER — APPOINTMENT (OUTPATIENT)
Dept: UROLOGY | Facility: HOSPITAL | Age: 49
End: 2024-06-26

## 2024-06-26 VITALS
HEART RATE: 98 BPM | SYSTOLIC BLOOD PRESSURE: 105 MMHG | RESPIRATION RATE: 16 BRPM | HEIGHT: 65 IN | TEMPERATURE: 98 F | OXYGEN SATURATION: 98 % | DIASTOLIC BLOOD PRESSURE: 69 MMHG | WEIGHT: 145.06 LBS

## 2024-06-26 DIAGNOSIS — K63.89 OTHER SPECIFIED DISEASES OF INTESTINE: ICD-10-CM

## 2024-06-26 DIAGNOSIS — Z98.890 OTHER SPECIFIED POSTPROCEDURAL STATES: Chronic | ICD-10-CM

## 2024-06-26 LAB
AMPHET UR-MCNC: NEGATIVE — SIGNIFICANT CHANGE UP
BARBITURATES UR SCN-MCNC: NEGATIVE — SIGNIFICANT CHANGE UP
BENZODIAZ UR-MCNC: NEGATIVE — SIGNIFICANT CHANGE UP
COCAINE METAB.OTHER UR-MCNC: NEGATIVE — SIGNIFICANT CHANGE UP
GLUCOSE BLDC GLUCOMTR-MCNC: 73 MG/DL — SIGNIFICANT CHANGE UP (ref 70–99)
METHADONE UR-MCNC: NEGATIVE — SIGNIFICANT CHANGE UP
OPIATES UR-MCNC: NEGATIVE — SIGNIFICANT CHANGE UP
OXYCODONE UR-MCNC: NEGATIVE — SIGNIFICANT CHANGE UP
PCP SPEC-MCNC: SIGNIFICANT CHANGE UP
PCP UR-MCNC: NEGATIVE — SIGNIFICANT CHANGE UP
THC UR QL: NEGATIVE — SIGNIFICANT CHANGE UP

## 2024-06-26 PROCEDURE — 44207 L COLECTOMY/COLOPROCTOSTOMY: CPT

## 2024-06-26 PROCEDURE — 44187 LAP ILEO/JEJUNO-STOMY: CPT

## 2024-06-26 PROCEDURE — 88342 IMHCHEM/IMCYTCHM 1ST ANTB: CPT | Mod: 26

## 2024-06-26 PROCEDURE — 88304 TISSUE EXAM BY PATHOLOGIST: CPT | Mod: 26

## 2024-06-26 PROCEDURE — S2900 ROBOTIC SURGICAL SYSTEM: CPT | Mod: NC

## 2024-06-26 PROCEDURE — 88341 IMHCHEM/IMCYTCHM EA ADD ANTB: CPT | Mod: 26

## 2024-06-26 PROCEDURE — 45330 DIAGNOSTIC SIGMOIDOSCOPY: CPT

## 2024-06-26 PROCEDURE — 88309 TISSUE EXAM BY PATHOLOGIST: CPT | Mod: 26

## 2024-06-26 PROCEDURE — 52005 CYSTO W/URTRL CATHJ: CPT

## 2024-06-26 DEVICE — URETERAL CATH WHISTLE TIP 5FR LEFT: Type: IMPLANTABLE DEVICE | Status: FUNCTIONAL

## 2024-06-26 DEVICE — SURGICEL POWDER 3 GRAMS: Type: IMPLANTABLE DEVICE | Status: FUNCTIONAL

## 2024-06-26 DEVICE — STAPLER COVIDIEN CIRCULAR TRI-STAPLE 28MM BLACK MEDIUM/THICK XL: Type: IMPLANTABLE DEVICE | Status: FUNCTIONAL

## 2024-06-26 DEVICE — STAPLER COVIDIEN PURSTRING 65MM: Type: IMPLANTABLE DEVICE | Status: FUNCTIONAL

## 2024-06-26 DEVICE — XI STAPLER SUREFORM RELOAD 45 GREEN: Type: IMPLANTABLE DEVICE | Status: FUNCTIONAL

## 2024-06-26 RX ORDER — SODIUM CHLORIDE 0.9 % (FLUSH) 0.9 %
3 SYRINGE (ML) INJECTION EVERY 8 HOURS
Refills: 0 | Status: DISCONTINUED | OUTPATIENT
Start: 2024-06-26 | End: 2024-06-26

## 2024-06-26 RX ORDER — HYDROMORPHONE HCL 0.2 MG/ML
0.5 INJECTION, SOLUTION INTRAVENOUS
Refills: 0 | Status: DISCONTINUED | OUTPATIENT
Start: 2024-06-26 | End: 2024-06-26

## 2024-06-26 RX ORDER — OLANZAPINE 15 MG/1
1 TABLET, FILM COATED ORAL
Refills: 0 | DISCHARGE

## 2024-06-26 RX ORDER — NALOXONE HYDROCHLORIDE 1 MG/ML
0.1 INJECTION PARENTERAL
Refills: 0 | Status: DISCONTINUED | OUTPATIENT
Start: 2024-06-26 | End: 2024-06-27

## 2024-06-26 RX ORDER — OXYCODONE HYDROCHLORIDE 100 MG/5ML
5 SOLUTION ORAL EVERY 4 HOURS
Refills: 0 | Status: DISCONTINUED | OUTPATIENT
Start: 2024-06-26 | End: 2024-07-03

## 2024-06-26 RX ORDER — HYDROMORPHONE HCL 0.2 MG/ML
1 INJECTION, SOLUTION INTRAVENOUS
Refills: 0 | Status: DISCONTINUED | OUTPATIENT
Start: 2024-06-26 | End: 2024-06-26

## 2024-06-26 RX ORDER — ACETAMINOPHEN 325 MG
1000 TABLET ORAL EVERY 6 HOURS
Refills: 0 | Status: COMPLETED | OUTPATIENT
Start: 2024-06-26 | End: 2024-06-27

## 2024-06-26 RX ORDER — OXYCODONE HYDROCHLORIDE 100 MG/5ML
5 SOLUTION ORAL
Refills: 0 | Status: DISCONTINUED | OUTPATIENT
Start: 2024-06-26 | End: 2024-06-26

## 2024-06-26 RX ORDER — OLANZAPINE 2.5 MG/1
10 TABLET, FILM COATED ORAL DAILY
Refills: 0 | Status: DISCONTINUED | OUTPATIENT
Start: 2024-06-26 | End: 2024-07-03

## 2024-06-26 RX ORDER — DIVALPROEX SODIUM 500 MG/1
1 TABLET, DELAYED RELEASE ORAL
Refills: 0 | DISCHARGE

## 2024-06-26 RX ORDER — NALBUPHINE HCL 100 %
2.5 POWDER (GRAM) MISCELLANEOUS EVERY 6 HOURS
Refills: 0 | Status: DISCONTINUED | OUTPATIENT
Start: 2024-06-26 | End: 2024-06-27

## 2024-06-26 RX ORDER — LIDOCAINE HYDROCHLORIDE 20 MG/ML
0.2 INJECTION, SOLUTION EPIDURAL; INFILTRATION; INTRACAUDAL; PERINEURAL ONCE
Refills: 0 | Status: DISCONTINUED | OUTPATIENT
Start: 2024-06-26 | End: 2024-06-26

## 2024-06-26 RX ORDER — OXYCODONE HYDROCHLORIDE 100 MG/5ML
10 SOLUTION ORAL
Refills: 0 | Status: DISCONTINUED | OUTPATIENT
Start: 2024-06-26 | End: 2024-06-26

## 2024-06-26 RX ORDER — ONDANSETRON HYDROCHLORIDE 2 MG/ML
4 INJECTION INTRAMUSCULAR; INTRAVENOUS ONCE
Refills: 0 | Status: DISCONTINUED | OUTPATIENT
Start: 2024-06-26 | End: 2024-06-26

## 2024-06-26 RX ORDER — CELECOXIB 100 MG/1
400 CAPSULE ORAL ONCE
Refills: 0 | Status: COMPLETED | OUTPATIENT
Start: 2024-06-26 | End: 2024-06-26

## 2024-06-26 RX ORDER — HEPARIN SODIUM 50 [USP'U]/ML
5000 INJECTION, SOLUTION INTRAVENOUS EVERY 8 HOURS
Refills: 0 | Status: DISCONTINUED | OUTPATIENT
Start: 2024-06-26 | End: 2024-07-03

## 2024-06-26 RX ORDER — DEXTROSE MONOHYDRATE AND SODIUM CHLORIDE 5; .3 G/100ML; G/100ML
1000 INJECTION, SOLUTION INTRAVENOUS
Refills: 0 | Status: DISCONTINUED | OUTPATIENT
Start: 2024-06-26 | End: 2024-06-27

## 2024-06-26 RX ORDER — OXYCODONE HYDROCHLORIDE 100 MG/5ML
10 SOLUTION ORAL EVERY 4 HOURS
Refills: 0 | Status: DISCONTINUED | OUTPATIENT
Start: 2024-06-26 | End: 2024-07-03

## 2024-06-26 RX ORDER — DIVALPROEX SODIUM 500 MG
500 TABLET, DELAYED RELEASE (ENTERIC COATED) ORAL DAILY
Refills: 0 | Status: DISCONTINUED | OUTPATIENT
Start: 2024-06-26 | End: 2024-07-03

## 2024-06-26 RX ORDER — KETOROLAC TROMETHAMINE 30 MG/ML
30 INJECTION, SOLUTION INTRAMUSCULAR EVERY 6 HOURS
Refills: 0 | Status: DISCONTINUED | OUTPATIENT
Start: 2024-06-26 | End: 2024-06-27

## 2024-06-26 RX ORDER — CEFOTETAN DISODIUM 2 G
2 VIAL (EA) INJECTION ONCE
Refills: 0 | Status: DISCONTINUED | OUTPATIENT
Start: 2024-06-26 | End: 2024-06-26

## 2024-06-26 RX ORDER — ONDANSETRON HYDROCHLORIDE 2 MG/ML
4 INJECTION INTRAMUSCULAR; INTRAVENOUS EVERY 6 HOURS
Refills: 0 | Status: DISCONTINUED | OUTPATIENT
Start: 2024-06-26 | End: 2024-06-27

## 2024-06-26 RX ORDER — MORPHINE SULFATE 100 MG/1
0.1 TABLET, EXTENDED RELEASE ORAL ONCE
Refills: 0 | Status: DISCONTINUED | OUTPATIENT
Start: 2024-06-26 | End: 2024-06-27

## 2024-06-26 RX ADMIN — CELECOXIB 400 MILLIGRAM(S): 100 CAPSULE ORAL at 07:34

## 2024-06-26 RX ADMIN — OLANZAPINE 10 MILLIGRAM(S): 2.5 TABLET, FILM COATED ORAL at 22:06

## 2024-06-26 RX ADMIN — HEPARIN SODIUM 5000 UNIT(S): 50 INJECTION, SOLUTION INTRAVENOUS at 17:53

## 2024-06-26 RX ADMIN — KETOROLAC TROMETHAMINE 30 MILLIGRAM(S): 30 INJECTION, SOLUTION INTRAMUSCULAR at 17:52

## 2024-06-26 RX ADMIN — Medication 400 MILLIGRAM(S): at 17:53

## 2024-06-26 RX ADMIN — HEPARIN SODIUM 5000 UNIT(S): 50 INJECTION, SOLUTION INTRAVENOUS at 23:35

## 2024-06-26 RX ADMIN — Medication 400 MILLIGRAM(S): at 23:34

## 2024-06-26 RX ADMIN — DEXTROSE MONOHYDRATE AND SODIUM CHLORIDE 40 MILLILITER(S): 5; .3 INJECTION, SOLUTION INTRAVENOUS at 17:53

## 2024-06-26 RX ADMIN — OXYCODONE HYDROCHLORIDE 10 MILLIGRAM(S): 100 SOLUTION ORAL at 16:22

## 2024-06-27 LAB
ANION GAP SERPL CALC-SCNC: 11 MMOL/L — SIGNIFICANT CHANGE UP (ref 5–17)
BUN SERPL-MCNC: 10 MG/DL — SIGNIFICANT CHANGE UP (ref 7–23)
CALCIUM SERPL-MCNC: 8.3 MG/DL — LOW (ref 8.4–10.5)
CHLORIDE SERPL-SCNC: 104 MMOL/L — SIGNIFICANT CHANGE UP (ref 96–108)
CO2 SERPL-SCNC: 23 MMOL/L — SIGNIFICANT CHANGE UP (ref 22–31)
CREAT SERPL-MCNC: 1.24 MG/DL — SIGNIFICANT CHANGE UP (ref 0.5–1.3)
EGFR: 73 ML/MIN/1.73M2 — SIGNIFICANT CHANGE UP
GLUCOSE SERPL-MCNC: 89 MG/DL — SIGNIFICANT CHANGE UP (ref 70–99)
HCT VFR BLD CALC: 37.4 % — LOW (ref 39–50)
HGB BLD-MCNC: 12.3 G/DL — LOW (ref 13–17)
MAGNESIUM SERPL-MCNC: 2 MG/DL — SIGNIFICANT CHANGE UP (ref 1.6–2.6)
MCHC RBC-ENTMCNC: 32.5 PG — SIGNIFICANT CHANGE UP (ref 27–34)
MCHC RBC-ENTMCNC: 32.9 GM/DL — SIGNIFICANT CHANGE UP (ref 32–36)
MCV RBC AUTO: 98.7 FL — SIGNIFICANT CHANGE UP (ref 80–100)
NRBC # BLD: 0 /100 WBCS — SIGNIFICANT CHANGE UP (ref 0–0)
PHOSPHATE SERPL-MCNC: 3.2 MG/DL — SIGNIFICANT CHANGE UP (ref 2.5–4.5)
PLATELET # BLD AUTO: 191 K/UL — SIGNIFICANT CHANGE UP (ref 150–400)
POTASSIUM SERPL-MCNC: 4.5 MMOL/L — SIGNIFICANT CHANGE UP (ref 3.5–5.3)
POTASSIUM SERPL-SCNC: 4.5 MMOL/L — SIGNIFICANT CHANGE UP (ref 3.5–5.3)
RBC # BLD: 3.79 M/UL — LOW (ref 4.2–5.8)
RBC # FLD: 13.2 % — SIGNIFICANT CHANGE UP (ref 10.3–14.5)
SODIUM SERPL-SCNC: 138 MMOL/L — SIGNIFICANT CHANGE UP (ref 135–145)
WBC # BLD: 3.56 K/UL — LOW (ref 3.8–10.5)
WBC # FLD AUTO: 3.56 K/UL — LOW (ref 3.8–10.5)

## 2024-06-27 RX ORDER — MAGNESIUM OXIDE 400 MG/1
1000 TABLET ORAL
Refills: 0 | Status: DISCONTINUED | OUTPATIENT
Start: 2024-06-27 | End: 2024-06-27

## 2024-06-27 RX ORDER — ACETAMINOPHEN 325 MG
1000 TABLET ORAL EVERY 6 HOURS
Refills: 0 | Status: DISCONTINUED | OUTPATIENT
Start: 2024-06-27 | End: 2024-07-03

## 2024-06-27 RX ADMIN — Medication 1000 MILLIGRAM(S): at 00:04

## 2024-06-27 RX ADMIN — Medication 400 MILLIGRAM(S): at 15:47

## 2024-06-27 RX ADMIN — Medication 1000 MILLIGRAM(S): at 05:38

## 2024-06-27 RX ADMIN — Medication 400 MILLIGRAM(S): at 18:40

## 2024-06-27 RX ADMIN — Medication 1000 MILLIGRAM(S): at 23:03

## 2024-06-27 RX ADMIN — Medication 1000 MILLIGRAM(S): at 22:33

## 2024-06-27 RX ADMIN — KETOROLAC TROMETHAMINE 30 MILLIGRAM(S): 30 INJECTION, SOLUTION INTRAMUSCULAR at 01:39

## 2024-06-27 RX ADMIN — Medication 400 MILLIGRAM(S): at 05:08

## 2024-06-27 RX ADMIN — OLANZAPINE 10 MILLIGRAM(S): 2.5 TABLET, FILM COATED ORAL at 22:33

## 2024-06-27 RX ADMIN — KETOROLAC TROMETHAMINE 30 MILLIGRAM(S): 30 INJECTION, SOLUTION INTRAMUSCULAR at 02:09

## 2024-06-27 RX ADMIN — KETOROLAC TROMETHAMINE 30 MILLIGRAM(S): 30 INJECTION, SOLUTION INTRAMUSCULAR at 08:50

## 2024-06-27 RX ADMIN — Medication 3 MILLIGRAM(S): at 23:14

## 2024-06-27 RX ADMIN — HEPARIN SODIUM 5000 UNIT(S): 50 INJECTION, SOLUTION INTRAVENOUS at 18:46

## 2024-06-27 RX ADMIN — Medication 500 MILLIGRAM(S): at 16:48

## 2024-06-27 RX ADMIN — KETOROLAC TROMETHAMINE 30 MILLIGRAM(S): 30 INJECTION, SOLUTION INTRAMUSCULAR at 16:54

## 2024-06-27 RX ADMIN — HEPARIN SODIUM 5000 UNIT(S): 50 INJECTION, SOLUTION INTRAVENOUS at 16:45

## 2024-06-28 LAB
ANION GAP SERPL CALC-SCNC: 10 MMOL/L — SIGNIFICANT CHANGE UP (ref 5–17)
BUN SERPL-MCNC: 13 MG/DL — SIGNIFICANT CHANGE UP (ref 7–23)
CALCIUM SERPL-MCNC: 8.3 MG/DL — LOW (ref 8.4–10.5)
CHLORIDE SERPL-SCNC: 109 MMOL/L — HIGH (ref 96–108)
CO2 SERPL-SCNC: 22 MMOL/L — SIGNIFICANT CHANGE UP (ref 22–31)
CREAT SERPL-MCNC: 1.57 MG/DL — HIGH (ref 0.5–1.3)
EGFR: 55 ML/MIN/1.73M2 — LOW
GLUCOSE SERPL-MCNC: 85 MG/DL — SIGNIFICANT CHANGE UP (ref 70–99)
HCT VFR BLD CALC: 32.6 % — LOW (ref 39–50)
HGB BLD-MCNC: 11 G/DL — LOW (ref 13–17)
MAGNESIUM SERPL-MCNC: 2.1 MG/DL — SIGNIFICANT CHANGE UP (ref 1.6–2.6)
MCHC RBC-ENTMCNC: 32.4 PG — SIGNIFICANT CHANGE UP (ref 27–34)
MCHC RBC-ENTMCNC: 33.7 GM/DL — SIGNIFICANT CHANGE UP (ref 32–36)
MCV RBC AUTO: 96.2 FL — SIGNIFICANT CHANGE UP (ref 80–100)
NRBC # BLD: 0 /100 WBCS — SIGNIFICANT CHANGE UP (ref 0–0)
PHOSPHATE SERPL-MCNC: 2.6 MG/DL — SIGNIFICANT CHANGE UP (ref 2.5–4.5)
PLATELET # BLD AUTO: 178 K/UL — SIGNIFICANT CHANGE UP (ref 150–400)
POTASSIUM SERPL-MCNC: 4 MMOL/L — SIGNIFICANT CHANGE UP (ref 3.5–5.3)
POTASSIUM SERPL-SCNC: 4 MMOL/L — SIGNIFICANT CHANGE UP (ref 3.5–5.3)
RBC # BLD: 3.39 M/UL — LOW (ref 4.2–5.8)
RBC # FLD: 13.4 % — SIGNIFICANT CHANGE UP (ref 10.3–14.5)
SODIUM SERPL-SCNC: 141 MMOL/L — SIGNIFICANT CHANGE UP (ref 135–145)
WBC # BLD: 2.61 K/UL — LOW (ref 3.8–10.5)
WBC # FLD AUTO: 2.61 K/UL — LOW (ref 3.8–10.5)

## 2024-06-28 RX ADMIN — Medication 400 MILLIGRAM(S): at 08:58

## 2024-06-28 RX ADMIN — OLANZAPINE 10 MILLIGRAM(S): 2.5 TABLET, FILM COATED ORAL at 21:06

## 2024-06-28 RX ADMIN — HEPARIN SODIUM 5000 UNIT(S): 50 INJECTION, SOLUTION INTRAVENOUS at 08:57

## 2024-06-28 RX ADMIN — Medication 3 MILLIGRAM(S): at 21:07

## 2024-06-28 RX ADMIN — Medication 400 MILLIGRAM(S): at 21:45

## 2024-06-28 RX ADMIN — Medication 500 MILLIGRAM(S): at 11:39

## 2024-06-28 RX ADMIN — Medication 400 MILLIGRAM(S): at 18:58

## 2024-06-28 RX ADMIN — Medication 1000 MILLIGRAM(S): at 06:01

## 2024-06-28 RX ADMIN — Medication 1000 MILLIGRAM(S): at 23:37

## 2024-06-28 RX ADMIN — Medication 1000 MILLIGRAM(S): at 23:49

## 2024-06-28 RX ADMIN — Medication 1000 MILLIGRAM(S): at 11:39

## 2024-06-28 RX ADMIN — Medication 1000 MILLIGRAM(S): at 17:07

## 2024-06-28 RX ADMIN — HEPARIN SODIUM 5000 UNIT(S): 50 INJECTION, SOLUTION INTRAVENOUS at 17:06

## 2024-06-28 RX ADMIN — Medication 400 MILLIGRAM(S): at 21:07

## 2024-06-28 RX ADMIN — HEPARIN SODIUM 5000 UNIT(S): 50 INJECTION, SOLUTION INTRAVENOUS at 00:08

## 2024-06-28 RX ADMIN — Medication 1000 MILLIGRAM(S): at 06:31

## 2024-06-29 LAB
ANION GAP SERPL CALC-SCNC: 15 MMOL/L — SIGNIFICANT CHANGE UP (ref 5–17)
BUN SERPL-MCNC: 12 MG/DL — SIGNIFICANT CHANGE UP (ref 7–23)
CALCIUM SERPL-MCNC: 9.3 MG/DL — SIGNIFICANT CHANGE UP (ref 8.4–10.5)
CHLORIDE SERPL-SCNC: 103 MMOL/L — SIGNIFICANT CHANGE UP (ref 96–108)
CO2 SERPL-SCNC: 21 MMOL/L — LOW (ref 22–31)
CREAT SERPL-MCNC: 1.59 MG/DL — HIGH (ref 0.5–1.3)
EGFR: 54 ML/MIN/1.73M2 — LOW
GLUCOSE SERPL-MCNC: 99 MG/DL — SIGNIFICANT CHANGE UP (ref 70–99)
HCT VFR BLD CALC: 42.2 % — SIGNIFICANT CHANGE UP (ref 39–50)
HGB BLD-MCNC: 13.7 G/DL — SIGNIFICANT CHANGE UP (ref 13–17)
MAGNESIUM SERPL-MCNC: 2.2 MG/DL — SIGNIFICANT CHANGE UP (ref 1.6–2.6)
MCHC RBC-ENTMCNC: 31.9 PG — SIGNIFICANT CHANGE UP (ref 27–34)
MCHC RBC-ENTMCNC: 32.5 GM/DL — SIGNIFICANT CHANGE UP (ref 32–36)
MCV RBC AUTO: 98.1 FL — SIGNIFICANT CHANGE UP (ref 80–100)
NRBC # BLD: 0 /100 WBCS — SIGNIFICANT CHANGE UP (ref 0–0)
PHOSPHATE SERPL-MCNC: 3.5 MG/DL — SIGNIFICANT CHANGE UP (ref 2.5–4.5)
PLATELET # BLD AUTO: 251 K/UL — SIGNIFICANT CHANGE UP (ref 150–400)
POTASSIUM SERPL-MCNC: 4.4 MMOL/L — SIGNIFICANT CHANGE UP (ref 3.5–5.3)
POTASSIUM SERPL-SCNC: 4.4 MMOL/L — SIGNIFICANT CHANGE UP (ref 3.5–5.3)
RBC # BLD: 4.3 M/UL — SIGNIFICANT CHANGE UP (ref 4.2–5.8)
RBC # FLD: 13.4 % — SIGNIFICANT CHANGE UP (ref 10.3–14.5)
SODIUM SERPL-SCNC: 139 MMOL/L — SIGNIFICANT CHANGE UP (ref 135–145)
WBC # BLD: 4.39 K/UL — SIGNIFICANT CHANGE UP (ref 3.8–10.5)
WBC # FLD AUTO: 4.39 K/UL — SIGNIFICANT CHANGE UP (ref 3.8–10.5)

## 2024-06-29 PROCEDURE — 93010 ELECTROCARDIOGRAM REPORT: CPT

## 2024-06-29 RX ADMIN — Medication 400 MILLIGRAM(S): at 02:00

## 2024-06-29 RX ADMIN — OXYCODONE HYDROCHLORIDE 5 MILLIGRAM(S): 100 SOLUTION ORAL at 23:42

## 2024-06-29 RX ADMIN — Medication 400 MILLIGRAM(S): at 22:37

## 2024-06-29 RX ADMIN — Medication 1000 MILLIGRAM(S): at 13:36

## 2024-06-29 RX ADMIN — Medication 1000 MILLIGRAM(S): at 18:03

## 2024-06-29 RX ADMIN — Medication 400 MILLIGRAM(S): at 22:22

## 2024-06-29 RX ADMIN — HEPARIN SODIUM 5000 UNIT(S): 50 INJECTION, SOLUTION INTRAVENOUS at 09:02

## 2024-06-29 RX ADMIN — OXYCODONE HYDROCHLORIDE 5 MILLIGRAM(S): 100 SOLUTION ORAL at 22:42

## 2024-06-29 RX ADMIN — HEPARIN SODIUM 5000 UNIT(S): 50 INJECTION, SOLUTION INTRAVENOUS at 18:03

## 2024-06-29 RX ADMIN — Medication 3 MILLIGRAM(S): at 22:23

## 2024-06-29 RX ADMIN — Medication 400 MILLIGRAM(S): at 01:42

## 2024-06-29 RX ADMIN — Medication 400 MILLIGRAM(S): at 09:31

## 2024-06-29 RX ADMIN — Medication 500 MILLIGRAM(S): at 13:07

## 2024-06-29 RX ADMIN — Medication 1000 MILLIGRAM(S): at 13:06

## 2024-06-29 RX ADMIN — Medication 400 MILLIGRAM(S): at 09:01

## 2024-06-29 RX ADMIN — Medication 1000 MILLIGRAM(S): at 18:33

## 2024-06-29 RX ADMIN — OLANZAPINE 10 MILLIGRAM(S): 2.5 TABLET, FILM COATED ORAL at 22:22

## 2024-06-29 RX ADMIN — OXYCODONE HYDROCHLORIDE 10 MILLIGRAM(S): 100 SOLUTION ORAL at 09:01

## 2024-06-29 RX ADMIN — HEPARIN SODIUM 5000 UNIT(S): 50 INJECTION, SOLUTION INTRAVENOUS at 01:43

## 2024-06-29 RX ADMIN — Medication 1000 MILLIGRAM(S): at 05:30

## 2024-06-29 RX ADMIN — Medication 1000 MILLIGRAM(S): at 05:38

## 2024-06-30 LAB
ANION GAP SERPL CALC-SCNC: 12 MMOL/L — SIGNIFICANT CHANGE UP (ref 5–17)
APPEARANCE UR: CLEAR — SIGNIFICANT CHANGE UP
BACTERIA # UR AUTO: NEGATIVE /HPF — SIGNIFICANT CHANGE UP
BILIRUB UR-MCNC: NEGATIVE — SIGNIFICANT CHANGE UP
BUN SERPL-MCNC: 15 MG/DL — SIGNIFICANT CHANGE UP (ref 7–23)
CALCIUM SERPL-MCNC: 8.7 MG/DL — SIGNIFICANT CHANGE UP (ref 8.4–10.5)
CAST: 0 /LPF — SIGNIFICANT CHANGE UP (ref 0–4)
CHLORIDE SERPL-SCNC: 105 MMOL/L — SIGNIFICANT CHANGE UP (ref 96–108)
CO2 SERPL-SCNC: 21 MMOL/L — LOW (ref 22–31)
COLOR SPEC: YELLOW — SIGNIFICANT CHANGE UP
CREAT ?TM UR-MCNC: 169 MG/DL — SIGNIFICANT CHANGE UP
CREAT SERPL-MCNC: 1.72 MG/DL — HIGH (ref 0.5–1.3)
DIFF PNL FLD: ABNORMAL
EGFR: 49 ML/MIN/1.73M2 — LOW
GLUCOSE SERPL-MCNC: 87 MG/DL — SIGNIFICANT CHANGE UP (ref 70–99)
GLUCOSE UR QL: NEGATIVE MG/DL — SIGNIFICANT CHANGE UP
HCT VFR BLD CALC: 39 % — SIGNIFICANT CHANGE UP (ref 39–50)
HGB BLD-MCNC: 12.6 G/DL — LOW (ref 13–17)
KETONES UR-MCNC: ABNORMAL MG/DL
LEUKOCYTE ESTERASE UR-ACNC: ABNORMAL
MAGNESIUM SERPL-MCNC: 2 MG/DL — SIGNIFICANT CHANGE UP (ref 1.6–2.6)
MCHC RBC-ENTMCNC: 32.1 PG — SIGNIFICANT CHANGE UP (ref 27–34)
MCHC RBC-ENTMCNC: 32.3 GM/DL — SIGNIFICANT CHANGE UP (ref 32–36)
MCV RBC AUTO: 99.2 FL — SIGNIFICANT CHANGE UP (ref 80–100)
NITRITE UR-MCNC: NEGATIVE — SIGNIFICANT CHANGE UP
NRBC # BLD: 0 /100 WBCS — SIGNIFICANT CHANGE UP (ref 0–0)
OSMOLALITY UR: 418 MOS/KG — SIGNIFICANT CHANGE UP (ref 300–900)
PH UR: 6 — SIGNIFICANT CHANGE UP (ref 5–8)
PHOSPHATE SERPL-MCNC: 3.2 MG/DL — SIGNIFICANT CHANGE UP (ref 2.5–4.5)
PLATELET # BLD AUTO: 223 K/UL — SIGNIFICANT CHANGE UP (ref 150–400)
POTASSIUM SERPL-MCNC: 4.7 MMOL/L — SIGNIFICANT CHANGE UP (ref 3.5–5.3)
POTASSIUM SERPL-SCNC: 4.7 MMOL/L — SIGNIFICANT CHANGE UP (ref 3.5–5.3)
POTASSIUM UR-SCNC: 30 MMOL/L — SIGNIFICANT CHANGE UP
PROT ?TM UR-MCNC: 23 MG/DL — HIGH (ref 0–12)
PROT UR-MCNC: SIGNIFICANT CHANGE UP MG/DL
PROT/CREAT UR-RTO: 0.1 RATIO — SIGNIFICANT CHANGE UP (ref 0–0.2)
RBC # BLD: 3.93 M/UL — LOW (ref 4.2–5.8)
RBC # FLD: 13.3 % — SIGNIFICANT CHANGE UP (ref 10.3–14.5)
RBC CASTS # UR COMP ASSIST: 42 /HPF — HIGH (ref 0–4)
SODIUM SERPL-SCNC: 138 MMOL/L — SIGNIFICANT CHANGE UP (ref 135–145)
SODIUM UR-SCNC: 16 MMOL/L — SIGNIFICANT CHANGE UP
SP GR SPEC: 1.02 — SIGNIFICANT CHANGE UP (ref 1–1.03)
SQUAMOUS # UR AUTO: 3 /HPF — SIGNIFICANT CHANGE UP (ref 0–5)
UROBILINOGEN FLD QL: 0.2 MG/DL — SIGNIFICANT CHANGE UP (ref 0.2–1)
WBC # BLD: 4.25 K/UL — SIGNIFICANT CHANGE UP (ref 3.8–10.5)
WBC # FLD AUTO: 4.25 K/UL — SIGNIFICANT CHANGE UP (ref 3.8–10.5)
WBC UR QL: 4 /HPF — SIGNIFICANT CHANGE UP (ref 0–5)

## 2024-06-30 RX ORDER — DEXTROSE MONOHYDRATE, SODIUM CHLORIDE, AND POTASSIUM CHLORIDE 50; 4.5; 2.24 G/1000ML; G/1000ML; G/1000ML
1000 INJECTION, SOLUTION INTRAVENOUS
Refills: 0 | Status: DISCONTINUED | OUTPATIENT
Start: 2024-06-30 | End: 2024-07-03

## 2024-06-30 RX ADMIN — Medication 1000 MILLIGRAM(S): at 17:34

## 2024-06-30 RX ADMIN — Medication 1000 MILLIGRAM(S): at 00:39

## 2024-06-30 RX ADMIN — Medication 400 MILLIGRAM(S): at 14:00

## 2024-06-30 RX ADMIN — Medication 3 MILLIGRAM(S): at 21:30

## 2024-06-30 RX ADMIN — Medication 400 MILLIGRAM(S): at 05:13

## 2024-06-30 RX ADMIN — HEPARIN SODIUM 5000 UNIT(S): 50 INJECTION, SOLUTION INTRAVENOUS at 09:18

## 2024-06-30 RX ADMIN — Medication 1000 MILLIGRAM(S): at 00:41

## 2024-06-30 RX ADMIN — DEXTROSE MONOHYDRATE, SODIUM CHLORIDE, AND POTASSIUM CHLORIDE 110 MILLILITER(S): 50; 4.5; 2.24 INJECTION, SOLUTION INTRAVENOUS at 14:04

## 2024-06-30 RX ADMIN — HEPARIN SODIUM 5000 UNIT(S): 50 INJECTION, SOLUTION INTRAVENOUS at 23:08

## 2024-06-30 RX ADMIN — Medication 1000 MILLIGRAM(S): at 13:08

## 2024-06-30 RX ADMIN — Medication 500 MILLIGRAM(S): at 13:09

## 2024-06-30 RX ADMIN — Medication 1000 MILLIGRAM(S): at 18:27

## 2024-06-30 RX ADMIN — OLANZAPINE 10 MILLIGRAM(S): 2.5 TABLET, FILM COATED ORAL at 21:31

## 2024-06-30 RX ADMIN — Medication 400 MILLIGRAM(S): at 09:15

## 2024-06-30 RX ADMIN — Medication 400 MILLIGRAM(S): at 20:35

## 2024-06-30 RX ADMIN — HEPARIN SODIUM 5000 UNIT(S): 50 INJECTION, SOLUTION INTRAVENOUS at 05:13

## 2024-06-30 RX ADMIN — HEPARIN SODIUM 5000 UNIT(S): 50 INJECTION, SOLUTION INTRAVENOUS at 17:34

## 2024-06-30 RX ADMIN — Medication 1000 MILLIGRAM(S): at 13:00

## 2024-06-30 RX ADMIN — Medication 400 MILLIGRAM(S): at 21:05

## 2024-06-30 RX ADMIN — Medication 400 MILLIGRAM(S): at 14:04

## 2024-06-30 RX ADMIN — Medication 400 MILLIGRAM(S): at 09:00

## 2024-07-01 LAB
ANION GAP SERPL CALC-SCNC: 13 MMOL/L — SIGNIFICANT CHANGE UP (ref 5–17)
BUN SERPL-MCNC: 10 MG/DL — SIGNIFICANT CHANGE UP (ref 7–23)
CALCIUM SERPL-MCNC: 8.9 MG/DL — SIGNIFICANT CHANGE UP (ref 8.4–10.5)
CHLORIDE SERPL-SCNC: 108 MMOL/L — SIGNIFICANT CHANGE UP (ref 96–108)
CO2 SERPL-SCNC: 19 MMOL/L — LOW (ref 22–31)
CREAT SERPL-MCNC: 1.65 MG/DL — HIGH (ref 0.5–1.3)
EGFR: 52 ML/MIN/1.73M2 — LOW
GLUCOSE SERPL-MCNC: 105 MG/DL — HIGH (ref 70–99)
HCT VFR BLD CALC: 39.5 % — SIGNIFICANT CHANGE UP (ref 39–50)
HGB BLD-MCNC: 12.8 G/DL — LOW (ref 13–17)
MAGNESIUM SERPL-MCNC: 1.9 MG/DL — SIGNIFICANT CHANGE UP (ref 1.6–2.6)
MCHC RBC-ENTMCNC: 32.1 PG — SIGNIFICANT CHANGE UP (ref 27–34)
MCHC RBC-ENTMCNC: 32.4 GM/DL — SIGNIFICANT CHANGE UP (ref 32–36)
MCV RBC AUTO: 99 FL — SIGNIFICANT CHANGE UP (ref 80–100)
NRBC # BLD: 0 /100 WBCS — SIGNIFICANT CHANGE UP (ref 0–0)
PHOSPHATE SERPL-MCNC: 2.6 MG/DL — SIGNIFICANT CHANGE UP (ref 2.5–4.5)
PLATELET # BLD AUTO: 253 K/UL — SIGNIFICANT CHANGE UP (ref 150–400)
POTASSIUM SERPL-MCNC: 4.6 MMOL/L — SIGNIFICANT CHANGE UP (ref 3.5–5.3)
POTASSIUM SERPL-SCNC: 4.6 MMOL/L — SIGNIFICANT CHANGE UP (ref 3.5–5.3)
RBC # BLD: 3.99 M/UL — LOW (ref 4.2–5.8)
RBC # FLD: 13.3 % — SIGNIFICANT CHANGE UP (ref 10.3–14.5)
SODIUM SERPL-SCNC: 140 MMOL/L — SIGNIFICANT CHANGE UP (ref 135–145)
UUN UR-MCNC: 542 MG/DL — SIGNIFICANT CHANGE UP
WBC # BLD: 4.2 K/UL — SIGNIFICANT CHANGE UP (ref 3.8–10.5)
WBC # FLD AUTO: 4.2 K/UL — SIGNIFICANT CHANGE UP (ref 3.8–10.5)

## 2024-07-01 RX ORDER — LOPERAMIDE HCL 2 MG
2 CAPSULE ORAL
Refills: 0 | Status: DISCONTINUED | OUTPATIENT
Start: 2024-07-01 | End: 2024-07-02

## 2024-07-01 RX ORDER — POTASSIUM PHOSPHATE, MONOBASIC POTASSIUM PHOSPHATE, DIBASIC INJECTION, 236; 224 MG/ML; MG/ML
15 SOLUTION, CONCENTRATE INTRAVENOUS ONCE
Refills: 0 | Status: COMPLETED | OUTPATIENT
Start: 2024-07-01 | End: 2024-07-01

## 2024-07-01 RX ADMIN — HEPARIN SODIUM 5000 UNIT(S): 50 INJECTION, SOLUTION INTRAVENOUS at 18:37

## 2024-07-01 RX ADMIN — Medication 500 MILLIGRAM(S): at 11:04

## 2024-07-01 RX ADMIN — Medication 3 MILLIGRAM(S): at 21:26

## 2024-07-01 RX ADMIN — HEPARIN SODIUM 5000 UNIT(S): 50 INJECTION, SOLUTION INTRAVENOUS at 11:03

## 2024-07-01 RX ADMIN — Medication 1000 MILLIGRAM(S): at 14:08

## 2024-07-01 RX ADMIN — Medication 2 MILLIGRAM(S): at 10:04

## 2024-07-01 RX ADMIN — OLANZAPINE 10 MILLIGRAM(S): 2.5 TABLET, FILM COATED ORAL at 21:25

## 2024-07-01 RX ADMIN — Medication 1000 MILLIGRAM(S): at 21:26

## 2024-07-01 RX ADMIN — Medication 400 MILLIGRAM(S): at 18:36

## 2024-07-01 RX ADMIN — POTASSIUM PHOSPHATE, MONOBASIC POTASSIUM PHOSPHATE, DIBASIC INJECTION, 62.5 MILLIMOLE(S): 236; 224 SOLUTION, CONCENTRATE INTRAVENOUS at 14:09

## 2024-07-01 RX ADMIN — Medication 2 MILLIGRAM(S): at 14:08

## 2024-07-01 RX ADMIN — Medication 1000 MILLIGRAM(S): at 21:56

## 2024-07-01 RX ADMIN — Medication 2 MILLIGRAM(S): at 18:37

## 2024-07-01 RX ADMIN — Medication 400 MILLIGRAM(S): at 11:04

## 2024-07-01 RX ADMIN — Medication 2 MILLIGRAM(S): at 23:23

## 2024-07-01 RX ADMIN — Medication 1000 MILLIGRAM(S): at 05:12

## 2024-07-01 RX ADMIN — Medication 400 MILLIGRAM(S): at 11:40

## 2024-07-01 RX ADMIN — Medication 1000 MILLIGRAM(S): at 05:42

## 2024-07-02 ENCOUNTER — NON-APPOINTMENT (OUTPATIENT)
Age: 49
End: 2024-07-02

## 2024-07-02 LAB
ANION GAP SERPL CALC-SCNC: 11 MMOL/L — SIGNIFICANT CHANGE UP (ref 5–17)
BUN SERPL-MCNC: 9 MG/DL — SIGNIFICANT CHANGE UP (ref 7–23)
CALCIUM SERPL-MCNC: 8.7 MG/DL — SIGNIFICANT CHANGE UP (ref 8.4–10.5)
CHLORIDE SERPL-SCNC: 109 MMOL/L — HIGH (ref 96–108)
CO2 SERPL-SCNC: 21 MMOL/L — LOW (ref 22–31)
CREAT SERPL-MCNC: 1.56 MG/DL — HIGH (ref 0.5–1.3)
EGFR: 55 ML/MIN/1.73M2 — LOW
GLUCOSE SERPL-MCNC: 87 MG/DL — SIGNIFICANT CHANGE UP (ref 70–99)
HCT VFR BLD CALC: 36.2 % — LOW (ref 39–50)
HGB BLD-MCNC: 12.2 G/DL — LOW (ref 13–17)
MAGNESIUM SERPL-MCNC: 1.8 MG/DL — SIGNIFICANT CHANGE UP (ref 1.6–2.6)
MCHC RBC-ENTMCNC: 33.2 PG — SIGNIFICANT CHANGE UP (ref 27–34)
MCHC RBC-ENTMCNC: 33.7 GM/DL — SIGNIFICANT CHANGE UP (ref 32–36)
MCV RBC AUTO: 98.4 FL — SIGNIFICANT CHANGE UP (ref 80–100)
NRBC # BLD: 0 /100 WBCS — SIGNIFICANT CHANGE UP (ref 0–0)
PHOSPHATE SERPL-MCNC: 3.5 MG/DL — SIGNIFICANT CHANGE UP (ref 2.5–4.5)
PLATELET # BLD AUTO: 211 K/UL — SIGNIFICANT CHANGE UP (ref 150–400)
POTASSIUM SERPL-MCNC: 4.5 MMOL/L — SIGNIFICANT CHANGE UP (ref 3.5–5.3)
POTASSIUM SERPL-SCNC: 4.5 MMOL/L — SIGNIFICANT CHANGE UP (ref 3.5–5.3)
RBC # BLD: 3.68 M/UL — LOW (ref 4.2–5.8)
RBC # FLD: 13.5 % — SIGNIFICANT CHANGE UP (ref 10.3–14.5)
SODIUM SERPL-SCNC: 141 MMOL/L — SIGNIFICANT CHANGE UP (ref 135–145)
SURGICAL PATHOLOGY STUDY: SIGNIFICANT CHANGE UP
WBC # BLD: 3.91 K/UL — SIGNIFICANT CHANGE UP (ref 3.8–10.5)
WBC # FLD AUTO: 3.91 K/UL — SIGNIFICANT CHANGE UP (ref 3.8–10.5)

## 2024-07-02 RX ORDER — MAGNESIUM SULFATE 100 %
1 POWDER (GRAM) MISCELLANEOUS ONCE
Refills: 0 | Status: COMPLETED | OUTPATIENT
Start: 2024-07-02 | End: 2024-07-02

## 2024-07-02 RX ORDER — LOPERAMIDE HCL 2 MG
4 CAPSULE ORAL
Refills: 0 | Status: DISCONTINUED | OUTPATIENT
Start: 2024-07-02 | End: 2024-07-03

## 2024-07-02 RX ADMIN — OLANZAPINE 10 MILLIGRAM(S): 2.5 TABLET, FILM COATED ORAL at 23:04

## 2024-07-02 RX ADMIN — Medication 400 MILLIGRAM(S): at 13:37

## 2024-07-02 RX ADMIN — Medication 1000 MILLIGRAM(S): at 21:25

## 2024-07-02 RX ADMIN — HEPARIN SODIUM 5000 UNIT(S): 50 INJECTION, SOLUTION INTRAVENOUS at 09:14

## 2024-07-02 RX ADMIN — Medication 4 MILLIGRAM(S): at 09:15

## 2024-07-02 RX ADMIN — Medication 2 MILLIGRAM(S): at 05:13

## 2024-07-02 RX ADMIN — Medication 500 MILLIGRAM(S): at 13:36

## 2024-07-02 RX ADMIN — Medication 4 MILLIGRAM(S): at 13:36

## 2024-07-02 RX ADMIN — Medication 3 MILLIGRAM(S): at 23:04

## 2024-07-02 RX ADMIN — Medication 400 MILLIGRAM(S): at 23:04

## 2024-07-02 RX ADMIN — HEPARIN SODIUM 5000 UNIT(S): 50 INJECTION, SOLUTION INTRAVENOUS at 18:37

## 2024-07-02 RX ADMIN — Medication 4 MILLIGRAM(S): at 23:04

## 2024-07-02 RX ADMIN — Medication 400 MILLIGRAM(S): at 18:35

## 2024-07-02 RX ADMIN — Medication 1000 MILLIGRAM(S): at 20:25

## 2024-07-02 RX ADMIN — Medication 1000 MILLIGRAM(S): at 09:12

## 2024-07-02 RX ADMIN — Medication 100 GRAM(S): at 09:13

## 2024-07-02 RX ADMIN — Medication 400 MILLIGRAM(S): at 05:12

## 2024-07-02 RX ADMIN — DEXTROSE MONOHYDRATE, SODIUM CHLORIDE, AND POTASSIUM CHLORIDE 110 MILLILITER(S): 50; 4.5; 2.24 INJECTION, SOLUTION INTRAVENOUS at 23:07

## 2024-07-02 RX ADMIN — Medication 4 MILLIGRAM(S): at 18:34

## 2024-07-03 ENCOUNTER — TRANSCRIPTION ENCOUNTER (OUTPATIENT)
Age: 49
End: 2024-07-03

## 2024-07-03 VITALS
RESPIRATION RATE: 18 BRPM | HEART RATE: 82 BPM | OXYGEN SATURATION: 95 % | DIASTOLIC BLOOD PRESSURE: 70 MMHG | SYSTOLIC BLOOD PRESSURE: 111 MMHG | TEMPERATURE: 99 F

## 2024-07-03 LAB
ANION GAP SERPL CALC-SCNC: 13 MMOL/L — SIGNIFICANT CHANGE UP (ref 5–17)
BUN SERPL-MCNC: 11 MG/DL — SIGNIFICANT CHANGE UP (ref 7–23)
CALCIUM SERPL-MCNC: 8.4 MG/DL — SIGNIFICANT CHANGE UP (ref 8.4–10.5)
CHLORIDE SERPL-SCNC: 108 MMOL/L — SIGNIFICANT CHANGE UP (ref 96–108)
CO2 SERPL-SCNC: 19 MMOL/L — LOW (ref 22–31)
CREAT SERPL-MCNC: 1.3 MG/DL — SIGNIFICANT CHANGE UP (ref 0.5–1.3)
EGFR: 69 ML/MIN/1.73M2 — SIGNIFICANT CHANGE UP
GLUCOSE SERPL-MCNC: 83 MG/DL — SIGNIFICANT CHANGE UP (ref 70–99)
HCT VFR BLD CALC: 35.8 % — LOW (ref 39–50)
HGB BLD-MCNC: 11.8 G/DL — LOW (ref 13–17)
MAGNESIUM SERPL-MCNC: 1.8 MG/DL — SIGNIFICANT CHANGE UP (ref 1.6–2.6)
MCHC RBC-ENTMCNC: 32.7 PG — SIGNIFICANT CHANGE UP (ref 27–34)
MCHC RBC-ENTMCNC: 33 GM/DL — SIGNIFICANT CHANGE UP (ref 32–36)
MCV RBC AUTO: 99.2 FL — SIGNIFICANT CHANGE UP (ref 80–100)
MRSA PCR RESULT.: SIGNIFICANT CHANGE UP
NRBC # BLD: 0 /100 WBCS — SIGNIFICANT CHANGE UP (ref 0–0)
PHOSPHATE SERPL-MCNC: 3.7 MG/DL — SIGNIFICANT CHANGE UP (ref 2.5–4.5)
PLATELET # BLD AUTO: 229 K/UL — SIGNIFICANT CHANGE UP (ref 150–400)
POTASSIUM SERPL-MCNC: 4.5 MMOL/L — SIGNIFICANT CHANGE UP (ref 3.5–5.3)
POTASSIUM SERPL-SCNC: 4.5 MMOL/L — SIGNIFICANT CHANGE UP (ref 3.5–5.3)
RBC # BLD: 3.61 M/UL — LOW (ref 4.2–5.8)
RBC # FLD: 13.5 % — SIGNIFICANT CHANGE UP (ref 10.3–14.5)
S AUREUS DNA NOSE QL NAA+PROBE: SIGNIFICANT CHANGE UP
SODIUM SERPL-SCNC: 140 MMOL/L — SIGNIFICANT CHANGE UP (ref 135–145)
WBC # BLD: 4.49 K/UL — SIGNIFICANT CHANGE UP (ref 3.8–10.5)
WBC # FLD AUTO: 4.49 K/UL — SIGNIFICANT CHANGE UP (ref 3.8–10.5)

## 2024-07-03 PROCEDURE — 87640 STAPH A DNA AMP PROBE: CPT

## 2024-07-03 PROCEDURE — 88304 TISSUE EXAM BY PATHOLOGIST: CPT

## 2024-07-03 PROCEDURE — 86901 BLOOD TYPING SEROLOGIC RH(D): CPT

## 2024-07-03 PROCEDURE — 84300 ASSAY OF URINE SODIUM: CPT

## 2024-07-03 PROCEDURE — 83935 ASSAY OF URINE OSMOLALITY: CPT

## 2024-07-03 PROCEDURE — C1889: CPT

## 2024-07-03 PROCEDURE — 82570 ASSAY OF URINE CREATININE: CPT

## 2024-07-03 PROCEDURE — 88309 TISSUE EXAM BY PATHOLOGIST: CPT

## 2024-07-03 PROCEDURE — 84100 ASSAY OF PHOSPHORUS: CPT

## 2024-07-03 PROCEDURE — 84156 ASSAY OF PROTEIN URINE: CPT

## 2024-07-03 PROCEDURE — S2900: CPT

## 2024-07-03 PROCEDURE — 80307 DRUG TEST PRSMV CHEM ANLYZR: CPT

## 2024-07-03 PROCEDURE — 93005 ELECTROCARDIOGRAM TRACING: CPT

## 2024-07-03 PROCEDURE — C9399: CPT

## 2024-07-03 PROCEDURE — 82962 GLUCOSE BLOOD TEST: CPT

## 2024-07-03 PROCEDURE — 88341 IMHCHEM/IMCYTCHM EA ADD ANTB: CPT

## 2024-07-03 PROCEDURE — 83735 ASSAY OF MAGNESIUM: CPT

## 2024-07-03 PROCEDURE — 86850 RBC ANTIBODY SCREEN: CPT

## 2024-07-03 PROCEDURE — 85027 COMPLETE CBC AUTOMATED: CPT

## 2024-07-03 PROCEDURE — 88342 IMHCHEM/IMCYTCHM 1ST ANTB: CPT

## 2024-07-03 PROCEDURE — C1758: CPT

## 2024-07-03 PROCEDURE — 86900 BLOOD TYPING SEROLOGIC ABO: CPT

## 2024-07-03 PROCEDURE — 36415 COLL VENOUS BLD VENIPUNCTURE: CPT

## 2024-07-03 PROCEDURE — 84540 ASSAY OF URINE/UREA-N: CPT

## 2024-07-03 PROCEDURE — 84133 ASSAY OF URINE POTASSIUM: CPT

## 2024-07-03 PROCEDURE — 87641 MR-STAPH DNA AMP PROBE: CPT

## 2024-07-03 PROCEDURE — 80048 BASIC METABOLIC PNL TOTAL CA: CPT

## 2024-07-03 PROCEDURE — 81001 URINALYSIS AUTO W/SCOPE: CPT

## 2024-07-03 RX ORDER — LOPERAMIDE HCL 2 MG
2 CAPSULE ORAL
Qty: 224 | Refills: 0
Start: 2024-07-03 | End: 2024-07-30

## 2024-07-03 RX ORDER — LOPERAMIDE HCL 2 MG
2 CAPSULE ORAL
Qty: 240 | Refills: 0
Start: 2024-07-03 | End: 2024-08-01

## 2024-07-03 RX ORDER — MAGNESIUM SULFATE 100 %
1 POWDER (GRAM) MISCELLANEOUS ONCE
Refills: 0 | Status: COMPLETED | OUTPATIENT
Start: 2024-07-03 | End: 2024-07-03

## 2024-07-03 RX ORDER — MUPIROCIN 20 MG/G
1 OINTMENT TOPICAL
Refills: 0 | Status: DISCONTINUED | OUTPATIENT
Start: 2024-07-03 | End: 2024-07-03

## 2024-07-03 RX ORDER — LOPERAMIDE HCL 2 MG
2 CAPSULE ORAL
Qty: 0 | Refills: 0 | DISCHARGE
Start: 2024-07-03

## 2024-07-03 RX ORDER — APIXABAN 5 MG/1
1 TABLET, FILM COATED ORAL
Qty: 56 | Refills: 0
Start: 2024-07-03 | End: 2024-07-30

## 2024-07-03 RX ORDER — APIXABAN 5 MG/1
1 TABLET, FILM COATED ORAL
Qty: 60 | Refills: 0
Start: 2024-07-03 | End: 2024-08-01

## 2024-07-03 RX ADMIN — Medication 1000 MILLIGRAM(S): at 08:13

## 2024-07-03 RX ADMIN — Medication 400 MILLIGRAM(S): at 05:27

## 2024-07-03 RX ADMIN — Medication 1000 MILLIGRAM(S): at 07:43

## 2024-07-03 RX ADMIN — Medication 400 MILLIGRAM(S): at 10:22

## 2024-07-03 RX ADMIN — HEPARIN SODIUM 5000 UNIT(S): 50 INJECTION, SOLUTION INTRAVENOUS at 10:22

## 2024-07-03 RX ADMIN — Medication 400 MILLIGRAM(S): at 10:52

## 2024-07-03 RX ADMIN — Medication 400 MILLIGRAM(S): at 06:27

## 2024-07-03 RX ADMIN — Medication 1000 MILLIGRAM(S): at 13:17

## 2024-07-03 RX ADMIN — Medication 100 GRAM(S): at 10:22

## 2024-07-03 RX ADMIN — Medication 500 MILLIGRAM(S): at 11:20

## 2024-07-03 RX ADMIN — Medication 400 MILLIGRAM(S): at 00:04

## 2024-07-03 RX ADMIN — Medication 1000 MILLIGRAM(S): at 12:47

## 2024-07-03 RX ADMIN — Medication 4 MILLIGRAM(S): at 05:27

## 2024-07-03 RX ADMIN — Medication 4 MILLIGRAM(S): at 11:20

## 2024-07-10 ENCOUNTER — APPOINTMENT (OUTPATIENT)
Dept: COLORECTAL SURGERY | Facility: CLINIC | Age: 49
End: 2024-07-10
Payer: MEDICAID

## 2024-07-10 PROCEDURE — 99024 POSTOP FOLLOW-UP VISIT: CPT

## 2024-07-15 ENCOUNTER — APPOINTMENT (OUTPATIENT)
Dept: FAMILY MEDICINE | Facility: CLINIC | Age: 49
End: 2024-07-15
Payer: MEDICAID

## 2024-07-15 VITALS
SYSTOLIC BLOOD PRESSURE: 111 MMHG | RESPIRATION RATE: 16 BRPM | HEART RATE: 102 BPM | DIASTOLIC BLOOD PRESSURE: 78 MMHG | BODY MASS INDEX: 22.16 KG/M2 | OXYGEN SATURATION: 97 % | TEMPERATURE: 97.4 F | HEIGHT: 65 IN | WEIGHT: 133 LBS

## 2024-07-15 DIAGNOSIS — C20 MALIGNANT NEOPLASM OF RECTUM: ICD-10-CM

## 2024-07-15 PROCEDURE — G2211 COMPLEX E/M VISIT ADD ON: CPT | Mod: NC

## 2024-07-15 PROCEDURE — 99213 OFFICE O/P EST LOW 20 MIN: CPT

## 2024-07-17 ENCOUNTER — TRANSCRIPTION ENCOUNTER (OUTPATIENT)
Age: 49
End: 2024-07-17

## 2024-07-29 ENCOUNTER — APPOINTMENT (OUTPATIENT)
Dept: HEMATOLOGY ONCOLOGY | Facility: CLINIC | Age: 49
End: 2024-07-29

## 2024-07-29 NOTE — DISCUSSION/SUMMARY
[FreeTextEntry1] : The visit was provided via telephone call as audio-visual telehealth services were unavailable due to technical issues. The patient, Rashid Titus, was located at [place], [place], [place], at the time of the visit. The Genetic Counselor, Rochelle Bray, was located at the medical office located in [place], [place]. The patient and the Genetic Counselor both participated in the telehealth encounter. Genetic counseling student, Yoseph Mart, also participated in this session. Consent for telehealth services was given on 07/29/2024 by the patient, Rashid Titus.   REASON FOR CONSULT Rashid Titus is a 48-year-old male who was referred by Dr. Ksehia Felix for cancer genetic counseling and risk assessment due to a personal history of colorectal cancer and a family history of cancer.  RELEVANT MEDICAL HISTORY Mr. Titus was diagnosed with colorectal cancer in 08/2023 at 47 years old. Pathology from colonoscopy performed in 08/2023 revealed invasive adenocarcinoma in the rectum, fragments of invasive adenocarcinoma in background of fibromuscular tissue in the rectosigmoid colon, and tubular adenoma with attached nonneoplastic colonic mucosa in the rectosigmoid colon, and colonic mucosa with focal adenomatous changes in the descending colon. Immunohistochemistry analysis of the Mismatch Repair (MMR) proteins was revealed to be intact. Final surgical pathology report revealed moderately differentiated adenocarcinoma of the rectum. Immunohistochemistry analysis of the Mismatch Repair (MMR) proteins was revealed to be intact. He was treated with colon resection, chemotherapy, and radiation therapy.   OTHER MEDICAL AND SURGICAL HISTORY: -	Medical History: No other major medical history reported. -	Surgical History: colon resection (06/2024)  CANCER SCREENING HISTORY:   Colon: -	Colonoscopy: last 08/2023, rectal cancer was identified as well as a tubular adenoma in the rectosigmoid colon. -	Upper Endoscopy: last 08/2023, chronic active gastritis, and in the esophagus, squamo-glandular mucosa with acute and chronic inflammation were identified.  -	FOBT: last 08/2023, positive. Prostate: -	PSA: No -	CHAGO: No Skin:   -	FBSE: No -	Lesions biopsied/removed: No  SOCIAL HISTORY: -	Tobacco-product use: Yes, former (intermittent use from [age] years old)  FAMILY HISTORY: Maternal ancestry was reported as Polish and paternal ancestry was reported as Iranian. Ashkenazi Buddhist ancestry and consanguinity were denied. A detailed family history of cancer was ascertained. Relevant diagnoses are detailed below and in the scanned pedigree.   To Mr. Titus's knowledge, no one in the family has had germline testing for cancer susceptibility.   	 	RISK ASSESSMENT: Mr. Titus's personal history of colorectal cancer and/or family history of breast cancer is suggestive of an inherited predisposition to colorectal cancer and/or breast cancer and related cancers. We recommended genetic testing for genes associated with breast cancer, gynecological cancers, and colorectal cancer. This test analyzes the following genes: APC, SERGIO, AXIN2, BARD1, BMPR1A, BRCA1, BRCA2, BRIP1, CDH1, CHEK2, EPCAM, GREM1, MLH1, MSH2, MSH3, MSH6, MUTYH, NF1, NTHL1, PALB2, PMS2, POLD1, POLE, PTEN, RAD51C, RAD51D, SMAD4, STK11, TP53.  We discussed the risks, benefits and limitations, and implications of genetic testing. We also discussed the psychosocial implications of genetic testing. Possible test results were reviewed with Mr. Titus, along with associated medical management options.   Mr. Titus verbally consented to the above mentioned genetic testing panel. Informed consent form was emailed to the patient, and a saliva sample kit will be mailed to the patient. Once the sample has been collected and sent out, Mr. Titus will inform us.   PLAN: 1.	Saliva kit was sent to Mr. Titus's home for collection. Once collected and dropped off, patient will inform us.  2.	Mr. Titus was sent a copy of the informed consent via email to be filled, signed, and returned to us. 3.	We will contact Mr. Titus once the results are available and will schedule a follow-up appointment, as needed. Results generally return in 2-3 weeks from the day the sample is received in the lab.  For any additional questions please call [facility] at [phone].    Rochelle Bray MS, [facility] Genetic Counselor, [facility]   CC:  Dr. Keshia Felix

## 2024-08-02 ENCOUNTER — APPOINTMENT (OUTPATIENT)
Dept: COLORECTAL SURGERY | Facility: CLINIC | Age: 49
End: 2024-08-02

## 2024-08-02 DIAGNOSIS — C20 MALIGNANT NEOPLASM OF RECTUM: ICD-10-CM

## 2024-08-02 PROCEDURE — 45330 DIAGNOSTIC SIGMOIDOSCOPY: CPT | Mod: 58

## 2024-08-05 ENCOUNTER — OUTPATIENT (OUTPATIENT)
Dept: OUTPATIENT SERVICES | Facility: HOSPITAL | Age: 49
LOS: 1 days | End: 2024-08-05
Payer: MEDICAID

## 2024-08-05 ENCOUNTER — APPOINTMENT (OUTPATIENT)
Dept: RADIOLOGY | Facility: HOSPITAL | Age: 49
End: 2024-08-05

## 2024-08-05 VITALS
RESPIRATION RATE: 16 BRPM | HEART RATE: 86 BPM | DIASTOLIC BLOOD PRESSURE: 87 MMHG | WEIGHT: 126.99 LBS | HEIGHT: 65 IN | TEMPERATURE: 98 F | OXYGEN SATURATION: 98 % | SYSTOLIC BLOOD PRESSURE: 119 MMHG

## 2024-08-05 DIAGNOSIS — K63.89 OTHER SPECIFIED DISEASES OF INTESTINE: ICD-10-CM

## 2024-08-05 DIAGNOSIS — Z98.890 OTHER SPECIFIED POSTPROCEDURAL STATES: Chronic | ICD-10-CM

## 2024-08-05 DIAGNOSIS — F31.9 BIPOLAR DISORDER, UNSPECIFIED: ICD-10-CM

## 2024-08-05 DIAGNOSIS — Z90.49 ACQUIRED ABSENCE OF OTHER SPECIFIED PARTS OF DIGESTIVE TRACT: Chronic | ICD-10-CM

## 2024-08-05 LAB
ANION GAP SERPL CALC-SCNC: 13 MMOL/L — SIGNIFICANT CHANGE UP (ref 5–17)
BLD GP AB SCN SERPL QL: NEGATIVE — SIGNIFICANT CHANGE UP
BUN SERPL-MCNC: 14 MG/DL — SIGNIFICANT CHANGE UP (ref 7–23)
CALCIUM SERPL-MCNC: 10 MG/DL — SIGNIFICANT CHANGE UP (ref 8.4–10.5)
CHLORIDE SERPL-SCNC: 102 MMOL/L — SIGNIFICANT CHANGE UP (ref 96–108)
CO2 SERPL-SCNC: 24 MMOL/L — SIGNIFICANT CHANGE UP (ref 22–31)
CREAT SERPL-MCNC: 0.77 MG/DL — SIGNIFICANT CHANGE UP (ref 0.5–1.3)
EGFR: 112 ML/MIN/1.73M2 — SIGNIFICANT CHANGE UP
GLUCOSE SERPL-MCNC: 92 MG/DL — SIGNIFICANT CHANGE UP (ref 70–99)
HCT VFR BLD CALC: 41.3 % — SIGNIFICANT CHANGE UP (ref 39–50)
HGB BLD-MCNC: 12.7 G/DL — LOW (ref 13–17)
MCHC RBC-ENTMCNC: 29.1 PG — SIGNIFICANT CHANGE UP (ref 27–34)
MCHC RBC-ENTMCNC: 30.8 GM/DL — LOW (ref 32–36)
MCV RBC AUTO: 94.5 FL — SIGNIFICANT CHANGE UP (ref 80–100)
NRBC # BLD: 0 /100 WBCS — SIGNIFICANT CHANGE UP (ref 0–0)
PLATELET # BLD AUTO: 627 K/UL — HIGH (ref 150–400)
POTASSIUM SERPL-MCNC: 4.3 MMOL/L — SIGNIFICANT CHANGE UP (ref 3.5–5.3)
POTASSIUM SERPL-SCNC: 4.3 MMOL/L — SIGNIFICANT CHANGE UP (ref 3.5–5.3)
RBC # BLD: 4.37 M/UL — SIGNIFICANT CHANGE UP (ref 4.2–5.8)
RBC # FLD: 13.8 % — SIGNIFICANT CHANGE UP (ref 10.3–14.5)
RH IG SCN BLD-IMP: POSITIVE — SIGNIFICANT CHANGE UP
SODIUM SERPL-SCNC: 139 MMOL/L — SIGNIFICANT CHANGE UP (ref 135–145)
WBC # BLD: 6.11 K/UL — SIGNIFICANT CHANGE UP (ref 3.8–10.5)
WBC # FLD AUTO: 6.11 K/UL — SIGNIFICANT CHANGE UP (ref 3.8–10.5)

## 2024-08-05 PROCEDURE — 80048 BASIC METABOLIC PNL TOTAL CA: CPT

## 2024-08-05 PROCEDURE — 74270 X-RAY XM COLON 1CNTRST STD: CPT | Mod: 26

## 2024-08-05 PROCEDURE — 86850 RBC ANTIBODY SCREEN: CPT

## 2024-08-05 PROCEDURE — G0463: CPT

## 2024-08-05 PROCEDURE — 85027 COMPLETE CBC AUTOMATED: CPT

## 2024-08-05 PROCEDURE — 74270 X-RAY XM COLON 1CNTRST STD: CPT

## 2024-08-05 PROCEDURE — 86901 BLOOD TYPING SEROLOGIC RH(D): CPT

## 2024-08-05 PROCEDURE — 86900 BLOOD TYPING SEROLOGIC ABO: CPT

## 2024-08-05 PROCEDURE — 83036 HEMOGLOBIN GLYCOSYLATED A1C: CPT

## 2024-08-05 RX ORDER — CEFOTETAN 2 G/1
2 INJECTION, POWDER, FOR SOLUTION INTRAMUSCULAR; INTRAVENOUS ONCE
Refills: 0 | Status: DISCONTINUED | OUTPATIENT
Start: 2024-08-20 | End: 2024-08-22

## 2024-08-05 NOTE — H&P PST ADULT - PROBLEM SELECTOR PLAN 1
scheduled ileostomy reversal on 8/20/2024  -preop instructions given  -ERP protocol  -Labs: cbc, bmp, a1c, T&S done in PST  DOS: ABO

## 2024-08-05 NOTE — H&P PST ADULT - HISTORY OF PRESENT ILLNESS
48 yr old male with PMH of  asthma (well controlled, denies inhaler use for many years), bipolar, substance abuse (last use 11/2023), colon cancer (tx: chemo and rads) s/p robotic colon resection with ileostomy June 2024. Pt offers no complaints at this time. Pt evaluated by Dr. Mott for a scheduled ileostomy reversal on 8/20/2024. Pt denies fever, chills, n/v, h/a, c/p or SOB at this time.

## 2024-08-05 NOTE — H&P PST ADULT - IN ACCORDANCE WITH [place] STATE LAW, WE OFFER EVERY PATIENT A HEPATITIS C TEST. WOULD YOU LIKE TO BE TESTED TODAY?
RX PROGRESS NOTE: Vancomycin Therapeutic Drug Monitoring    Day of therapy: 5    Indication and target trough: SSTI/pancreatitis/biliary leak (15-20 mcg/mL)    Current vancomycin dosing regimen: 1500 mg IVPB every 12 hours    Most recent height and weight information:  Weight: 72.3 kg (03/03/17 0400)  Height: 5' 8\" (172.7 cm) (03/01/17 1008)    The Following are the Calculated  Current Weights for Anirudh Trujillo     Adjusted Ideal    70 kg 68.4 kg          Labs:  Serum Creatinine and Creatinine Clearance:  CREATININE: 0.99 mg/dL (03/09/17 0645)  Estimated creatinine clearance: 92.1 mL/min    Vancomycin Serum Concentrations:  VANCOMYCIN (TROUGH) (mcg/mL)   Date/Time Value   03/10/2017 0643 26.4 ()       Assessment:  Serum concentration of 26.4, will decrease dose to 750 mg IVPB every 12 hours.    Additional serum concentrations may be necessary depending on pathogen identified, risk factors for adverse events, and/or duration of therapy.  Pharmacy will continue to follow and adjust as needed.    Thank you,    Adina Rucker RPH  3/10/2017 9:52 AM  Contact # [phone]     Opt out

## 2024-08-05 NOTE — H&P PST ADULT - NSICDXPASTSURGICALHX_GEN_ALL_CORE_FT
PAST SURGICAL HISTORY:  History of colonoscopy     History of endoscopy     History of ileostomy     S/P colon resection

## 2024-08-05 NOTE — H&P PST ADULT - ASSESSMENT
DASI score: 6  DASI activity: Pt states he is able to walk 2-3 blocks, and 1 FOS, denies c/p or SOB  Loose teeth or denture: denies      CAPRINI SCORE    AGE RELATED RISK FACTORS                                                             [ ] Age 41-60 years                                            (1 Point)  [ ] Age: 61-74 years                                           (2 Points)                 [ ] Age= 75 years                                                (3 Points)             DISEASE RELATED RISK FACTORS                                                       [ ] Edema in the lower extremities                 (1 Point)                     [ ] Varicose veins                                               (1 Point)                                 [ ] BMI > 25 Kg/m2                                            (1 Point)                                  [ ] Serious infection (ie PNA)                            (1 Point)                     [ ] Lung disease ( COPD, Emphysema)            (1 Point)                                                                          [ ] Acute myocardial infarction                         (1 Point)                  [ ] Congestive heart failure (in the previous month)  (1 Point)         [ ] Inflammatory bowel disease                            (1 Point)                  [ ] Central venous access, PICC or Port               (2 points)       (within the last month)                                                                [ ] Stroke (in the previous month)                        (5 Points)    [ ] Previous or present malignancy                       (2 points)                                                                                                                                                         HEMATOLOGY RELATED FACTORS                                                         [ ] Prior episodes of VTE                                     (3 Points)                     [ ] Positive family history for VTE                      (3 Points)                  [ ] Prothrombin 20885 A                                     (3 Points)                     [ ] Factor V Leiden                                                (3 Points)                        [ ] Lupus anticoagulants                                      (3 Points)                                                           [ ] Anticardiolipin antibodies                              (3 Points)                                                       [ ] High homocysteine in the blood                   (3 Points)                                             [ ] Other congenital or acquired thrombophilia      (3 Points)                                                [ ] Heparin induced thrombocytopenia                  (3 Points)                                        MOBILITY RELATED FACTORS  [ ] Bed rest                                                         (1 Point)  [ ] Plaster cast                                                    (2 points)  [ ] Bed bound for more than 72 hours           (2 Points)    GENDER SPECIFIC FACTORS  [ ] Pregnancy or had a baby within the last month   (1 Point)  [ ] Post-partum < 6 weeks                                   (1 Point)  [ ] Hormonal therapy  or oral contraception   (1 Point)  [ ] History of pregnancy complications              (1 point)  [ ] Unexplained or recurrent              (1 Point)    OTHER RISK FACTORS                                           (1 Point)  [ ] BMI >40, smoking, diabetes requiring insulin, chemotherapy  blood transfusions and length of surgery over 2 hours    SURGERY RELATED RISK FACTORS  [ ]  Section within the last month     (1 Point)  [ ] Minor surgery                                                  (1 Point)  [ ] Arthroscopic surgery                                       (2 Points)  [ ] Planned major surgery lasting more            (2 Points)      than 45 minutes     [ ] Elective hip or knee joint replacement       (5 points)       surgery                                                TRAUMA RELATED RISK FACTORS  [ ] Fracture of the hip, pelvis, or leg                       (5 Points)  [ ] Spinal cord injury resulting in paralysis             (5 points)       (in the previous month)    [ ] Paralysis  (less than 1 month)                             (5 Points)  [ ] Multiple Trauma within 1 month                        (5 Points)    Total Score [     3   ]    Caprini Score 0-2: Low Risk, NO VTE prophylaxis required for most patients, encourage ambulation  Caprini Score 3-6: Moderate Risk , pharmacologic VTE prophylaxis is indicated for most patients (in the absence of contraindications)  Caprini Score Greater than or =7: High risk, pharmocologic VTE prophylaxis indicated for most patients (in the absence of contraindications)

## 2024-08-06 LAB
A1C WITH ESTIMATED AVERAGE GLUCOSE RESULT: 5.4 % — SIGNIFICANT CHANGE UP (ref 4–5.6)
ESTIMATED AVERAGE GLUCOSE: 108 MG/DL — SIGNIFICANT CHANGE UP (ref 68–114)

## 2024-08-15 NOTE — PATIENT PROFILE ADULT - NSPROPOAURINARYCATHETER_GEN_A_NUR
Pt's mom Antionette required to sign paperwork and provide appropriate documentation prior to transportation at [place]; pt's mom stated she will be here early tomorrow morning to fill out documents. Document packet sent to MICHELLE Valle to keep at bedside.    no

## 2024-08-19 ENCOUNTER — TRANSCRIPTION ENCOUNTER (OUTPATIENT)
Age: 49
End: 2024-08-19

## 2024-08-20 ENCOUNTER — RESULT REVIEW (OUTPATIENT)
Age: 49
End: 2024-08-20

## 2024-08-20 ENCOUNTER — INPATIENT (INPATIENT)
Facility: HOSPITAL | Age: 49
LOS: 1 days | Discharge: HOME | DRG: 395 | End: 2024-08-22
Attending: SURGERY | Admitting: SURGERY
Payer: MEDICAID

## 2024-08-20 ENCOUNTER — APPOINTMENT (OUTPATIENT)
Dept: COLORECTAL SURGERY | Facility: HOSPITAL | Age: 49
End: 2024-08-20
Payer: MEDICAID

## 2024-08-20 VITALS
DIASTOLIC BLOOD PRESSURE: 76 MMHG | OXYGEN SATURATION: 98 % | RESPIRATION RATE: 18 BRPM | WEIGHT: 126.99 LBS | TEMPERATURE: 98 F | HEIGHT: 65 IN | SYSTOLIC BLOOD PRESSURE: 108 MMHG | HEART RATE: 94 BPM

## 2024-08-20 DIAGNOSIS — Z98.890 OTHER SPECIFIED POSTPROCEDURAL STATES: Chronic | ICD-10-CM

## 2024-08-20 DIAGNOSIS — Z90.49 ACQUIRED ABSENCE OF OTHER SPECIFIED PARTS OF DIGESTIVE TRACT: Chronic | ICD-10-CM

## 2024-08-20 DIAGNOSIS — K63.89 OTHER SPECIFIED DISEASES OF INTESTINE: ICD-10-CM

## 2024-08-20 LAB
GLUCOSE BLDC GLUCOMTR-MCNC: 122 MG/DL — HIGH (ref 70–99)
GLUCOSE BLDC GLUCOMTR-MCNC: 171 MG/DL — HIGH (ref 70–99)

## 2024-08-20 PROCEDURE — 44625 REPAIR BOWEL OPENING: CPT | Mod: 58

## 2024-08-20 PROCEDURE — 88304 TISSUE EXAM BY PATHOLOGIST: CPT | Mod: 26

## 2024-08-20 DEVICE — STAPLER COVIDIEN GIA 80-3.0MM PURPLE: Type: IMPLANTABLE DEVICE | Status: FUNCTIONAL

## 2024-08-20 DEVICE — STAPLER COVIDIEN TA 90 BLUE: Type: IMPLANTABLE DEVICE | Status: FUNCTIONAL

## 2024-08-20 RX ORDER — NALOXONE HCL 1 MG/ML
0.1 VIAL (ML) INJECTION
Refills: 0 | Status: DISCONTINUED | OUTPATIENT
Start: 2024-08-20 | End: 2024-08-22

## 2024-08-20 RX ORDER — OLANZAPINE 7.5 MG/1
10 TABLET ORAL AT BEDTIME
Refills: 0 | Status: DISCONTINUED | OUTPATIENT
Start: 2024-08-20 | End: 2024-08-22

## 2024-08-20 RX ORDER — SODIUM CHLORIDE 9 MG/ML
3 INJECTION INTRAMUSCULAR; INTRAVENOUS; SUBCUTANEOUS EVERY 8 HOURS
Refills: 0 | Status: DISCONTINUED | OUTPATIENT
Start: 2024-08-20 | End: 2024-08-20

## 2024-08-20 RX ORDER — ACETAMINOPHEN 325 MG/1
1000 TABLET ORAL EVERY 6 HOURS
Refills: 0 | Status: DISCONTINUED | OUTPATIENT
Start: 2024-08-20 | End: 2024-08-22

## 2024-08-20 RX ORDER — ENOXAPARIN SODIUM 100 MG/ML
40 INJECTION SUBCUTANEOUS EVERY 24 HOURS
Refills: 0 | Status: DISCONTINUED | OUTPATIENT
Start: 2024-08-20 | End: 2024-08-20

## 2024-08-20 RX ORDER — KETOROLAC TROMETHAMINE 30 MG/ML
15 INJECTION, SOLUTION INTRAMUSCULAR EVERY 6 HOURS
Refills: 0 | Status: DISCONTINUED | OUTPATIENT
Start: 2024-08-20 | End: 2024-08-21

## 2024-08-20 RX ORDER — HEPARIN SODIUM,BOVINE 1000/ML
5000 VIAL (ML) INJECTION EVERY 8 HOURS
Refills: 0 | Status: DISCONTINUED | OUTPATIENT
Start: 2024-08-20 | End: 2024-08-22

## 2024-08-20 RX ORDER — ONDANSETRON 2 MG/ML
4 INJECTION, SOLUTION INTRAMUSCULAR; INTRAVENOUS EVERY 6 HOURS
Refills: 0 | Status: DISCONTINUED | OUTPATIENT
Start: 2024-08-20 | End: 2024-08-22

## 2024-08-20 RX ORDER — DIVALPROEX SODIUM 125 MG/1
500 CAPSULE, DELAYED RELEASE ORAL DAILY
Refills: 0 | Status: DISCONTINUED | OUTPATIENT
Start: 2024-08-20 | End: 2024-08-22

## 2024-08-20 RX ORDER — OLANZAPINE 7.5 MG/1
10 TABLET ORAL DAILY
Refills: 0 | Status: DISCONTINUED | OUTPATIENT
Start: 2024-08-20 | End: 2024-08-20

## 2024-08-20 RX ORDER — CELECOXIB 400 MG/1
400 CAPSULE ORAL ONCE
Refills: 0 | Status: COMPLETED | OUTPATIENT
Start: 2024-08-20 | End: 2024-08-20

## 2024-08-20 RX ORDER — LIDOCAINE HCL 20 MG/ML
0.2 VIAL (ML) INJECTION ONCE
Refills: 0 | Status: DISCONTINUED | OUTPATIENT
Start: 2024-08-20 | End: 2024-08-20

## 2024-08-20 RX ORDER — CHLORHEXIDINE GLUCONATE 40 MG/ML
1 SOLUTION TOPICAL ONCE
Refills: 0 | Status: DISCONTINUED | OUTPATIENT
Start: 2024-08-20 | End: 2024-08-20

## 2024-08-20 RX ADMIN — KETOROLAC TROMETHAMINE 15 MILLIGRAM(S): 30 INJECTION, SOLUTION INTRAMUSCULAR at 14:30

## 2024-08-20 RX ADMIN — CELECOXIB 400 MILLIGRAM(S): 400 CAPSULE ORAL at 10:35

## 2024-08-20 RX ADMIN — ACETAMINOPHEN 1000 MILLIGRAM(S): 325 TABLET ORAL at 20:56

## 2024-08-20 RX ADMIN — OLANZAPINE 10 MILLIGRAM(S): 7.5 TABLET ORAL at 22:15

## 2024-08-20 RX ADMIN — ACETAMINOPHEN 400 MILLIGRAM(S): 325 TABLET ORAL at 20:26

## 2024-08-20 RX ADMIN — Medication 40 MILLILITER(S): at 14:00

## 2024-08-20 RX ADMIN — ACETAMINOPHEN 400 MILLIGRAM(S): 325 TABLET ORAL at 14:00

## 2024-08-20 RX ADMIN — KETOROLAC TROMETHAMINE 15 MILLIGRAM(S): 30 INJECTION, SOLUTION INTRAMUSCULAR at 22:40

## 2024-08-20 RX ADMIN — Medication 5000 UNIT(S): at 20:26

## 2024-08-20 RX ADMIN — ACETAMINOPHEN 1000 MILLIGRAM(S): 325 TABLET ORAL at 14:30

## 2024-08-20 RX ADMIN — KETOROLAC TROMETHAMINE 15 MILLIGRAM(S): 30 INJECTION, SOLUTION INTRAMUSCULAR at 22:10

## 2024-08-20 RX ADMIN — KETOROLAC TROMETHAMINE 15 MILLIGRAM(S): 30 INJECTION, SOLUTION INTRAMUSCULAR at 14:00

## 2024-08-20 NOTE — PRE-OP CHECKLIST - HEART RATE (BEATS/MIN)
Patient: Cheli Tavera    Procedure Summary     Date:  11/27/18 Room / Location:  [address] [place] [address] MAIN OR 08 / [address] [place] [address] OR    Anesthesia Start:  1719 Anesthesia Stop:      Procedure:  KNEE TOTAL REPLACEMENT (Left Knee) Diagnosis:  (Left knee degenerative joint dis
94

## 2024-08-20 NOTE — CHART NOTE - NSCHARTNOTEFT_GEN_A_CORE
SURGERY POST OP CHECK    STATUS POST PROCEDURE: ileostomy reversal     SUBJECTIVE: Pt seen and examined at bedside. Patient reports appropriate surgical incisional pain; pain is controlled. Denies fevers/chills, chest pain, dyspnea, nausea, vomiting   Pt has not passed gas or had bowel movement yet. Pt is ambulating well    --------------------------------------------------------------------------------------------------------------------------------------------------------------------------------------------------------------  OBJECTIVE:  Vital Signs Last 24 Hrs  T(C): 36.4 (20 Aug 2024 15:30), Max: 36.9 (20 Aug 2024 09:14)  T(F): 97.5 (20 Aug 2024 15:30), Max: 98.4 (20 Aug 2024 09:14)  HR: 79 (20 Aug 2024 16:00) (77 - 94)  BP: 111/61 (20 Aug 2024 16:00) (108/76 - 137/56)  BP(mean): 80 (20 Aug 2024 16:00) (80 - 101)  RR: 16 (20 Aug 2024 16:00) (16 - 18)  SpO2: 100% (20 Aug 2024 16:00) (98% - 100%)    Parameters below as of 20 Aug 2024 16:00  Patient On (Oxygen Delivery Method): nasal cannula  O2 Flow (L/min): 2    I&O's Summary    20 Aug 2024 07:01  -  20 Aug 2024 16:51  --------------------------------------------------------  IN: 280 mL / OUT: 0 mL / NET: 280 mL        PHYSICAL EXAM:  Constitutional: Well developed, NAD  Chest: Symmetric chest rise bilaterally, no increased WOB  Abdomen: Soft, nontender, nondistended, appropriate sx incisional tenderness with dressings d/i with some serosanguinous staining on dressing. No rebound or guarding.   Groin: Soft, nontender, no ecchymosis/hematoma.   Extremities:  Warm to touch, soft. No cyanosis/erythema/edema/hematoma  ----------------------------------------------------------------------------------------------------------------------------------------------------------------------------------------------------------------  ASSESSMENT: HPI:  48 yr old male with PMH of  asthma (well controlled, denies inhaler use for many years), bipolar, substance abuse (last use 11/2023), colon cancer (tx: chemo and rads) s/p robotic colon resection with ileostomy June 2024. Pt now s/p scheduled ileostomy reversal on 8/20/2024. Patient is recovering well.       PLAN:  - ERP Protocol  - Barber removed; TOV at 8 PM   - Diet: CLD   - Antibiotics: cefotetan   - Analgesia and antiemetics as needed  - Strict I&O's  - Monitor bowel function   - Incentive spirometry  - OOB as tolerated  - DVT prophylaxis: SCD  - Monitor overnight  - Dispo: Floor SURGERY POST OP CHECK    STATUS POST PROCEDURE: ileostomy reversal     SUBJECTIVE: Pt seen and examined at bedside. Patient reports appropriate surgical incisional pain; pain is controlled. Denies fevers/chills, chest pain, dyspnea, nausea, vomiting   Pt has not passed gas or had bowel movement yet. Pt is ambulating well    --------------------------------------------------------------------------------------------------------------------------------------------------------------------------------------------------------------  OBJECTIVE:  Vital Signs Last 24 Hrs  T(C): 36.4 (20 Aug 2024 15:30), Max: 36.9 (20 Aug 2024 09:14)  T(F): 97.5 (20 Aug 2024 15:30), Max: 98.4 (20 Aug 2024 09:14)  HR: 79 (20 Aug 2024 16:00) (77 - 94)  BP: 111/61 (20 Aug 2024 16:00) (108/76 - 137/56)  BP(mean): 80 (20 Aug 2024 16:00) (80 - 101)  RR: 16 (20 Aug 2024 16:00) (16 - 18)  SpO2: 100% (20 Aug 2024 16:00) (98% - 100%)    Parameters below as of 20 Aug 2024 16:00  Patient On (Oxygen Delivery Method): nasal cannula  O2 Flow (L/min): 2    I&O's Summary    20 Aug 2024 07:01  -  20 Aug 2024 16:51  --------------------------------------------------------  IN: 280 mL / OUT: 0 mL / NET: 280 mL        PHYSICAL EXAM:  Constitutional: Well developed, NAD  Chest: Symmetric chest rise bilaterally, no increased WOB  Abdomen: Soft, nontender, nondistended, appropriate sx incisional tenderness with dressings d/i with some serosanguinous staining on dressing. No rebound or guarding.   Groin: Soft, nontender, no ecchymosis/hematoma.   Extremities:  Warm to touch, soft. No cyanosis/erythema/edema/hematoma  ----------------------------------------------------------------------------------------------------------------------------------------------------------------------------------------------------------------  ASSESSMENT: HPI:  48 yr old male with PMH of  asthma (well controlled, denies inhaler use for many years), bipolar, substance abuse (last use 11/2023), colon cancer (tx: chemo and rads) s/p robotic colon resection with ileostomy June 2024. Pt now s/p scheduled ileostomy reversal on 8/20/2024. Patient is recovering well.       PLAN:  - ERP Protocol  - Barber removed; TOV at 8 PM   - Diet: CLD   - Antibiotics: cefotetan   - Analgesia and antiemetics as needed; patient does not want narcotics   - Strict I&O's  - Monitor bowel function   - Incentive spirometry  - OOB as tolerated  - DVT prophylaxis: SCD  - Monitor overnight  - Dispo: Floor

## 2024-08-20 NOTE — PRE-ANESTHESIA EVALUATION ADULT - NSANTHOBSERVEDRD_ENT_A_CORE
[facility] Consultation / Initial Visit      Today's Date: 7/25/2024    PCP: Indio Chiang MD  Referring Provider: Indio Chiang MD    INDICATION FOR CONSULTATION:     positive stress test with ICD noncompliant with meds         HPI:       70 year old male with HFrEF, nonischemic cardiomyopathy, very mild nonobstructive coronary artery disease s/p LHC (2022), ventricular tachycardia and cardiac arrest s/p ICD (2022), hypertension, hyperlipidemia, BPH presented to [facility] from PCP office for evaluation of positive stress test, chest pain, shortness of breath. Per chart review, patient's chest pain was intermittent, worse at night, for the past few months, burning pressure sensation. He reports orthopnea and headache overnight, shortness of breath on exertion.    Cardiology is on consult for evaluation of positive stress test.    Patient seen in bed, in no acute distress. He reports palpitations overnight. I discussed results of stress test, medication compliance with him in detail.  He has had relatively extensive workup he had coronary angiography and November 2022 which showed mild nonobstructive coronary artery disease he also had cardiac MRI in September 2023 which showed inferolateral scar tissue.     ROS:     General: No fever or chills, No loss of appetite.    RS: No hemoptysis  GI: No melena or hematochezia  : No urinary disturbance  Derm: No skin disorders   Heme: No blood dyscrasias.  Remainder of 12 point systems reviewed and negative (or as mentioned in HPI)    Relevant Past Medical History:  Past Medical History:   Diagnosis Date    Benign prostatic hyperplasia 01/06/2023    CAD in native artery 11/11/2022    Chronic combined systolic and diastolic heart failure  (CMD) 02/17/2023    ICD (implantable cardioverter-defibrillator), dual, in situ 03/17/2023    Nonischemic cardiomyopathy  (CMD) 11/11/2022    Other hyperlipidemia 06/23/2024    Primary hypertension 06/23/2024      Past Surgical History:    Procedure Laterality Date    Remv 2nd cataract,corn-scler sectn Right         Social History:  Social History     Tobacco Use   Smoking Status Never    Passive exposure: Never   Smokeless Tobacco Never     Social History     Substance and Sexual Activity   Alcohol Use Yes    Comment: socially     History   Drug Use Unknown       Family History:   Family History   Problem Relation Age of Onset    Heart disease Neg Hx        Inpatient Medications  Current Facility-Administered Medications   Medication Dose Route Frequency Provider Last Rate Last Admin    sodium chloride 0.9 % injection 2 mL  2 mL Intracatheter 2 times per day Katelynn Hooker DO        losartan (COZAAR) tablet 50 mg  50 mg Oral Daily Katelynn Hooker DO        aspirin (ECOTRIN) enteric coated tablet 81 mg  81 mg Oral Daily Katelynn Hooker DO        atorvastatin (LIPITOR) tablet 40 mg  40 mg Oral QHS Katelynn Hooker DO   40 mg at 07/25/24 0025      Current Facility-Administered Medications   Medication Dose Route Frequency Provider Last Rate Last Admin      Current Facility-Administered Medications   Medication Dose Route Frequency Provider Last Rate Last Admin    sodium chloride 0.9 % flush bag 25 mL  25 mL Intravenous PRN Katelynn Hooker DO        sodium chloride (NORMAL SALINE) 0.9 % bolus 500 mL  500 mL Intravenous PRN Katelynn Hooker DO        acetaminophen (TYLENOL) tablet 650 mg  650 mg Oral Q4H PRN Katelynn Hooker DO            Allergy   ALLERGIES:  No Known Allergies         Examination:      Vital Last Value 24 Hour Range   Temperature 97.7 °F (36.5 °C) (07/25/24 0330) Temp  Min: 97.7 °F (36.5 °C)  Max: 97.9 °F (36.6 °C)   Pulse (!) 59 (07/25/24 0330) Pulse  Min: 59  Max: 71   Respiratory 16 (07/25/24 0330) Resp  Min: 16  Max: 16   Non-Invasive  Blood Pressure 117/68 (07/25/24 0330) BP  Min: 117/68  Max: 146/87   Pulse Oximetry 98 % (07/25/24 0330) SpO2  Min: 95 %  Max: 98 %   Arterial   Blood  Pressure   No data recorded     Tele: A paced 60-80s (underlying SR, PVCs, RBBB)      Intake/Output Summary (Last 24 hours) at 7/25/2024 0725  Last data filed at 7/25/2024 0331  Gross per 24 hour   Intake 0 ml   Output 400 ml   Net -400 ml        Weight    07/24/24 2041 07/25/24 0649   Weight: 93.4 kg (205 lb 14.6 oz) 93.4 kg (205 lb 14.6 oz)         GENERAL: No apparent distress  HEENT: Normocephalic.  Neck:  Supple neck.   Oral mucosa : Pink and moist.    Endocrine: There is no goiter.  CVS: Regular rate and rhythm.  Normal first and second heart tones.   Lung fields: Clear to auscultation bilaterally.   GI: Soft. Nontender, nondistended.    Lower extremity: No cyanosis, clubbing or edema.   Peripheral vascular: Both lower extremities are warm and well perfused.    Neuro: Awake and alert.  Nonfocal examination.  Psych: Appropriate mood and affect  Integumentary: Warm and Dry      Clinical Data:       The following were personally visualized and interpreted by me:    LABS:    CBC  Recent Labs   Lab 07/24/24 2203   WBC 6.0   HCT 44.4   HGB 14.8          CMP  Recent Labs   Lab 07/24/24 2203   SODIUM 138   POTASSIUM 4.3   CHLORIDE 110   CO2 24   GLUCOSE 121*   BUN 16   CREATININE 0.83   CALCIUM 9.0   TOTPROTEIN 6.9   ALBUMIN 3.6   BILIRUBIN 0.6   AST 28   GPT 28   ALKPT 60       Cardiac Labs  Recent Labs   Lab 07/24/24  2203   HTROPI 21       Lipid Panel  No results found    Coags  No results found    ABG  No results found    IMAGING:    ECG:   Encounter Date: 07/24/24   Electrocardiogram 12-Lead   Result Value    Ventricular Rate EKG/Min (BPM) 79    Atrial Rate (BPM) 79    QRS-Interval (MSEC) 190    QT-Interval (MSEC) 446    QTc 511    R Axis (Degrees) 162    T Axis (Degrees) 11    REPORT TEXT      Sinus rhythm  with short AR  with frequent  atrial-paced complexes  and  premature supraventricular complexes  in a pattern of bigeminy  Right bundle branch block  Lateral infarct  , age undetermined  Inferior  No infarct  , age undetermined  Abnormal ECG  When compared with ECG of  24-JUL-2024 08:25,  Electronic atrial pacemaker  has replaced  Sinus rhythm  Right bundle branch block  is now  present  Lateral infarct  is now  present          Echocardiogram:    Cardiac MRI 6/29/2023  IMPRESSION:   1.  The left ventricle is normal in size with mild-moderately reduced systolic function.   The overall LV ejection fraction is 44%. There is hypokinesis-akinesis of the basal-mid   inferolateral wall. On late gadolinium enhancement imaging, there is an area of   subendocardial late gadolinium enhancement in the mid inferolateral wall that is   suggestive of prior myocardial infarction. This area corresponds with the area of   hypo-akinesis.   2.  The right ventricle is normal in size and systolic function.  The overall RV ejection   fraction is 44%   3.  There is a lead extending from the right atrium into the right ventricle.   4.  The pericardium overlying the lateral wall has evidence of late gadolinium enhancement   as well as thickening, which is suggestive of active or recent inflammation/edema and   could be consistent with active or prior pericarditis. Clinical correlation required.     TTE 7/10/24  SUMMARY:     1. Left ventricle: The cavity size is normal. Wall thickness is normal.     Systolic function is mildly reduced. The ejection fraction was measured by     visual estimation. Hypokinesis of the inferior wall. Doppler parameters are     consistent with abnormal left ventricular relaxation (grade 1 diastolic     dysfunction). The ejection fraction is 45%.  2. Left atrium: The atrium is mildly dilated.  3. Right ventricle: The cavity size is normal. Wall thickness is normal.     Systolic function is normal. Pacemaker lead noted in right ventricle.      Stress Test 7/24/24  Summary:     1. Myocardial perfusion imaging: The left ventricle is dilated. There is no     transient ischemic dilation of the left ventricle during  stress. The TID     ratio is 0.96. There is a medium-sized, reversible defect involving the     basal and mid inferolateral wall(s).  2. Stress: The calculated EF is 39%.  3. Stress ECG conclusions: Rare premature ventricular ectopy. The stress ECG     is negative for ischemia.  4. Baseline ECG: Isolated premature ventricular complexes. Normal sinus     rhythm. Nonspecific ST changes.  Impressions:     1. There is evidence of myocardial ischemia.  2. Abnormal ejection fraction.       Cath Report:  No results found for this or any previous visit.      Assessment/Plans:     Abnormal Stress Test   -EKG reviewed  -Troponin negative 1x will obtain second troponin  -Patient with very mild nonobstructive coronary artery disease based on coronary angiogram in November 2022 see below  -s/p LHC 11/11/22: Right dominant, LM: no significant disease, LAD 40-50% ostial, luminal in the LAD, LCx luminal irregularities, RCA 30% prox-mid.  -Patient also had cardiac MRI in June 2023 inferolateral scar  -I personally reviewed the patient's stress test stress test with poor quality however there is a fixed defect in the inferolateral area with no significant reversibility  -Continue aspirin 81 mg po daily, atorvastatin 40 mg po daily  -Would recommend outpatient follow-up with Dr. Bernal    HFrEF  Nonischemic Cardiomyopathy  -No NT proBNP on file  -Cardiac MRI on 6/29/2023 showed EF 44%, hypokinesis-akinesis of the basal-mid inferolateral wall. On late gadolinium enhancement imaging, there is an area of subendocardial late gadolinium enhancement in the mid inferolateral wall that is suggestive of prior myocardial infarction, this area corresponds with the area of hypo-akinesis. RVEF 44%, pericardium overlying the lateral wall has evidence suggestive of active or prior pericarditis.  -TTE 7/10/24 showed hypokinesis of the inferior wall, EF 45%, LA is mildly dilated.  -Patient is noncompliant with GDMT, would recommend reinitiating  patient's GDMT and outpatient follow-up with cardiology  -He is also status post dual-chamber ICD and device interrogation with no events    Hx of Ventricular Tachycardia s/p ICD (2022)  Hx of Cardiac Arrest  -EKG reviewed  -Previously on Toprol-XL 25 mg po daily  -Cardiac device check [id number] with device clinic    Hypertension  -BP currently controlled  -Previously on Toprol-XL 25 mg po daily  -Continue losartan 50 mg po daily    Hyperlipidemia  -Lipid panel on 5/20/24: CHOL 165, HDL 52, LDL 90, TRIG 114  -Continue atorvastatin 40 mg po daily    BPH  -mgmt per primary    Discussed with internal medicine team.    Thank you for allowing us to participate in this patient's care.  Please do not hesitate to call with any questions or concerns.    Scribe Attestation: Entered by Bekah Espinoza acting as scribe for Dr. Negrete  The documentation recorded by the scribe accurately and completely reflects the service(s) I personally performed and the decisions made by me.

## 2024-08-20 NOTE — BRIEF OPERATIVE NOTE - OPERATION/FINDINGS
Loop ileostomy reversal. Small bowel anastomosis made with 80 mm purple ANDRZEJ stapler and common channel closed with TA stapler. Mesenteric defect closed. Fascia closed with #1 Maxxon.

## 2024-08-21 LAB
ANION GAP SERPL CALC-SCNC: 14 MMOL/L — SIGNIFICANT CHANGE UP (ref 5–17)
BUN SERPL-MCNC: 9 MG/DL — SIGNIFICANT CHANGE UP (ref 7–23)
CALCIUM SERPL-MCNC: 9.1 MG/DL — SIGNIFICANT CHANGE UP (ref 8.4–10.5)
CHLORIDE SERPL-SCNC: 100 MMOL/L — SIGNIFICANT CHANGE UP (ref 96–108)
CO2 SERPL-SCNC: 24 MMOL/L — SIGNIFICANT CHANGE UP (ref 22–31)
CREAT SERPL-MCNC: 0.7 MG/DL — SIGNIFICANT CHANGE UP (ref 0.5–1.3)
EGFR: 115 ML/MIN/1.73M2 — SIGNIFICANT CHANGE UP
GLUCOSE SERPL-MCNC: 95 MG/DL — SIGNIFICANT CHANGE UP (ref 70–99)
HCT VFR BLD CALC: 36.6 % — LOW (ref 39–50)
HGB BLD-MCNC: 11.5 G/DL — LOW (ref 13–17)
MAGNESIUM SERPL-MCNC: 2.1 MG/DL — SIGNIFICANT CHANGE UP (ref 1.6–2.6)
MCHC RBC-ENTMCNC: 28.7 PG — SIGNIFICANT CHANGE UP (ref 27–34)
MCHC RBC-ENTMCNC: 31.4 GM/DL — LOW (ref 32–36)
MCV RBC AUTO: 91.3 FL — SIGNIFICANT CHANGE UP (ref 80–100)
NRBC # BLD: 0 /100 WBCS — SIGNIFICANT CHANGE UP (ref 0–0)
PHOSPHATE SERPL-MCNC: 3.6 MG/DL — SIGNIFICANT CHANGE UP (ref 2.5–4.5)
PLATELET # BLD AUTO: 396 K/UL — SIGNIFICANT CHANGE UP (ref 150–400)
POTASSIUM SERPL-MCNC: 4.5 MMOL/L — SIGNIFICANT CHANGE UP (ref 3.5–5.3)
POTASSIUM SERPL-SCNC: 4.5 MMOL/L — SIGNIFICANT CHANGE UP (ref 3.5–5.3)
RBC # BLD: 4.01 M/UL — LOW (ref 4.2–5.8)
RBC # FLD: 13.6 % — SIGNIFICANT CHANGE UP (ref 10.3–14.5)
SODIUM SERPL-SCNC: 138 MMOL/L — SIGNIFICANT CHANGE UP (ref 135–145)
WBC # BLD: 6.88 K/UL — SIGNIFICANT CHANGE UP (ref 3.8–10.5)
WBC # FLD AUTO: 6.88 K/UL — SIGNIFICANT CHANGE UP (ref 3.8–10.5)

## 2024-08-21 RX ORDER — MAGNESIUM OXIDE TAB 400 MG (240 MG ELEMENTAL MG) 400 (240 MG) MG
1000 TAB ORAL
Refills: 0 | Status: DISCONTINUED | OUTPATIENT
Start: 2024-08-21 | End: 2024-08-22

## 2024-08-21 RX ORDER — OXYCODONE HYDROCHLORIDE 5 MG/1
2.5 TABLET ORAL EVERY 4 HOURS
Refills: 0 | Status: DISCONTINUED | OUTPATIENT
Start: 2024-08-21 | End: 2024-08-21

## 2024-08-21 RX ORDER — OXYCODONE HYDROCHLORIDE 5 MG/1
5 TABLET ORAL EVERY 6 HOURS
Refills: 0 | Status: DISCONTINUED | OUTPATIENT
Start: 2024-08-21 | End: 2024-08-21

## 2024-08-21 RX ORDER — OXYCODONE HYDROCHLORIDE 5 MG/1
2.5 TABLET ORAL EVERY 6 HOURS
Refills: 0 | Status: DISCONTINUED | OUTPATIENT
Start: 2024-08-21 | End: 2024-08-21

## 2024-08-21 RX ORDER — ACETAMINOPHEN 325 MG/1
975 TABLET ORAL EVERY 6 HOURS
Refills: 0 | Status: DISCONTINUED | OUTPATIENT
Start: 2024-08-21 | End: 2024-08-22

## 2024-08-21 RX ORDER — OXYCODONE HYDROCHLORIDE 5 MG/1
5 TABLET ORAL EVERY 4 HOURS
Refills: 0 | Status: DISCONTINUED | OUTPATIENT
Start: 2024-08-21 | End: 2024-08-21

## 2024-08-21 RX ORDER — HYDROMORPHONE HYDROCHLORIDE 2 MG/1
0.2 TABLET ORAL EVERY 6 HOURS
Refills: 0 | Status: DISCONTINUED | OUTPATIENT
Start: 2024-08-21 | End: 2024-08-21

## 2024-08-21 RX ORDER — IBUPROFEN 600 MG
400 TABLET ORAL EVERY 6 HOURS
Refills: 0 | Status: DISCONTINUED | OUTPATIENT
Start: 2024-08-21 | End: 2024-08-22

## 2024-08-21 RX ADMIN — Medication 400 MILLIGRAM(S): at 18:36

## 2024-08-21 RX ADMIN — KETOROLAC TROMETHAMINE 15 MILLIGRAM(S): 30 INJECTION, SOLUTION INTRAMUSCULAR at 10:03

## 2024-08-21 RX ADMIN — DIVALPROEX SODIUM 500 MILLIGRAM(S): 125 CAPSULE, DELAYED RELEASE ORAL at 10:02

## 2024-08-21 RX ADMIN — Medication 400 MILLIGRAM(S): at 12:43

## 2024-08-21 RX ADMIN — KETOROLAC TROMETHAMINE 15 MILLIGRAM(S): 30 INJECTION, SOLUTION INTRAMUSCULAR at 03:43

## 2024-08-21 RX ADMIN — Medication 5000 UNIT(S): at 03:13

## 2024-08-21 RX ADMIN — Medication 5000 UNIT(S): at 21:01

## 2024-08-21 RX ADMIN — ACETAMINOPHEN 975 MILLIGRAM(S): 325 TABLET ORAL at 12:43

## 2024-08-21 RX ADMIN — OLANZAPINE 10 MILLIGRAM(S): 7.5 TABLET ORAL at 21:01

## 2024-08-21 RX ADMIN — Medication 5000 UNIT(S): at 12:42

## 2024-08-21 RX ADMIN — MAGNESIUM OXIDE TAB 400 MG (240 MG ELEMENTAL MG) 1000 MILLIGRAM(S): 400 (240 MG) TAB at 18:36

## 2024-08-21 RX ADMIN — ACETAMINOPHEN 975 MILLIGRAM(S): 325 TABLET ORAL at 18:36

## 2024-08-21 RX ADMIN — ACETAMINOPHEN 400 MILLIGRAM(S): 325 TABLET ORAL at 02:06

## 2024-08-21 RX ADMIN — ACETAMINOPHEN 975 MILLIGRAM(S): 325 TABLET ORAL at 23:12

## 2024-08-21 RX ADMIN — ACETAMINOPHEN 975 MILLIGRAM(S): 325 TABLET ORAL at 23:42

## 2024-08-21 RX ADMIN — ACETAMINOPHEN 1000 MILLIGRAM(S): 325 TABLET ORAL at 02:36

## 2024-08-21 RX ADMIN — KETOROLAC TROMETHAMINE 15 MILLIGRAM(S): 30 INJECTION, SOLUTION INTRAMUSCULAR at 03:13

## 2024-08-21 NOTE — DIETITIAN INITIAL EVALUATION ADULT - PERTINENT MEDS FT
MEDICATIONS  (STANDING):  acetaminophen     Tablet .. 975 milliGRAM(s) Oral every 6 hours  acetaminophen   IVPB .. 1000 milliGRAM(s) IV Intermittent every 6 hours  cefoTEtan  IVPB 2 Gram(s) IV Intermittent once  divalproex  milliGRAM(s) Oral daily  heparin   Injectable 5000 Unit(s) SubCutaneous every 8 hours  ibuprofen  Tablet. 400 milliGRAM(s) Oral every 6 hours  magnesium oxide 1000 milliGRAM(s) Oral two times a day with meals  OLANZapine 10 milliGRAM(s) Oral at bedtime    MEDICATIONS  (PRN):  naloxone Injectable 0.1 milliGRAM(s) IV Push every 3 minutes PRN For ANY of the following changes in patient status:  A. RR LESS THAN 10 breaths per minute, B. Oxygen saturation LESS THAN 90%, C. Sedation score of 6  ondansetron Injectable 4 milliGRAM(s) IV Push every 6 hours PRN Nausea  oxyCODONE    IR 5 milliGRAM(s) Oral every 6 hours PRN Severe Pain (7 - 10)  oxyCODONE    IR 2.5 milliGRAM(s) Oral every 6 hours PRN Moderate Pain (4 - 6)

## 2024-08-21 NOTE — DIETITIAN INITIAL EVALUATION ADULT - ENERGY INTAKE
Adequate (%) Pt reports very good appetite >75% of foods provided for breakfast and lunch today. Agree to drink Ensure surgery 1x daily (ordered for one). Pt made aware of menu ordering procedures in house with menu provided, encourage pt to order as needed to encourage PO intake while in house.

## 2024-08-21 NOTE — DIETITIAN INITIAL EVALUATION ADULT - ADD RECOMMEND
-- Monitor PO intake, GI tolerance, skin integrity, labs, weight, and bowel movement regularity.   -- Encourage use of daily menus. Honor dietary preferences as expressed as able.   -- Malnutrition alert placed in chart.

## 2024-08-21 NOTE — DIETITIAN INITIAL EVALUATION ADULT - ETIOLOGY-BASIS
Pulmonary Consult note      LOS: 0 days     Mr. Smith Craven, 72 y.o. male is seen for: Dyspnea.To see  for evaluation by Dr. Brant Napier    Patient was previously seen by my associate, Dr. Blake,  and most recently by Ms. Thorne.     Patient has a history of mild COPD, obstructive sleep apnea, hypoxemia with exertion, and mitral regurgitation.  Subjective - Interval History   Patient been complaining of increasing dyspnea and over the past several years.  He was hospitalized here in December and evaluated apparently improved some with diuresis.  He was discharged home.  He was seen by Ms. Thorne earlier this week and was evaluated medicines adjusted.  He returned however hospital complaining of increasing dyspnea.  He's had a little fever and found to have cellulitis of his legs.  He has been using oxygen 2 L/m continuously since last fall.  He has occasional wheezing and is been on 30 which she takes regularly but says he can only walk 20 feet on flat ground or climb one flight of steps.  He is a lifelong nonsmoker and denies any significant occupational exposure history.  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    He has a history of obstructive sleep apnea that was apparently diagnosed here.  He's been on CPAP but is not been using it because he saw the ads TV describing respiratory illness from using dirty CPAP.  His recent visit he was prescribed a new machine but he has not yet received that.  He has a history of atrial fibrillation for roughly 5 years.  He denies ever having an MI.  He is been told he had congestive heart failure.  He had a right heart catheter done this admission.  He had a left heart catheter apparently several years ago.    He has a history of fatty liver.  He had a biopsy that just showed this.  He has an enlarged spleen.  He is been seen by hematology and oncology in felt to have Gilbert's syndrome.    He has  history of hernia repair, cholecystectomy, knee replacement, and skin cancer    Allergies without    Smoking without    Ethanol without  Review of systems unremarkable except noted above.  The patient's relevant past medical, surgical and social history were reviewed and updated in Epic as appropriate.     Objective     Infusions:     Medications:    albuterol 2.5 mg Nebulization Q6H - RT   apixaban 5 mg Oral Q12H   fentaNYL citrate (PF)      Fluticasone Furoate-Vilanterol 1 puff Inhalation Daily   furosemide 40 mg Intravenous Q12H   metoprolol tartrate 25 mg Oral Q12H   midazolam      midazolam      O2 2 L/min Inhalation Q24H   potassium chloride 20 mEq Oral Daily   sulfamethoxazole-trimethoprim 1 tablet Oral Q12H       Vital Sign Min/Max for last 24 hours  Temp  Min: 97.6 °F (36.4 °C)  Max: 98 °F (36.7 °C)   BP  Min: 102/58  Max: 122/65   Pulse  Min: 59  Max: 79   Resp  Min: 16  Max: 20   SpO2  Min: 89 %  Max: 96 %   No Data Recorded      Intake/Output Summary (Last 24 hours) at 1/12/2019 1659  Last data filed at 1/12/2019 1523  Gross per 24 hour   Intake 960 ml   Output 4750 ml   Net -3790 ml           Physical Exam:   GENERAL: He is awake and alert   HEENT: Normocephalic   LUNGS: A few slight wheezes with decreased breath sounds at bases   HEART: Irregularly irregular rhythm   ABDOMEN: Soft but obese   EXTREMITIES: Has 3+ edema has some erythema on his left lower leg   NEURO/PSYCH: No focal deficits    Results from last 7 days   Lab Units  01/11/19   0850   WBC 10*3/mm3  5.61   HEMOGLOBIN g/dL  9.5*   PLATELETS 10*3/mm3  307     Results from last 7 days   Lab Units  01/12/19   0519  01/11/19   0850   SODIUM mmol/L  136  138   POTASSIUM mmol/L  4.2  5.1   CO2 mmol/L  21.0  18.0*   BUN mg/dL  26*  24*   CREATININE mg/dL  1.76*  1.80*   GLUCOSE mg/dL  127*  115*     Estimated Creatinine Clearance: 49.2 mL/min (A) (by C-G formula based on SCr of 1.76 mg/dL (H)).    Results from last 7 days   Lab Units  01/11/19    0850   HEMOGLOBIN A1C %  3.90*   [facility] showed mild obstruction mild restriction and a moderate diffusion impairment    His recent right heart catheter showed significant pulmonary hypertension with a PA pressure of 80/30.    His echocardiogram showed evidence of PFO on bubble testing.        No results found for: LACTATE       Images: Chest x-ray from earlier this week show some small bilateral effusions.  Looking back he is had similar small effusions for the past 2+ years.    I reviewed the patient's results and images.     Impression      Patient Active Problem List   Diagnosis Code   • Non-obstructive coronary artery disease I25.10   • Atrial fibrillation (CMS/[facility]) I48.91   • Neurocardiogenic syncope R55   • SOB (shortness of breath) R06.02   • Calculus of gallbladder with biliary obstruction but without cholecystitis K80.21   • Bronchitis J40   • Obesity (BMI 30-39.9) E66.9   • Iron deficiency anemia D50.9   • Reactive airway disease J45.909   • ANN MARIE on CPAP G47.33, Z99.89   • Peripheral venous insufficiency I87.2   • COPD (chronic obstructive pulmonary disease) (CMS/[facility]) J44.9   • Mitral valve regurgitation I34.0   • Gilbert's syndrome E80.4   • Splenomegaly D73.2   • Pleural effusion, bilateral J90   • Fatty liver- No cirrhosis by BX K76.0   • Mitral valve regurgitation, MILD MR I34.0   • Chronic anemia D63.8   • Pericardial effusion I31.3   • Mitral valve insufficiency I34.0            Plan        Patient presents with significant dyspnea that seems disproportionate to his PFT findings.  He does seem to have some mild obstruction would continue his bronchodilators.  He has known obstructive sleep apnea and I think he should be using his CPAP effectively given his pulmonary hypertension.  He appears to have some long-standing effusions on chest x-ray.  Dr. Napier is apparently considering the possibility amyloidosis and this may be explanation for his ongoing effusions  and dyspnea.      We'll try to get the patient to use CPAP.  We will check a ventilation perfusion lung scan.  We will follow with you.     Plan of care and goals reviewed with nurse at daily rounds.   I discussed the patient's findings and my recommendations with patient, family and nursing staff       Michael Petty MD  Pulmonary and Critical Care Medicine  01/12/19 4:59 PM        Chronic illness

## 2024-08-21 NOTE — PROGRESS NOTE ADULT - SUBJECTIVE AND OBJECTIVE BOX
SURGERY DAILY PROGRESS NOTE    24 Hour/Overnight Events: Failed TOV, straight cath 600 m L, voided 300 mL after    SUBJECTIVE: Patient seen and evaluated on AM rounds. Pt is resting in bed with no acute complaints. Patient reports surgical pain controlled on current regimen. Tolerating clear liquid diet. Ambulating well.   Denies fevers/chills, chest pain, dyspnea, abdominal pain, nausea, vomiting, and diarrhea    ------------------------------------------------------------------------------------------------------------  OBJECTIVE:  Vital Signs Last 24 Hrs  T(C): 36.4 (21 Aug 2024 04:55), Max: 36.9 (20 Aug 2024 09:14)  T(F): 97.5 (21 Aug 2024 04:55), Max: 98.4 (20 Aug 2024 09:14)  HR: 60 (21 Aug 2024 04:55) (60 - 94)  BP: 108/73 (21 Aug 2024 04:55) (99/65 - 137/56)  BP(mean): 80 (20 Aug 2024 16:00) (80 - 101)  RR: 18 (21 Aug 2024 04:55) (16 - 18)  SpO2: 98% (21 Aug 2024 04:55) (97% - 100%)    Parameters below as of 21 Aug 2024 04:55  Patient On (Oxygen Delivery Method): room air      I&O's Detail    20 Aug 2024 07:01  -  21 Aug 2024 06:17  --------------------------------------------------------  IN:    Lactated Ringers: 80 mL    Oral Fluid: 200 mL  Total IN: 280 mL    OUT:    Intermittent Catheterization - Urethral (mL): 600 mL    Voided (mL): 550 mL  Total OUT: 1150 mL    Total NET: -870 mL          LABS:                    PHYSICAL EXAM:  Constitutional: Well developed, NAD  Chest: Symmetric chest rise bilaterally, no increased WOB  Abdomen: Soft, nontender, nondistended, appropriate sx incisional tenderness with dressings d/i with some serosanguinous staining on dressing. No rebound or guarding.   Groin: Soft, nontender, no ecchymosis/hematoma.   Extremities:  Warm to touch, soft. No cyanosis/erythema/edema/hematoma   SURGERY DAILY PROGRESS NOTE    24 Hour/Overnight Events: Failed TOV, straight cath 600 m L, voided 300 mL after    SUBJECTIVE: Patient seen and evaluated on AM rounds. Pt is resting in bed with no acute complaints. Patient reports surgical pain controlled on current regimen. Tolerating clear liquid diet. Ambulating well. Patient not passing gas or having bowel movement yet.  Denies fevers/chills, chest pain, dyspnea, abdominal pain, nausea, vomiting, and diarrhea    ------------------------------------------------------------------------------------------------------------  OBJECTIVE:  Vital Signs Last 24 Hrs  T(C): 36.4 (21 Aug 2024 04:55), Max: 36.9 (20 Aug 2024 09:14)  T(F): 97.5 (21 Aug 2024 04:55), Max: 98.4 (20 Aug 2024 09:14)  HR: 60 (21 Aug 2024 04:55) (60 - 94)  BP: 108/73 (21 Aug 2024 04:55) (99/65 - 137/56)  BP(mean): 80 (20 Aug 2024 16:00) (80 - 101)  RR: 18 (21 Aug 2024 04:55) (16 - 18)  SpO2: 98% (21 Aug 2024 04:55) (97% - 100%)    Parameters below as of 21 Aug 2024 04:55  Patient On (Oxygen Delivery Method): room air      I&O's Detail    20 Aug 2024 07:01  -  21 Aug 2024 06:17  --------------------------------------------------------  IN:    Lactated Ringers: 80 mL    Oral Fluid: 200 mL  Total IN: 280 mL    OUT:    Intermittent Catheterization - Urethral (mL): 600 mL    Voided (mL): 550 mL  Total OUT: 1150 mL    Total NET: -870 mL          LABS:                    PHYSICAL EXAM:  Constitutional: Well developed, NAD  Chest: Symmetric chest rise bilaterally, no increased WOB  Abdomen: Soft, nontender, nondistended, appropriate sx incisional tenderness with dressings d/i with some serosanguinous staining on dressing. No rebound or guarding.   Groin: Soft, nontender, no ecchymosis/hematoma.   Extremities:  Warm to touch, soft. No cyanosis/erythema/edema/hematoma

## 2024-08-21 NOTE — DIETITIAN INITIAL EVALUATION ADULT - OTHER INFO
--  rectal and rectosigmoid CA: s/p LAR, DLI for sigmoid and rectal masses 6/26/24, s/p ileostomy reversal on 8/20/24, on Zofran

## 2024-08-21 NOTE — DIETITIAN INITIAL EVALUATION ADULT - NS FNS DIET ORDER
Diet, Low Fiber:   Ensure Surgery Cans or Servings Per Day:  1     Special Instructions for Nursing:  Advance diet to low fiber with 1 ensure surgery if patient tolerates 1 clear liquid tray (08-21-24 @ 07:42)

## 2024-08-21 NOTE — DIETITIAN INITIAL EVALUATION ADULT - PERTINENT LABORATORY DATA
08-21    138  |  100  |  9   ----------------------------<  95  4.5   |  24  |  0.70    Ca    9.1      21 Aug 2024 07:23  Phos  3.6     08-21  Mg     2.1     08-21    A1C with Estimated Average Glucose Result: 5.4 % (08-05-24 @ 15:04)  A1C with Estimated Average Glucose Result: 5.2 % (06-06-24 @ 16:12)

## 2024-08-21 NOTE — DIETITIAN INITIAL EVALUATION ADULT - EDUCATION DIETARY MODIFICATIONS
Reviewed Ileostomy nutrition therapy/food list handout (provided by ostomy RN). Discussed eating low-fiber foods, chewing foods well, small frequent meals high in protein & energy. Reviewed foods that may cause odors &/or gas, discussed foods that bulk stool and thin stool, and importance of adequate hydration./(1) partially meets; needs review/practice/verbalization

## 2024-08-21 NOTE — DIETITIAN INITIAL EVALUATION ADULT - PHYSCIAL ASSESSMENT
Drug Dosing Weight  Height (cm): 165.1 (20 Aug 2024 10:08)  Weight (kg): 57.6 (20 Aug 2024 10:08)  BMI (kg/m2): 21.1 (20 Aug 2024 10:08)    Daily weight (standing or bed scale): none documented thus far   Weight obtained by RD: unable to assess as pt out of bed to recliner upon visit     Weight history:   Per pt: 127lb prior to admission, reports wt loss from 135lb to 127lb for the past 2 months due to GI distress and decreased PO intake   Previous RD notes: 65.8kg (6/27/24), 59.1kg (4/22/24), 60.3kg (12/7/23)      ** Noted with weight fluctuation but downtrending per previous RD notes. Pt reported wt loss of 8lb/6% in 2 months. RD will continue to monitor wt trends as available/able.     IBW: 136lb, 93% IBW

## 2024-08-21 NOTE — DIETITIAN INITIAL EVALUATION ADULT - SIGNS/SYMPTOMS
pt asking for diet-related question and receptive to diet education on low fiber diet  reported PO intake <75% and non significant wt loss in 2 months, mild fat and muscle wasting

## 2024-08-21 NOTE — DIETITIAN INITIAL EVALUATION ADULT - WEIGHT FOR BMI (KG)
[facility]     57.6

## 2024-08-21 NOTE — DIETITIAN INITIAL EVALUATION ADULT - ORAL NUTRITION SUPPLEMENTS
continue Ensure surgery 1x daily as ordered to provide additional calories and nutrients to encourage PO intake.

## 2024-08-21 NOTE — DIETITIAN INITIAL EVALUATION ADULT - OTHER CALCULATIONS
Fluid needs deferred to team. Energy and protein needs based on dosing wt of 57.6kg in consideration of malnutrition.

## 2024-08-21 NOTE — DIETITIAN NUTRITION RISK NOTIFICATION - TREATMENT: THE FOLLOWING DIET HAS BEEN RECOMMENDED
Diet, Low Fiber:   Ensure Surgery Cans or Servings Per Day:  1     Special Instructions for Nursing:  Advance diet to low fiber with 1 ensure surgery if patient tolerates 1 clear liquid tray (08-21-24 @ 07:42) [Active]

## 2024-08-21 NOTE — PROGRESS NOTE ADULT - ASSESSMENT
48 year old male with w/ PMHx of asthma, bipolar, substance abuse (occasional cocaine and marijuana use, questionable EtOH hx), and rectal and rectosigmoid CA. On 8/22/2023 he presented to [facility] ED for symptomatic anemia 2/2 iron deficiency and underwent EDG/colonoscopy on 8/24/2023 which revealed two large malignant-appearing masses in the rectosigmoid colon at 20 cm and in the distal rectum at 5 cm from the anal verge. Pathology both positive for invasive adenocarcinoma. Patient underwent robotic assisted LAR, DLI for sigmoid and rectal masses 6/26/24 now s/p ileostomy reversal on 8/20/24.     PLAN:  - Diet: CLD --> advanced to LRD if tolerating and IV lock  - Pain control: tylenol, toradol  - Passed TOV   - monitor bowel movement/flatus   - OOB AT  - DVT ppx: [facility]    Red Surgery  [id number] 48 year old male with w/ PMHx of asthma, bipolar, substance abuse (occasional cocaine and marijuana use, questionable EtOH hx), and rectal and rectosigmoid CA. On 8/22/2023 he presented to [facility] ED for symptomatic anemia 2/2 iron deficiency and underwent EDG/colonoscopy on 8/24/2023 which revealed two large malignant-appearing masses in the rectosigmoid colon at 20 cm and in the distal rectum at 5 cm from the anal verge. Pathology both positive for invasive adenocarcinoma. Patient underwent robotic assisted LAR, DLI for sigmoid and rectal masses 6/26/24 now s/p ileostomy reversal on 8/20/24.     PLAN:  - Diet: advance to LRD and IV lock  - Pain control: tylenol, toradol  - Passed TOV   - monitor bowel movement/flatus   - OOB AT  - DVT ppx: [facility]    Red Surgery  [id number]

## 2024-08-21 NOTE — DIETITIAN INITIAL EVALUATION ADULT - REASON INDICATOR FOR ASSESSMENT
Pt seen for consult for nutrition assessment/education for ERP, low fiber diet education. Information obtained from pt, RN, electronic medical record. Chart reviewed, events noted.

## 2024-08-21 NOTE — PROGRESS NOTE ADULT - SUBJECTIVE AND OBJECTIVE BOX
Day 1 of Anesthesia Pain Management Service    SUBJECTIVE: Doing ok  Pain Scale Score:          [X] Refer to charted pain scores    THERAPY:    s/p     100mcg PF morphine on 8\20\24      MEDICATIONS  (STANDING):  acetaminophen     Tablet .. 975 milliGRAM(s) Oral every 6 hours  acetaminophen   IVPB .. 1000 milliGRAM(s) IV Intermittent every 6 hours  cefoTEtan  IVPB 2 Gram(s) IV Intermittent once  divalproex  milliGRAM(s) Oral daily  heparin   Injectable 5000 Unit(s) SubCutaneous every 8 hours  ibuprofen  Tablet. 400 milliGRAM(s) Oral every 6 hours  morphine PF Spinal 0.1 milliGRAM(s) IntraThecal. once  OLANZapine 10 milliGRAM(s) Oral at bedtime    MEDICATIONS  (PRN):  naloxone Injectable 0.1 milliGRAM(s) IV Push every 3 minutes PRN For ANY of the following changes in patient status:  A. RR LESS THAN 10 breaths per minute, B. Oxygen saturation LESS THAN 90%, C. Sedation score of 6  ondansetron Injectable 4 milliGRAM(s) IV Push every 6 hours PRN Nausea      OBJECTIVE:    Sedation:        	[X] Alert	 [ ] Drowsy	[ ] Arousable      [ ] Asleep       [ ] Unresponsive    Side Effects:	[X] None 	[ ] Nausea	[ ] Vomiting         [ ] Pruritus  		[ ] Weakness            [ ] Numbness	          [ ] Other:    Vital Signs Last 24 Hrs  T(C): 36.4 (21 Aug 2024 09:18), Max: 36.7 (20 Aug 2024 18:05)  T(F): 97.5 (21 Aug 2024 09:18), Max: 98 (20 Aug 2024 18:05)  HR: 82 (21 Aug 2024 09:18) (60 - 87)  BP: 96/64 (21 Aug 2024 09:18) (96/64 - 137/56)  BP(mean): 80 (20 Aug 2024 16:00) (80 - 101)  RR: 18 (21 Aug 2024 09:18) (16 - 18)  SpO2: 96% (21 Aug 2024 09:18) (96% - 100%)    Parameters below as of 21 Aug 2024 09:18  Patient On (Oxygen Delivery Method): room air        ASSESSMENT/ PLAN  [X] Patient transitioned to prn analgesics  [X] Pain management per primary service, pain service to sign off   [X]Documentation and Verification of current medications

## 2024-08-21 NOTE — DIETITIAN INITIAL EVALUATION ADULT - ETIOLOGY
decreased ability to consume adequate protein-energy in setting of GI distress  incomplete exposure to previous diet education

## 2024-08-21 NOTE — DIETITIAN INITIAL EVALUATION ADULT - ORAL INTAKE PTA/DIET HISTORY
Pt reports decreased PO intake with estimated <75% of usual PO intake for the past 2 months (after ileostomy creation in June 2024). Pt practices low fiber diet at home, reports early satiety and watery stools. Confirms no known food allergies. Denies history of chewing or swallowing issues. Denies micronutrient supplement use, denies protein supplement use.

## 2024-08-22 ENCOUNTER — TRANSCRIPTION ENCOUNTER (OUTPATIENT)
Age: 49
End: 2024-08-22

## 2024-08-22 VITALS
OXYGEN SATURATION: 96 % | DIASTOLIC BLOOD PRESSURE: 77 MMHG | SYSTOLIC BLOOD PRESSURE: 118 MMHG | RESPIRATION RATE: 18 BRPM | HEART RATE: 96 BPM | TEMPERATURE: 98 F

## 2024-08-22 LAB
ANION GAP SERPL CALC-SCNC: 10 MMOL/L — SIGNIFICANT CHANGE UP (ref 5–17)
BUN SERPL-MCNC: 10 MG/DL — SIGNIFICANT CHANGE UP (ref 7–23)
CALCIUM SERPL-MCNC: 8.3 MG/DL — LOW (ref 8.4–10.5)
CHLORIDE SERPL-SCNC: 105 MMOL/L — SIGNIFICANT CHANGE UP (ref 96–108)
CO2 SERPL-SCNC: 24 MMOL/L — SIGNIFICANT CHANGE UP (ref 22–31)
CREAT SERPL-MCNC: 0.75 MG/DL — SIGNIFICANT CHANGE UP (ref 0.5–1.3)
EGFR: 113 ML/MIN/1.73M2 — SIGNIFICANT CHANGE UP
GLUCOSE SERPL-MCNC: 81 MG/DL — SIGNIFICANT CHANGE UP (ref 70–99)
HCT VFR BLD CALC: 35.5 % — LOW (ref 39–50)
HGB BLD-MCNC: 11.2 G/DL — LOW (ref 13–17)
MAGNESIUM SERPL-MCNC: 2.1 MG/DL — SIGNIFICANT CHANGE UP (ref 1.6–2.6)
MCHC RBC-ENTMCNC: 28.9 PG — SIGNIFICANT CHANGE UP (ref 27–34)
MCHC RBC-ENTMCNC: 31.5 GM/DL — LOW (ref 32–36)
MCV RBC AUTO: 91.5 FL — SIGNIFICANT CHANGE UP (ref 80–100)
NRBC # BLD: 0 /100 WBCS — SIGNIFICANT CHANGE UP (ref 0–0)
PHOSPHATE SERPL-MCNC: 2.8 MG/DL — SIGNIFICANT CHANGE UP (ref 2.5–4.5)
PLATELET # BLD AUTO: 412 K/UL — HIGH (ref 150–400)
POTASSIUM SERPL-MCNC: 3.8 MMOL/L — SIGNIFICANT CHANGE UP (ref 3.5–5.3)
POTASSIUM SERPL-SCNC: 3.8 MMOL/L — SIGNIFICANT CHANGE UP (ref 3.5–5.3)
RBC # BLD: 3.88 M/UL — LOW (ref 4.2–5.8)
RBC # FLD: 13.9 % — SIGNIFICANT CHANGE UP (ref 10.3–14.5)
SODIUM SERPL-SCNC: 139 MMOL/L — SIGNIFICANT CHANGE UP (ref 135–145)
SURGICAL PATHOLOGY STUDY: SIGNIFICANT CHANGE UP
WBC # BLD: 6.65 K/UL — SIGNIFICANT CHANGE UP (ref 3.8–10.5)
WBC # FLD AUTO: 6.65 K/UL — SIGNIFICANT CHANGE UP (ref 3.8–10.5)

## 2024-08-22 PROCEDURE — 85027 COMPLETE CBC AUTOMATED: CPT

## 2024-08-22 PROCEDURE — 84100 ASSAY OF PHOSPHORUS: CPT

## 2024-08-22 PROCEDURE — C1889: CPT

## 2024-08-22 PROCEDURE — 88304 TISSUE EXAM BY PATHOLOGIST: CPT

## 2024-08-22 PROCEDURE — 83735 ASSAY OF MAGNESIUM: CPT

## 2024-08-22 PROCEDURE — 82962 GLUCOSE BLOOD TEST: CPT

## 2024-08-22 PROCEDURE — 80048 BASIC METABOLIC PNL TOTAL CA: CPT

## 2024-08-22 PROCEDURE — C9399: CPT

## 2024-08-22 RX ORDER — ACETAMINOPHEN 325 MG/1
3 TABLET ORAL
Qty: 0 | Refills: 0 | DISCHARGE
Start: 2024-08-22

## 2024-08-22 RX ORDER — IBUPROFEN 600 MG
1 TABLET ORAL
Qty: 0 | Refills: 0 | DISCHARGE
Start: 2024-08-22

## 2024-08-22 RX ADMIN — DIVALPROEX SODIUM 500 MILLIGRAM(S): 125 CAPSULE, DELAYED RELEASE ORAL at 12:41

## 2024-08-22 RX ADMIN — Medication 400 MILLIGRAM(S): at 01:27

## 2024-08-22 RX ADMIN — ACETAMINOPHEN 975 MILLIGRAM(S): 325 TABLET ORAL at 05:33

## 2024-08-22 RX ADMIN — Medication 5000 UNIT(S): at 05:02

## 2024-08-22 RX ADMIN — ACETAMINOPHEN 975 MILLIGRAM(S): 325 TABLET ORAL at 05:03

## 2024-08-22 RX ADMIN — Medication 400 MILLIGRAM(S): at 01:57

## 2024-08-22 RX ADMIN — ACETAMINOPHEN 975 MILLIGRAM(S): 325 TABLET ORAL at 12:43

## 2024-08-22 NOTE — DISCHARGE NOTE NURSING/CASE MANAGEMENT/SOCIAL WORK - NSDCPEFALRISK_GEN_ALL_CORE
For information on Fall & Injury Prevention, visit: [email or web address] OR  [email or web address] OR  https://www.cdc.gov/steadi/patient.html

## 2024-08-22 NOTE — PROGRESS NOTE ADULT - SUBJECTIVE AND OBJECTIVE BOX
SURGERY DAILY PROGRESS NOTE    24 Hour/Overnight Events: No acute events overnight    SUBJECTIVE: Patient seen and evaluated on AM rounds. Pt is resting comfortably in bed with no acute complaints. Tolerating low fiber diet. Ambulating well. Patient has had a bit of diarrhea last night and this morning.   Denies fevers/chills, chest pain, dyspnea, abdominal pain, nausea, vomiting.   ------------------------------------------------------------------------------------------------------------  OBJECTIVE:  Vital Signs Last 24 Hrs  T(C): 36.7 (22 Aug 2024 05:10), Max: 36.7 (21 Aug 2024 19:22)  T(F): 98.1 (22 Aug 2024 05:10), Max: 98.1 (21 Aug 2024 19:22)  HR: 83 (22 Aug 2024 05:10) (80 - 89)  BP: 104/71 (22 Aug 2024 05:10) (96/64 - 133/89)  BP(mean): --  RR: 18 (22 Aug 2024 05:10) (18 - 18)  SpO2: 98% (22 Aug 2024 05:10) (96% - 98%)    Parameters below as of 22 Aug 2024 05:10  Patient On (Oxygen Delivery Method): room air      I&O's Detail    21 Aug 2024 07:01  -  22 Aug 2024 07:00  --------------------------------------------------------  IN:    Oral Fluid: 680 mL  Total IN: 680 mL    OUT:    Voided (mL): 1350 mL  Total OUT: 1350 mL    Total NET: -670 mL          LABS:                        11.5   6.88  )-----------( 396      ( 21 Aug 2024 07:25 )             36.6     08-21    138  |  100  |  9   ----------------------------<  95  4.5   |  24  |  0.70    Ca    9.1      21 Aug 2024 07:23  Phos  3.6     08-21  Mg     2.1     08-21          Urinalysis Basic - ( 21 Aug 2024 07:23 )    Color: x / Appearance: x / SG: x / pH: x  Gluc: 95 mg/dL / Ketone: x  / Bili: x / Urobili: x   Blood: x / Protein: x / Nitrite: x   Leuk Esterase: x / RBC: x / WBC x   Sq Epi: x / Non Sq Epi: x / Bacteria: x          PHYSICAL EXAM:  Constitutional: Well developed, NAD  Chest: Symmetric chest rise bilaterally, no increased WOB  Abdomen: Soft, nontender, nondistended, appropriate sx incisional tenderness; dressings d/i with some serosanguinous staining on dressing, but now replaced with new dressings. No rebound or guarding.   Groin: Soft, nontender, no ecchymosis/hematoma.   Extremities:  Warm to touch, soft. No cyanosis/erythema/edema/hematoma

## 2024-08-22 NOTE — DISCHARGE NOTE NURSING/CASE MANAGEMENT/SOCIAL WORK - PATIENT PORTAL LINK FT
You can access the FollowMyHealth Patient Portal offered by [facility] by registering at the following website: [email or web address]. By joining [employer]’s FollowMyHealth portal, you will also be able to view your health information using other applications (apps) compatible with our system.

## 2024-08-22 NOTE — DISCHARGE NOTE PROVIDER - NSDCCPTREATMENT_GEN_ALL_CORE_FT
PRINCIPAL PROCEDURE  Procedure: Reversal of colostomy or ileostomy  Findings and Treatment: Activity:  - Rest at home during the first few days after surgery.  - Walking is encouraged, but strenuous exercise is not allowed until 6 weeks after surgery.   - Avoid strenuous activity. Do not lift your arms above your head. Do not lift more than 5-10  pounds.  Call the Office:  Do not hesitate to call the office with any concerns or questions. A doctor is available to answer your  questions 24 hours a day. Please notify us immediately at if:  1. You have increased swelling, pain, or color change in the abdomen  2. You have a sudden increase in swelling of the abdomen.  3. You have redness develop around the incisions.  4. You have a fever greater than 101 F.  5. You develop sudden increase in pain.  6. You develop drainage, spreading redness or foul odor  7. You have any questions.        SECONDARY PROCEDURE  Procedure: Reversal of colostomy or ileostomy  Findings and Treatment:      PRINCIPAL PROCEDURE  Procedure: Reversal of colostomy or ileostomy  Findings and Treatment: Activity:  - Rest at home during the first few days after surgery.  - Walking is encouraged, but strenuous exercise is not allowed until 6 weeks after surgery.   - Avoid strenuous activity. Do not lift your arms above your head. Do not lift more than 5-10  pounds.  WOUND CARE: Keep covered with Gauze and tape.    BATHING: Please do not submerge wound underwater. You may shower and/or sponge bathe.  ACTIVITY: No heavy lifting anything more than 10-15lbs or straining. Otherwise, you may return to your usual level of physical activity. If you are taking narcotic pain medication (such as Percocet), do NOT drive a car, operate machinery or make important decisions.  DIET: Low fiber diet  NOTIFY YOUR SURGEON IF: You have any bleeding that does not stop, any pus draining from your wound, any fever (over 100.4 F) or chills, persistent nausea/vomiting with inability to tolerate food or liquids, persistent diarrhea, or if your pain is not controlled on your discharge pain medications.  FOLLOW-UP:  1. Please call to make a follow-up appointment within one week of discharge with Dr. Mott  2. Please follow up with your primary care physician in one week regarding your hospitalization.  Please take tylenol 1000mg every 6 hours alternating with motrin every 6 hours as needed for pain. Take with food.         SECONDARY PROCEDURE  Procedure: Reversal of colostomy or ileostomy  Findings and Treatment:

## 2024-08-22 NOTE — DISCHARGE NOTE PROVIDER - HOSPITAL COURSE
48 year old male with w/ PMHx of asthma, bipolar, substance abuse (occasional cocaine and marijuana use, questionable EtOH hx), and rectal and rectosigmoid CA. On 8/22/2023 he presented to [facility] ED for symptomatic anemia 2/2 iron deficiency and underwent EDG/colonoscopy on 8/24/2023 which revealed two large malignant-appearing masses in the rectosigmoid colon at 20 cm and in the distal rectum at 5 cm from the anal verge. Pathology both positive for invasive adenocarcinoma. S/p robotic colon resection with ileostomy June 2024. Pt evaluated by Dr. Mott for a scheduled ileostomy reversal on 8/20/2024.    Patient's diet progressed after procedure while monitoring his bowel function.     At the time of discharge, the patient was hemodynamically stable, was tolerating PO diet, was voiding urine and passing stool, was ambulating, and was comfortable with adequate pain control. The patient was instructed to follow up with Dr. Mott within 1-2 weeks after discharge from the hospital. The patient/family felt comfortable with discharge. The patient was discharged to home/rehab. The patient had no other issues.

## 2024-08-22 NOTE — PROGRESS NOTE ADULT - NUTRITIONAL ASSESSMENT
This patient has been assessed with a concern for Malnutrition and has been determined to have a diagnosis/diagnoses of Moderate protein-calorie malnutrition.    This patient is being managed with:   Diet Low Fiber-  Ensure Surgery Cans or Servings Per Day:  1     Special Instructions for Nursing:  Advance diet to low fiber with 1 ensure surgery if patient tolerates 1 clear liquid tray  Entered: Aug 21 2024  7:42AM  
This patient has been assessed with a concern for Malnutrition and has been determined to have a diagnosis/diagnoses of Moderate protein-calorie malnutrition.    This patient is being managed with:   Diet Low Fiber-  Entered: Jun 27 2024 12:38PM

## 2024-08-22 NOTE — PROGRESS NOTE ADULT - ASSESSMENT
48 year old male with w/ PMHx of asthma, bipolar, substance abuse (occasional cocaine and marijuana use, questionable EtOH hx), and rectal and rectosigmoid CA. On 8/22/2023 he presented to [facility] ED for symptomatic anemia 2/2 iron deficiency and underwent EDG/colonoscopy on 8/24/2023 which revealed two large malignant-appearing masses in the rectosigmoid colon at 20 cm and in the distal rectum at 5 cm from the anal verge. Pathology both positive for invasive adenocarcinoma. Patient underwent robotic assisted LAR, DLI for sigmoid and rectal masses 6/26/24 now s/p ileostomy reversal on 8/20/24.     PLAN:  - Diet: LRD   - dc today   - +/+ flatus/bowel movement   - Pain control: tylenol, toradol  - OOB AT  - DVT ppx: [facility]    Red Surgery  [id number]

## 2024-08-22 NOTE — DISCHARGE NOTE PROVIDER - NSDCMRMEDTOKEN_GEN_ALL_CORE_FT
Depakote 500 mg oral delayed release tablet: 1 tab(s) orally once a day  loperamide 2 mg oral capsule: 2 cap(s) orally 4 times a day  ZyPREXA 10 mg oral tablet: 1 tab(s) orally once a day   acetaminophen 325 mg oral tablet: 3 tab(s) orally every 6 hours  Depakote 500 mg oral delayed release tablet: 1 tab(s) orally once a day  ibuprofen 400 mg oral tablet: 1 tab(s) orally every 6 hours  ZyPREXA 10 mg oral tablet: 1 tab(s) orally once a day

## 2024-08-22 NOTE — DISCHARGE NOTE PROVIDER - CARE PROVIDER_API CALL
Saulo Mott  Colon/Rectal Surgery  [address] [place] [address], [address]  [place] [address]  Phone: [phone]  Fax: [phone]  Follow Up Time: 2 weeks

## 2024-08-22 NOTE — DISCHARGE NOTE PROVIDER - NSDCFUSCHEDAPPT_GEN_ALL_CORE_FT
[facility]  [facility] [address]  Scheduled Appointment: 09/09/2024

## 2024-08-22 NOTE — DISCHARGE NOTE PROVIDER - DETAILS OF MALNUTRITION DIAGNOSIS/DIAGNOSES
This patient has been assessed with a concern for Malnutrition and was treated during this hospitalization for the following Nutrition diagnosis/diagnoses:     -  08/21/2024: Moderate protein-calorie malnutrition

## 2024-08-23 ENCOUNTER — TRANSCRIPTION ENCOUNTER (OUTPATIENT)
Age: 49
End: 2024-08-23

## 2024-08-30 ENCOUNTER — APPOINTMENT (OUTPATIENT)
Dept: COLORECTAL SURGERY | Facility: CLINIC | Age: 49
End: 2024-08-30
Payer: MEDICAID

## 2024-08-30 DIAGNOSIS — C20 MALIGNANT NEOPLASM OF RECTUM: ICD-10-CM

## 2024-08-30 PROCEDURE — 99024 POSTOP FOLLOW-UP VISIT: CPT

## 2024-08-30 NOTE — HISTORY OF PRESENT ILLNESS
[FreeTextEntry1] : The patient was having some postprandial epigastric pain that has subsided as of today.  He was also having significant issues with constipation and upon further investigating was found to still be taking Imodium despite it not being in his discharge paperwork to continue it.  He was told over the phone to stop taking it which he did.  He subsequently has been having multiple small bowel movements.  He said he had up to 20 last night again and very small amounts each time.

## 2024-08-30 NOTE — ASSESSMENT
[FreeTextEntry1] : resume high fiber diet and begin a daily fiber supplement to better bulk stool cont local wound care for ostomy site f/u in 2-3 weeks

## 2024-09-09 ENCOUNTER — APPOINTMENT (OUTPATIENT)
Dept: FAMILY MEDICINE | Facility: CLINIC | Age: 49
End: 2024-09-09

## 2024-09-13 ENCOUNTER — APPOINTMENT (OUTPATIENT)
Dept: COLORECTAL SURGERY | Facility: CLINIC | Age: 49
End: 2024-09-13
Payer: MEDICAID

## 2024-09-13 DIAGNOSIS — C20 MALIGNANT NEOPLASM OF RECTUM: ICD-10-CM

## 2024-09-13 PROCEDURE — 99024 POSTOP FOLLOW-UP VISIT: CPT

## 2024-09-13 NOTE — HISTORY OF PRESENT ILLNESS
[FreeTextEntry1] : The patient presents with reports of continued frequent small bowel movements throughout the day.  He has resumed a regular diet but admits to not drinking any water during the day.  He only drinks soda.

## 2024-09-13 NOTE — ASSESSMENT
[FreeTextEntry1] : The patient is healing well He is instructed to begin a daily fiber supplement and to increase the amount of water intake daily.  He understands the importance of drinking water rather than soda.  He will follow-up in 1 to 2 weeks for another wound check

## 2024-09-13 NOTE — PHYSICAL EXAM
[Normal Breath Sounds] : Normal breath sounds [Wheezing] : no wheezing was heard [Normal Heart Sounds] : normal heart sounds [Normal Rate and Rhythm] : normal rate and rhythm [Alert] : alert [Oriented to Person] : oriented to person [Oriented to Place] : oriented to place [Oriented to Time] : oriented to time [Calm] : calm [de-identified] : soft, NT/ND, incisions well healed, RLQ ileostomy site smaller than on previous exam with a healthy bed of granulation tissue that is treated with silver nitrate sticks [de-identified] : NAD [de-identified] : NCAT [de-identified] : supple [de-identified] : KANDACE [de-identified] : warm

## 2024-09-23 NOTE — H&P PST ADULT - SPO2 (%)
Patient was seen today, mother requesting a note for school. Please email to [email or web address] Call mother if more information is needed.    98

## 2024-09-27 ENCOUNTER — APPOINTMENT (OUTPATIENT)
Dept: COLORECTAL SURGERY | Facility: CLINIC | Age: 49
End: 2024-09-27
Payer: MEDICAID

## 2024-09-27 DIAGNOSIS — C20 MALIGNANT NEOPLASM OF RECTUM: ICD-10-CM

## 2024-09-27 DIAGNOSIS — R19.5 OTHER FECAL ABNORMALITIES: ICD-10-CM

## 2024-09-27 PROCEDURE — 99024 POSTOP FOLLOW-UP VISIT: CPT

## 2024-09-27 NOTE — ASSESSMENT
[FreeTextEntry1] : The patient will begin 1 tab of Imodium daily He will call next week if his stools do not improve Follow-up in 3 weeks for wound check

## 2024-09-27 NOTE — HISTORY OF PRESENT ILLNESS
[FreeTextEntry1] : The patient reports continued frequent small loose bowel movements throughout the day multiple times an hour.  He is fearful that he will not be able to return to work given the frequency of his bowel movements

## 2024-09-27 NOTE — PHYSICAL EXAM
[Normal Breath Sounds] : Normal breath sounds [Wheezing] : no wheezing was heard [Normal Heart Sounds] : normal heart sounds [Normal Rate and Rhythm] : normal rate and rhythm [Alert] : alert [Oriented to Person] : oriented to person [Oriented to Place] : oriented to place [Oriented to Time] : oriented to time [Calm] : calm [de-identified] : soft, NT/ND, incisions well healed, RLQ ileostomy site with tiny remaining wound treated with silver nitrate sticks [de-identified] : NAD [de-identified] : NCAT [de-identified] : supple [de-identified] : KANDACE [de-identified] : warm

## 2024-10-01 ENCOUNTER — APPOINTMENT (OUTPATIENT)
Dept: FAMILY MEDICINE | Facility: CLINIC | Age: 49
End: 2024-10-01
Payer: MEDICAID

## 2024-10-01 VITALS
OXYGEN SATURATION: 98 % | BODY MASS INDEX: 21.33 KG/M2 | TEMPERATURE: 98 F | HEIGHT: 65 IN | WEIGHT: 128 LBS | HEART RATE: 91 BPM | DIASTOLIC BLOOD PRESSURE: 78 MMHG | SYSTOLIC BLOOD PRESSURE: 103 MMHG

## 2024-10-01 DIAGNOSIS — D64.9 ANEMIA, UNSPECIFIED: ICD-10-CM

## 2024-10-01 DIAGNOSIS — C18.9 MALIGNANT NEOPLASM OF COLON, UNSPECIFIED: ICD-10-CM

## 2024-10-01 DIAGNOSIS — Z82.49 FAMILY HISTORY OF ISCHEMIC HEART DISEASE AND OTHER DISEASES OF THE CIRCULATORY SYSTEM: ICD-10-CM

## 2024-10-01 DIAGNOSIS — F31.9 BIPOLAR DISORDER, UNSPECIFIED: ICD-10-CM

## 2024-10-01 PROCEDURE — 99204 OFFICE O/P NEW MOD 45 MIN: CPT

## 2024-10-01 PROCEDURE — G2211 COMPLEX E/M VISIT ADD ON: CPT | Mod: NC

## 2024-10-01 RX ORDER — MULTIVIT-MIN/IRON/FOLIC ACID/K 18-600-40
CAPSULE ORAL
Refills: 0 | Status: ACTIVE | COMMUNITY

## 2024-10-02 ENCOUNTER — NON-APPOINTMENT (OUTPATIENT)
Age: 49
End: 2024-10-02

## 2024-10-02 PROBLEM — C18.9 COLON CANCER: Status: ACTIVE | Noted: 2024-10-01

## 2024-10-02 NOTE — PHYSICAL EXAM
[No Acute Distress] : no acute distress [Well-Appearing] : well-appearing [Normal Sclera/Conjunctiva] : normal sclera/conjunctiva [EOMI] : extraocular movements intact [Normal Outer Ear/Nose] : the outer ears and nose were normal in appearance [Normal Nasal Mucosa] : the nasal mucosa was normal [No Respiratory Distress] : no respiratory distress  [No Accessory Muscle Use] : no accessory muscle use [Clear to Auscultation] : lungs were clear to auscultation bilaterally [Normal Rate] : normal rate  [Regular Rhythm] : with a regular rhythm [Normal S1, S2] : normal S1 and S2 [No Edema] : there was no peripheral edema [Soft] : abdomen soft [Non Tender] : non-tender [Non-distended] : non-distended [Normal Bowel Sounds] : normal bowel sounds [No CVA Tenderness] : no CVA  tenderness [No Spinal Tenderness] : no spinal tenderness [No Joint Swelling] : no joint swelling [Grossly Normal Strength/Tone] : grossly normal strength/tone [Coordination Grossly Intact] : coordination grossly intact [Normal Gait] : normal gait [Normal Affect] : the affect was normal [Normal Insight/Judgement] : insight and judgment were intact [de-identified] :  incisions well healed, RLQ ileostomy site with clean and dry bandage

## 2024-10-02 NOTE — PLAN
[FreeTextEntry1] : Recent labs in [facility]IE reviewed   1. Colon Cancer - s/p radiation, chemo, and colon resection - s/p ileostomy reversal on 8/2024 - follows with Colorectal: Dr. Saulo Mott, Med/Onc: Dr. Keshia Felix.  2. Anemia - 2/2 hx of CHUCK and colon cancer - on iron supplements - Hgb stable on recent CBC done with heme/onc - follows with heme/onc: Dr. Keshia Felix.  3. Bipolar Disorder - stable - on Depakote and zyprexa - follows with psychiatry  f/u for CPE

## 2024-10-02 NOTE — PHYSICAL EXAM
[No Acute Distress] : no acute distress [Well-Appearing] : well-appearing [Normal Sclera/Conjunctiva] : normal sclera/conjunctiva [EOMI] : extraocular movements intact [Normal Outer Ear/Nose] : the outer ears and nose were normal in appearance [Normal Nasal Mucosa] : the nasal mucosa was normal [No Respiratory Distress] : no respiratory distress  [No Accessory Muscle Use] : no accessory muscle use [Clear to Auscultation] : lungs were clear to auscultation bilaterally [Normal Rate] : normal rate  [Regular Rhythm] : with a regular rhythm [Normal S1, S2] : normal S1 and S2 [No Edema] : there was no peripheral edema [Soft] : abdomen soft [Non Tender] : non-tender [Non-distended] : non-distended [Normal Bowel Sounds] : normal bowel sounds [No CVA Tenderness] : no CVA  tenderness [No Spinal Tenderness] : no spinal tenderness [No Joint Swelling] : no joint swelling [Grossly Normal Strength/Tone] : grossly normal strength/tone [Coordination Grossly Intact] : coordination grossly intact [Normal Gait] : normal gait [Normal Affect] : the affect was normal [Normal Insight/Judgement] : insight and judgment were intact [de-identified] :  incisions well healed, RLQ ileostomy site with clean and dry bandage

## 2024-10-02 NOTE — DISCUSSION/SUMMARY
[FreeTextEntry1] : Patient was contacted multiple times to complete his genetic testing by sending his saliva kit to the testing company. This was never completed. A letter was sent today by our GCA, Luis Goncalves, requesting that he contact our office to indicate whether he would still like to pursue genetic testing, otherwise the order will be canceled in two weeks.  Rochelle Bray MS, [facility] [place] Genetic Counselor, Cancer Genetics

## 2024-10-02 NOTE — HISTORY OF PRESENT ILLNESS
[FreeTextEntry8] : The patient is a 49yo male with pmhx of asthma (well controlled, denies inhaler use for many years), bipolar, substance abuse, and rectal and rectosigmoid CA (s/p radiation, chemo, and colon resection) who presents today to establish care with a new PCP. He states he feel well today. He is s/p ileostomy reversal on 8/2024  He follows with: For hx of Colon Cancer: Colorectal: Dr. Saulo Mott, Med/Onc: Dr. Keshia Felix. For hx of bipolar disorder: psychiatry  Has hx of substance abuse, but states he has not used in a long time.   Follows with psychiatrist monthly for hx of bipolar

## 2024-10-18 ENCOUNTER — APPOINTMENT (OUTPATIENT)
Dept: COLORECTAL SURGERY | Facility: CLINIC | Age: 49
End: 2024-10-18
Payer: MEDICAID

## 2024-10-18 DIAGNOSIS — C20 MALIGNANT NEOPLASM OF RECTUM: ICD-10-CM

## 2024-10-18 PROCEDURE — 99213 OFFICE O/P EST LOW 20 MIN: CPT | Mod: 24

## 2024-10-21 ENCOUNTER — NON-APPOINTMENT (OUTPATIENT)
Age: 49
End: 2024-10-21

## 2024-11-11 NOTE — DISCHARGE NOTE NURSING/CASE MANAGEMENT/SOCIAL WORK - NURSING SECTION COMPLETE
Medication(s) requesting:   Requested Prescriptions     Pending Prescriptions Disp Refills    tiZANidine (ZANAFLEX) 4 MG tablet 30 tablet 0     Sig: Take 1 tablet by mouth 3 times daily as needed (muscle spasm)    amLODIPine (NORVASC) 2.5 MG tablet 90 tablet 1     Sig: Take 1 tablet by mouth daily       Last office visit:  06/17/2024  Next office visit DMA: Visit date not found  
Patient/Caregiver provided printed discharge information.

## 2024-11-14 ENCOUNTER — APPOINTMENT (OUTPATIENT)
Dept: CT IMAGING | Facility: CLINIC | Age: 49
End: 2024-11-14
Payer: MEDICAID

## 2024-11-14 PROCEDURE — 74177 CT ABD & PELVIS W/CONTRAST: CPT

## 2024-12-13 ENCOUNTER — APPOINTMENT (OUTPATIENT)
Dept: COLORECTAL SURGERY | Facility: CLINIC | Age: 49
End: 2024-12-13
Payer: MEDICAID

## 2024-12-13 DIAGNOSIS — C18.9 MALIGNANT NEOPLASM OF COLON, UNSPECIFIED: ICD-10-CM

## 2024-12-13 DIAGNOSIS — C20 MALIGNANT NEOPLASM OF RECTUM: ICD-10-CM

## 2024-12-13 PROCEDURE — 99213 OFFICE O/P EST LOW 20 MIN: CPT | Mod: 25

## 2024-12-13 PROCEDURE — 45330 DIAGNOSTIC SIGMOIDOSCOPY: CPT

## 2025-01-15 NOTE — ED PROVIDER NOTE - CPE EDP SKIN NORM
A CEE has been scheduled for Raul with Dr. Vazquez on 4/2/25. I told patient to call us if he has any questions or concerns.    normal...

## 2025-01-16 ENCOUNTER — APPOINTMENT (OUTPATIENT)
Dept: FAMILY MEDICINE | Facility: CLINIC | Age: 50
End: 2025-01-16
Payer: MEDICAID

## 2025-01-16 VITALS
BODY MASS INDEX: 22.33 KG/M2 | HEIGHT: 65 IN | OXYGEN SATURATION: 96 % | DIASTOLIC BLOOD PRESSURE: 86 MMHG | SYSTOLIC BLOOD PRESSURE: 120 MMHG | TEMPERATURE: 98.2 F | WEIGHT: 134 LBS | HEART RATE: 83 BPM

## 2025-01-16 DIAGNOSIS — Z13.29 ENCOUNTER FOR SCREENING FOR OTHER SUSPECTED ENDOCRINE DISORDER: ICD-10-CM

## 2025-01-16 DIAGNOSIS — D64.9 ANEMIA, UNSPECIFIED: ICD-10-CM

## 2025-01-16 DIAGNOSIS — C18.9 MALIGNANT NEOPLASM OF COLON, UNSPECIFIED: ICD-10-CM

## 2025-01-16 DIAGNOSIS — Z13.1 ENCOUNTER FOR SCREENING FOR DIABETES MELLITUS: ICD-10-CM

## 2025-01-16 DIAGNOSIS — F31.9 BIPOLAR DISORDER, UNSPECIFIED: ICD-10-CM

## 2025-01-16 DIAGNOSIS — Z13.220 ENCOUNTER FOR SCREENING FOR LIPOID DISORDERS: ICD-10-CM

## 2025-01-16 DIAGNOSIS — Z86.79 PERSONAL HISTORY OF OTHER DISEASES OF THE CIRCULATORY SYSTEM: ICD-10-CM

## 2025-01-16 PROCEDURE — 99214 OFFICE O/P EST MOD 30 MIN: CPT | Mod: 25

## 2025-01-17 LAB
CHOLEST SERPL-MCNC: 143 MG/DL
ESTIMATED AVERAGE GLUCOSE: 105 MG/DL
HBA1C MFR BLD HPLC: 5.3 %
HDLC SERPL-MCNC: 45 MG/DL
LDLC SERPL CALC-MCNC: 84 MG/DL
NONHDLC SERPL-MCNC: 97 MG/DL
TRIGL SERPL-MCNC: 62 MG/DL
TSH SERPL-ACNC: 1.16 UIU/ML

## 2025-01-28 ENCOUNTER — APPOINTMENT (OUTPATIENT)
Dept: UROLOGY | Facility: CLINIC | Age: 50
End: 2025-01-28
Payer: MEDICAID

## 2025-01-28 ENCOUNTER — NON-APPOINTMENT (OUTPATIENT)
Age: 50
End: 2025-01-28

## 2025-01-28 DIAGNOSIS — N52.9 MALE ERECTILE DYSFUNCTION, UNSPECIFIED: ICD-10-CM

## 2025-01-28 PROCEDURE — G2211 COMPLEX E/M VISIT ADD ON: CPT | Mod: NC

## 2025-01-28 PROCEDURE — 99213 OFFICE O/P EST LOW 20 MIN: CPT

## 2025-01-29 LAB
PSA FREE FLD-MCNC: 15 %
PSA FREE SERPL-MCNC: 0.07 NG/ML
PSA SERPL-MCNC: 0.47 NG/ML

## 2025-01-30 NOTE — PATIENT PROFILE ADULT - FALL HARM RISK - FALL HARM RISK
----- Message from Millie sent at 1/27/2025  2:05 PM CST -----  Who Called: Charli Alonso    Caller is requesting assistance/information from provider's office.    Pt is calling to speak with nurse about medicine she got today from another appt.       Preferred Method of Contact: Phone Call  Patient's Preferred Phone Number on File: [phone]   Best Call Back Number, if different:  Additional Information:   No indicators present

## 2025-02-25 RX ORDER — LOPERAMIDE HYDROCHLORIDE 2 MG/1
2 TABLET ORAL
Qty: 240 | Refills: 3 | Status: ACTIVE | COMMUNITY
Start: 2025-02-25 | End: 1900-01-01

## 2025-03-14 ENCOUNTER — APPOINTMENT (OUTPATIENT)
Dept: COLORECTAL SURGERY | Facility: CLINIC | Age: 50
End: 2025-03-14
Payer: MEDICAID

## 2025-03-14 ENCOUNTER — APPOINTMENT (OUTPATIENT)
Dept: COLORECTAL SURGERY | Facility: CLINIC | Age: 50
End: 2025-03-14

## 2025-03-14 DIAGNOSIS — C18.9 MALIGNANT NEOPLASM OF COLON, UNSPECIFIED: ICD-10-CM

## 2025-03-14 DIAGNOSIS — C20 MALIGNANT NEOPLASM OF RECTUM: ICD-10-CM

## 2025-03-14 PROCEDURE — 45330 DIAGNOSTIC SIGMOIDOSCOPY: CPT

## 2025-03-31 ENCOUNTER — APPOINTMENT (OUTPATIENT)
Dept: UROLOGY | Facility: CLINIC | Age: 50
End: 2025-03-31
Payer: MEDICAID

## 2025-03-31 VITALS
WEIGHT: 133 LBS | OXYGEN SATURATION: 93 % | DIASTOLIC BLOOD PRESSURE: 73 MMHG | HEART RATE: 90 BPM | TEMPERATURE: 97.74 F | HEIGHT: 65 IN | BODY MASS INDEX: 22.16 KG/M2 | SYSTOLIC BLOOD PRESSURE: 102 MMHG | RESPIRATION RATE: 16 BRPM

## 2025-03-31 DIAGNOSIS — N52.9 MALE ERECTILE DYSFUNCTION, UNSPECIFIED: ICD-10-CM

## 2025-03-31 PROCEDURE — G2211 COMPLEX E/M VISIT ADD ON: CPT | Mod: NC

## 2025-03-31 PROCEDURE — 99214 OFFICE O/P EST MOD 30 MIN: CPT

## 2025-03-31 RX ORDER — TADALAFIL 20 MG/1
20 TABLET ORAL
Qty: 30 | Refills: 6 | Status: ACTIVE | COMMUNITY
Start: 2025-03-31 | End: 1900-01-01

## 2025-04-15 ENCOUNTER — APPOINTMENT (OUTPATIENT)
Dept: FAMILY MEDICINE | Facility: CLINIC | Age: 50
End: 2025-04-15
Payer: MEDICAID

## 2025-04-15 ENCOUNTER — NON-APPOINTMENT (OUTPATIENT)
Age: 50
End: 2025-04-15

## 2025-04-15 VITALS
TEMPERATURE: 98.5 F | SYSTOLIC BLOOD PRESSURE: 118 MMHG | HEART RATE: 96 BPM | WEIGHT: 130 LBS | BODY MASS INDEX: 21.66 KG/M2 | DIASTOLIC BLOOD PRESSURE: 83 MMHG | OXYGEN SATURATION: 95 % | HEIGHT: 65 IN

## 2025-04-15 DIAGNOSIS — C18.9 MALIGNANT NEOPLASM OF COLON, UNSPECIFIED: ICD-10-CM

## 2025-04-15 DIAGNOSIS — N52.9 MALE ERECTILE DYSFUNCTION, UNSPECIFIED: ICD-10-CM

## 2025-04-15 DIAGNOSIS — D64.9 ANEMIA, UNSPECIFIED: ICD-10-CM

## 2025-04-15 DIAGNOSIS — C20 MALIGNANT NEOPLASM OF RECTUM: ICD-10-CM

## 2025-04-15 DIAGNOSIS — Z13.6 ENCOUNTER FOR SCREENING FOR CARDIOVASCULAR DISORDERS: ICD-10-CM

## 2025-04-15 DIAGNOSIS — R94.31 ABNORMAL ELECTROCARDIOGRAM [ECG] [EKG]: ICD-10-CM

## 2025-04-15 DIAGNOSIS — Z00.00 ENCOUNTER FOR GENERAL ADULT MEDICAL EXAMINATION W/OUT ABNORMAL FINDINGS: ICD-10-CM

## 2025-04-15 DIAGNOSIS — F31.9 BIPOLAR DISORDER, UNSPECIFIED: ICD-10-CM

## 2025-04-15 PROCEDURE — 99396 PREV VISIT EST AGE 40-64: CPT | Mod: 25

## 2025-04-15 PROCEDURE — 93000 ELECTROCARDIOGRAM COMPLETE: CPT

## 2025-05-14 ENCOUNTER — APPOINTMENT (OUTPATIENT)
Dept: CT IMAGING | Facility: CLINIC | Age: 50
End: 2025-05-14
Payer: MEDICAID

## 2025-05-14 PROCEDURE — 74177 CT ABD & PELVIS W/CONTRAST: CPT

## 2025-05-21 ENCOUNTER — APPOINTMENT (OUTPATIENT)
Dept: CARDIOLOGY | Facility: CLINIC | Age: 50
End: 2025-05-21

## 2025-06-03 ENCOUNTER — APPOINTMENT (OUTPATIENT)
Dept: COLORECTAL SURGERY | Facility: CLINIC | Age: 50
End: 2025-06-03
Payer: MEDICAID

## 2025-06-03 DIAGNOSIS — C20 MALIGNANT NEOPLASM OF RECTUM: ICD-10-CM

## 2025-06-03 DIAGNOSIS — C18.9 MALIGNANT NEOPLASM OF COLON, UNSPECIFIED: ICD-10-CM

## 2025-06-03 PROCEDURE — 45378 DIAGNOSTIC COLONOSCOPY: CPT

## 2025-07-22 NOTE — ED PROVIDER NOTE - TEMPLATE, MLM
Psych/Behavioral Patient requests all Lab, Cardiology, and Radiology Results on their Discharge Instructions

## (undated) DEVICE — DRAPE GYN W LEGGINGS 3X125"

## (undated) DEVICE — WARMING BLANKET FULL ADULT

## (undated) DEVICE — DRSG TEGADERM 6"X8"

## (undated) DEVICE — TUBING IV SET GRAVITY 1Y 78" MACRO

## (undated) DEVICE — GLV 7 PROTEXIS (WHITE)

## (undated) DEVICE — GLV 7.5 PROTEXIS (WHITE)

## (undated) DEVICE — DRSG OPSITE 13.75 X 4"

## (undated) DEVICE — ELCTR BOVIE PENCIL SMOKE EVACUATION

## (undated) DEVICE — MARKING PEN W RULER

## (undated) DEVICE — POSITIONER FOAM HEADREST (PINK)

## (undated) DEVICE — WARMING BLANKET UPPER ADULT

## (undated) DEVICE — SUT POLYSORB 3-0 30" V-20 UNDYED

## (undated) DEVICE — GLV 8 PROTEXIS (WHITE)

## (undated) DEVICE — DRSG STERISTRIPS 0.5 X 4"

## (undated) DEVICE — PACK MAJOR ABDOMINAL SUPINE

## (undated) DEVICE — PREP BETADINE KIT

## (undated) DEVICE — GOWN LG

## (undated) DEVICE — XI ARM FORCEP FENESTRATED BIPOLAR 8MM

## (undated) DEVICE — Device

## (undated) DEVICE — XI DRAPE COLUMN

## (undated) DEVICE — DRSG CURITY GAUZE SPONGE 4 X 4" 12-PLY NON-STERILE

## (undated) DEVICE — DRAPE TOWEL BLUE 17" X 24"

## (undated) DEVICE — SUT SOFSILK 2-0 18" V-20 (POP-OFF)

## (undated) DEVICE — VENODYNE/SCD SLEEVE CALF MEDIUM

## (undated) DEVICE — DRSG 2X2

## (undated) DEVICE — BITE BLOCK ADULT 20 X 27MM (GREEN)

## (undated) DEVICE — GOWN XXL

## (undated) DEVICE — ELCTR ECG CONDUCTIVE ADHESIVE

## (undated) DEVICE — ANESTHESIA CIRCUIT ADULT HMEF

## (undated) DEVICE — PACK IV START WITH CHG

## (undated) DEVICE — BASIN EMESIS 10IN GRADUATED MAUVE

## (undated) DEVICE — NDL HYPO SAFE 22G X 1" (BLACK)

## (undated) DEVICE — PREP CHLORAPREP HI-LITE ORANGE 26ML

## (undated) DEVICE — TROCAR SURGIQUEST AIRSEAL 5MM X 100MM

## (undated) DEVICE — XI STAPLER SUREFORM 45

## (undated) DEVICE — PACK BASIN SPECIAL PROCEDURE

## (undated) DEVICE — SOL IRR BAG H2O 3000ML

## (undated) DEVICE — SOL IRR POUR H2O 250ML

## (undated) DEVICE — BLADE SCALPEL SAFETYLOCK #15

## (undated) DEVICE — LIGASURE SMALL JAW

## (undated) DEVICE — DRAPE MAYO STAND 30"

## (undated) DEVICE — DRAIN PENROSE .25" X 18" LATEX

## (undated) DEVICE — LUBRICATING JELLY ONESHOT 1.25OZ

## (undated) DEVICE — SUT PDS II 1 54" TP-1

## (undated) DEVICE — XI VESSEL SEALER

## (undated) DEVICE — XI DRAPE ARM

## (undated) DEVICE — PACK COLON BUNDLE

## (undated) DEVICE — DRSG OPSITE 2.5 X 2"

## (undated) DEVICE — MEDICATION LABELS W MARKER

## (undated) DEVICE — NDL INJ SCLERO INTERJECT 23G

## (undated) DEVICE — DRAPE 1/2 SHEET 40X57"

## (undated) DEVICE — XI OBTURATOR OPTICAL BLADELESS 8MM

## (undated) DEVICE — GOWN TRIMAX LG

## (undated) DEVICE — XI ARM SCISSOR MONO CURVED

## (undated) DEVICE — GLV 8.5 PROTEXIS (WHITE)

## (undated) DEVICE — TROCAR APPLIED MEDICAL KII BALLOON BLUNT TIP 12MM X 100MM

## (undated) DEVICE — BLADE SCALPEL SAFETYLOCK #10

## (undated) DEVICE — LIGASURE BLUNT TIP 37CM

## (undated) DEVICE — LABELS BLANK W PEN

## (undated) DEVICE — GLV 6.5 PROTEXIS (WHITE)

## (undated) DEVICE — ENDOCATCH 10MM SPECIMEN POUCH

## (undated) DEVICE — DRSG BANDAID 0.75X3"

## (undated) DEVICE — SUT BIOSYN 4-0 18" P-12

## (undated) DEVICE — LINE BREATHE SAMPLNG

## (undated) DEVICE — CONTAINER FORMALIN 10% 20ML

## (undated) DEVICE — DRAPE IOBAN 23" X 23"

## (undated) DEVICE — DRAPE EQUIPMENT BANDED BAG 30 X 30" (SHOWER CAP)

## (undated) DEVICE — XI ARM FORCEP CADIERE 8MM

## (undated) DEVICE — SYR LUER LOK 3CC

## (undated) DEVICE — DRAPE LINGEMAN TUR

## (undated) DEVICE — DRAPE BACK TABLE COVER HEAVY DUTY 60"

## (undated) DEVICE — TRAP SUCTION POLYP ENDODYNAMIC

## (undated) DEVICE — DENTURE CUP PINK

## (undated) DEVICE — APPLICATOR SURGICEL LAP TROCAR POINT 2.5MM X 150MM

## (undated) DEVICE — TUBING STRYKEFLOW II SUCTION / IRRIGATOR

## (undated) DEVICE — ADAPTER URETHRAL CATH CONNECTING

## (undated) DEVICE — FOLEY TRAY 16FR 5CC LTX UMETER CLOSED

## (undated) DEVICE — SOL IRR BAG NS 0.9% 3000ML

## (undated) DEVICE — SOL INJ NS 0.9% 500ML 1-PORT

## (undated) DEVICE — DRAPE LIGHT HANDLE COVER (GREEN)

## (undated) DEVICE — SOL IRR POUR NS 0.9% 500ML

## (undated) DEVICE — XI TIP COVER

## (undated) DEVICE — TUBING THERMADX UROLOGY

## (undated) DEVICE — ACMI SELF-SEALING SEAL UP TO 7FR

## (undated) DEVICE — D HELP - CLEARVIEW CLEARIFY SYSTEM

## (undated) DEVICE — BIOPSY FORCEP COLD DISP

## (undated) DEVICE — LUBRICANT INST ELECTROLUBE Z SOLUTION

## (undated) DEVICE — DRAPE INSTRUMENT POUCH 6.75" X 11"

## (undated) DEVICE — TUBING IV SET GRAVITY 3Y 100" MACRO

## (undated) DEVICE — SPECIMEN CONTAINER 100ML

## (undated) DEVICE — BIOPSY FORCEP RADIAL JAW 4 STANDARD WITH NEEDLE

## (undated) DEVICE — POSITIONER FOAM EGG CRATE ULNAR 2PCS (PINK)

## (undated) DEVICE — SUT SOFSILK 3-0 18" V-20 (POP-OFF)

## (undated) DEVICE — LUBRICATING JELLY HR ONE SHOT 3G

## (undated) DEVICE — POSITIONER PINK PAD PIGAZZI SYSTEM

## (undated) DEVICE — POSITIONER PURPLE ARM ONE STEP (LARGE)

## (undated) DEVICE — ELCTR GROUNDING PAD ADULT COVIDIEN

## (undated) DEVICE — SNARE CAPTIVATOR COLD RND STIFF 10X2.4X2.8MM 240CM

## (undated) DEVICE — TUBING SUCTION NONCONDUCTIVE 6MM X 12FT

## (undated) DEVICE — TUBING MEDI-VAC W MAXIGRIP CONNECTORS 1/4"X6'

## (undated) DEVICE — CATH IV SAFE BC 22G X 1" (BLUE)

## (undated) DEVICE — DRSG CURITY GAUZE SPONGE 4 X 4" 12-PLY

## (undated) DEVICE — DRAPE 3/4 SHEET W REINFORCEMENT 56X77"

## (undated) DEVICE — STAPLER SKIN VISI-STAT 35 WIDE

## (undated) DEVICE — MARKER ENDO SPOT EX

## (undated) DEVICE — LIGASURE IMPACT

## (undated) DEVICE — TUBING ENDO EXT OLYMPUS 160 24HR USE GI

## (undated) DEVICE — SUT SOFSILK 3-0 30" V-20

## (undated) DEVICE — XI STAPLER SUREFORM 60

## (undated) DEVICE — CATH NG SALEM SUMP 16FR

## (undated) DEVICE — TROCAR COVIDIEN VERSAPORT BLADELESS OPTICAL 5MM STANDARD

## (undated) DEVICE — DRAPE ROBOTIC

## (undated) DEVICE — FORMALIN CUPS 10% BUFFERED

## (undated) DEVICE — SALIVA EJECTOR (BLUE)

## (undated) DEVICE — SOLIDIFIER 1200CC

## (undated) DEVICE — STAPLER COVIDIEN ENDO GIA STANDARD HANDLE

## (undated) DEVICE — UNDERPAD LINEN SAVER 17 X 24"

## (undated) DEVICE — OSTOMY KIT 2-PIECE 2.25" NS (RED)

## (undated) DEVICE — PACK CYSTO

## (undated) DEVICE — GLV 7.5 PROTEXIS (BLUE)